# Patient Record
Sex: FEMALE | Race: WHITE | Employment: UNEMPLOYED | URBAN - METROPOLITAN AREA
[De-identification: names, ages, dates, MRNs, and addresses within clinical notes are randomized per-mention and may not be internally consistent; named-entity substitution may affect disease eponyms.]

---

## 2019-05-08 ENCOUNTER — ANESTHESIA (OUTPATIENT)
Dept: OPERATING ROOM | Age: 49
End: 2019-05-08
Payer: COMMERCIAL

## 2019-05-08 ENCOUNTER — HOSPITAL ENCOUNTER (OUTPATIENT)
Age: 49
Setting detail: OUTPATIENT SURGERY
Discharge: HOME | End: 2019-05-08
Attending: ORTHOPAEDIC SURGERY | Admitting: ORTHOPAEDIC SURGERY
Payer: COMMERCIAL

## 2019-05-08 ENCOUNTER — HOSPITAL ENCOUNTER (OUTPATIENT)
Dept: GENERAL RADIOLOGY | Age: 49
Setting detail: OUTPATIENT SURGERY
Discharge: HOME | End: 2019-05-10
Attending: ORTHOPAEDIC SURGERY
Payer: COMMERCIAL

## 2019-05-08 ENCOUNTER — ANESTHESIA EVENT (OUTPATIENT)
Dept: OPERATING ROOM | Age: 49
End: 2019-05-08
Payer: COMMERCIAL

## 2019-05-08 VITALS
TEMPERATURE: 96.4 F | SYSTOLIC BLOOD PRESSURE: 130 MMHG | OXYGEN SATURATION: 100 % | DIASTOLIC BLOOD PRESSURE: 65 MMHG | RESPIRATION RATE: 12 BRPM

## 2019-05-08 VITALS
HEART RATE: 78 BPM | RESPIRATION RATE: 20 BRPM | OXYGEN SATURATION: 98 % | TEMPERATURE: 97.3 F | HEIGHT: 62 IN | WEIGHT: 144 LBS | SYSTOLIC BLOOD PRESSURE: 122 MMHG | DIASTOLIC BLOOD PRESSURE: 66 MMHG | BODY MASS INDEX: 26.5 KG/M2

## 2019-05-08 DIAGNOSIS — R52 PAIN: ICD-10-CM

## 2019-05-08 LAB
ANION GAP SERPL CALCULATED.3IONS-SCNC: 13 MEQ/L (ref 9–15)
BASOPHILS ABSOLUTE: 0 K/UL (ref 0–0.2)
BASOPHILS RELATIVE PERCENT: 0.6 %
BUN BLDV-MCNC: 32 MG/DL (ref 6–20)
CALCIUM SERPL-MCNC: 9 MG/DL (ref 8.5–9.9)
CHLORIDE BLD-SCNC: 105 MEQ/L (ref 95–107)
CO2: 23 MEQ/L (ref 20–31)
CREAT SERPL-MCNC: 0.85 MG/DL (ref 0.5–0.9)
EKG ATRIAL RATE: 83 BPM
EKG P AXIS: 27 DEGREES
EKG P-R INTERVAL: 170 MS
EKG Q-T INTERVAL: 392 MS
EKG QRS DURATION: 78 MS
EKG QTC CALCULATION (BAZETT): 460 MS
EKG R AXIS: 27 DEGREES
EKG T AXIS: 51 DEGREES
EKG VENTRICULAR RATE: 83 BPM
EOSINOPHILS ABSOLUTE: 0.2 K/UL (ref 0–0.7)
EOSINOPHILS RELATIVE PERCENT: 3.8 %
GFR AFRICAN AMERICAN: >60
GFR NON-AFRICAN AMERICAN: >60
GLUCOSE BLD-MCNC: 235 MG/DL (ref 70–99)
GLUCOSE BLD-MCNC: 240 MG/DL (ref 60–115)
GLUCOSE BLD-MCNC: 246 MG/DL (ref 60–115)
HCT VFR BLD CALC: 34.5 % (ref 37–47)
HEMOGLOBIN: 11.4 G/DL (ref 12–16)
LYMPHOCYTES ABSOLUTE: 1.7 K/UL (ref 1–4.8)
LYMPHOCYTES RELATIVE PERCENT: 37.8 %
MCH RBC QN AUTO: 31.3 PG (ref 27–31.3)
MCHC RBC AUTO-ENTMCNC: 33.1 % (ref 33–37)
MCV RBC AUTO: 94.5 FL (ref 82–100)
MONOCYTES ABSOLUTE: 0.2 K/UL (ref 0.2–0.8)
MONOCYTES RELATIVE PERCENT: 5.1 %
NEUTROPHILS ABSOLUTE: 2.4 K/UL (ref 1.4–6.5)
NEUTROPHILS RELATIVE PERCENT: 52.7 %
PDW BLD-RTO: 12.9 % (ref 11.5–14.5)
PERFORMED ON: ABNORMAL
PERFORMED ON: ABNORMAL
PLATELET # BLD: 240 K/UL (ref 130–400)
POTASSIUM SERPL-SCNC: 4.6 MEQ/L (ref 3.4–4.9)
RBC # BLD: 3.65 M/UL (ref 4.2–5.4)
SODIUM BLD-SCNC: 141 MEQ/L (ref 135–144)
WBC # BLD: 4.5 K/UL (ref 4.8–10.8)

## 2019-05-08 PROCEDURE — 3700000001 HC ADD 15 MINUTES (ANESTHESIA): Performed by: ORTHOPAEDIC SURGERY

## 2019-05-08 PROCEDURE — 64415 NJX AA&/STRD BRCH PLXS IMG: CPT | Performed by: ANESTHESIOLOGY

## 2019-05-08 PROCEDURE — 6360000002 HC RX W HCPCS: Performed by: ANESTHESIOLOGY

## 2019-05-08 PROCEDURE — 2580000003 HC RX 258: Performed by: ORTHOPAEDIC SURGERY

## 2019-05-08 PROCEDURE — 2709999900 HC NON-CHARGEABLE SUPPLY: Performed by: ORTHOPAEDIC SURGERY

## 2019-05-08 PROCEDURE — 7100000001 HC PACU RECOVERY - ADDTL 15 MIN: Performed by: ORTHOPAEDIC SURGERY

## 2019-05-08 PROCEDURE — 93005 ELECTROCARDIOGRAM TRACING: CPT

## 2019-05-08 PROCEDURE — 3600000013 HC SURGERY LEVEL 3 ADDTL 15MIN: Performed by: ORTHOPAEDIC SURGERY

## 2019-05-08 PROCEDURE — 3209999900 FLUORO FOR SURGICAL PROCEDURES

## 2019-05-08 PROCEDURE — C1713 ANCHOR/SCREW BN/BN,TIS/BN: HCPCS | Performed by: ORTHOPAEDIC SURGERY

## 2019-05-08 PROCEDURE — 80048 BASIC METABOLIC PNL TOTAL CA: CPT

## 2019-05-08 PROCEDURE — 85025 COMPLETE CBC W/AUTO DIFF WBC: CPT

## 2019-05-08 PROCEDURE — 3700000000 HC ANESTHESIA ATTENDED CARE: Performed by: ORTHOPAEDIC SURGERY

## 2019-05-08 PROCEDURE — 2720000010 HC SURG SUPPLY STERILE: Performed by: ORTHOPAEDIC SURGERY

## 2019-05-08 PROCEDURE — 6360000002 HC RX W HCPCS: Performed by: ORTHOPAEDIC SURGERY

## 2019-05-08 PROCEDURE — 6360000002 HC RX W HCPCS: Performed by: NURSE ANESTHETIST, CERTIFIED REGISTERED

## 2019-05-08 PROCEDURE — 3600000003 HC SURGERY LEVEL 3 BASE: Performed by: ORTHOPAEDIC SURGERY

## 2019-05-08 PROCEDURE — 7100000010 HC PHASE II RECOVERY - FIRST 15 MIN: Performed by: ORTHOPAEDIC SURGERY

## 2019-05-08 PROCEDURE — 7100000000 HC PACU RECOVERY - FIRST 15 MIN: Performed by: ORTHOPAEDIC SURGERY

## 2019-05-08 PROCEDURE — 2500000003 HC RX 250 WO HCPCS: Performed by: NURSE ANESTHETIST, CERTIFIED REGISTERED

## 2019-05-08 PROCEDURE — 7100000011 HC PHASE II RECOVERY - ADDTL 15 MIN: Performed by: ORTHOPAEDIC SURGERY

## 2019-05-08 DEVICE — PLATE BNE L45MM 6X2 H L VOLAR DST RAD S STL VAR ANG LOK: Type: IMPLANTABLE DEVICE | Site: WRIST | Status: FUNCTIONAL

## 2019-05-08 DEVICE — SCREW BNE L12MM DIA24MM CORT S STL ST T8 STARDRV RECESS: Type: IMPLANTABLE DEVICE | Site: WRIST | Status: FUNCTIONAL

## 2019-05-08 DEVICE — SCREW BNE L18MM DIA2.4MM S STL ST LOK FULL THRD T8 STARDRV: Type: IMPLANTABLE DEVICE | Site: WRIST | Status: FUNCTIONAL

## 2019-05-08 DEVICE — SCREW BNE L16MM DIA2.4MM S STL ST LOK FULL THRD T8 STARDRV: Type: IMPLANTABLE DEVICE | Site: WRIST | Status: FUNCTIONAL

## 2019-05-08 RX ORDER — PREDNISOLONE ACETATE 10 MG/ML
2 SUSPENSION/ DROPS OPHTHALMIC
COMMUNITY
Start: 2019-01-15

## 2019-05-08 RX ORDER — METOCLOPRAMIDE HYDROCHLORIDE 5 MG/ML
10 INJECTION INTRAMUSCULAR; INTRAVENOUS
Status: DISCONTINUED | OUTPATIENT
Start: 2019-05-08 | End: 2019-05-08 | Stop reason: HOSPADM

## 2019-05-08 RX ORDER — FENTANYL CITRATE 50 UG/ML
INJECTION, SOLUTION INTRAMUSCULAR; INTRAVENOUS PRN
Status: DISCONTINUED | OUTPATIENT
Start: 2019-05-08 | End: 2019-05-08 | Stop reason: SDUPTHER

## 2019-05-08 RX ORDER — LOPERAMIDE HYDROCHLORIDE 2 MG/1
2 CAPSULE ORAL
COMMUNITY
Start: 2018-05-07

## 2019-05-08 RX ORDER — LIDOCAINE HYDROCHLORIDE 10 MG/ML
1 INJECTION, SOLUTION EPIDURAL; INFILTRATION; INTRACAUDAL; PERINEURAL
Status: DISCONTINUED | OUTPATIENT
Start: 2019-05-08 | End: 2019-05-08 | Stop reason: HOSPADM

## 2019-05-08 RX ORDER — METOCLOPRAMIDE 10 MG/1
10 TABLET ORAL
COMMUNITY

## 2019-05-08 RX ORDER — LEVETIRACETAM 100 MG/ML
1000 SOLUTION ORAL
COMMUNITY
Start: 2018-10-29

## 2019-05-08 RX ORDER — MAGNESIUM HYDROXIDE 1200 MG/15ML
LIQUID ORAL CONTINUOUS PRN
Status: COMPLETED | OUTPATIENT
Start: 2019-05-08 | End: 2019-05-08

## 2019-05-08 RX ORDER — ROPIVACAINE HYDROCHLORIDE 5 MG/ML
INJECTION, SOLUTION EPIDURAL; INFILTRATION; PERINEURAL PRN
Status: DISCONTINUED | OUTPATIENT
Start: 2019-05-08 | End: 2019-05-08 | Stop reason: SDUPTHER

## 2019-05-08 RX ORDER — PROPOFOL 10 MG/ML
INJECTION, EMULSION INTRAVENOUS PRN
Status: DISCONTINUED | OUTPATIENT
Start: 2019-05-08 | End: 2019-05-08 | Stop reason: SDUPTHER

## 2019-05-08 RX ORDER — INSULIN GLARGINE 100 [IU]/ML
30 INJECTION, SOLUTION SUBCUTANEOUS
COMMUNITY
Start: 2018-06-23

## 2019-05-08 RX ORDER — SODIUM CHLORIDE, SODIUM LACTATE, POTASSIUM CHLORIDE, CALCIUM CHLORIDE 600; 310; 30; 20 MG/100ML; MG/100ML; MG/100ML; MG/100ML
INJECTION, SOLUTION INTRAVENOUS CONTINUOUS
Status: DISCONTINUED | OUTPATIENT
Start: 2019-05-08 | End: 2019-05-08 | Stop reason: HOSPADM

## 2019-05-08 RX ORDER — DIPHENHYDRAMINE HYDROCHLORIDE 50 MG/ML
12.5 INJECTION INTRAMUSCULAR; INTRAVENOUS
Status: DISCONTINUED | OUTPATIENT
Start: 2019-05-08 | End: 2019-05-08 | Stop reason: HOSPADM

## 2019-05-08 RX ORDER — ONDANSETRON 2 MG/ML
4 INJECTION INTRAMUSCULAR; INTRAVENOUS
Status: DISCONTINUED | OUTPATIENT
Start: 2019-05-08 | End: 2019-05-08 | Stop reason: HOSPADM

## 2019-05-08 RX ORDER — POLYETHYLENE GLYCOL 3350 17 G/17G
17 POWDER, FOR SOLUTION ORAL
COMMUNITY
Start: 2018-03-28

## 2019-05-08 RX ORDER — FENTANYL CITRATE 50 UG/ML
50 INJECTION, SOLUTION INTRAMUSCULAR; INTRAVENOUS EVERY 10 MIN PRN
Status: DISCONTINUED | OUTPATIENT
Start: 2019-05-08 | End: 2019-05-08 | Stop reason: HOSPADM

## 2019-05-08 RX ORDER — SODIUM CHLORIDE 0.9 % (FLUSH) 0.9 %
10 SYRINGE (ML) INJECTION PRN
Status: DISCONTINUED | OUTPATIENT
Start: 2019-05-08 | End: 2019-05-08 | Stop reason: HOSPADM

## 2019-05-08 RX ORDER — ZONISAMIDE 100 MG/1
CAPSULE ORAL
COMMUNITY
Start: 2018-10-29

## 2019-05-08 RX ORDER — HYDROCODONE BITARTRATE AND ACETAMINOPHEN 5; 325 MG/1; MG/1
2 TABLET ORAL PRN
Status: DISCONTINUED | OUTPATIENT
Start: 2019-05-08 | End: 2019-05-08 | Stop reason: HOSPADM

## 2019-05-08 RX ORDER — MIDAZOLAM HYDROCHLORIDE 1 MG/ML
INJECTION INTRAMUSCULAR; INTRAVENOUS PRN
Status: DISCONTINUED | OUTPATIENT
Start: 2019-05-08 | End: 2019-05-08 | Stop reason: SDUPTHER

## 2019-05-08 RX ORDER — FAMOTIDINE 20 MG/1
20 TABLET, FILM COATED ORAL
COMMUNITY
Start: 2019-01-04

## 2019-05-08 RX ORDER — LIDOCAINE HYDROCHLORIDE 20 MG/ML
INJECTION, SOLUTION INFILTRATION; PERINEURAL PRN
Status: DISCONTINUED | OUTPATIENT
Start: 2019-05-08 | End: 2019-05-08 | Stop reason: SDUPTHER

## 2019-05-08 RX ORDER — ONDANSETRON 2 MG/ML
INJECTION INTRAMUSCULAR; INTRAVENOUS PRN
Status: DISCONTINUED | OUTPATIENT
Start: 2019-05-08 | End: 2019-05-08 | Stop reason: SDUPTHER

## 2019-05-08 RX ORDER — HYDROCODONE BITARTRATE AND ACETAMINOPHEN 5; 325 MG/1; MG/1
1 TABLET ORAL PRN
Status: DISCONTINUED | OUTPATIENT
Start: 2019-05-08 | End: 2019-05-08 | Stop reason: HOSPADM

## 2019-05-08 RX ORDER — CLONAZEPAM 0.5 MG/1
TABLET ORAL
COMMUNITY
Start: 2019-04-08 | End: 2019-09-16

## 2019-05-08 RX ORDER — LACOSAMIDE 150 MG/1
300 TABLET ORAL
COMMUNITY
Start: 2018-10-29

## 2019-05-08 RX ORDER — ONDANSETRON HYDROCHLORIDE 4 MG/5ML
4 SOLUTION ORAL
COMMUNITY
Start: 2019-02-06

## 2019-05-08 RX ORDER — MEPERIDINE HYDROCHLORIDE 25 MG/ML
12.5 INJECTION INTRAMUSCULAR; INTRAVENOUS; SUBCUTANEOUS EVERY 5 MIN PRN
Status: DISCONTINUED | OUTPATIENT
Start: 2019-05-08 | End: 2019-05-08 | Stop reason: HOSPADM

## 2019-05-08 RX ORDER — SODIUM CHLORIDE 0.9 % (FLUSH) 0.9 %
10 SYRINGE (ML) INJECTION EVERY 12 HOURS SCHEDULED
Status: DISCONTINUED | OUTPATIENT
Start: 2019-05-08 | End: 2019-05-08 | Stop reason: HOSPADM

## 2019-05-08 RX ORDER — FERROUS SULFATE 325(65) MG
330 TABLET ORAL
COMMUNITY

## 2019-05-08 RX ORDER — OMEPRAZOLE 20 MG/1
20 CAPSULE, DELAYED RELEASE ORAL
COMMUNITY

## 2019-05-08 RX ADMIN — PROPOFOL 150 MG: 10 INJECTION, EMULSION INTRAVENOUS at 11:57

## 2019-05-08 RX ADMIN — PHENYLEPHRINE HYDROCHLORIDE 100 MCG: 10 INJECTION INTRAVENOUS at 12:07

## 2019-05-08 RX ADMIN — ROPIVACAINE HYDROCHLORIDE 40 ML: 5 INJECTION, SOLUTION EPIDURAL; INFILTRATION; PERINEURAL at 11:14

## 2019-05-08 RX ADMIN — PHENYLEPHRINE HYDROCHLORIDE 50 MCG: 10 INJECTION INTRAVENOUS at 12:32

## 2019-05-08 RX ADMIN — PHENYLEPHRINE HYDROCHLORIDE 50 MCG: 10 INJECTION INTRAVENOUS at 12:26

## 2019-05-08 RX ADMIN — PHENYLEPHRINE HYDROCHLORIDE 100 MCG: 10 INJECTION INTRAVENOUS at 12:10

## 2019-05-08 RX ADMIN — LIDOCAINE HYDROCHLORIDE 40 MG: 20 INJECTION, SOLUTION INFILTRATION; PERINEURAL at 11:57

## 2019-05-08 RX ADMIN — FENTANYL CITRATE 100 MCG: 50 INJECTION, SOLUTION INTRAMUSCULAR; INTRAVENOUS at 11:10

## 2019-05-08 RX ADMIN — MIDAZOLAM HYDROCHLORIDE 2 MG: 1 INJECTION, SOLUTION INTRAMUSCULAR; INTRAVENOUS at 11:10

## 2019-05-08 RX ADMIN — Medication 2 G: at 11:57

## 2019-05-08 RX ADMIN — ONDANSETRON 4 MG: 2 INJECTION INTRAMUSCULAR; INTRAVENOUS at 12:48

## 2019-05-08 RX ADMIN — PHENYLEPHRINE HYDROCHLORIDE 50 MCG: 10 INJECTION INTRAVENOUS at 12:37

## 2019-05-08 SDOH — HEALTH STABILITY: MENTAL HEALTH: HOW OFTEN DO YOU HAVE A DRINK CONTAINING ALCOHOL?: NEVER

## 2019-05-08 ASSESSMENT — PULMONARY FUNCTION TESTS
PIF_VALUE: 3
PIF_VALUE: 3
PIF_VALUE: 6
PIF_VALUE: 5
PIF_VALUE: 5
PIF_VALUE: 0
PIF_VALUE: 21
PIF_VALUE: 10
PIF_VALUE: 5
PIF_VALUE: 10
PIF_VALUE: 3
PIF_VALUE: 10
PIF_VALUE: 4
PIF_VALUE: 10
PIF_VALUE: 1
PIF_VALUE: 6
PIF_VALUE: 5
PIF_VALUE: 5
PIF_VALUE: 10
PIF_VALUE: 1
PIF_VALUE: 1
PIF_VALUE: 3
PIF_VALUE: 12
PIF_VALUE: 12
PIF_VALUE: 1
PIF_VALUE: 3
PIF_VALUE: 6
PIF_VALUE: 5
PIF_VALUE: 10
PIF_VALUE: 10
PIF_VALUE: 6
PIF_VALUE: 3
PIF_VALUE: 5
PIF_VALUE: 8
PIF_VALUE: 6
PIF_VALUE: 1
PIF_VALUE: 1
PIF_VALUE: 5
PIF_VALUE: 3
PIF_VALUE: 5
PIF_VALUE: 5
PIF_VALUE: 6
PIF_VALUE: 12
PIF_VALUE: 5
PIF_VALUE: 10
PIF_VALUE: 2
PIF_VALUE: 5
PIF_VALUE: 5
PIF_VALUE: 10
PIF_VALUE: 3
PIF_VALUE: 10
PIF_VALUE: 5
PIF_VALUE: 3
PIF_VALUE: 10
PIF_VALUE: 12
PIF_VALUE: 5
PIF_VALUE: 12
PIF_VALUE: 19
PIF_VALUE: 12
PIF_VALUE: 5
PIF_VALUE: 6
PIF_VALUE: 3
PIF_VALUE: 6
PIF_VALUE: 3
PIF_VALUE: 6
PIF_VALUE: 10
PIF_VALUE: 3

## 2019-05-08 NOTE — ANESTHESIA PRE PROCEDURE
List:  There is no problem list on file for this patient. Past Medical History:        Diagnosis Date    Cerebral artery occlusion with cerebral infarction ([facility]     Diabetes mellitus ([facility]     Epilepsy ([facility]        Past Surgical History:        Procedure Laterality Date    APPENDECTOMY      CHOLECYSTECTOMY      EYE SURGERY      recent detach retina    GASTROSTOMY TUBE PLACEMENT      JOINT REPLACEMENT Right     infant       Social History:    Social History     Tobacco Use    Smoking status: Never Smoker    Smokeless tobacco: Never Used   Substance Use Topics    Alcohol use: Never     Frequency: Never                                Counseling given: Not Answered      Vital Signs (Current):   Vitals:    05/08/19 0916   BP: 118/72   Pulse: 83   Resp: 16   Temp: 97.2 °F (36.2 °C)   TempSrc: Temporal   SpO2: 100%   Weight: 144 lb (65.3 kg)   Height: 5' 2\" (1.575 m)                                              BP Readings from Last 3 Encounters:   05/08/19 118/72       NPO Status: Time of last liquid consumption: 1800                        Time of last solid consumption: 1800                        Date of last liquid consumption: 05/07/19                        Date of last solid food consumption: 05/07/19    BMI:   Wt Readings from Last 3 Encounters:   05/08/19 144 lb (65.3 kg)     Body mass index is 26.34 kg/m². CBC: No results found for: WBC, RBC, HGB, HCT, MCV, RDW, PLT    CMP: No results found for: NA, K, CL, CO2, BUN, CREATININE, GFRAA, AGRATIO, LABGLOM, GLUCOSE, PROT, CALCIUM, BILITOT, ALKPHOS, AST, ALT    POC Tests: No results for input(s): POCGLU, POCNA, POCK, POCCL, POCBUN, POCHEMO, POCHCT in the last 72 hours.     Coags: No results found for: PROTIME, INR, APTT    HCG (If Applicable): No results found for: PREGTESTUR, PREGSERUM, HCG, HCGQUANT     ABGs: No results found for: PHART, PO2ART, QBN5LQV, DQJ9EJC, BEART, J6IKCKWR     Type & Screen (If Applicable):  No results found for: LABABO, [address]    Anesthesia Evaluation  Patient summary reviewed and Nursing notes reviewed no history of anesthetic complications:   Airway: Mallampati: II  TM distance: >3 FB   Neck ROM: full  Mouth opening: > = 3 FB Dental: normal exam         Pulmonary:Negative Pulmonary ROS and normal exam                               Cardiovascular:Negative CV ROS          ECG reviewed                        Neuro/Psych:   (+) seizures:, CVA:,             GI/Hepatic/Renal: Neg GI/Hepatic/Renal ROS            Endo/Other:    (+) Diabetes, . Pt had PAT visit. Abdominal:           Vascular:   + PVD, aortic or cerebral, . Anesthesia Plan      general     ASA 4       Induction: intravenous. MIPS: Prophylactic antiemetics administered. Anesthetic plan and risks discussed with patient. Plan discussed with CRNA.     Attending anesthesiologist reviewed and agrees with Pre Eval content              Maykel Godfrey MD   5/8/2019

## 2019-05-08 NOTE — BRIEF OP NOTE
Brief Postoperative Note  ______________________________________________________________    Patient: Marisol Box  YOB: 1970  MRN: [id number]  Date of Procedure: 5/8/2019    Pre-Op Diagnosis: LEFT DISTAL RADIUS FRACTURE    Post-Op Diagnosis: Same       Procedure(s):  LEFT OPEN REDUCTION INTERNAL FIXATION LEFT DISTAL RADIUS    Anesthesia: General    Surgeon(s):  Annika Allen DO    Assistant: BABAK Weinberg  The physician assistant was present through the entire case. Given the nature of the disease process and the procedure to be performed a skilled surgical assistant was necessary during the case. The assistant was necessary in order to hold retractors and directly assist in the operation. A certified scrub tech was at the back table managing instruments and supplies for the surgical case. Estimated Blood Loss (mL): less than 50     Complications: None    Specimens:   * No specimens in log *    Implants:  Implant Name Type Inv.  Item Serial No.  Lot No. LRB No. Used   PLATE DISTL RAD LIVIER ANGL LT 2.4X45MM Screw/Plate/Nail/Jose PLATE DISTL RAD LIVIER ANGL LT 2.4X45MM  SYNTHES [id number] Left 1   SCREW CORTX SLFTP W/ T8 2.4X12MM Screw/Plate/Nail/Jose SCREW CORTX SLFTP W/ T8 2.4X12MM  SYNTHES  Left 1   SCREW LK SLFTP W/ T8 2.4X16MM Screw/Plate/Nail/Jose SCREW LK SLFTP W/ T8 2.4X16MM  SYNTHES  Left 1   SCREW LK SLFTP W/ T8 2.4X18MM Screw/Plate/Nail/Jose SCREW LK SLFTP W/ T8 2.4X18MM  SYNTHES  Left 3         Drains: * No LDAs found *    Findings: Intra-articular fracture of the left distal radius    FLAQUITA MULLER DO  Date: 5/8/2019  Time: 1:53 PM

## 2019-05-08 NOTE — OP NOTE
Preoperative diagnosis: Left distal radius fracture    Postoperative diagnosis: Intra-articular fracture of the left distal radius     Procedure planned: Open reduction with internal fixation left distal radius fracture    Procedure performed: Open reduction with internal fixation to 3 part intra-articular fracture left distal radius    Surgeon: Jalen Nguyen D.O. Assistant: BABAK Xie  The physician assistant was present through the entire case. Given the nature of the disease process and the procedure to be performed a skilled surgical assistant was necessary during the case. The assistant was necessary in order to hold retractors and directly assist in the operation. A certified scrub tech was at the back table managing instruments and supplies for the surgical case. Anesthesia: Gen. Estimated blood loss: Less than 20 mL    Drains: None    Tourniquet: Approximately 25 minutes at 250 mmHg to the well-padded left upper arm    Specimens: None    Implants: Synthes    Indications: The patient is a pleasant 57-year-old female status post injury to the left wrist.  X-rays showed evidence for displaced unstable pattern of left distal radius fracture. Treatment options were discussed. Recommendations made for operative stabilization. Patient verbalized understanding and agreement plan for care. Informed consent was signed and placed in the chart. She elected to proceed forth with surgery. Complications: None noted at the time of surgery     Description of operation: The patient was taken to the operative suite and placed in the supine position on the operating table. A timeout was performed and the left wrist was confirmed to be the operative site. She was carefully positioned on the table in such a fashion as to pad all bony prominences and peripheral nerves. She was administered appropriate IV antibiotics and general anesthesia. She was prepped and draped in normal sterile fashion.   The tourniquet was raised. A longitudinal incision was made over the distal course of the FCR tendon dissecting down through the skin and subcutaneous plane dividing the tendon sheath both palmarly and dorsally. A finger sweep maneuver was used to clear the FPL muscle belly and fat plane. The pronator quadratus was sharply elevated off of its volar attachment to the distal radius and reflected ulnarly. Direct techniques were used to anatomically reduce the fracture. The temporizing K wires were placed. The appropriate plate was chosen from the Synthes set and coupled to bone in the shaft segment. Targeted locking screws were then used to capture the intra-articular fragments. While manipulating and examining the patient under anesthesia and by way of fluoroscopy it was noted that she had a split within the articular surface giving us a discrete fragment of radial styloid discrete fragment of lunate facet comprising free articular fragments. In the distal segment of the plate to locking screws were placed in the radial styloid segment and 2 were placed in the lunate facet segment. This gave us stable reduction and nice restoration of articular congruence radial height radial inclination and volar tilt. An additional cortical screw was placed in the shaft segment. The wounds were copiously irrigated the tourniquet was relieved and the wounds were closed in a layered fashion. A bulky soft dressing was placed along with short arm plaster splint. The patient was allowed to arise from anesthesia and taken recovery in stable condition. Overall she tolerated the procedure well.        Disposition: Stable to PACU

## 2019-05-08 NOTE — ANESTHESIA POSTPROCEDURE EVALUATION
Department of Anesthesiology  Postprocedure Note    Patient: Miladis Carrasco  MRN: [id number]  [place]: 1970  Date of evaluation: 5/8/2019  Time:  1:18 PM     Procedure Summary     Date:  05/08/19 Room / Location:  [facility] OR  / [facility] OR    Anesthesia Start:  1152 Anesthesia Stop:  4588    Procedure:  LEFT OPEN REDUCTION INTERNAL FIXATION LEFT DISTAL RADIUS (Left Wrist) Diagnosis:  (LEFT DISTAL RADIUS FRACTURE)    Surgeon:  Alka Masters DO Responsible Provider:  Ronda Billings MD    Anesthesia Type:  general ASA Status:  4          Anesthesia Type: general    Osmel Phase I: Osmel Score: 8    Osmel Phase II:      Last vitals: Reviewed and per EMR flowsheets.        Anesthesia Post Evaluation    Patient location during evaluation: bedside  Patient participation: complete - patient participated  Level of consciousness: awake and awake and alert  Pain score: 0  Airway patency: patent  Nausea & Vomiting: no nausea and no vomiting  Complications: no  Cardiovascular status: blood pressure returned to baseline and hemodynamically stable  Respiratory status: acceptable  Hydration status: euvolemic

## 2019-05-09 PROCEDURE — 93010 ELECTROCARDIOGRAM REPORT: CPT | Performed by: INTERNAL MEDICINE

## 2023-02-17 PROBLEM — M79.2 NEUROPATHIC PAIN: Status: ACTIVE | Noted: 2023-02-17

## 2023-02-17 PROBLEM — M79.606 LEG PAIN: Status: ACTIVE | Noted: 2023-02-17

## 2023-02-17 PROBLEM — R32 URINARY INCONTINENCE: Status: ACTIVE | Noted: 2023-02-17

## 2023-02-17 PROBLEM — E11.43 DIABETIC GASTROPARESIS (MULTI): Status: ACTIVE | Noted: 2023-02-17

## 2023-02-17 PROBLEM — L25.9 CONTACT DERMATITIS: Status: ACTIVE | Noted: 2023-02-17

## 2023-02-17 PROBLEM — M25.512 PAIN, JOINT, SHOULDER REGION, LEFT: Status: ACTIVE | Noted: 2023-02-17

## 2023-02-17 PROBLEM — R56.9 SEIZURE (MULTI): Status: ACTIVE | Noted: 2023-02-17

## 2023-02-17 PROBLEM — B37.81 CANDIDA ESOPHAGITIS (MULTI): Status: ACTIVE | Noted: 2023-02-17

## 2023-02-17 PROBLEM — K31.84 DIABETIC GASTROPARESIS (MULTI): Status: ACTIVE | Noted: 2023-02-17

## 2023-02-17 PROBLEM — R06.09 DYSPNEA ON MINIMAL EXERTION: Status: ACTIVE | Noted: 2023-02-17

## 2023-02-17 PROBLEM — E88.A: Status: ACTIVE | Noted: 2023-02-17

## 2023-02-17 PROBLEM — M79.603 ARM PAIN: Status: ACTIVE | Noted: 2023-02-17

## 2023-02-17 PROBLEM — R11.0 DAILY NAUSEA: Status: ACTIVE | Noted: 2023-02-17

## 2023-02-17 PROBLEM — G62.9 PERIPHERAL NEUROPATHY: Status: ACTIVE | Noted: 2023-02-17

## 2023-02-17 PROBLEM — R19.7 DIABETIC DIARRHEA (MULTI): Status: ACTIVE | Noted: 2023-02-17

## 2023-02-17 PROBLEM — E10.9 TYPE 1 DIABETES MELLITUS (MULTI): Status: ACTIVE | Noted: 2023-02-17

## 2023-02-17 PROBLEM — F44.9 CONVERSION DISORDER: Status: ACTIVE | Noted: 2023-02-17

## 2023-02-17 PROBLEM — I69.398 GAIT DISTURBANCE, POST-STROKE: Status: ACTIVE | Noted: 2023-02-17

## 2023-02-17 PROBLEM — E11.69 DIABETIC DIARRHEA (MULTI): Status: ACTIVE | Noted: 2023-02-17

## 2023-02-17 PROBLEM — E78.5 HYPERLIPIDEMIA LDL GOAL <100: Status: ACTIVE | Noted: 2023-02-17

## 2023-02-17 PROBLEM — K22.70 BARRETT'S ESOPHAGUS WITHOUT DYSPLASIA: Status: ACTIVE | Noted: 2023-02-17

## 2023-02-17 PROBLEM — E11.10 DIABETIC KETOACIDOSIS (MULTI): Status: ACTIVE | Noted: 2023-02-17

## 2023-02-17 PROBLEM — J39.8 TRACHEAL STENOSIS: Status: ACTIVE | Noted: 2023-02-17

## 2023-02-17 PROBLEM — M62.81 MUSCLE WEAKNESS: Status: ACTIVE | Noted: 2023-02-17

## 2023-02-17 PROBLEM — R60.9 DEPENDENT EDEMA: Status: ACTIVE | Noted: 2023-02-17

## 2023-02-17 PROBLEM — E11.40: Status: ACTIVE | Noted: 2023-02-17

## 2023-02-17 PROBLEM — M25.519 SHOULDER PAIN: Status: ACTIVE | Noted: 2023-02-17

## 2023-02-17 PROBLEM — E06.3 HASHIMOTO'S THYROIDITIS: Status: ACTIVE | Noted: 2023-02-17

## 2023-02-17 PROBLEM — R26.9 GAIT DISTURBANCE, POST-STROKE: Status: ACTIVE | Noted: 2023-02-17

## 2023-02-17 PROBLEM — R13.14 PHARYNGOESOPHAGEAL DYSPHAGIA: Status: ACTIVE | Noted: 2023-02-17

## 2023-02-17 PROBLEM — I63.9 STROKE (MULTI): Status: ACTIVE | Noted: 2023-02-17

## 2023-02-17 RX ORDER — ATORVASTATIN CALCIUM 40 MG/1
40 TABLET, FILM COATED ORAL
COMMUNITY
Start: 2021-05-13

## 2023-02-17 RX ORDER — LACOSAMIDE 100 MG/1
100 TABLET ORAL 2 TIMES DAILY
COMMUNITY
Start: 2019-08-29 | End: 2024-05-10 | Stop reason: DRUGHIGH

## 2023-02-17 RX ORDER — ZONISAMIDE 100 MG/1
CAPSULE ORAL
COMMUNITY
Start: 2018-05-25

## 2023-02-17 RX ORDER — LOSARTAN POTASSIUM 25 MG/1
1 TABLET ORAL DAILY
COMMUNITY
Start: 2022-08-24 | End: 2023-08-21 | Stop reason: SDUPTHER

## 2023-02-17 RX ORDER — ALBUTEROL SULFATE 90 UG/1
2 AEROSOL, METERED RESPIRATORY (INHALATION)
COMMUNITY
Start: 2017-03-26 | End: 2024-02-28 | Stop reason: WASHOUT

## 2023-02-17 RX ORDER — OMEPRAZOLE 40 MG/1
1 CAPSULE, DELAYED RELEASE ORAL DAILY
COMMUNITY
End: 2023-12-27

## 2023-02-17 RX ORDER — LOPERAMIDE HYDROCHLORIDE 2 MG/1
1 CAPSULE ORAL 2 TIMES DAILY PRN
COMMUNITY
End: 2024-02-28 | Stop reason: WASHOUT

## 2023-02-17 RX ORDER — INSULIN LISPRO 100 [IU]/ML
INJECTION, SOLUTION INTRAVENOUS; SUBCUTANEOUS
COMMUNITY
Start: 2021-11-09 | End: 2024-02-06 | Stop reason: WASHOUT

## 2023-02-17 RX ORDER — ONDANSETRON HYDROCHLORIDE 8 MG/1
8 TABLET, FILM COATED ORAL
COMMUNITY
Start: 2019-05-04 | End: 2024-02-07 | Stop reason: ALTCHOICE

## 2023-02-17 RX ORDER — TRIAMCINOLONE ACETONIDE 1 MG/G
CREAM TOPICAL 2 TIMES DAILY PRN
COMMUNITY
Start: 2022-07-14 | End: 2024-02-28 | Stop reason: WASHOUT

## 2023-02-17 RX ORDER — ASPIRIN 81 MG/1
1 TABLET ORAL DAILY
COMMUNITY
End: 2024-02-28 | Stop reason: WASHOUT

## 2023-02-17 RX ORDER — MONTELUKAST SODIUM 10 MG/1
1 TABLET ORAL NIGHTLY
COMMUNITY
Start: 2021-06-23 | End: 2024-02-28 | Stop reason: WASHOUT

## 2023-02-17 RX ORDER — BLOOD-GLUCOSE METER
EACH MISCELLANEOUS 4 TIMES DAILY
COMMUNITY
End: 2024-02-28 | Stop reason: WASHOUT

## 2023-02-17 RX ORDER — METOCLOPRAMIDE 5 MG/1
TABLET ORAL
COMMUNITY
Start: 2018-05-25 | End: 2023-10-30 | Stop reason: ALTCHOICE

## 2023-02-17 RX ORDER — INSULIN GLARGINE 100 [IU]/ML
INJECTION, SOLUTION SUBCUTANEOUS
COMMUNITY
Start: 2013-04-17 | End: 2023-10-30 | Stop reason: ALTCHOICE

## 2023-02-17 RX ORDER — MOMETASONE FUROATE AND FORMOTEROL FUMARATE DIHYDRATE 200; 5 UG/1; UG/1
AEROSOL RESPIRATORY (INHALATION)
COMMUNITY
Start: 2021-06-23 | End: 2024-02-28 | Stop reason: WASHOUT

## 2023-04-27 ENCOUNTER — OFFICE VISIT (OUTPATIENT)
Dept: PRIMARY CARE | Facility: CLINIC | Age: 53
End: 2023-04-27
Payer: MEDICAID

## 2023-04-27 VITALS
HEART RATE: 90 BPM | OXYGEN SATURATION: 100 % | WEIGHT: 148 LBS | HEIGHT: 62 IN | RESPIRATION RATE: 14 BRPM | SYSTOLIC BLOOD PRESSURE: 110 MMHG | BODY MASS INDEX: 27.23 KG/M2 | DIASTOLIC BLOOD PRESSURE: 70 MMHG

## 2023-04-27 DIAGNOSIS — J39.8 TRACHEAL STENOSIS: Primary | ICD-10-CM

## 2023-04-27 DIAGNOSIS — Z12.31 ENCOUNTER FOR SCREENING MAMMOGRAM FOR BREAST CANCER: ICD-10-CM

## 2023-04-27 DIAGNOSIS — K31.84 GASTROPARESIS: ICD-10-CM

## 2023-04-27 DIAGNOSIS — E10.9 TYPE 1 DIABETES MELLITUS WITHOUT COMPLICATION (MULTI): ICD-10-CM

## 2023-04-27 LAB — POC HEMOGLOBIN A1C: 8.3 % (ref 4.2–6.5)

## 2023-04-27 PROCEDURE — 1036F TOBACCO NON-USER: CPT | Performed by: INTERNAL MEDICINE

## 2023-04-27 PROCEDURE — 3074F SYST BP LT 130 MM HG: CPT | Performed by: INTERNAL MEDICINE

## 2023-04-27 PROCEDURE — 3078F DIAST BP <80 MM HG: CPT | Performed by: INTERNAL MEDICINE

## 2023-04-27 PROCEDURE — 4010F ACE/ARB THERAPY RXD/TAKEN: CPT | Performed by: INTERNAL MEDICINE

## 2023-04-27 PROCEDURE — 83036 HEMOGLOBIN GLYCOSYLATED A1C: CPT | Performed by: INTERNAL MEDICINE

## 2023-04-27 PROCEDURE — 99214 OFFICE O/P EST MOD 30 MIN: CPT | Performed by: INTERNAL MEDICINE

## 2023-04-27 ASSESSMENT — PATIENT HEALTH QUESTIONNAIRE - PHQ9
SUM OF ALL RESPONSES TO PHQ9 QUESTIONS 1 AND 2: 0
2. FEELING DOWN, DEPRESSED OR HOPELESS: NOT AT ALL
1. LITTLE INTEREST OR PLEASURE IN DOING THINGS: NOT AT ALL

## 2023-04-27 NOTE — PATIENT INSTRUCTIONS
SINCE THE VOMITING ISSUE SEEMS TO HAVE RESOLVED FOR THE TIME BEING, OBSERVE,AND IF THE PROBLEM COMES BACK THEN I'D GET TO BE SEEN BY THE GI DOCTORS THAT SAW YOU AND DID THE PROCEDURE ON THE STOMACH    2.  LABS ORDERED TO MONITOR MEDS    3. MAMMOGRAM ORDERED FOR YOU    4.  FOLLOW UP WITH THE ENT SURGEON AS PLANNED FOR REPEAT PULMONARY FUNCTION ASSESSMENT AFTER POST OPERATIVE INFLAMMATION HAS COMPLETELY RESOLVED, THIS WILL AID THE SURGEON IN DETERMINING IF THE OTHER SIDE NEEDS TO BE DONE TO MORE COMPLETELY CORRECT THE OBSTRUCTION.  FROM WHAT I CAN HEAR AND TELL, THERE HAS BEEN A SIGNIFICANT RELIEF OF THE SUBGLOTTIC STENOSIS WITH THE SURGERY YOU HAD    5. FOLLOW UP WITH LAM AUGUST AS PLANNED    6.  FOLLOW UP WITH ME IN 6 MONTHS OR AS NEEDED

## 2023-04-27 NOTE — PROGRESS NOTES
"Subjective   Mattie Wolfe is a 52 y.o. female who presents for follow up   Pt says she has had bouts of vomiting bile ,had a episode of bright red blood in stool     Hx of gastric paresis had procedure 2019 that resolved issue .  Had surgery on throat wont follow up for 6 months told wont have much more improvement still gets SOB   Endo appt not till August     HPI     Had a surgical procedure on her throat UNILATERAL CHORDOTOMY , did improve her breathing but not as much as she was hoping  Follow up ent in 6 months to see if could improve more    Still get sob a lot when walking and even standing, but not wheezing as much, but very glad had surgery    FURTHER SURGICAL INTERVENTION POSSIBILITIES TO BE DETERMINED AFTER 6 MONTHS.  DID HAVE PFT'S POST OP, UNSURE IF ACCURATE OR NOT    AWAIT INFLAMMATION RECEDE AND THEN CONSIDER REPEAT PFT'S AFTER THE FACT.    WHAT BEEN BOTHERING HERE LATELY IS GASTROPARESIS.  IN 2019 HAD PROCEDURE CALLED POP PROCEDURE MADE A HOLE IN STOMACH TO MAKE FOOD MOVE THROUGH FASTER. HAVEN'T HAD AN ISSUE SINCE THEN BUT IN LAST FEW MONTHS HAVE HAD SEVERAL BOUTS OF VOMITING BILE.    THREW UP A LOT OF BILE.  2 WEEKS AGO THROWING UP ALL EVENING, ALMOST WENT TO ER.  DID HAVE A PAIN LEFT UPPER QUADRANT AREA TRANSIENTLY.    ALSO PASSED BRBPR A FEW WEEKS AGO.  NO H/ HEMORRHOIDS, BUT FELT THAT'S WHAT IT WAS.    BILE VOMITING HAS HAPPENED 3 TIMES    SEE NEUROLOGIST REGULARLY NEXT SEPT, NEXT CARD NOV, JUST WENT TO [facility]          Review of Systems   Constitutional:  Negative for chills, diaphoresis and fever.   HENT:          PER HPI   Respiratory:  Negative for cough and shortness of breath.    Cardiovascular:  Negative for chest pain and leg swelling.   Gastrointestinal:  Positive for vomiting. Negative for constipation, diarrhea and nausea.   Musculoskeletal:  Negative for joint swelling and myalgias.       Objective   /70   Pulse 90   Resp 14   Ht 1.575 m (5' 2\")   Wt 67.1 kg (148 lb)   " SpO2 100%   BMI 27.07 kg/m²     Physical Exam  Vitals reviewed.   Constitutional:       General: She is not in acute distress.     Appearance: She is not ill-appearing.   Cardiovascular:      Rate and Rhythm: Normal rate and regular rhythm.      Pulses: Normal pulses.      Heart sounds:      No gallop.   Pulmonary:      Breath sounds: Normal breath sounds. No stridor. No wheezing, rhonchi or rales.      Comments: STRIDOR HAS FOR THE MOST PART RESOLVED AFTER SURGICAL INTERVENTION  Abdominal:      General: Abdomen is flat. Bowel sounds are normal.      Palpations: Abdomen is soft.      Tenderness: There is no guarding or rebound.   Musculoskeletal:      Right lower leg: No edema.      Left lower leg: No edema.         Assessment/Plan   Problem List Items Addressed This Visit          Respiratory    Tracheal stenosis - Primary    Relevant Orders    Follow Up In [facility] - PCP       Endocrine/Metabolic    Type 1 diabetes mellitus (CMS/HCC)    Relevant Orders    POCT glycosylated hemoglobin (Hb A1C) manually resulted    CBC    Comprehensive Metabolic Panel     Other Visit Diagnoses       Encounter for screening mammogram for breast cancer        Relevant Orders    BI mammo bilateral screening tomosynthesis          Patient Instructions    SINCE THE VOMITING ISSUE SEEMS TO HAVE RESOLVED FOR THE TIME BEING, OBSERVE,AND IF THE PROBLEM COMES BACK THEN I'D GET TO BE SEEN BY THE GI DOCTORS THAT SAW YOU AND DID THE PROCEDURE ON THE STOMACH    2.  LABS ORDERED TO MONITOR MEDS    3. MAMMOGRAM ORDERED FOR YOU    4.  FOLLOW UP WITH THE ENT SURGEON AS PLANNED FOR REPEAT PULMONARY FUNCTION ASSESSMENT AFTER POST OPERATIVE INFLAMMATION HAS COMPLETELY RESOLVED, THIS WILL AID THE SURGEON IN DETERMINING IF THE OTHER SIDE NEEDS TO BE DONE TO MORE COMPLETELY CORRECT THE OBSTRUCTION.  FROM WHAT I CAN HEAR AND TELL, THERE HAS BEEN A SIGNIFICANT RELIEF OF THE SUBGLOTTIC STENOSIS WITH THE SURGERY YOU HAD    5. FOLLOW UP WITH ENDO  AUGUST AS PLANNED    6.  FOLLOW UP WITH ME IN 6 MONTHS OR AS NEEDED

## 2023-05-01 ASSESSMENT — ENCOUNTER SYMPTOMS
JOINT SWELLING: 0
COUGH: 0
FEVER: 0
DIAPHORESIS: 0
MYALGIAS: 0
VOMITING: 1
DIARRHEA: 0
CONSTIPATION: 0
NAUSEA: 0
SHORTNESS OF BREATH: 0
CHILLS: 0

## 2023-08-18 ENCOUNTER — TELEPHONE (OUTPATIENT)
Dept: PRIMARY CARE | Facility: CLINIC | Age: 53
End: 2023-08-18
Payer: MEDICAID

## 2023-08-18 NOTE — TELEPHONE ENCOUNTER
[facility] calling in refill request for patient 90 day supply    -losartan (Cozaar) 25 mg tablet 1xday

## 2023-08-21 DIAGNOSIS — I10 HTN (HYPERTENSION), BENIGN: ICD-10-CM

## 2023-08-21 RX ORDER — LOSARTAN POTASSIUM 25 MG/1
25 TABLET ORAL DAILY
Qty: 90 TABLET | Refills: 3 | Status: SHIPPED | OUTPATIENT
Start: 2023-08-21

## 2023-10-06 ENCOUNTER — LAB (OUTPATIENT)
Dept: LAB | Facility: LAB | Age: 53
End: 2023-10-06
Payer: MEDICAID

## 2023-10-06 DIAGNOSIS — E53.1 PYRIDOXINE DEFICIENCY: ICD-10-CM

## 2023-10-06 DIAGNOSIS — H47.293 OTHER OPTIC ATROPHY, BILATERAL: ICD-10-CM

## 2023-10-06 DIAGNOSIS — E53.8 DEFICIENCY OF OTHER SPECIFIED B GROUP VITAMINS: ICD-10-CM

## 2023-10-06 DIAGNOSIS — E53.0 RIBOFLAVIN DEFICIENCY: ICD-10-CM

## 2023-10-06 DIAGNOSIS — E10.9 TYPE 1 DIABETES MELLITUS WITHOUT COMPLICATION (MULTI): ICD-10-CM

## 2023-10-06 DIAGNOSIS — E10.9 TYPE 1 DIABETES MELLITUS WITHOUT COMPLICATIONS (MULTI): Primary | ICD-10-CM

## 2023-10-06 DIAGNOSIS — E51.8 OTHER MANIFESTATIONS OF THIAMINE DEFICIENCY: ICD-10-CM

## 2023-10-06 LAB
CREAT UR-MCNC: 79.6 MG/DL (ref 20–320)
MICROALBUMIN UR-MCNC: 289 MG/L
MICROALBUMIN/CREAT UR: 363.1 UG/MG CREAT

## 2023-10-06 PROCEDURE — 86140 C-REACTIVE PROTEIN: CPT

## 2023-10-06 PROCEDURE — 83036 HEMOGLOBIN GLYCOSYLATED A1C: CPT

## 2023-10-06 PROCEDURE — 85652 RBC SED RATE AUTOMATED: CPT

## 2023-10-06 PROCEDURE — 85027 COMPLETE CBC AUTOMATED: CPT

## 2023-10-06 PROCEDURE — 80053 COMPREHEN METABOLIC PANEL: CPT

## 2023-10-06 PROCEDURE — 80061 LIPID PANEL: CPT

## 2023-10-06 PROCEDURE — 36415 COLL VENOUS BLD VENIPUNCTURE: CPT

## 2023-10-06 PROCEDURE — 82043 UR ALBUMIN QUANTITATIVE: CPT

## 2023-10-06 PROCEDURE — 86780 TREPONEMA PALLIDUM: CPT

## 2023-10-06 PROCEDURE — 82570 ASSAY OF URINE CREATININE: CPT

## 2023-10-06 PROCEDURE — 82746 ASSAY OF FOLIC ACID SERUM: CPT

## 2023-10-07 LAB
ALBUMIN SERPL BCP-MCNC: 3.7 G/DL (ref 3.4–5)
ALP SERPL-CCNC: 390 U/L (ref 33–110)
ALT SERPL W P-5'-P-CCNC: 71 U/L (ref 7–45)
ANION GAP SERPL CALC-SCNC: 14 MMOL/L (ref 10–20)
AST SERPL W P-5'-P-CCNC: 40 U/L (ref 9–39)
BILIRUB SERPL-MCNC: 0.3 MG/DL (ref 0–1.2)
BUN SERPL-MCNC: 25 MG/DL (ref 6–23)
CALCIUM SERPL-MCNC: 9 MG/DL (ref 8.6–10.6)
CHLORIDE SERPL-SCNC: 109 MMOL/L (ref 98–107)
CHOLEST SERPL-MCNC: 145 MG/DL (ref 0–199)
CHOLESTEROL/HDL RATIO: 2.8
CO2 SERPL-SCNC: 21 MMOL/L (ref 21–32)
CREAT SERPL-MCNC: 1.1 MG/DL (ref 0.5–1.05)
CRP SERPL-MCNC: <0.1 MG/DL
ERYTHROCYTE [DISTWIDTH] IN BLOOD BY AUTOMATED COUNT: 13.2 % (ref 11.5–14.5)
ERYTHROCYTE [SEDIMENTATION RATE] IN BLOOD BY WESTERGREN METHOD: 10 MM/H (ref 0–30)
FOLATE SERPL-MCNC: 20.4 NG/ML
GFR SERPL CREATININE-BSD FRML MDRD: 59 ML/MIN/1.73M*2
GLUCOSE SERPL-MCNC: 139 MG/DL (ref 74–99)
HCT VFR BLD AUTO: 35.7 % (ref 36–46)
HDLC SERPL-MCNC: 52.3 MG/DL
HGB BLD-MCNC: 10.9 G/DL (ref 12–16)
LDLC SERPL CALC-MCNC: 77 MG/DL (ref 140–190)
MCH RBC QN AUTO: 30.6 PG (ref 26–34)
MCHC RBC AUTO-ENTMCNC: 30.5 G/DL (ref 32–36)
MCV RBC AUTO: 100 FL (ref 80–100)
NON HDL CHOLESTEROL: 93 MG/DL (ref 0–149)
NRBC BLD-RTO: 0 /100 WBCS (ref 0–0)
PLATELET # BLD AUTO: 181 X10*3/UL (ref 150–450)
PMV BLD AUTO: 11.2 FL (ref 7.5–11.5)
POTASSIUM SERPL-SCNC: 5.1 MMOL/L (ref 3.5–5.3)
PROT SERPL-MCNC: 6.5 G/DL (ref 6.4–8.2)
RBC # BLD AUTO: 3.56 X10*6/UL (ref 4–5.2)
SODIUM SERPL-SCNC: 139 MMOL/L (ref 136–145)
TRIGL SERPL-MCNC: 78 MG/DL (ref 0–149)
VLDL: 16 MG/DL (ref 0–40)
WBC # BLD AUTO: 3.4 X10*3/UL (ref 4.4–11.3)

## 2023-10-08 LAB — T PALLIDUM AB SER QL: NONREACTIVE

## 2023-10-10 ENCOUNTER — TELEPHONE (OUTPATIENT)
Dept: ENDOCRINOLOGY | Facility: CLINIC | Age: 53
End: 2023-10-10
Payer: MEDICAID

## 2023-10-10 LAB
EST. AVERAGE GLUCOSE BLD GHB EST-MCNC: 134 MG/DL
HBA1C MFR BLD: 6.3 %

## 2023-10-10 NOTE — TELEPHONE ENCOUNTER
Mattie left a voice mail asking if we could put in a new order for the A1C that was done on  Monday?  She wanted them to use the same blood.  Please call her to explain [phone]

## 2023-10-17 ENCOUNTER — OFFICE VISIT (OUTPATIENT)
Dept: OPHTHALMOLOGY | Facility: CLINIC | Age: 53
End: 2023-10-17
Payer: MEDICAID

## 2023-10-17 ENCOUNTER — LAB (OUTPATIENT)
Dept: LAB | Facility: LAB | Age: 53
End: 2023-10-17
Payer: MEDICAID

## 2023-10-17 DIAGNOSIS — H53.432 ARCUATE VISUAL FIELD DEFECT OF LEFT EYE: ICD-10-CM

## 2023-10-17 DIAGNOSIS — E53.0 RIBOFLAVIN DEFICIENCY: ICD-10-CM

## 2023-10-17 DIAGNOSIS — E53.1 PYRIDOXINE DEFICIENCY: ICD-10-CM

## 2023-10-17 DIAGNOSIS — E51.8 OTHER MANIFESTATIONS OF THIAMINE DEFICIENCY: Primary | ICD-10-CM

## 2023-10-17 DIAGNOSIS — H53.432 ARCUATE VISUAL FIELD DEFECT OF LEFT EYE: Primary | ICD-10-CM

## 2023-10-17 DIAGNOSIS — H47.20 OPTIC ATROPHY: ICD-10-CM

## 2023-10-17 DIAGNOSIS — H47.293 OTHER OPTIC ATROPHY, BILATERAL: ICD-10-CM

## 2023-10-17 DIAGNOSIS — E53.8 DEFICIENCY OF OTHER SPECIFIED B GROUP VITAMINS: ICD-10-CM

## 2023-10-17 LAB
CREAT SERPL-MCNC: 1.1 MG/DL (ref 0.5–1.05)
GFR SERPL CREATININE-BSD FRML MDRD: 59 ML/MIN/1.73M*2
VIT B12 SERPL-MCNC: 470 PG/ML (ref 211–911)

## 2023-10-17 PROCEDURE — 84590 ASSAY OF VITAMIN A: CPT

## 2023-10-17 PROCEDURE — 84425 ASSAY OF VITAMIN B-1: CPT

## 2023-10-17 PROCEDURE — 82565 ASSAY OF CREATININE: CPT

## 2023-10-17 PROCEDURE — 82164 ANGIOTENSIN I ENZYME TEST: CPT

## 2023-10-17 PROCEDURE — 36415 COLL VENOUS BLD VENIPUNCTURE: CPT

## 2023-10-17 PROCEDURE — 99205 OFFICE O/P NEW HI 60 MIN: CPT | Performed by: PSYCHIATRY & NEUROLOGY

## 2023-10-17 PROCEDURE — 92133 CPTRZD OPH DX IMG PST SGM ON: CPT | Performed by: PSYCHIATRY & NEUROLOGY

## 2023-10-17 PROCEDURE — 84252 ASSAY OF VITAMIN B-2: CPT

## 2023-10-17 PROCEDURE — 84207 ASSAY OF VITAMIN B-6: CPT

## 2023-10-17 PROCEDURE — 86481 TB AG RESPONSE T-CELL SUSP: CPT

## 2023-10-17 PROCEDURE — 82607 VITAMIN B-12: CPT

## 2023-10-17 RX ORDER — FLUTICASONE PROPIONATE 50 MCG
SPRAY, SUSPENSION (ML) NASAL
COMMUNITY
End: 2024-01-16 | Stop reason: SDUPTHER

## 2023-10-17 RX ORDER — KETOCONAZOLE 20 MG/G
1 CREAM TOPICAL 2 TIMES DAILY PRN
COMMUNITY
Start: 2023-09-26 | End: 2024-02-28 | Stop reason: WASHOUT

## 2023-10-17 ASSESSMENT — SLIT LAMP EXAM - LIDS
COMMENTS: NORMAL
COMMENTS: NORMAL

## 2023-10-17 ASSESSMENT — TONOMETRY
OD_IOP_MMHG: 18
OS_IOP_MMHG: 18
IOP_METHOD: GOLDMANN APPLANATION

## 2023-10-17 ASSESSMENT — ENCOUNTER SYMPTOMS
MUSCULOSKELETAL NEGATIVE: 0
ALLERGIC/IMMUNOLOGIC NEGATIVE: 0
GASTROINTESTINAL NEGATIVE: 0
PSYCHIATRIC NEGATIVE: 0
RESPIRATORY NEGATIVE: 0
HEMATOLOGIC/LYMPHATIC NEGATIVE: 0
EYES NEGATIVE: 1
CONSTITUTIONAL NEGATIVE: 0
NEUROLOGICAL NEGATIVE: 0
CARDIOVASCULAR NEGATIVE: 0
ENDOCRINE NEGATIVE: 0

## 2023-10-17 ASSESSMENT — EXTERNAL EXAM - RIGHT EYE: OD_EXAM: NORMAL

## 2023-10-17 ASSESSMENT — VISUAL ACUITY
OS_SC: 20/40
OS_SC+: +2
OD_SC: NLP
METHOD: SNELLEN - LINEAR

## 2023-10-17 ASSESSMENT — CUP TO DISC RATIO: OS_RATIO: 0.2

## 2023-10-17 ASSESSMENT — CONF VISUAL FIELD
OD_SUPERIOR_TEMPORAL_RESTRICTION: 1
OS_SUPERIOR_TEMPORAL_RESTRICTION: 3
OD_INFERIOR_TEMPORAL_RESTRICTION: 1
OD_INFERIOR_NASAL_RESTRICTION: 1
OS_INFERIOR_TEMPORAL_RESTRICTION: 3
OD_SUPERIOR_NASAL_RESTRICTION: 1

## 2023-10-17 ASSESSMENT — EXTERNAL EXAM - LEFT EYE: OS_EXAM: NORMAL

## 2023-10-17 NOTE — LETTER
October 17, 2023     Kamilah Corona MD  [id number] [facility] [id number]    Patient: Mattie Wolfe   YOB: 1970   Date of Visit: 10/17/2023     Dear Dr. Kamilah Corona MD:    I am writing to share my findings regarding our shared patient Mattie Wolfe from her visit with me on 10/17/2023.    HPI    Last A1c: 6.3% on 10/13/2023  Pt reports having a hard time with colors and gets injections from retina specialist  -- Attending history below --  This 53 year-old woman with a history of DM1 complicated by diabetic retinopathy with bilateral retinal detachments, ongoing treatment for left macular edema, stroke 2018, epilepsy, HLD, Hashimoto thyroiditis presents for evaluation of left eye vision loss.      She has seen ophthalmologist Dr. Kamilah Corona and retina specialist Dr. Anil Salamanca. She has a complex history as above including retinal detachment.    She is bothered by poor vision when she enters a bright room or goes outside. She also has trouble distinguishing colors. It has become worse in the past year and even worse in the past 6 months. She denies transient vision loss episodes recently.    Last edited by Flakito Faulkner MD PhD on 10/17/2023  9:35 AM.        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    Lab Results   Component Value Date/Time    SEDRATE 10 10/06/2023 1117    CRP <0.10 10/06/2023 1117      10/06/2023 folate wnl. Syphilis non-reactive (NR).    10/17/2023 OCT RNFL OD NLP & OS 58 with S & I & borderline T & N thinning.    06/18/2023 HVF 30-2 OD no light perception (NLP) & OS superior altitudinal & infeiror arcuate MD -11.81.  12/13/2022 HVF 30-2 OD unable & OS FP  17%, FN 19%, inferior & superior arcuate MD -8.20. (hard to read)  05/14/2023 HVF 30-2  OS unreliable MD -5.43.  Prior HVFs.    This 53 year-old woman with a history of DM1 complicated by diabetic retinopathy with bilateral retinal detachments, ongoing treatment for left macular edema, stroke 2018, epilepsy, HLD, Hashimoto thyroiditis presents for evaluation of left eye vision loss.    She has decreased vision of the left eye including mildly reduced central vision and color vision. Problems are somewhat suspicious for nutritional optic neuropathy in addition to known macular edema. The differential of optic atrophy is broad including compressive lesions, nutritional deficiencies, chronic/latent infections. I will test for these possibilties. I also will follow over time to try to establish chronicity.     Plan    Check vitamin A, B12, thiamine, T-SPOT & ACE.  Check MRI orbits with contrast.    Follow up in 1-2 months with HVF & OCT. (Dilated 10/17/2023)      Below you will find my full examination. I appreciate the opportunity to see Mattie Wolfe today and to share in her care with you. Please contact me if you have questions for me regarding this visit or if I can be of assistance to another of your patients with neuro-ophthalmological problems.    Sincerely,    Flakito Faulkner MD PhD    CC:   Anil Salamanca MD      Base Eye Exam       Visual Acuity (Snellen - Linear)         Right Left    Dist sc NLP 20/40 +2    Dist ph sc  NI              Tonometry (Goldmann Applanation, 8:39 AM)         Right Left    Pressure 18 18              Pupils         Dark Light Shape React APD    Right 3 3 Round none NATALEE    Left 3 3 Round none NATALEE              Visual Fields         Left Right    Restrictions Partial outer superior temporal, inferior temporal deficiencies Total superior temporal, inferior temporal, superior nasal, inferior nasal deficiencies   Pt kept looking at fingers- may have some constriction nasally OS              Extraocular Movement         Right Left     Full Full   Small angle exotropia              Neuro/Psych       Oriented x3: Yes    Mood/Affect: Normal              Dilation       Both eyes: 1% Mydriacyl & 2.5% Harinder  @ 9:41 AM                  Additional Tests       Color         Right Left    Ishihara NLP 4/11                  Slit Lamp and Fundus Exam       External Exam         Right Left    External Normal Normal              Slit Lamp Exam         Right Left    Lids/Lashes Normal Normal    Conjunctiva/Sclera White and quiet White and quiet    Cornea Clear Clear    Anterior Chamber Deep and quiet Deep and quiet    Iris Round and reactive Round and reactive    Lens Posterior chamber intraocular lens 1+ Nuclear sclerosis    Anterior Vitreous Normal Normal              Fundus Exam         Right Left    Disc obscured Pallor    C/D Ratio obscured by tractional membrane 0.2    Macula flat Retinal pigment epithelial mottling    Vessels attenuated Normal    Periphery extensive PRP extensive PRP scars

## 2023-10-19 LAB
NIL(NEG) CONTROL SPOT COUNT: NORMAL
PANEL A SPOT COUNT: 0
PANEL B SPOT COUNT: 0
POS CONTROL SPOT COUNT: NORMAL
T-SPOT. TB INTERPRETATION: NEGATIVE

## 2023-10-20 LAB
ACE SERPL-CCNC: 69 U/L (ref 16–85)
ANNOTATION COMMENT IMP: NORMAL
RETINYL PALMITATE SERPL-MCNC: <0.02 MG/L (ref 0–0.1)
VIT A SERPL-MCNC: 0.75 MG/L (ref 0.3–1.2)

## 2023-10-20 PROCEDURE — 87186 SC STD MICRODIL/AGAR DIL: CPT

## 2023-10-20 PROCEDURE — 87086 URINE CULTURE/COLONY COUNT: CPT

## 2023-10-21 ENCOUNTER — LAB REQUISITION (OUTPATIENT)
Dept: LAB | Facility: HOSPITAL | Age: 53
End: 2023-10-21
Payer: MEDICAID

## 2023-10-21 DIAGNOSIS — N39.0 URINARY TRACT INFECTION, SITE NOT SPECIFIED: ICD-10-CM

## 2023-10-21 LAB — PYRIDOXAL PHOS SERPL-SCNC: 11.8 NMOL/L (ref 20–125)

## 2023-10-22 LAB — VIT B1 PYROPHOSHATE BLD-SCNC: 135 NMOL/L (ref 70–180)

## 2023-10-23 LAB — BACTERIA UR CULT: ABNORMAL

## 2023-10-24 LAB — VIT B2 SERPL-SCNC: 12 NMOL/L (ref 5–50)

## 2023-10-30 ENCOUNTER — OFFICE VISIT (OUTPATIENT)
Dept: PRIMARY CARE | Facility: CLINIC | Age: 53
End: 2023-10-30
Payer: MEDICAID

## 2023-10-30 VITALS
SYSTOLIC BLOOD PRESSURE: 118 MMHG | RESPIRATION RATE: 16 BRPM | WEIGHT: 146 LBS | BODY MASS INDEX: 26.87 KG/M2 | HEIGHT: 62 IN | OXYGEN SATURATION: 100 % | DIASTOLIC BLOOD PRESSURE: 78 MMHG | HEART RATE: 85 BPM

## 2023-10-30 DIAGNOSIS — J39.8 TRACHEAL STENOSIS: ICD-10-CM

## 2023-10-30 DIAGNOSIS — Z00.00 PREVENTATIVE HEALTH CARE: ICD-10-CM

## 2023-10-30 DIAGNOSIS — Z23 NEED FOR INFLUENZA VACCINATION: ICD-10-CM

## 2023-10-30 DIAGNOSIS — H91.93 BILATERAL HEARING LOSS, UNSPECIFIED HEARING LOSS TYPE: ICD-10-CM

## 2023-10-30 DIAGNOSIS — H91.90 HEARING LOSS, UNSPECIFIED HEARING LOSS TYPE, UNSPECIFIED LATERALITY: Primary | ICD-10-CM

## 2023-10-30 PROCEDURE — 3048F LDL-C <100 MG/DL: CPT | Performed by: INTERNAL MEDICINE

## 2023-10-30 PROCEDURE — 90471 IMMUNIZATION ADMIN: CPT | Performed by: INTERNAL MEDICINE

## 2023-10-30 PROCEDURE — 3078F DIAST BP <80 MM HG: CPT | Performed by: INTERNAL MEDICINE

## 2023-10-30 PROCEDURE — 90686 IIV4 VACC NO PRSV 0.5 ML IM: CPT | Performed by: INTERNAL MEDICINE

## 2023-10-30 PROCEDURE — 4010F ACE/ARB THERAPY RXD/TAKEN: CPT | Performed by: INTERNAL MEDICINE

## 2023-10-30 PROCEDURE — 99214 OFFICE O/P EST MOD 30 MIN: CPT | Performed by: INTERNAL MEDICINE

## 2023-10-30 PROCEDURE — 3044F HG A1C LEVEL LT 7.0%: CPT | Performed by: INTERNAL MEDICINE

## 2023-10-30 PROCEDURE — 1036F TOBACCO NON-USER: CPT | Performed by: INTERNAL MEDICINE

## 2023-10-30 PROCEDURE — 3060F POS MICROALBUMINURIA REV: CPT | Performed by: INTERNAL MEDICINE

## 2023-10-30 PROCEDURE — 3074F SYST BP LT 130 MM HG: CPT | Performed by: INTERNAL MEDICINE

## 2023-10-30 NOTE — PATIENT INSTRUCTIONS
WILL HOLD ON REPEAT LABS FOR MINOR LIVER ENZYME ELEVATION GIVEN DIFFICULTY OF BLOOD DRAWS    2.  WILL REPEAT AT NEXT VISIT IN 6 MONTHS    3.  FLU SHOT TODAY    4.  PLEASE CALL IF REFILLS ARE NEEDED    5.  ONGOING FOLLOW UP WITH DR. SWARTZ    6.  FOLLOW UP 6 MONTHS    7.  REFERRAL DR. ALBERTO GYN FOR ROUTINE    8.  REFERRAL [facility]

## 2023-10-30 NOTE — PROGRESS NOTES
"Subjective   Mattie Wolfe is a 53 y.o. female who presents for FOLLOW UP   6.1 %A1C WITH     WOULD LIKE FLU SHOT TODAY       HPI   BROKE LEG ON 7/3, HAD REPAIRED, IN PT NOW    HAD A SCOPE DONE 10/13, HAD BLOODWORK DONE    HAD A DIFFICULT IV PLACEMENT, ALSO TROUBLE WITH DRAWING BLOOD    A1C 6.3% NOW ON INSULIN PUMP    SEE CARDIOLOGIST 12/1/23    SEEING AN OPTICAL NEUROLOGIST, MRI'S ARE PLANNED ALONG WITH FOLLOW UP    BREATHING HAS BEEN BETTER WITH UNILATERAL CHORD SCAR LASER REMOVAL, CONTEMOPLATING WHETHER SHE WANTS THE OTHER SIDE DONE    NEUROLOGIST RECOMMENDED SHE ALSO HAVE HEARING CHECKED, ADMITS TO HEARING LOSS    Review of Systems   Constitutional:  Negative for chills, diaphoresis and fever.   Respiratory:  Negative for cough and shortness of breath.    Cardiovascular:  Negative for chest pain and leg swelling.   Gastrointestinal:  Negative for constipation, diarrhea, nausea and vomiting.   Musculoskeletal:  Negative for joint swelling and myalgias.       Objective   /78   Pulse 85   Resp 16   Ht 1.575 m (5' 2\")   Wt 66.2 kg (146 lb)   SpO2 100%   BMI 26.70 kg/m²     Physical Exam  Vitals reviewed.   Constitutional:       General: She is not in acute distress.     Appearance: She is not ill-appearing.   HENT:      Right Ear: Tympanic membrane and external ear normal. There is no impacted cerumen.      Left Ear: Tympanic membrane and external ear normal. There is no impacted cerumen.   Cardiovascular:      Rate and Rhythm: Normal rate and regular rhythm.      Pulses: Normal pulses.      Heart sounds:      No gallop.   Pulmonary:      Breath sounds: Normal breath sounds. No wheezing, rhonchi or rales.      Comments: MARKEDLY REDUCED STRIDOROUS NECK SOUNDS THAN BEFORE  Abdominal:      General: Abdomen is flat. Bowel sounds are normal.      Palpations: Abdomen is soft.      Tenderness: There is no guarding or rebound.   Musculoskeletal:      Right lower leg: No edema.      Left lower leg: No " edema.         Assessment/Plan   Problem List Items Addressed This Visit       Tracheal stenosis    Bilateral hearing loss     Other Visit Diagnoses       Hearing loss, unspecified hearing loss type, unspecified laterality    -  Primary    Relevant Orders    Referral to Audiology    Preventative health care        Relevant Orders    Referral to Gynecology    Need for influenza vaccination        Relevant Orders    Flu vaccine (IIV4) age 6 months and greater, preservative free (Completed)          Patient Instructions    WILL HOLD ON REPEAT LABS FOR MINOR LIVER ENZYME ELEVATION GIVEN DIFFICULTY OF BLOOD DRAWS    2.  WILL REPEAT AT NEXT VISIT IN 6 MONTHS    3.  FLU SHOT TODAY    4.  PLEASE CALL IF REFILLS ARE NEEDED    5.  ONGOING FOLLOW UP WITH DR. SWARTZ    6.  FOLLOW UP 6 MONTHS    7.  REFERRAL DR. ALBERTO GYN FOR ROUTINE    8.  REFERRAL [facility]

## 2023-11-01 PROBLEM — H91.93 BILATERAL HEARING LOSS: Status: ACTIVE | Noted: 2023-11-01

## 2023-11-01 ASSESSMENT — ENCOUNTER SYMPTOMS
DIARRHEA: 0
FEVER: 0
COUGH: 0
NAUSEA: 0
VOMITING: 0
CHILLS: 0
CONSTIPATION: 0
MYALGIAS: 0
SHORTNESS OF BREATH: 0
DIAPHORESIS: 0
JOINT SWELLING: 0

## 2023-11-09 ENCOUNTER — OFFICE VISIT (OUTPATIENT)
Dept: ENDOCRINOLOGY | Facility: CLINIC | Age: 53
End: 2023-11-09
Payer: MEDICAID

## 2023-11-09 VITALS — DIASTOLIC BLOOD PRESSURE: 76 MMHG | WEIGHT: 144.4 LBS | BODY MASS INDEX: 26.41 KG/M2 | SYSTOLIC BLOOD PRESSURE: 124 MMHG

## 2023-11-09 DIAGNOSIS — Z97.8 USES SELF-APPLIED CONTINUOUS GLUCOSE MONITORING DEVICE: ICD-10-CM

## 2023-11-09 DIAGNOSIS — Z96.41 INSULIN PUMP IN PLACE: ICD-10-CM

## 2023-11-09 DIAGNOSIS — E10.9 TYPE 1 DIABETES MELLITUS WITHOUT COMPLICATION (MULTI): Primary | ICD-10-CM

## 2023-11-09 LAB — POC FINGERSTICK BLOOD GLUCOSE: 184 MG/DL (ref 70–100)

## 2023-11-09 PROCEDURE — 99214 OFFICE O/P EST MOD 30 MIN: CPT | Performed by: STUDENT IN AN ORGANIZED HEALTH CARE EDUCATION/TRAINING PROGRAM

## 2023-11-09 PROCEDURE — 1036F TOBACCO NON-USER: CPT | Performed by: STUDENT IN AN ORGANIZED HEALTH CARE EDUCATION/TRAINING PROGRAM

## 2023-11-09 PROCEDURE — 3044F HG A1C LEVEL LT 7.0%: CPT | Performed by: STUDENT IN AN ORGANIZED HEALTH CARE EDUCATION/TRAINING PROGRAM

## 2023-11-09 PROCEDURE — 3060F POS MICROALBUMINURIA REV: CPT | Performed by: STUDENT IN AN ORGANIZED HEALTH CARE EDUCATION/TRAINING PROGRAM

## 2023-11-09 PROCEDURE — 3048F LDL-C <100 MG/DL: CPT | Performed by: STUDENT IN AN ORGANIZED HEALTH CARE EDUCATION/TRAINING PROGRAM

## 2023-11-09 PROCEDURE — 4010F ACE/ARB THERAPY RXD/TAKEN: CPT | Performed by: STUDENT IN AN ORGANIZED HEALTH CARE EDUCATION/TRAINING PROGRAM

## 2023-11-09 PROCEDURE — 95251 CONT GLUC MNTR ANALYSIS I&R: CPT | Performed by: STUDENT IN AN ORGANIZED HEALTH CARE EDUCATION/TRAINING PROGRAM

## 2023-11-09 PROCEDURE — 3074F SYST BP LT 130 MM HG: CPT | Performed by: STUDENT IN AN ORGANIZED HEALTH CARE EDUCATION/TRAINING PROGRAM

## 2023-11-09 PROCEDURE — 82962 GLUCOSE BLOOD TEST: CPT | Performed by: STUDENT IN AN ORGANIZED HEALTH CARE EDUCATION/TRAINING PROGRAM

## 2023-11-09 PROCEDURE — 3078F DIAST BP <80 MM HG: CPT | Performed by: STUDENT IN AN ORGANIZED HEALTH CARE EDUCATION/TRAINING PROGRAM

## 2023-11-09 NOTE — PROGRESS NOTES
Subjective   Patient ID: Mattie Wolfe is a 53 y.o. female who presents for Diabetes (Patient here for 3 month recheck. Last A1c was 10/6/23 and was 6.3% Too soon for A1c today so I will just do the glucose finger stick. ).  HPI  The patient is a 51 yo female with Type 1 DM diagnosed 14 years ago , CVA 2018 , Rt eye blindness epilepsy present for follow up.       Currently on 780 G with CGM     Smart guard  98 %   Sesnor wear 96 %     she previously followed with Dr. welsh     Previous  meds :  Lantus 28 units daily   humalog 8 units , SS2     Review of Systems     Objective   Physical Exam  Pulmonary:      Effort: Pulmonary effort is normal.   Musculoskeletal:      Comments: Uses a walker    Neurological:      Mental Status: She is alert and oriented to person, place, and time.         Assessment/Plan        Well controlled Type 1 DM with Hba1c  6.3 on  Medtronic 780 G pump automode        DM retinopathy ,with retinal detachment , rt eye blindness follows with Retina specilasit   DM neuropathy , follows with podiatry   DM nephropathy , urine albumin/creat 363 , GFR 59 on ARB   HLD , LDL 77 on Atorvasatin 40 mg      Plan :  Continue current pump settings   Basal 0.9 unit/hr   Carb ratio 1:1 ( will likely change it to 1:2 on follow up to avoid hypoglycemia )   Sensitivity 40  -120  Active Inuslin 2    RTC in 3 months

## 2023-11-16 ENCOUNTER — HOSPITAL ENCOUNTER (OUTPATIENT)
Dept: RADIOLOGY | Facility: CLINIC | Age: 53
Discharge: HOME | End: 2023-11-16
Payer: MEDICAID

## 2023-11-16 DIAGNOSIS — H47.20 OPTIC ATROPHY: ICD-10-CM

## 2023-11-16 DIAGNOSIS — H53.432 ARCUATE VISUAL FIELD DEFECT OF LEFT EYE: ICD-10-CM

## 2023-11-16 PROCEDURE — 70540 MRI ORBIT/FACE/NECK W/O DYE: CPT | Performed by: RADIOLOGY

## 2023-11-16 PROCEDURE — 70540 MRI ORBIT/FACE/NECK W/O DYE: CPT

## 2023-11-16 PROCEDURE — 70551 MRI BRAIN STEM W/O DYE: CPT

## 2023-11-16 PROCEDURE — 70551 MRI BRAIN STEM W/O DYE: CPT | Performed by: RADIOLOGY

## 2023-11-21 ENCOUNTER — OFFICE VISIT (OUTPATIENT)
Dept: OPHTHALMOLOGY | Facility: CLINIC | Age: 53
End: 2023-11-21
Payer: MEDICAID

## 2023-11-21 DIAGNOSIS — H54.7 UNSPECIFIED VISUAL LOSS: Primary | ICD-10-CM

## 2023-11-21 DIAGNOSIS — H47.20 OPTIC ATROPHY: ICD-10-CM

## 2023-11-21 PROCEDURE — 1036F TOBACCO NON-USER: CPT | Performed by: PSYCHIATRY & NEUROLOGY

## 2023-11-21 PROCEDURE — 92133 CPTRZD OPH DX IMG PST SGM ON: CPT | Mod: LEFT SIDE | Performed by: PSYCHIATRY & NEUROLOGY

## 2023-11-21 PROCEDURE — 92083 EXTENDED VISUAL FIELD XM: CPT | Mod: LEFT SIDE | Performed by: PSYCHIATRY & NEUROLOGY

## 2023-11-21 PROCEDURE — 3044F HG A1C LEVEL LT 7.0%: CPT | Performed by: PSYCHIATRY & NEUROLOGY

## 2023-11-21 PROCEDURE — 4010F ACE/ARB THERAPY RXD/TAKEN: CPT | Performed by: PSYCHIATRY & NEUROLOGY

## 2023-11-21 PROCEDURE — 3060F POS MICROALBUMINURIA REV: CPT | Performed by: PSYCHIATRY & NEUROLOGY

## 2023-11-21 PROCEDURE — 3048F LDL-C <100 MG/DL: CPT | Performed by: PSYCHIATRY & NEUROLOGY

## 2023-11-21 PROCEDURE — 99214 OFFICE O/P EST MOD 30 MIN: CPT | Performed by: PSYCHIATRY & NEUROLOGY

## 2023-11-21 ASSESSMENT — VISUAL ACUITY
OD_SC: NLP
OS_SC: 20/40-2
METHOD: SNELLEN - LINEAR

## 2023-11-21 ASSESSMENT — ENCOUNTER SYMPTOMS
ALLERGIC/IMMUNOLOGIC NEGATIVE: 0
ENDOCRINE NEGATIVE: 0
HEMATOLOGIC/LYMPHATIC NEGATIVE: 0
RESPIRATORY NEGATIVE: 0
CARDIOVASCULAR NEGATIVE: 0
MUSCULOSKELETAL NEGATIVE: 0
CONSTITUTIONAL NEGATIVE: 0
NEUROLOGICAL NEGATIVE: 0
EYES NEGATIVE: 1
GASTROINTESTINAL NEGATIVE: 0
PSYCHIATRIC NEGATIVE: 0

## 2023-11-21 ASSESSMENT — CUP TO DISC RATIO: OS_RATIO: 0.2

## 2023-11-21 ASSESSMENT — CONF VISUAL FIELD
OD_INFERIOR_TEMPORAL_RESTRICTION: 1
OD_SUPERIOR_TEMPORAL_RESTRICTION: 1
OS_SUPERIOR_NASAL_RESTRICTION: 3
OS_INFERIOR_TEMPORAL_RESTRICTION: 3
OD_INFERIOR_NASAL_RESTRICTION: 1
METHOD: COUNTING FINGERS
OS_SUPERIOR_TEMPORAL_RESTRICTION: 3
OD_SUPERIOR_NASAL_RESTRICTION: 1
OS_INFERIOR_NASAL_RESTRICTION: 3

## 2023-11-21 ASSESSMENT — TONOMETRY
OS_IOP_MMHG: 12
IOP_METHOD: GOLDMANN APPLANATION
OD_IOP_MMHG: 22

## 2023-11-21 ASSESSMENT — EXTERNAL EXAM - RIGHT EYE: OD_EXAM: NORMAL

## 2023-11-21 ASSESSMENT — SLIT LAMP EXAM - LIDS
COMMENTS: NORMAL
COMMENTS: NORMAL

## 2023-11-21 ASSESSMENT — EXTERNAL EXAM - LEFT EYE: OS_EXAM: NORMAL

## 2023-11-21 NOTE — PROGRESS NOTES
Assessment and Plan    11/16/2023 MRI brain & orbits without contrast, which I personally reviewed, shows right parietotemporal FLAIR findings consistent with chronic stroke changes.    02/28/2020 MRI brain without contrast & MRA head & neck ,which I personally reviewed previously, show right occipital encephalomalacia consistent with prior stroke.    10/17/2023 B12 470. Vitamin A, thiamine, T-SPOT & ACE negative/wnl.    Lab Results   Component Value Date/Time    SEDRATE 10 10/06/2023 1117    CRP <0.10 10/06/2023 1117      10/06/2023 folate wnl. Syphilis non-reactive (NR).    11/21/2023 OCT RNFL OD unable secondary to NLP & OS 56. (Stable left eye (OS))  10/17/2023 OCT RNFL OD NLP & OS 58 with S & I & borderline T & N thinning.    11/21/2023 HVF 24-2 OD no light perception (NLP) & OS fovea 31, FL 10/18, FP 8%, FN 30%, unreliable MD -16.43.  06/18/2023 HVF 30-2 OD no light perception (NLP) & OS superior altitudinal & infeiror arcuate MD -11.81.  12/13/2022 HVF 30-2 OD unable & OS FP 17%, FN 19%, inferior & superior arcuate MD -8.20. (hard to read)  05/14/2023 HVF 30-2  OS unreliable MD -5.43.  Prior HVFs.    This 53 year-old woman with a history of DM1 complicated by diabetic retinopathy with bilateral retinal detachments, ongoing treatment for left macular edema, stroke 2018, epilepsy, HLD, Hashimoto thyroiditis presents for evaluation of left eye vision loss.    Vision remains poor, but similar with some worse color vision. Testing thus far revealed no cause, so I recommend further toxic & nutritional optic neuropathy testing such as heavy metals, copper and volatiles. Genetic testing for a hereditary cause contributing is an option, too, but not one expected to lead to a tretament.    Plan    Check other toxic / metabolic studies.    Follow up in 3-4 months with HVF & OCT. (Dilated 10/17/2023)

## 2023-12-12 ENCOUNTER — CLINICAL SUPPORT (OUTPATIENT)
Dept: AUDIOLOGY | Facility: CLINIC | Age: 53
End: 2023-12-12
Payer: MEDICAID

## 2023-12-12 DIAGNOSIS — H90.3 SENSORINEURAL HEARING LOSS (SNHL) OF BOTH EARS: Primary | ICD-10-CM

## 2023-12-12 PROCEDURE — 92550 TYMPANOMETRY & REFLEX THRESH: CPT

## 2023-12-12 PROCEDURE — 92557 COMPREHENSIVE HEARING TEST: CPT

## 2023-12-12 ASSESSMENT — PAIN SCALES - GENERAL: PAINLEVEL_OUTOF10: 0 - NO PAIN

## 2023-12-12 NOTE — PROGRESS NOTES
AUDIOLOGIC EVALUATION  Name: Mattie Wolfe  YOB: 1970  MRN: [id number]  Age: 53 y.o.    Date of Evaluation:  12/12/2023    History:  Mattie Wolfe, 53 y.o., was seen today for a hearing evaluation on order from Kobi Gold MD .  The patient reported a history of hearing loss for many years. She reported difficulty understanding others at times. She noted that she had a stroke in 2018. She denied otalgia, recent ear infection, dizziness, tinnitus and previous otologic surgery. Medical history is notable for type 1 diabetes and epilepsy.    Evaluation:    Otoscopy  Clear ear canals bilaterally.    Tympanometry  Right ear: Type A, normal ear canal volume and compliance.  Left ear: Type A, normal ear canal volume and compliance.    Acoustic Reflexes  Right ear: Ipsilateral acoustic reflexes present at 500 - 4000 Hz  Left ear: Ipsilateral acoustic reflexes present at 500 - 4000 Hz    Audiometric Evaluation  Right ear: essentially moderately-severe sensorineural hearing loss rising to within normal limits. Word recognition ability estimated to be poor (64%) at 80 dB HL based on an NU-6 recorded 25-word list.  Left ear: moderate rising to mild sensorineural hearing loss. Word recognition ability estimated to be good (88%) at 80 dB HL based on an NU-6 recorded 25-word list.    The test results were discussed with the patient.    Impressions  Today's evaluation revealed an essentially moderately-severe sensorineural hearing loss rising to within normal limits in the right ear and a moderate rising to mild sensorineural hearing loss in the left ear. Word recognition abilities were measured to be good in the left ear and poor in the right ear. Of note, word recognition scores are significantly asymmetric. It was recommended that the patient see an ENT provider regarding this asymmetry.    The patient was informed that they are a candidate for binaural amplification, pending medical clearance. She reported  that she has a hearing aid benefit through Medicaid. She was given the phone number for [facility] in order to pursue this hearing aid benefit.     Recommendations  - Continue medical follow-up with established providers   - Consider binaural amplification  - See an ENT regarding your word understanding asymmetry  -Re-test hearing annually    Time: 0199-0273    YAMILET Carvalho, CCC-A  Licensed Audiologist

## 2023-12-12 NOTE — LETTER
December 12, 2023     Kobi Gold MD  [address]  [address]  [place] [place] [address]    Patient: Mattie Wolfe   YOB: 1970   Date of Visit: 12/12/2023       Dear Dr. Kobi Gold MD:    Thank you for referring Mattie Wolfe to me for evaluation. Below are my notes for this consultation.  If you have questions, please do not hesitate to call me. I look forward to following your patient along with you.       Sincerely,     Kylie Hebert, YAMILET, CCC-A      CC: No Recipients  ______________________________________________________________________________________    AUDIOLOGIC EVALUATION  Name: Mattie Wolfe  YOB: 1970  MRN: [id number]  Age: 53 y.o.    Date of Evaluation:  12/12/2023    History:  Mattie Wolfe, 53 y.o., was seen today for a hearing evaluation on order from Kobi Gold MD .  The patient reported a history of hearing loss for many years. She reported difficulty understanding others at times. She noted that she had a stroke in 2018. She denied otalgia, recent ear infection, dizziness, tinnitus and previous otologic surgery. Medical history is notable for type 1 diabetes and epilepsy.    Evaluation:    Otoscopy  Clear ear canals bilaterally.    Tympanometry  Right ear: Type A, normal ear canal volume and compliance.  Left ear: Type A, normal ear canal volume and compliance.    Acoustic Reflexes  Right ear: Ipsilateral acoustic reflexes present at 500 - 4000 Hz  Left ear: Ipsilateral acoustic reflexes present at 500 - 4000 Hz    Audiometric Evaluation  Right ear: essentially moderately-severe sensorineural hearing loss rising to within normal limits. Word recognition ability estimated to be poor (64%) at 80 dB HL based on an NU-6 recorded 25-word list.  Left ear: moderate rising to mild sensorineural hearing loss. Word recognition ability estimated to be good (88%) at 80 dB HL based on an NU-6 recorded 25-word list.    The test results were discussed with the  patient.    Impressions  Today's evaluation revealed an essentially moderately-severe sensorineural hearing loss rising to within normal limits in the right ear and a moderate rising to mild sensorineural hearing loss in the left ear. Word recognition abilities were measured to be good in the left ear and poor in the right ear. Of note, word recognition scores are significantly asymmetric. It was recommended that the patient see an ENT provider regarding this asymmetry.    The patient was informed that they are a candidate for binaural amplification, pending medical clearance. She reported that she has a hearing aid benefit through Medicaid. She was given the phone number for [facility] in order to pursue this hearing aid benefit.     Recommendations  - Continue medical follow-up with established providers   - Consider binaural amplification  - See an ENT regarding your word understanding asymmetry  -Re-test hearing annually    Time: 7988-2053    YAMILET Carvalho, CCC-A  Licensed Audiologist

## 2023-12-20 ENCOUNTER — APPOINTMENT (OUTPATIENT)
Dept: AUDIOLOGY | Facility: CLINIC | Age: 53
End: 2023-12-20
Payer: MEDICAID

## 2023-12-26 DIAGNOSIS — K22.70 BARRETT'S ESOPHAGUS WITHOUT DYSPLASIA: Primary | ICD-10-CM

## 2023-12-27 RX ORDER — OMEPRAZOLE 40 MG/1
40 CAPSULE, DELAYED RELEASE ORAL DAILY
Qty: 90 CAPSULE | Refills: 3 | Status: SHIPPED | OUTPATIENT
Start: 2023-12-27

## 2024-01-08 ENCOUNTER — OFFICE VISIT (OUTPATIENT)
Dept: OTOLARYNGOLOGY | Facility: CLINIC | Age: 54
End: 2024-01-08
Payer: MEDICAID

## 2024-01-08 ENCOUNTER — APPOINTMENT (OUTPATIENT)
Dept: OTOLARYNGOLOGY | Facility: CLINIC | Age: 54
End: 2024-01-08
Payer: MEDICAID

## 2024-01-08 VITALS
BODY MASS INDEX: 26.5 KG/M2 | SYSTOLIC BLOOD PRESSURE: 105 MMHG | WEIGHT: 144 LBS | HEIGHT: 62 IN | TEMPERATURE: 97.3 F | DIASTOLIC BLOOD PRESSURE: 67 MMHG

## 2024-01-08 DIAGNOSIS — H90.3 BILATERAL SENSORINEURAL HEARING LOSS: Primary | ICD-10-CM

## 2024-01-08 PROCEDURE — 1036F TOBACCO NON-USER: CPT | Performed by: OTOLARYNGOLOGY

## 2024-01-08 PROCEDURE — 3078F DIAST BP <80 MM HG: CPT | Performed by: OTOLARYNGOLOGY

## 2024-01-08 PROCEDURE — 3074F SYST BP LT 130 MM HG: CPT | Performed by: OTOLARYNGOLOGY

## 2024-01-08 PROCEDURE — 99204 OFFICE O/P NEW MOD 45 MIN: CPT | Performed by: OTOLARYNGOLOGY

## 2024-01-08 PROCEDURE — 4010F ACE/ARB THERAPY RXD/TAKEN: CPT | Performed by: OTOLARYNGOLOGY

## 2024-01-08 NOTE — PROGRESS NOTES
Impression:  1. Bilateral sensorineural hearing loss              RECOMMENDATIONS/PLAN :  I reassured the patient that medically her ears are clear and more than likely she would benefit from hearing aids.  She is considering hearing aids in the near future.  We will certainly repeat an audiogram over the next year to make sure she does not have any fluctuation in hearing.  All of her concerns were addressed today.      **This electronic medical record note was created with the use of voice recognition software.  Despite proofreading, typographical or grammatical errors may be present that could affect meaning of content **    Subjective   Patient ID:     Mattie Wolfe is a 53 y.o. female who presents to the office today with a longstanding history of hearing loss.  She denies any recent vertigo or any neurologic complaints.  Back in 2018, the patient did suffer a stroke/CVA.  She feels like her hearing did decrease after this event.  She has not had repetitive middle ear infections or any drainage from her ears.  I did review her most recent audiogram from 12/12/2023.    ROS:  A detailed 12 system review of systems is noted on the intake form has been reviewed with the patient with details noted in the HPI and scanned into the patient's medical record.    Objective     Past Medical History:   Diagnosis Date    Diabetic retinopathy (CMS/HCC)     Gastroparesis     Gastroparesis    Other conditions influencing health status     Diabetes type 1, uncontrolled    Personal history of transient ischemic attack (TIA), and cerebral infarction without residual deficits     History of stroke        Past Surgical History:   Procedure Laterality Date    APPENDECTOMY  04/09/2014    Appendectomy    CATARACT EXTRACTION      HIP SURGERY  04/09/2014    Hip Surgery    MR HEAD ANGIO WO IV CONTRAST  02/28/2020    MR HEAD ANGIO WO IV CONTRAST [facility]    MR NECK ANGIO WO IV CONTRAST  02/28/2020    MR NECK ANGIO WO  IV CONTRAST 2/28/2020 [facility]    OTHER SURGICAL HISTORY  04/09/2014    Cholecystotomy    OTHER SURGICAL HISTORY  11/01/2019    Retinal detachment repair    OTHER SURGICAL HISTORY  06/04/2020    Wrist surgery    RETINAL DETACHMENT SURGERY          Allergies   Allergen Reactions    Aspirin Unknown    Azithromycin Hives          Current Outpatient Medications:     aspirin 81 mg EC tablet, Take 1 tablet (81 mg) by mouth once daily., Disp: , Rfl:     atorvastatin (Lipitor) 40 mg tablet, Take 1 tablet (40 mg) by mouth., Disp: , Rfl:     blood sugar diagnostic (Premier Test Strip) strip, 4 times a day., Disp: , Rfl:     fluticasone (Flonase) 50 mcg/actuation nasal spray, INSTILL ONE (1) SPRAY IN EACH NOSTRIL ONCE DAILY, Disp: , Rfl:     insulin lispro (HumaLOG) 100 unit/mL injection, Inject under the skin. Inject 8 units subcutaneous plus sliding scale max dose 50 units daily, Disp: , Rfl:     ketoconazole (NIZOral) 2 % cream, , Disp: , Rfl:     lacosamide (Vimpat) 100 mg tablet, Take 1 tablet (100 mg) by mouth., Disp: , Rfl:     loperamide (Imodium) 2 mg capsule, Take 1 capsule (2 mg) by mouth 2 times a day., Disp: , Rfl:     losartan (Cozaar) 25 mg tablet, Take 1 tablet (25 mg) by mouth once daily., Disp: 90 tablet, Rfl: 3    mometasone-formoterol (Dulera) 200-5 mcg/actuation inhaler, Inhale. 2 puffs Bid, Disp: , Rfl:     montelukast (Singulair) 10 mg tablet, Take 1 tablet (10 mg) by mouth once daily at bedtime., Disp: , Rfl:     omeprazole (PriLOSEC) 40 mg DR capsule, TAKE 1 CAPSULE BY MOUTH DAILY, Disp: 90 capsule, Rfl: 3    zonisamide (Zonegran) 100 mg capsule, Take by mouth. 4 capules qam and 4 capsules at bedtime, Disp: , Rfl:     albuterol 90 mcg/actuation inhaler, Inhale 2 puffs. 4-6 hrs prn, Disp: , Rfl:     ondansetron (Zofran) 8 mg tablet, Take 1 tablet (8 mg) by mouth., Disp: , Rfl:     triamcinolone (Kenalog) 0.1 % cream, twice a day., Disp: , Rfl:      Tobacco Use: Low Risk  (1/8/2024)     "Patient History     Smoking Tobacco Use: Never     Smokeless Tobacco Use: Never     Passive Exposure: Not on file        Alcohol Use: Not on file        Social History     Substance and Sexual Activity   Drug Use Not Currently        Physical Exam:  Visit Vitals  /67   Temp 36.3 °C (97.3 °F) (Temporal)   Ht 1.575 m (5' 2\")   Wt 65.3 kg (144 lb)   BMI 26.34 kg/m²   Smoking Status Never   BSA 1.69 m²      General: Patient is alert, oriented, cooperative in no apparent distress.  Head: Normocephalic, atraumatic.  Eyes: PERRL, EOMI, Conjunctiva is clear. No nystagmus.  Ears: Right Ear-- Pinna is normal.  External auditory canal is patent. Tympanic membrane is [intact, translucent and has good mobility with my pneumatic otoscope. No effusion].  Mastoid is nontender.  Left ear-- Pinna is normal.  External auditory canal is patent. Tympanic membrane is [intact, translucent and has good mobility with my pneumatic otoscope.  No effusion].  Mastoid is nontender.  Nose: Septum is relatively straight.  No septal perforation or lesions. No septal hematoma/ seroma.  No signs of bleeding.  Inferior turbinates are mildly swollen.   No evidence of intranasal polyps.  No infectious drainage.  Throat:  Floor of mouth is clear, no masses.  Tongue appears normal, no lesions or masses. Gums, gingiva, buccal mucosa appear pink and moist, no lesions. Teeth are in fair repair.  No obvious dental infections.  Peritonsillar regions appear symmetric without swelling.  Hard and soft palate appear normal, no obvious cleft. Uvula is midline.  Oropharynx: No lesions. Retropharyngeal wall is flat.  No active postnasal drip.  Neck: Supple,  no lymphadenopathy.  No masses.  Salivary Glands: Symmetric bilaterally.  No palpable masses.  No evidence of acute infection or salivary stones  Neurologic: Cranial Nerves 2-12 are grossly intact without focal deficits. Cerebellar function testing is normal.     Results:   I reviewed her recent audiogram " and she does have a moderate sensorineural loss in both ears.  This is symmetric.  Both tympanic membranes have good mobility on tympanometry.  Word recognition score was 64% in the right ear and 88% in the left ear.  Speech reception threshold is 40 dB bilaterally.    Procedure:   []    Franklin Green, DO

## 2024-01-09 ENCOUNTER — TELEPHONE (OUTPATIENT)
Dept: PRIMARY CARE | Facility: CLINIC | Age: 54
End: 2024-01-09
Payer: MEDICAID

## 2024-01-09 NOTE — TELEPHONE ENCOUNTER
Pt is requesting an order to have a port put in- she is unable to have her blood drawn and cannot get IV's started. Having a port placed helped in the past.

## 2024-01-16 ENCOUNTER — OFFICE VISIT (OUTPATIENT)
Dept: PRIMARY CARE | Facility: CLINIC | Age: 54
End: 2024-01-16
Payer: MEDICAID

## 2024-01-16 VITALS
OXYGEN SATURATION: 100 % | HEIGHT: 62 IN | HEART RATE: 83 BPM | RESPIRATION RATE: 14 BRPM | DIASTOLIC BLOOD PRESSURE: 70 MMHG | BODY MASS INDEX: 26.68 KG/M2 | WEIGHT: 145 LBS | SYSTOLIC BLOOD PRESSURE: 115 MMHG

## 2024-01-16 DIAGNOSIS — E10.69 TYPE 1 DIABETES MELLITUS WITH OTHER SPECIFIED COMPLICATION (MULTI): ICD-10-CM

## 2024-01-16 DIAGNOSIS — J39.8 TRACHEAL STENOSIS: ICD-10-CM

## 2024-01-16 DIAGNOSIS — Z78.9 DIFFICULT INTRAVENOUS ACCESS: Primary | ICD-10-CM

## 2024-01-16 DIAGNOSIS — K22.70 BARRETT'S ESOPHAGUS WITHOUT DYSPLASIA: ICD-10-CM

## 2024-01-16 DIAGNOSIS — I63.9 CEREBROVASCULAR ACCIDENT (CVA), UNSPECIFIED MECHANISM (MULTI): ICD-10-CM

## 2024-01-16 DIAGNOSIS — J30.9 ALLERGIC RHINITIS, UNSPECIFIED SEASONALITY, UNSPECIFIED TRIGGER: ICD-10-CM

## 2024-01-16 DIAGNOSIS — K59.09 OTHER CONSTIPATION: ICD-10-CM

## 2024-01-16 DIAGNOSIS — R13.14 PHARYNGOESOPHAGEAL DYSPHAGIA: ICD-10-CM

## 2024-01-16 DIAGNOSIS — R56.9 SEIZURE (MULTI): ICD-10-CM

## 2024-01-16 PROCEDURE — 99214 OFFICE O/P EST MOD 30 MIN: CPT | Performed by: INTERNAL MEDICINE

## 2024-01-16 PROCEDURE — 4010F ACE/ARB THERAPY RXD/TAKEN: CPT | Performed by: INTERNAL MEDICINE

## 2024-01-16 PROCEDURE — 3078F DIAST BP <80 MM HG: CPT | Performed by: INTERNAL MEDICINE

## 2024-01-16 PROCEDURE — 1036F TOBACCO NON-USER: CPT | Performed by: INTERNAL MEDICINE

## 2024-01-16 PROCEDURE — 3074F SYST BP LT 130 MM HG: CPT | Performed by: INTERNAL MEDICINE

## 2024-01-16 RX ORDER — FLUTICASONE PROPIONATE 50 MCG
2 SPRAY, SUSPENSION (ML) NASAL DAILY
Qty: 16 G | Refills: 11 | Status: SHIPPED | OUTPATIENT
Start: 2024-01-16 | End: 2025-01-15

## 2024-01-16 ASSESSMENT — ENCOUNTER SYMPTOMS
FEVER: 0
JOINT SWELLING: 0
DIARRHEA: 0
DIAPHORESIS: 0
SHORTNESS OF BREATH: 0
NAUSEA: 0
CHILLS: 0
CONSTIPATION: 0
COUGH: 0
VOMITING: 0
MYALGIAS: 0

## 2024-01-16 NOTE — PATIENT INSTRUCTIONS
RESUME COLACE 100 MG TWICE DAILY FOR CONSTIPATION, TAKE IT REGULARLY    2.  TRY DULCOLAX SUPPOSITORY TO MAKE YOU HAVE A BM, THESE TEND TO WORK MORE EFFECTIVELY THAN THE PILLS    3.  IF YOU CAN'T GET BOWEL TO MOVE WITH DULCOLAX SUPPOSITORY, THEN YOU CAN TRY OVER THE COUNTER MIRALAX TO GET THINGS MOVING    4.  I'LL START ARRANGEMENTS FOR A MEDIPOST PLACEMENT BY THE INTERVENTIONAL RADIOLOGY DOCTORS AT THE [facility]    5.  FOLLOW UP 6 MONTHS OR AS NEEDED.

## 2024-01-16 NOTE — PROGRESS NOTES
"Subjective   Mattie Wolfe is a 53 y.o. female who presents for FOLLOW UP   WOULD LIKE TO DISCUSS HAVING A PORT IN FOR BLOOD DRAWS   NEEDS ORDER RENEWED WITH [facility] FOR INCONTINENCE SUPPLIES   PULL UPS MEDIUM /CHUX / GLOVES LARGE  PAPER ORDER I CAN FAX     HPI   Small VEINS, HARD TO GET BLOOD DRAWN OR GET IV ACCESS    HAS HAD AN MRI ORDERED BY NEUROLOGIST, WANTED CONTRAST    UNFORTUNATELY COULDN'T GET AN IV STARTED    ALSO BLOOD DRAWERS HAVING TROUBLE GETTING ADEQUATE SAMPLING    CARDIOLOGY WANTED CHEMICAL STRESS TEST FOR THE HEART, COULDN'T GET IV STARTED AFTER 5 TIMES TRYING.    HAVE HAD A PORT YEARS AGO BUT TAKEN OUT DUE TO INFECTION.  HAD HAD IT FOR 2 YEARS AT THE TIME, AND GLUCOSE WAS UNCONTROLLED WHEN IT GOT INFECTED    HAD TO USE A FOOT IV FOR HER THROAT SURGERY?  HAD TROUBLE AT EGD AS WELL DESPITE A VEIN FINDER, HAD 8 PEOPLE TRY    HAVING PROBLEMS WITH CONSTIPATION.  TOOK LAXATIVE, AND THEN GOING TO GET SOME  Review of Systems   Constitutional:  Negative for chills, diaphoresis and fever.   Respiratory:  Negative for cough and shortness of breath.    Cardiovascular:  Negative for chest pain and leg swelling.   Gastrointestinal:  Negative for constipation, diarrhea, nausea and vomiting.   Musculoskeletal:  Negative for joint swelling and myalgias.       Objective   /70   Pulse 83   Resp 14   Ht 1.575 m (5' 2\")   Wt 65.8 kg (145 lb)   SpO2 100%   BMI 26.52 kg/m²     Physical Exam  Vitals reviewed.   Constitutional:       General: She is not in acute distress.     Appearance: She is not ill-appearing.   HENT:      Right Ear: Tympanic membrane and external ear normal. There is no impacted cerumen.      Left Ear: Tympanic membrane and external ear normal. There is no impacted cerumen.   Cardiovascular:      Rate and Rhythm: Normal rate and regular rhythm.      Pulses: Normal pulses.      Heart sounds:      No gallop.   Pulmonary:      Breath sounds: Normal breath sounds. No wheezing, rhonchi " or rales.      Comments: MARKEDLY REDUCED STRIDOROUS NECK SOUNDS THAN BEFORE  Abdominal:      General: Abdomen is flat. Bowel sounds are normal.      Palpations: Abdomen is soft.      Tenderness: There is no guarding or rebound.   Musculoskeletal:      Right lower leg: No edema.      Left lower leg: No edema.      Comments: THE PATIENT HAS EXTREMELY SMALL PERIPHERAL VEINS         Assessment/Plan   Problem List Items Addressed This Visit       Ortega's esophagus without dysplasia    Type 1 diabetes mellitus (CMS/HCC)    Pharyngoesophageal dysphagia    Seizure (CMS/[facility], located within [facility])    Stroke (CMS/[facility], located within [facility])    Tracheal stenosis    Difficult intravenous access - Primary     Other Visit Diagnoses       Allergic rhinitis, unspecified seasonality, unspecified trigger        Relevant Medications    fluticasone (Flonase) 50 mcg/actuation nasal spray          Patient Instructions    RESUME COLACE 100 MG TWICE DAILY FOR CONSTIPATION, TAKE IT REGULARLY    2.  TRY DULCOLAX SUPPOSITORY TO MAKE YOU HAVE A BM, THESE TEND TO WORK MORE EFFECTIVELY THAN THE PILLS    3.  IF YOU CAN'T GET BOWEL TO MOVE WITH DULCOLAX SUPPOSITORY, THEN YOU CAN TRY OVER THE COUNTER MIRALAX TO GET THINGS MOVING    4.  I'LL START ARRANGEMENTS FOR A MEDIPOST PLACEMENT BY THE INTERVENTIONAL RADIOLOGY DOCTORS AT THE [facility]    5.  FOLLOW UP 6 MONTHS OR AS NEEDED.

## 2024-01-26 ENCOUNTER — TELEPHONE (OUTPATIENT)
Dept: PRIMARY CARE | Facility: CLINIC | Age: 54
End: 2024-01-26
Payer: MEDICAID

## 2024-01-26 NOTE — TELEPHONE ENCOUNTER
Pt was reading her summary from last week and noticed it says to  her medications from [facility] pharmacy.  Pt cannot drive and is supposed to have them delivered. Asking to verify these are in fact for delivery not .

## 2024-01-31 RX ORDER — SODIUM CHLORIDE 9 MG/ML
50 INJECTION, SOLUTION INTRAVENOUS CONTINUOUS
Status: CANCELLED | OUTPATIENT
Start: 2024-01-31

## 2024-02-02 ENCOUNTER — APPOINTMENT (OUTPATIENT)
Dept: RADIOLOGY | Facility: HOSPITAL | Age: 54
End: 2024-02-02
Payer: MEDICAID

## 2024-02-06 DIAGNOSIS — E10.9 TYPE 1 DIABETES MELLITUS WITHOUT COMPLICATION (MULTI): ICD-10-CM

## 2024-02-06 RX ORDER — INSULIN LISPRO 100 [IU]/ML
INJECTION, SOLUTION INTRAVENOUS; SUBCUTANEOUS
Qty: 70 ML | Refills: 0 | Status: SHIPPED | OUTPATIENT
Start: 2024-02-06 | End: 2024-05-28

## 2024-02-06 RX ORDER — INSULIN LISPRO 100 [IU]/ML
INJECTION, SOLUTION INTRAVENOUS; SUBCUTANEOUS
COMMUNITY
Start: 2024-01-15 | End: 2024-02-06 | Stop reason: SDUPTHER

## 2024-02-07 ENCOUNTER — HOSPITAL ENCOUNTER (OUTPATIENT)
Dept: RADIOLOGY | Facility: HOSPITAL | Age: 54
Discharge: HOME | End: 2024-02-07
Payer: MEDICAID

## 2024-02-07 VITALS
OXYGEN SATURATION: 100 % | HEART RATE: 81 BPM | BODY MASS INDEX: 25.4 KG/M2 | SYSTOLIC BLOOD PRESSURE: 107 MMHG | HEIGHT: 62 IN | TEMPERATURE: 98.3 F | DIASTOLIC BLOOD PRESSURE: 59 MMHG | WEIGHT: 138 LBS | RESPIRATION RATE: 16 BRPM

## 2024-02-07 DIAGNOSIS — Z78.9 DIFFICULT INTRAVENOUS ACCESS: ICD-10-CM

## 2024-02-07 LAB
ANION GAP SERPL CALC-SCNC: 9 MMOL/L (ref 10–20)
BUN SERPL-MCNC: 26 MG/DL (ref 6–23)
CALCIUM SERPL-MCNC: 9.1 MG/DL (ref 8.6–10.3)
CHLORIDE SERPL-SCNC: 110 MMOL/L (ref 98–107)
CO2 SERPL-SCNC: 26 MMOL/L (ref 21–32)
CREAT SERPL-MCNC: 1.28 MG/DL (ref 0.5–1.05)
EGFRCR SERPLBLD CKD-EPI 2021: 50 ML/MIN/1.73M*2
ERYTHROCYTE [DISTWIDTH] IN BLOOD BY AUTOMATED COUNT: 12.4 % (ref 11.5–14.5)
GLUCOSE BLD MANUAL STRIP-MCNC: 183 MG/DL (ref 74–99)
GLUCOSE BLD MANUAL STRIP-MCNC: 56 MG/DL (ref 74–99)
GLUCOSE SERPL-MCNC: 65 MG/DL (ref 74–99)
HCT VFR BLD AUTO: 36.7 % (ref 36–46)
HGB BLD-MCNC: 11.7 G/DL (ref 12–16)
INR PPP: 1 (ref 0.9–1.1)
MCH RBC QN AUTO: 30.8 PG (ref 26–34)
MCHC RBC AUTO-ENTMCNC: 31.9 G/DL (ref 32–36)
MCV RBC AUTO: 97 FL (ref 80–100)
NRBC BLD-RTO: 0 /100 WBCS (ref 0–0)
PLATELET # BLD AUTO: 214 X10*3/UL (ref 150–450)
POTASSIUM SERPL-SCNC: 4.2 MMOL/L (ref 3.5–5.3)
PROTHROMBIN TIME: 11.5 SECONDS (ref 9.8–12.8)
RBC # BLD AUTO: 3.8 X10*6/UL (ref 4–5.2)
SODIUM SERPL-SCNC: 141 MMOL/L (ref 136–145)
WBC # BLD AUTO: 5.1 X10*3/UL (ref 4.4–11.3)

## 2024-02-07 PROCEDURE — 85610 PROTHROMBIN TIME: CPT | Performed by: RADIOLOGY

## 2024-02-07 PROCEDURE — 76937 US GUIDE VASCULAR ACCESS: CPT | Mod: RT

## 2024-02-07 PROCEDURE — 7100000001 HC RECOVERY ROOM TIME - INITIAL BASE CHARGE

## 2024-02-07 PROCEDURE — 36561 INSERT TUNNELED CV CATH: CPT | Mod: RT

## 2024-02-07 PROCEDURE — 80048 BASIC METABOLIC PNL TOTAL CA: CPT | Performed by: RADIOLOGY

## 2024-02-07 PROCEDURE — 99153 MOD SED SAME PHYS/QHP EA: CPT

## 2024-02-07 PROCEDURE — 76937 US GUIDE VASCULAR ACCESS: CPT | Performed by: RADIOLOGY

## 2024-02-07 PROCEDURE — 99152 MOD SED SAME PHYS/QHP 5/>YRS: CPT

## 2024-02-07 PROCEDURE — 36010 PLACE CATHETER IN VEIN: CPT | Mod: RT

## 2024-02-07 PROCEDURE — 2500000005 HC RX 250 GENERAL PHARMACY W/O HCPCS

## 2024-02-07 PROCEDURE — 2780000003 HC OR 278 NO HCPCS

## 2024-02-07 PROCEDURE — 77001 FLUOROGUIDE FOR VEIN DEVICE: CPT | Mod: RT

## 2024-02-07 PROCEDURE — 99152 MOD SED SAME PHYS/QHP 5/>YRS: CPT | Performed by: RADIOLOGY

## 2024-02-07 PROCEDURE — 82947 ASSAY GLUCOSE BLOOD QUANT: CPT

## 2024-02-07 PROCEDURE — 85027 COMPLETE CBC AUTOMATED: CPT | Performed by: RADIOLOGY

## 2024-02-07 PROCEDURE — 99153 MOD SED SAME PHYS/QHP EA: CPT | Performed by: RADIOLOGY

## 2024-02-07 PROCEDURE — 36415 COLL VENOUS BLD VENIPUNCTURE: CPT | Performed by: RADIOLOGY

## 2024-02-07 PROCEDURE — 2500000004 HC RX 250 GENERAL PHARMACY W/ HCPCS (ALT 636 FOR OP/ED): Performed by: RADIOLOGY

## 2024-02-07 PROCEDURE — 7100000002 HC RECOVERY ROOM TIME - EACH INCREMENTAL 1 MINUTE

## 2024-02-07 PROCEDURE — 36561 INSERT TUNNELED CV CATH: CPT | Performed by: RADIOLOGY

## 2024-02-07 PROCEDURE — 2720000007 HC OR 272 NO HCPCS

## 2024-02-07 PROCEDURE — 77001 FLUOROGUIDE FOR VEIN DEVICE: CPT | Performed by: RADIOLOGY

## 2024-02-07 PROCEDURE — C1788 PORT, INDWELLING, IMP: HCPCS

## 2024-02-07 RX ORDER — CEFAZOLIN SODIUM 1 G/50ML
SOLUTION INTRAVENOUS CONTINUOUS PRN
Status: COMPLETED | OUTPATIENT
Start: 2024-02-07 | End: 2024-02-07

## 2024-02-07 RX ORDER — FENTANYL CITRATE 50 UG/ML
INJECTION, SOLUTION INTRAMUSCULAR; INTRAVENOUS
Status: COMPLETED | OUTPATIENT
Start: 2024-02-07 | End: 2024-02-07

## 2024-02-07 RX ORDER — MIDAZOLAM HYDROCHLORIDE 1 MG/ML
INJECTION, SOLUTION INTRAMUSCULAR; INTRAVENOUS
Status: COMPLETED | OUTPATIENT
Start: 2024-02-07 | End: 2024-02-07

## 2024-02-07 RX ORDER — DEXTROSE MONOHYDRATE 25 G/50ML
INJECTION, SOLUTION INTRAVENOUS
Status: COMPLETED | OUTPATIENT
Start: 2024-02-07 | End: 2024-02-07

## 2024-02-07 RX ORDER — SODIUM CHLORIDE 9 MG/ML
50 INJECTION, SOLUTION INTRAVENOUS CONTINUOUS
Status: DISCONTINUED | OUTPATIENT
Start: 2024-02-07 | End: 2024-02-08 | Stop reason: HOSPADM

## 2024-02-07 RX ADMIN — CEFAZOLIN SODIUM 2 G: 1 INJECTION, SOLUTION INTRAVENOUS at 11:42

## 2024-02-07 RX ADMIN — MIDAZOLAM 1 MG: 1 INJECTION INTRAMUSCULAR; INTRAVENOUS at 11:42

## 2024-02-07 RX ADMIN — FENTANYL CITRATE 50 MCG: 50 INJECTION, SOLUTION INTRAMUSCULAR; INTRAVENOUS at 11:43

## 2024-02-07 RX ADMIN — SODIUM CHLORIDE 50 ML/HR: 9 INJECTION, SOLUTION INTRAVENOUS at 09:00

## 2024-02-07 RX ADMIN — DEXTROSE MONOHYDRATE 25 G: 25 INJECTION, SOLUTION INTRAVENOUS at 11:16

## 2024-02-07 ASSESSMENT — PAIN - FUNCTIONAL ASSESSMENT
PAIN_FUNCTIONAL_ASSESSMENT: 0-10

## 2024-02-07 ASSESSMENT — PAIN SCALES - GENERAL

## 2024-02-07 NOTE — NURSING NOTE
Ambulated to BR and back with 2 standby assist. Gait unsteady but is baseline for patient. Mediport implant incision right chest remains open to air and well-approximated with skin glue intact and no bleeding or drainage noted. Right subclavian Mediport venotomy incision with drsg dry/intact and no bleeding or drainage noted.

## 2024-02-07 NOTE — NURSING NOTE
Patient wearing an insulin pump which gives continuous blood glucose readings. Current blood glucose reading is 95.

## 2024-02-07 NOTE — PRE-PROCEDURE NOTE
Interventional Radiology Preprocedure Note    Indication for procedure: The encounter diagnosis was Difficult intravenous access.    Relevant review of systems: NA    Relevant Labs:   Lab Results   Component Value Date    CREATININE 1.28 (H) 02/07/2024    EGFR 50 (L) 02/07/2024    INR 1.0 02/07/2024    PROTIME 11.5 02/07/2024       Planned Sedation/Anesthesia: Moderate    Airway assessment: normal    Directed physical examination:    RRR, CTA-Bilat    Mallampati: III (soft and hard palate and base of uvula visible)    ASA Score: ASA 2 - Patient with mild systemic disease with no functional limitations    Benefits, risks and alternatives of procedure and planned sedation have been discussed with the patient and/or their representative. All questions answered and they agree to proceed.

## 2024-02-07 NOTE — POST-PROCEDURE NOTE
Interventional Radiology Brief Postprocedure Note    Attending: Denys Plaza MD    Assistant:   Staff Role   Denys Plaza MD Radiologist   Miller Elkins, RN Radiology Nurse   Jose Angel Noel Radiology Technologist   Ronak Calderon, RT Radiology Technologist   Jennifer Encinas Radiology Technologist   Jolynn Shultz, RN Radiology Nurse       Diagnosis:   1. Difficult intravenous access  IR CVC port placement    IR CVC port placement          Description of procedure: right chest port placement    Timeout:  Yes    Procedure Area: Procedure Area     Anesthesia:   Conscious Sedation    Complications: None    Estimated Blood Loss: minimal    Medications (Filter: Administrations occurring from 1111 to 1222 on 02/07/24) As of 02/07/24 1222      dextrose 50 % solution (g) Total dose:  25 g      Date/Time Rate/Dose/Volume Action       02/07/24  1116 25 g Given               midazolam (Versed) injection (mg) Total dose:  1 mg      Date/Time Rate/Dose/Volume Action       02/07/24  1142 1 mg Given               fentaNYL PF (Sublimaze) injection (mcg) Total dose:  50 mcg      Date/Time Rate/Dose/Volume Action       02/07/24  1143 50 mcg Given               ceFAZolin in dextrose (iso-os) (Ancef) IVPB (g) Total dose:  2 g      Date/Time Rate/Dose/Volume Action       02/07/24  1142 2 g New Bag      1212  (over 30 min) Stopped                   No specimens collected      See detailed result report with images in PACS.    The patient tolerated the procedure well without incident or complication and is in stable condition.

## 2024-02-07 NOTE — NURSING NOTE
Discharge instructions given and reviewed with patient and patient's sister Hamida. Mediport pamphlet w/ID card given and reviewed with patient. Discharged to private vehicle via wheelchair, escorted by cath lab RN.

## 2024-02-07 NOTE — Clinical Note
Right chest mediport inserted. Incision internally sutured then skin approximated with dermabond. Jugular venotomy site approximated with steri strips and dressed with 2x2 and tegaderm. Pt tolerated procedure well.

## 2024-02-13 ENCOUNTER — TELEPHONE (OUTPATIENT)
Dept: PRIMARY CARE | Facility: CLINIC | Age: 54
End: 2024-02-13

## 2024-02-14 ENCOUNTER — TELEMEDICINE (OUTPATIENT)
Dept: ENDOCRINOLOGY | Facility: CLINIC | Age: 54
End: 2024-02-14
Payer: MEDICAID

## 2024-02-14 VITALS — BODY MASS INDEX: 24.84 KG/M2 | WEIGHT: 135 LBS | HEIGHT: 62 IN

## 2024-02-14 DIAGNOSIS — Z97.8 USES SELF-APPLIED CONTINUOUS GLUCOSE MONITORING DEVICE: ICD-10-CM

## 2024-02-14 DIAGNOSIS — E10.69 TYPE 1 DIABETES MELLITUS WITH OTHER SPECIFIED COMPLICATION (MULTI): Primary | ICD-10-CM

## 2024-02-14 DIAGNOSIS — U07.1 COVID-19 VIRUS INFECTION: Primary | ICD-10-CM

## 2024-02-14 DIAGNOSIS — Z96.41 INSULIN PUMP IN PLACE: ICD-10-CM

## 2024-02-14 PROCEDURE — 4010F ACE/ARB THERAPY RXD/TAKEN: CPT | Performed by: STUDENT IN AN ORGANIZED HEALTH CARE EDUCATION/TRAINING PROGRAM

## 2024-02-14 PROCEDURE — 1036F TOBACCO NON-USER: CPT | Performed by: STUDENT IN AN ORGANIZED HEALTH CARE EDUCATION/TRAINING PROGRAM

## 2024-02-14 PROCEDURE — 99214 OFFICE O/P EST MOD 30 MIN: CPT | Performed by: STUDENT IN AN ORGANIZED HEALTH CARE EDUCATION/TRAINING PROGRAM

## 2024-02-14 NOTE — PROGRESS NOTES
"Subjective   Mattie Wolfe is a 53 y.o. female with Type 1 DM diagnosed 14 years ago , CVA 2018 , Rt eye blindness epilepsy present for follow up.       Currently on 780 G with CGM , not linked ( unable to get downloads on this virtual )       She is COVID +ve since yesterady      BG is 120 on CGM       she previously followed with Dr. welsh   Previous  meds :  Lantus 28 units daily   humalog 8 units , SS2        Review of Systems URI     Objective   Ht 1.575 m (5' 2\")   Wt 61.2 kg (135 lb)   BMI 24.69 kg/m²       Assessment/Plan      Well controlled Type 1 DM with Hba1c  6.3 on  Medtronic 780 G pump automode.Due for recheck.       DM retinopathy ,with retinal detachment , rt eye blindness follows with Retina specilasit   DM neuropathy , follows with podiatry   DM nephropathy , urine albumin/creat 363 , GFR 59 on ARB   HLD , LDL 77 on Atorvasatin 40 mg   COVID + ve , urinary symptoms as well. She will call pcp today      Plan :  Continue current pump settings   Basal 0.9 unit/hr   Carb ratio 1:1 ( she enters the required insulin dose for coverage)  Sensitivity 40  -120  Active Inuslin 2  We discussed temp target on smart guard to be used during procedures     RTC in 3 months     "

## 2024-02-14 NOTE — TELEPHONE ENCOUNTER
Pt states she had a home COVID test sx started Monday night   Fever ( resolved now) runny nose body aches tightness in chest   Advised BY Dr. Ohara to call for paxlovid because she is diabetic   I reviewed protocol with pt.

## 2024-02-19 ENCOUNTER — APPOINTMENT (OUTPATIENT)
Dept: OBSTETRICS AND GYNECOLOGY | Facility: CLINIC | Age: 54
End: 2024-02-19
Payer: MEDICAID

## 2024-02-28 ENCOUNTER — OFFICE VISIT (OUTPATIENT)
Dept: OBSTETRICS AND GYNECOLOGY | Facility: CLINIC | Age: 54
End: 2024-02-28
Payer: MEDICAID

## 2024-02-28 VITALS
DIASTOLIC BLOOD PRESSURE: 66 MMHG | BODY MASS INDEX: 25.79 KG/M2 | WEIGHT: 140.13 LBS | SYSTOLIC BLOOD PRESSURE: 108 MMHG | HEIGHT: 62 IN

## 2024-02-28 DIAGNOSIS — Z91.89 AT RISK FOR OSTEOPOROSIS: ICD-10-CM

## 2024-02-28 DIAGNOSIS — Z12.31 SCREENING MAMMOGRAM FOR BREAST CANCER: ICD-10-CM

## 2024-02-28 DIAGNOSIS — Z01.419 WELL WOMAN EXAM WITH ROUTINE GYNECOLOGICAL EXAM: Primary | ICD-10-CM

## 2024-02-28 DIAGNOSIS — R39.9 UTI SYMPTOMS: ICD-10-CM

## 2024-02-28 DIAGNOSIS — R19.00 PELVIC MASS: ICD-10-CM

## 2024-02-28 LAB
POC APPEARANCE, URINE: ABNORMAL
POC BILIRUBIN, URINE: NEGATIVE
POC BLOOD, URINE: ABNORMAL
POC COLOR, URINE: YELLOW
POC GLUCOSE, URINE: NEGATIVE MG/DL
POC KETONES, URINE: NEGATIVE MG/DL
POC LEUKOCYTES, URINE: ABNORMAL
POC NITRITE,URINE: NEGATIVE
POC PH, URINE: 6 PH
POC PROTEIN, URINE: ABNORMAL MG/DL
POC SPECIFIC GRAVITY, URINE: 1.02
POC UROBILINOGEN, URINE: 1 EU/DL

## 2024-02-28 PROCEDURE — 99386 PREV VISIT NEW AGE 40-64: CPT | Performed by: ADVANCED PRACTICE MIDWIFE

## 2024-02-28 PROCEDURE — 87186 SC STD MICRODIL/AGAR DIL: CPT

## 2024-02-28 PROCEDURE — 4010F ACE/ARB THERAPY RXD/TAKEN: CPT | Performed by: ADVANCED PRACTICE MIDWIFE

## 2024-02-28 PROCEDURE — 3074F SYST BP LT 130 MM HG: CPT | Performed by: ADVANCED PRACTICE MIDWIFE

## 2024-02-28 PROCEDURE — 87624 HPV HI-RISK TYP POOLED RSLT: CPT

## 2024-02-28 PROCEDURE — 87086 URINE CULTURE/COLONY COUNT: CPT

## 2024-02-28 PROCEDURE — 81003 URINALYSIS AUTO W/O SCOPE: CPT | Performed by: ADVANCED PRACTICE MIDWIFE

## 2024-02-28 PROCEDURE — 3078F DIAST BP <80 MM HG: CPT | Performed by: ADVANCED PRACTICE MIDWIFE

## 2024-02-28 PROCEDURE — 1036F TOBACCO NON-USER: CPT | Performed by: ADVANCED PRACTICE MIDWIFE

## 2024-02-28 PROCEDURE — 88175 CYTOPATH C/V AUTO FLUID REDO: CPT

## 2024-02-28 ASSESSMENT — ENCOUNTER SYMPTOMS
ALLERGIC/IMMUNOLOGIC NEGATIVE: 0
EYES NEGATIVE: 1
GASTROINTESTINAL NEGATIVE: 0
CONSTITUTIONAL NEGATIVE: 0
RESPIRATORY NEGATIVE: 0
PSYCHIATRIC NEGATIVE: 0
NEUROLOGICAL NEGATIVE: 0
CARDIOVASCULAR NEGATIVE: 0
ENDOCRINE NEGATIVE: 0
MUSCULOSKELETAL NEGATIVE: 0
HEMATOLOGIC/LYMPHATIC NEGATIVE: 0

## 2024-02-28 NOTE — PROGRESS NOTES
Subjective   Mattie Wolfe is a 53 y.o. female who is here for an annual gyn exam.     Concerns for this visit:  Possible UTI , dysuria  Hx urinary incontinence    Current contraception: none  PAP: 19 NML HPV -  History of abnormal Pap smear: no  Family history of uterine or ovarian cancer: yes - Aunt  ovarian cancern-mom side  Mammogram: 23 NML  History of abnormal mammogram: no  Family history of breast cancer: yes - Grandmother  father's side    Comorbidities include DM Type 1 and hx stroke. Multiple bone fractures in past 3 years.    Review of Systems   Eyes:  Positive for visual disturbance.   Gastrointestinal:  Positive for constipation.   All other systems reviewed and are negative.      Objective     Physical Exam  Constitutional:       General: She is not in acute distress.  Cardiovascular:      Rate and Rhythm: Normal rate and regular rhythm.      Heart sounds: Normal heart sounds.   Pulmonary:      Effort: Pulmonary effort is normal.      Breath sounds: Normal breath sounds.   Chest:      Chest wall: No deformity, swelling or tenderness.   Breasts:     Right: Normal.      Left: Normal.   Abdominal:      Palpations: Abdomen is soft. There is no mass.      Tenderness: There is no abdominal tenderness.   Genitourinary:     Vagina: No vaginal discharge, erythema, tenderness, bleeding or lesions.      Cervix: No cervical motion tenderness, discharge, friability, lesion or cervical bleeding.      Uterus: Normal. Not enlarged, not fixed and not tender.       Adnexa: Right adnexa normal.        Right: No mass, tenderness or fullness.          Left: Mass (constipation vs adnexal), tenderness and fullness present.       Rectum: Normal. No anal fissure or external hemorrhoid.      Comments: Significant atrophy of vulva without fusion/discoloration or depigmentation  Vaginal paleness of mucosa and absence or ruggae  Lymphadenopathy:      Upper Body:      Right upper body: No supraclavicular  or axillary adenopathy.      Left upper body: No supraclavicular or axillary adenopathy.   Skin:     Findings: Lesion (right groin and legs, dry patchy) present.   Neurological:      Mental Status: She is alert.          Assessment/Plan: 53 y.o. yo woman for annual GYN exam    1) Health maintenance:   Pap guidelines discussed (ASCCP). PAP cotesting  Self breast exam encouraged - mammogram discussed and ordered  Diet and exercise reviewed.  Routine follow up with PCP for health maintenance examination encouraged including TSH, cholesterol and vitamin D evaluation.    2) Postmenopausal  Call for vaginal bleeding    3) At risk osteoporosis  15yrs postmenopausal + DM Type 1 + hx thyroid disease + multiple bone fractures = indication for DEXA   Will reach out to pt PCP for coordination of care    4) Left pelvic mass  Constipation vs gyn, obtain pelvic ultrasound by abdominal means due to significant vaginal atrophy    5) Urinary incontinence  Urogyn referral  UA leuks and blood  Urine culture, follow per results    6) Vaginal atrophy  Urogyn referral    Mattie was seen today for well women visit.  Diagnoses and all orders for this visit:  Well woman exam with routine gynecological exam  -     THINPREP PAP  Screening mammogram for breast cancer  -     BI mammo bilateral screening tomosynthesis; Future  UTI symptoms  -     POCT UA Automated manually resulted  -     Urine culture  -     Referral to Urogynecology; Future  Pelvic mass  -     US pelvis; Future  At risk for osteoporosis     F/U 1 year or as needed

## 2024-02-29 DIAGNOSIS — Z78.0 MENOPAUSE PRESENT: Primary | ICD-10-CM

## 2024-02-29 ASSESSMENT — ENCOUNTER SYMPTOMS: CONSTIPATION: 1

## 2024-03-01 ENCOUNTER — HOSPITAL ENCOUNTER (OUTPATIENT)
Dept: RADIOLOGY | Facility: HOSPITAL | Age: 54
Discharge: HOME | End: 2024-03-01
Payer: MEDICAID

## 2024-03-01 DIAGNOSIS — R06.02 SOB (SHORTNESS OF BREATH): ICD-10-CM

## 2024-03-01 DIAGNOSIS — R19.00 PELVIC MASS: ICD-10-CM

## 2024-03-01 DIAGNOSIS — N30.01 ACUTE CYSTITIS WITH HEMATURIA: Primary | ICD-10-CM

## 2024-03-01 DIAGNOSIS — Z78.0 MENOPAUSE PRESENT: ICD-10-CM

## 2024-03-01 LAB — BACTERIA UR CULT: ABNORMAL

## 2024-03-01 PROCEDURE — 77080 DXA BONE DENSITY AXIAL: CPT

## 2024-03-01 PROCEDURE — 71046 X-RAY EXAM CHEST 2 VIEWS: CPT | Performed by: RADIOLOGY

## 2024-03-01 PROCEDURE — 71046 X-RAY EXAM CHEST 2 VIEWS: CPT

## 2024-03-01 PROCEDURE — 76856 US EXAM PELVIC COMPLETE: CPT | Performed by: RADIOLOGY

## 2024-03-01 PROCEDURE — 77080 DXA BONE DENSITY AXIAL: CPT | Performed by: STUDENT IN AN ORGANIZED HEALTH CARE EDUCATION/TRAINING PROGRAM

## 2024-03-01 PROCEDURE — 76856 US EXAM PELVIC COMPLETE: CPT

## 2024-03-01 PROCEDURE — 76830 TRANSVAGINAL US NON-OB: CPT | Performed by: RADIOLOGY

## 2024-03-01 RX ORDER — SULFAMETHOXAZOLE AND TRIMETHOPRIM 800; 160 MG/1; MG/1
1 TABLET ORAL 2 TIMES DAILY
Qty: 6 TABLET | Refills: 0 | Status: SHIPPED | OUTPATIENT
Start: 2024-03-01 | End: 2024-03-04

## 2024-03-04 ENCOUNTER — TELEPHONE (OUTPATIENT)
Dept: PRIMARY CARE | Facility: CLINIC | Age: 54
End: 2024-03-04
Payer: MEDICAID

## 2024-03-04 NOTE — TELEPHONE ENCOUNTER
Patient called regarding the port you referred her to get. She would like to know where/how to get it flushed. She states she is supposed to get it done monthly.     Thanks

## 2024-03-05 ENCOUNTER — OFFICE VISIT (OUTPATIENT)
Dept: OPHTHALMOLOGY | Facility: CLINIC | Age: 54
End: 2024-03-05
Payer: MEDICAID

## 2024-03-05 DIAGNOSIS — H54.7 UNSPECIFIED VISUAL LOSS: Primary | ICD-10-CM

## 2024-03-05 DIAGNOSIS — H47.20 OPTIC ATROPHY: ICD-10-CM

## 2024-03-05 DIAGNOSIS — H53.432 ARCUATE VISUAL FIELD DEFECT OF LEFT EYE: ICD-10-CM

## 2024-03-05 PROCEDURE — 92083 EXTENDED VISUAL FIELD XM: CPT | Performed by: PSYCHIATRY & NEUROLOGY

## 2024-03-05 PROCEDURE — 99214 OFFICE O/P EST MOD 30 MIN: CPT | Performed by: PSYCHIATRY & NEUROLOGY

## 2024-03-05 PROCEDURE — 92133 CPTRZD OPH DX IMG PST SGM ON: CPT | Performed by: PSYCHIATRY & NEUROLOGY

## 2024-03-05 ASSESSMENT — EXTERNAL EXAM - RIGHT EYE: OD_EXAM: NORMAL

## 2024-03-05 ASSESSMENT — CONF VISUAL FIELD
OS_INFERIOR_TEMPORAL_RESTRICTION: 3
OS_INFERIOR_NASAL_RESTRICTION: 3
OD_INFERIOR_TEMPORAL_RESTRICTION: 1
OD_INFERIOR_NASAL_RESTRICTION: 1
OD_SUPERIOR_NASAL_RESTRICTION: 1
OD_SUPERIOR_TEMPORAL_RESTRICTION: 1
OS_SUPERIOR_TEMPORAL_RESTRICTION: 3
OS_SUPERIOR_NASAL_RESTRICTION: 3

## 2024-03-05 ASSESSMENT — SLIT LAMP EXAM - LIDS
COMMENTS: NORMAL
COMMENTS: NORMAL

## 2024-03-05 ASSESSMENT — VISUAL ACUITY
OD_SC: NPL
OS_SC: 20/40
METHOD: SNELLEN - LINEAR
OS_SC+: -1

## 2024-03-05 ASSESSMENT — ENCOUNTER SYMPTOMS
RESPIRATORY NEGATIVE: 0
CARDIOVASCULAR NEGATIVE: 0
ALLERGIC/IMMUNOLOGIC NEGATIVE: 0
CONSTITUTIONAL NEGATIVE: 0
NEUROLOGICAL NEGATIVE: 0
EYES NEGATIVE: 1
PSYCHIATRIC NEGATIVE: 0
HEMATOLOGIC/LYMPHATIC NEGATIVE: 0
GASTROINTESTINAL NEGATIVE: 0
MUSCULOSKELETAL NEGATIVE: 0
ENDOCRINE NEGATIVE: 0

## 2024-03-05 ASSESSMENT — EXTERNAL EXAM - LEFT EYE: OS_EXAM: NORMAL

## 2024-03-05 ASSESSMENT — TONOMETRY
OD_IOP_MMHG: 18
IOP_METHOD: GOLDMANN APPLANATION
OS_IOP_MMHG: 10

## 2024-03-05 ASSESSMENT — CUP TO DISC RATIO: OS_RATIO: 0.2

## 2024-03-05 NOTE — LETTER
March 5, 2024     Anil Salamanca MD  [address]  [address]  [facility] [address]    Patient: Mattie Wolfe   YOB: 1970   Date of Visit: 3/5/2024     Dear Dr. Anil Salamanca MD:    I am writing to share my findings regarding our shared patient Mattie Wolfe from her visit with me on 3/5/2024.    HPI    This 53 year-old woman with a history of DM1 complicated by diabetic retinopathy with bilateral retinal detachments, ongoing treatment for left macular edema, stroke 2018, epilepsy, HLD, Hashimoto thyroiditis presents in follow up for evaluation of left eye vision loss. No changes since she was last here. A1c was 6.3 in 10/2023  -- Attending history below --  She went to the lab for blood draw, but it was not successful. She since had a port placed.    She notes some similar vision. She continues care with a retina specialist, but recently, the decision was against injection.  Last edited by Flakito Faulkner MD PhD on 3/5/2024  4:47 PM.        Diagnoses    Mattie was seen today for follow-up.  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  02/28/2020 MRI brain without contrast & MRA head & neck ,which I personally reviewed previously, show right occipital encephalomalacia consistent with prior stroke.    10/17/2023 B12 470. Vitamin A, thiamine, T-SPOT & ACE negative/wnl.    Lab Results   Component Value Date/Time    SEDRATE 10 10/06/2023 1117    CRP <0.10  10/06/2023 1117      10/06/2023 folate wnl. Syphilis non-reactive (NR).    03/05/2024 OCT RNFL OD unable secondary to NLP & OS 56. (Stable OS)  11/21/2023 OCT RNFL OD unable secondary to NLP & OS 56. (Stable left eye (OS))  10/17/2023 OCT RNFL OD NLP & OS 58 with S & I & borderline T & N thinning.    03/05/2024 HVF 24-2 OD no light perception (NLP) & OS fove 33, L 6/18, FP 7%, FN 13%, generalized depression MD -12.22.  11/21/2023 HVF 24-2 OD no light perception (NLP) & OS fovea 31, FL 10/18, FP 8%, FN 30%, unreliable MD -16.43.  06/18/2023 HVF 30-2 OD no light perception (NLP) & OS superior altitudinal & infeiror arcuate MD -11.81.  12/13/2022 HVF 30-2 OD unable & OS FP 17%, FN 19%, inferior & superior arcuate MD -8.20. (hard to read)  05/14/2023 HVF 30-2  OS unreliable MD -5.43.  Prior HVFs.    This 53 year-old woman with a history of DM1 complicated by diabetic retinopathy with bilateral retinal detachments, ongoing treatment for left macular edema, stroke 2018, epilepsy, HLD, Hashimoto thyroiditis presents in follow up for evaluation of left eye vision loss.    My examination today is similar to the last one. I again recommend further toxic & nutritional optic neuropathy testing such as heavy metals, copper and volatiles. Genetic testing for a hereditary cause contributing is an option, too, but not one expected to lead to a treatment.    Plan    Check other toxic / metabolic studies.    Follow up in 3-4 months with HVF & OCT. (Dilated 10/17/2023)      Below you will find my full examination. I appreciate the opportunity to see Mattie Wolfe today and to share in her care with you. Please contact me if you have questions for me regarding this visit or if I can be of assistance to another of your patients with neuro-ophthalmological problems.    Sincerely,    Flakito Faulkner MD PhD    CC:   No Recipients      Base Eye Exam       Visual Acuity (Snellen - Linear)         Right Left    Dist sc NPL 20/40 -1               Tonometry (Goldmann Applanation, 3:23 PM)         Right Left    Pressure 18 10              Pupils         Dark Light Shape React APD    Right 3 3 Round None NATALEE    Left 3 3 Round None NATALEE              Visual Fields         Left Right    Restrictions Partial outer superior temporal, inferior temporal, superior nasal, inferior nasal deficiencies Total superior temporal, inferior temporal, superior nasal, inferior nasal deficiencies              Extraocular Movement         Right Left     Full, Ortho Full, Ortho              Neuro/Psych       Oriented x3: Yes    Mood/Affect: Normal                  Additional Tests       Color         Right Left    Ishihara NLP 2/11                  Slit Lamp and Fundus Exam       External Exam         Right Left    External Normal Normal              Slit Lamp Exam         Right Left    Lids/Lashes Normal Normal    Conjunctiva/Sclera White and quiet White and quiet    Cornea Clear Clear    Anterior Chamber Deep and quiet Deep and quiet    Iris Round and reactive Round and reactive    Lens Posterior chamber intraocular lens 1+ Nuclear sclerosis    Anterior Vitreous Normal Normal              Fundus Exam         Right Left    Disc obscured Pallor    C/D Ratio obscured by tractional membrane 0.2    Macula flat Retinal pigment epithelial mottling    Vessels attenuated Normal    Periphery extensive PRP extensive PRP scars

## 2024-03-05 NOTE — PROGRESS NOTES
Assessment and Plan    11/16/2023 MRI brain & orbits without contrast, which I personally reviewed previously, shows right parietotemporal FLAIR findings consistent with chronic stroke changes.  02/28/2020 MRI brain without contrast & MRA head & neck ,which I personally reviewed previously, show right occipital encephalomalacia consistent with prior stroke.    10/17/2023 B12 470. Vitamin A, thiamine, T-SPOT & ACE negative/wnl.    Lab Results   Component Value Date/Time    SEDRATE 10 10/06/2023 1117    CRP <0.10 10/06/2023 1117      10/06/2023 folate wnl. Syphilis non-reactive (NR).    03/05/2024 OCT RNFL OD unable secondary to NLP & OS 56. (Stable OS)  11/21/2023 OCT RNFL OD unable secondary to NLP & OS 56. (Stable left eye (OS))  10/17/2023 OCT RNFL OD NLP & OS 58 with S & I & borderline T & N thinning.    03/05/2024 HVF 24-2 OD no light perception (NLP) & OS fove 33, L 6/18, FP 7%, FN 13%, generalized depression MD -12.22.  11/21/2023 HVF 24-2 OD no light perception (NLP) & OS fovea 31, FL 10/18, FP 8%, FN 30%, unreliable MD -16.43.  06/18/2023 HVF 30-2 OD no light perception (NLP) & OS superior altitudinal & infeiror arcuate MD -11.81.  12/13/2022 HVF 30-2 OD unable & OS FP 17%, FN 19%, inferior & superior arcuate MD -8.20. (hard to read)  05/14/2023 HVF 30-2  OS unreliable MD -5.43.  Prior HVFs.    This 53 year-old woman with a history of DM1 complicated by diabetic retinopathy with bilateral retinal detachments, ongoing treatment for left macular edema, stroke 2018, epilepsy, HLD, Hashimoto thyroiditis presents in follow up for evaluation of left eye vision loss.    My examination today is similar to the last one. I again recommend further toxic & nutritional optic neuropathy testing such as heavy metals, copper and volatiles. Genetic testing for a hereditary cause contributing is an option, too, but not one expected to lead to a treatment.    Plan    Check other toxic / metabolic studies.    Follow up in 3-4  months with HVF & OCT. (Dilated 10/17/2023)

## 2024-03-06 ENCOUNTER — TELEPHONE (OUTPATIENT)
Dept: INFUSION THERAPY | Facility: CLINIC | Age: 54
End: 2024-03-06
Payer: MEDICAID

## 2024-03-06 PROBLEM — Z95.828: Status: ACTIVE | Noted: 2024-03-06

## 2024-03-06 RX ORDER — HEPARIN 100 UNIT/ML
500 SYRINGE INTRAVENOUS AS NEEDED
Status: CANCELLED | OUTPATIENT
Start: 2024-03-11

## 2024-03-06 RX ORDER — HEPARIN SODIUM,PORCINE/PF 10 UNIT/ML
50 SYRINGE (ML) INTRAVENOUS AS NEEDED
Status: CANCELLED | OUTPATIENT
Start: 2024-03-11

## 2024-03-12 LAB
CYTOLOGY CMNT CVX/VAG CYTO-IMP: NORMAL
HPV HR 12 DNA GENITAL QL NAA+PROBE: NEGATIVE
HPV HR GENOTYPES PNL CVX NAA+PROBE: NEGATIVE
HPV16 DNA SPEC QL NAA+PROBE: NEGATIVE
HPV18 DNA SPEC QL NAA+PROBE: NEGATIVE
LAB AP HPV GENOTYPE QUESTION: YES
LAB AP HPV HR: NORMAL
LABORATORY COMMENT REPORT: NORMAL
PATH REPORT.TOTAL CANCER: NORMAL

## 2024-03-13 ENCOUNTER — TELEPHONE (OUTPATIENT)
Dept: PRIMARY CARE | Facility: CLINIC | Age: 54
End: 2024-03-13

## 2024-03-13 NOTE — TELEPHONE ENCOUNTER
Spoke with infusion center they do not see a referral for pts port flush please put in again   [phone]

## 2024-03-21 ENCOUNTER — INFUSION (OUTPATIENT)
Dept: INFUSION THERAPY | Facility: CLINIC | Age: 54
End: 2024-03-21
Payer: MEDICAID

## 2024-03-21 DIAGNOSIS — Z95.828 PRESENCE OF PERMANENT CENTRAL VENOUS CATHETER: ICD-10-CM

## 2024-03-21 PROCEDURE — 96523 IRRIG DRUG DELIVERY DEVICE: CPT | Performed by: REGISTERED NURSE

## 2024-03-21 RX ORDER — HEPARIN SODIUM,PORCINE/PF 10 UNIT/ML
50 SYRINGE (ML) INTRAVENOUS AS NEEDED
Status: CANCELLED | OUTPATIENT
Start: 2024-03-21

## 2024-03-21 RX ORDER — HEPARIN SODIUM,PORCINE/PF 10 UNIT/ML
50 SYRINGE (ML) INTRAVENOUS AS NEEDED
Status: DISCONTINUED | OUTPATIENT
Start: 2024-03-21 | End: 2024-03-21 | Stop reason: HOSPADM

## 2024-03-21 RX ORDER — HEPARIN 100 UNIT/ML
500 SYRINGE INTRAVENOUS AS NEEDED
Status: DISCONTINUED | OUTPATIENT
Start: 2024-03-21 | End: 2024-03-21 | Stop reason: HOSPADM

## 2024-03-21 RX ORDER — HEPARIN 100 UNIT/ML
500 SYRINGE INTRAVENOUS AS NEEDED
Status: CANCELLED | OUTPATIENT
Start: 2024-03-21

## 2024-03-21 RX ADMIN — HEPARIN 500 UNITS: 100 SYRINGE at 13:17

## 2024-03-21 NOTE — PATIENT INSTRUCTIONS
Today :We administered heparin flush.     For:   1. Presence of permanent central venous catheter         Your next appointment is due in:  30 days        Please read the  Medication Guide that was given to you and reviewed during todays visit.     (Tell all doctors including dentists that you are taking this medication)     Go to the emergency room or call 911 if:  -You have signs of allergic reaction:   -Rash, hives, itching.   -Swollen, blistered, peeling skin.   -Swelling of face, lips, mouth, tongue or throat.   -Tightness of chest, trouble breathing, swallowing or talking     Call your doctor:  - If IV / injection site gets red, warm, swollen, itchy or leaks fluid or pus.     (Leave dressing on your IV site for at least 2 hours and keep area clean and dry  - If you get sick or have symptoms of infection or are not feeling well for any reason.    (Wash your hands often, stay away from people who are sick)  - If you have side effects from your medication that do not go away or are bothersome.     (Refer to the teaching your nurse gave you for side effects to call your doctor about)    - Common side effects may include:  stuffy nose, headache, feeling tired, muscle aches, upset stomach  - Before receiving any vaccines     - Call the [facility] at  [phone]

## 2024-03-21 NOTE — PROGRESS NOTES
[facility]   Infusion Clinic Note   Date: 2024   Name: Mattie Wolfe  : 1970   MRN: [id number]         Reason for Visit: OP Infusion (Port Flush)         Visit Type: INFUSION             Accompanied by:Self      Diagnosis: Presence of permanent central venous catheter       Allergies:   Allergies as of 2024 - Reviewed 2024   Allergen Reaction Noted    Aspirin Unknown 2023    Azithromycin Hives 2023    Other Unknown 2024         Current Medications has a current medication list which includes the following prescription(s): atorvastatin, fluticasone, insulin lispro, lacosamide, losartan, omeprazole, and zonisamide, and the following Facility-Administered Medications: alteplase, heparin flush, and heparin flush.       Vitals:   There were no vitals filed for this visit.          Infusion Pre-procedure Checklist:   - Allergies reviewed: yes   - Medications reviewed: yes       - Previous reaction to current treatment: no      Assess patient for the concerns below. Document provider notification as appropriate.  - Active or recent infection with/without current antibiotic use: no  - Recent or planned invasive dental work: no  - Recent or planned surgeries: no  - Recently received or plans to receive vaccinations: no  - Has treatment related toxicities: no  - Is pregnant:  no      Pain: 0   - Is the pain different from normal: no   - Is the pain tolerable: n/a   - Is your Doctor aware:  n/a      Labs: N/A Pt had order for A1c but had just eaten Peanut Butter crackers         Fall Risk Screening:         Review Of Systems:  Review of Systems - Oncology      Infusion Readiness:   - Assessment Concerns Related to Infusion: No  - Provider notified: n/a      Document Below Only If Indicated:   New Patient Education:    N/A (returning patient for continuation of therapy. Ongoing education provided as needed.)        Treatment Conditions & Drug Specific  Questions:    NOT APPLICABLE        Weight Based Drug Calculations:    WEIGHT BASED DRUGS: NOT APPLICABLE / FLAT DOSE          Initiated By: Kate Mann RN   Time: 1:18 PM     We administered heparin flush.

## 2024-03-27 RX ORDER — HEPARIN SODIUM 1000 [USP'U]/ML
2000 INJECTION, SOLUTION INTRAVENOUS; SUBCUTANEOUS AS NEEDED
Status: CANCELLED | OUTPATIENT
Start: 2024-03-27

## 2024-03-27 RX ORDER — HEPARIN SODIUM,PORCINE/PF 10 UNIT/ML
50 SYRINGE (ML) INTRAVENOUS AS NEEDED
Status: CANCELLED | OUTPATIENT
Start: 2024-03-27

## 2024-03-28 ENCOUNTER — TELEPHONE (OUTPATIENT)
Dept: HEMATOLOGY/ONCOLOGY | Facility: CLINIC | Age: 54
End: 2024-03-28
Payer: MEDICAID

## 2024-03-28 DIAGNOSIS — Z95.828 PRESENCE OF PERMANENT CENTRAL VENOUS CATHETER: ICD-10-CM

## 2024-03-28 NOTE — TELEPHONE ENCOUNTER
Call to pt to set up scheduling for port flushes. Per pt, she gets port flushed monthly and had her port flushed yesterday. I confirmed with pt that her next flush should be the week of 4/27/24. Pt given verbal directions on how to find [facility]. I made pt aware that a scheduled would be calling to set up port flush appointment. Pt denied further questions and was appreciative of call.

## 2024-04-01 ENCOUNTER — LAB (OUTPATIENT)
Dept: LAB | Facility: LAB | Age: 54
End: 2024-04-01
Payer: MEDICAID

## 2024-04-01 DIAGNOSIS — E78.49 OTHER HYPERLIPIDEMIA: Primary | ICD-10-CM

## 2024-04-01 DIAGNOSIS — E10.69 TYPE 1 DIABETES MELLITUS WITH OTHER SPECIFIED COMPLICATION (MULTI): ICD-10-CM

## 2024-04-01 LAB
CHOLEST SERPL-MCNC: 146 MG/DL (ref 0–199)
CHOLESTEROL/HDL RATIO: 2.6
EST. AVERAGE GLUCOSE BLD GHB EST-MCNC: 120 MG/DL
HBA1C MFR BLD: 5.8 %
HDLC SERPL-MCNC: 55.5 MG/DL
LDLC SERPL CALC-MCNC: 76 MG/DL
NON HDL CHOLESTEROL: 91 MG/DL (ref 0–149)
TRIGL SERPL-MCNC: 74 MG/DL (ref 0–149)
VLDL: 15 MG/DL (ref 0–40)

## 2024-04-01 PROCEDURE — 83036 HEMOGLOBIN GLYCOSYLATED A1C: CPT

## 2024-04-01 PROCEDURE — 80061 LIPID PANEL: CPT

## 2024-04-01 PROCEDURE — 36415 COLL VENOUS BLD VENIPUNCTURE: CPT

## 2024-04-18 ENCOUNTER — OFFICE VISIT (OUTPATIENT)
Dept: UROLOGY | Facility: CLINIC | Age: 54
End: 2024-04-18
Payer: MEDICAID

## 2024-04-18 ENCOUNTER — APPOINTMENT (OUTPATIENT)
Dept: UROLOGY | Facility: CLINIC | Age: 54
End: 2024-04-18
Payer: MEDICAID

## 2024-04-18 VITALS — SYSTOLIC BLOOD PRESSURE: 104 MMHG | DIASTOLIC BLOOD PRESSURE: 68 MMHG | TEMPERATURE: 98 F | HEART RATE: 98 BPM

## 2024-04-18 DIAGNOSIS — R39.9 UTI SYMPTOMS: ICD-10-CM

## 2024-04-18 DIAGNOSIS — R30.0 DYSURIA: ICD-10-CM

## 2024-04-18 LAB
POC APPEARANCE, URINE: CLEAR
POC BILIRUBIN, URINE: ABNORMAL
POC BLOOD, URINE: ABNORMAL
POC COLOR, URINE: YELLOW
POC GLUCOSE, URINE: NEGATIVE MG/DL
POC KETONES, URINE: ABNORMAL MG/DL
POC LEUKOCYTES, URINE: ABNORMAL
POC NITRITE,URINE: NEGATIVE
POC PH, URINE: 5.5 PH
POC PROTEIN, URINE: ABNORMAL MG/DL
POC SPECIFIC GRAVITY, URINE: >=1.03
POC UROBILINOGEN, URINE: 0.2 EU/DL

## 2024-04-18 PROCEDURE — 87086 URINE CULTURE/COLONY COUNT: CPT

## 2024-04-18 PROCEDURE — 3074F SYST BP LT 130 MM HG: CPT | Performed by: UROLOGY

## 2024-04-18 PROCEDURE — 3048F LDL-C <100 MG/DL: CPT | Performed by: UROLOGY

## 2024-04-18 PROCEDURE — 4010F ACE/ARB THERAPY RXD/TAKEN: CPT | Performed by: UROLOGY

## 2024-04-18 PROCEDURE — 81003 URINALYSIS AUTO W/O SCOPE: CPT | Performed by: UROLOGY

## 2024-04-18 PROCEDURE — 3078F DIAST BP <80 MM HG: CPT | Performed by: UROLOGY

## 2024-04-18 PROCEDURE — 99214 OFFICE O/P EST MOD 30 MIN: CPT | Performed by: UROLOGY

## 2024-04-18 PROCEDURE — 87186 SC STD MICRODIL/AGAR DIL: CPT

## 2024-04-18 PROCEDURE — 3044F HG A1C LEVEL LT 7.0%: CPT | Performed by: UROLOGY

## 2024-04-18 ASSESSMENT — ENCOUNTER SYMPTOMS
NEUROLOGICAL NEGATIVE: 1
SHORTNESS OF BREATH: 1
PSYCHIATRIC NEGATIVE: 1
EYES NEGATIVE: 1
ALLERGIC/IMMUNOLOGIC NEGATIVE: 1
ROS GI COMMENTS: HEARTBURN
ENDOCRINE NEGATIVE: 1
CONSTITUTIONAL NEGATIVE: 1
CARDIOVASCULAR NEGATIVE: 1
MUSCULOSKELETAL NEGATIVE: 1
HEMATOLOGIC/LYMPHATIC NEGATIVE: 1

## 2024-04-18 NOTE — PROGRESS NOTES
Referred by: Mai Perez, BOUCHRA-CNM     PCP  Kobi Gold MD         CHIEF COMPLAINT:    UTIs         HISTORY OF PRESENT ILLNESS:  1.WINSTON   2. Frequent UTIs with signifincant dysuria, burning on urination, fever, gross hematuria   3.Sensation of pneumauria   4.WINSTON  5.T1DM  6. Shx hip surgery as an infant, gallbladder surgery, appendectomy, cordotomy    Patient's history was reviewed. This is a 53 y.o. female who presents for evaluation of UTIs. She 3 UTIs since halloween with signifincant dysuria, burning on urination, fever, gross hematuria (during 2 out of 3 of her recent UTIs). She has had UTIs before but not as frequent. She wears incontinence supplies. She has been told that she has air in her bladder and she heard the passage of air form her bladder this morning. Additionally, she endorses frequency, occasional nocturia, and incontinence with cough, laugh, sneeze, urge, and when she moves in a hurry. Her incontinence is also worse when it is hot out. She wears period underwear and pad today, but wears pull-ups at home. She changes incontinence products up to TID. She has skin irritation with utilization of incontinence products when it is hot out. She has T1DM and is concerned about her kidney function d/t T1DM. She has been on a pump for her T1DM since December and her blood sugar has recently improved. She has not been diagnosed with diverticulosis. She is not sexually active, she has not had a hysterectomy, and she denies symptoms of prolapse. She had a hip surgery as an infant, an eye surgery for retina, wrist surgery for broken bone, gallbladder surgery, appendectomy, cordotomy, and has had a tracheostomy. She has a 12 y/o daughter.       OB History    No obstetric history on file.         Past Medical History:   Diagnosis Date    Asthma (HHS-HCC)     Diabetes (Multi)     Diabetic retinopathy (Multi)     Gastrointestinal disorder     Gastroparesis     Gastroparesis    H/O bladder infections      History of stroke     Osteoporosis     Other conditions influencing health status     Diabetes type 1, uncontrolled    Personal history of transient ischemic attack (TIA), and cerebral infarction without residual deficits     History of stroke       Past Surgical History:   Procedure Laterality Date    APPENDECTOMY  04/09/2014    Appendectomy    CATARACT EXTRACTION      HIP SURGERY  04/09/2014    Hip Surgery    MR HEAD ANGIO WO IV CONTRAST  02/28/2020    MR HEAD ANGIO WO IV CONTRAST 2/28/2020 [facility]    MR NECK ANGIO WO IV CONTRAST  02/28/2020    MR NECK ANGIO WO IV CONTRAST 2/28/2020 [facility]    OTHER SURGICAL HISTORY  04/09/2014    Cholecystotomy    OTHER SURGICAL HISTORY  11/01/2019    Retinal detachment repair    OTHER SURGICAL HISTORY  06/04/2020    Wrist surgery    RETINAL DETACHMENT SURGERY         Family History   Problem Relation Name Age of Onset    Hypertension Mother      Stroke Mother      Hypertension Father      Other (SUBSTANCE ABUSE) Father      Stroke Other Sibling     Breast cancer Other grandparent        Review of Systems   Constitutional: Negative.    HENT: Negative.     Eyes: Negative.    Respiratory:  Positive for shortness of breath.    Cardiovascular: Negative.    Gastrointestinal:         Heartburn   Endocrine: Negative.    Genitourinary:         REFER TO HPI   Musculoskeletal: Negative.    Skin: Negative.    Allergic/Immunologic: Negative.    Neurological: Negative.    Hematological: Negative.    Psychiatric/Behavioral: Negative.                 PHYSICAL EXAMINATION:  No LMP recorded. Patient is postmenopausal.  There is no height or weight on file to calculate BMI.  Visit Vitals  /68   Pulse 98   Temp 36.7 °C (98 °F) (Temporal)   OB Status Postmenopausal   Smoking Status Never     NP: Constitutional: alert and in no acute distress, well developed, well nourished.   Head and Face: head and face: unremarkable.   Neck: no neck asymmetry, supple.   Pulmonary:  no respiratory distress, unremarkable respiratory effort.   Skin: normal skin color and pigmentation, normal skin turgor, and no rash.   Neurologic: cranial nerves: non-focal, grossly intact.   Psychiatric: alert and oriented x 3.     PVR (by Ultrasound): 0   Urine dip:   Recent Results (from the past 6 hour(s))   POCT UA Automated manually resulted    Collection Time: 04/18/24 10:25 AM   Result Value Ref Range    POC Color, Urine Yellow Straw, Yellow, Light-Yellow    POC Appearance, Urine Clear Clear    POC Glucose, Urine NEGATIVE NEGATIVE mg/dl    POC Bilirubin, Urine SMALL (1+) (A) NEGATIVE    POC Ketones, Urine TRACE (A) NEGATIVE mg/dl    POC Specific Gravity, Urine >=1.030 1.005 - 1.035    POC Blood, Urine TRACE-Intact (A) NEGATIVE    POC PH, Urine 5.5 No Reference Range Established PH    POC Protein, Urine 100 (2+) (A) NEGATIVE, 30 (1+) mg/dl    POC Urobilinogen, Urine 0.2 0.2, 1.0 EU/DL    Poc Nitrite, Urine NEGATIVE NEGATIVE    POC Leukocytes, Urine TRACE (A) NEGATIVE       I personally reviewed recent Hemoglobin A1c findings with the patient today in clinic. They demonstrated   Component  Ref Range & Units 2 wk ago (4/1/24) 6 mo ago 1 yr ago   Hemoglobin A1C  see below % 5.8 High  6.3 High  8.3 High  R, CM   Estimated Average Glucose  Not Established mg/dL 120 134 192 R, CM       I personally reviewed the US pelvis dated 3/02/2024 with the patient today in clinic. It demonstrated     FINDINGS:  UTERUS:  The uterus measures  3.9 x 2.1 x 5.9.  ENDOMETRIUM:  The endometrium measures a thickness of 0.4 cm, which is normal.  Right ovary not seen  LEFT ADNEXA:  The left ovary measures 1.9 x 1.2 x 2 and demonstrates normal flow.  No gross left adnexal masses are seen, no hydrosalpinx.  Urinary bladder is distended and probably contains air. It has a  thick wall. This may be cystitis. CT is advised.  IMPRESSION:  1. Thickened urinary bladder wall. Cystitis is seen. Equivocal air  seen which does raise the  possibility of underlying urinary tract  infection. Emphysematous cystitis is felt to be unlikely. Consider CT.  2. Right ovary is not seen      I personally reviewed the urine culture dated 2/28/24 with the patient today in clinic. It demonstrated   Urine Culture >100,000 Escherichia coli Abnormal            Resulting Agency: [facility]     Susceptibility     Escherichia coli     MICROSCAN    $$ Ampicillin Susceptible    $ Cefazolin Susceptible     Cefazolin (uncomplicated UTIs only) Susceptible    $ Ciprofloxacin Resistant    $ Gentamicin Susceptible    $ Nitrofurantoin Susceptible    $$ Piperacillin/Tazobactam Susceptible    $ Trimethoprim/Sulfamethoxazole Susceptible                 I personally reviewed the recent BMPs with the patient today in clinic. They demonstrated             Component  Ref Range & Units 2 mo ago  (2/7/24) 6 mo ago  (10/17/23) 6 mo ago  (10/6/23) 1 yr ago  (8/11/22) 2 yr ago  (6/21/21) 2 yr ago  (6/21/21) 4 yr ago  (2/29/20)   Glucose  74 - 99 mg/dL 65 Low   139 High  176 High  286 High  286 High  93   Sodium  136 - 145 mmol/L 141  139 139 138 138 141   Potassium  3.5 - 5.3 mmol/L 4.2  5.1 4.5 4.3 4.2 4.1   Chloride  98 - 107 mmol/L 110 High   109 High  107 108 High  107 110 High    Bicarbonate  21 - 32 mmol/L 26  21 24 22 23 23   Anion Gap  10 - 20 mmol/L 9 Low   14 13 12 12 12   Urea Nitrogen  6 - 23 mg/dL 26 High   25 High  28 High  26 High  26 High  34 High    Creatinine  0.50 - 1.05 mg/dL 1.28 High  1.10 High  1.10 High  1.14 High  1.06 High  1.04 1.16 High    eGFR  >60 mL/min/1.73m*2 50 Low  59 Low  CM 59 Low  CM       Comment: Calculations of estimated GFR are performed using the 2021 CKD-EPI Study Refit equation without the race variable for the IDMS-Traceable creatinine methods.  https://jasn.asnjournals.org/content/early/2021/09/22/ASN.5075521709   Calcium  8.6 - 10.3 mg/dL 9.1  9.0 R 9.3 9.0 9.0 8.9            IMPRESSION AND PLAN:  1.WINSTON   2. Frequent UTIs with signifincant dysuria,  burning on urination, fever, gross hematuria   3.Sensation of pneumauria   4.WINSTON  5.T1DM  6. Shx hip surgery as an infant, gallbladder surgery, appendectomy, cordotomy    Mattie Wolfe is a 53 y.o. who presents with UTIs     Problem List Items Addressed This Visit    None  Visit Diagnoses       Dysuria        Relevant Orders    POCT UA Automated manually resulted (Completed)    Post-Void Residual (Completed)    UTI symptoms               We discussed treatment options for UTI including topical estrogen cream or low dose prophylactic antibiotics. Patient feels comfortable sending urine for culture and treating with culture-specific antibiotics. I described that air in the bladder could be secondary to UTIs, but I asked her to follow up for cystoscopy  when she is not infected to rule out fistula. Additionally, I recommended that she visit a nephrologist in regard to her T1DM.     All questions and concerns were answered and addressed.  The patient expressed understanding and agrees with the plan.       SIGNATURES  Scribe Attestation  By signing my name below, ISusie Scribe   attest that this documentation has been prepared under the direction and in the presence of Iván Abdul MD.

## 2024-04-18 NOTE — LETTER
April 20, 2024     GEORGIE Paniagua  [address] [place] [address]    Patient: Mattie Wolfe   YOB: 1970   Date of Visit: 4/18/2024       Dear GEORGIE Medeiros:    Thank you for referring Mattie Wolfe to me for evaluation. Below are my notes for this consultation.  If you have questions, please do not hesitate to call me. I look forward to following your patient along with you.       Sincerely,     Iván Abdul MD      CC: Kobi Gold MD  ______________________________________________________________________________________    Referred by: GEORGIE Paniagua     PCP  Kobi Gold MD         CHIEF COMPLAINT:    UTIs         HISTORY OF PRESENT ILLNESS:  1.WINSTON   2. Frequent UTIs with signifincant dysuria, burning on urination, fever, gross hematuria   3.Sensation of pneumauria   4.WINSTON  5.T1DM  6. Shx hip surgery as an infant, gallbladder surgery, appendectomy, cordotomy    Patient's history was reviewed. This is a 53 y.o. female who presents for evaluation of UTIs. She 3 UTIs since halloween with signifincant dysuria, burning on urination, fever, gross hematuria (during 2 out of 3 of her recent UTIs). She has had UTIs before but not as frequent. She wears incontinence supplies. She has been told that she has air in her bladder and she heard the passage of air form her bladder this morning. Additionally, she endorses frequency, occasional nocturia, and incontinence with cough, laugh, sneeze, urge, and when she moves in a hurry. Her incontinence is also worse when it is hot out. She wears period underwear and pad today, but wears pull-ups at home. She changes incontinence products up to TID. She has skin irritation with utilization of incontinence products when it is hot out. She has T1DM and is concerned about her kidney function d/t T1DM. She has been on a pump for her T1DM since December and her blood sugar has recently improved. She  has not been diagnosed with diverticulosis. She is not sexually active, she has not had a hysterectomy, and she denies symptoms of prolapse. She had a hip surgery as an infant, an eye surgery for retina, wrist surgery for broken bone, gallbladder surgery, appendectomy, cordotomy, and has had a tracheostomy. She has a 12 y/o daughter.       OB History    No obstetric history on file.         Past Medical History:   Diagnosis Date   • Asthma (HHS-HCC)    • Diabetes (Multi)    • Diabetic retinopathy (Multi)    • Gastrointestinal disorder    • Gastroparesis     Gastroparesis   • H/O bladder infections    • History of stroke    • Osteoporosis    • Other conditions influencing health status     Diabetes type 1, uncontrolled   • Personal history of transient ischemic attack (TIA), and cerebral infarction without residual deficits     History of stroke       Past Surgical History:   Procedure Laterality Date   • APPENDECTOMY  04/09/2014    Appendectomy   • CATARACT EXTRACTION     • HIP SURGERY  04/09/2014    Hip Surgery   • MR HEAD ANGIO WO IV CONTRAST  02/28/2020    MR HEAD ANGIO WO IV CONTRAST 2/28/2020 [facility]   • MR NECK ANGIO WO IV CONTRAST  02/28/2020    MR NECK ANGIO WO IV CONTRAST 2/28/2020 [facility]   • OTHER SURGICAL HISTORY  04/09/2014    Cholecystotomy   • OTHER SURGICAL HISTORY  11/01/2019    Retinal detachment repair   • OTHER SURGICAL HISTORY  06/04/2020    Wrist surgery   • RETINAL DETACHMENT SURGERY         Family History   Problem Relation Name Age of Onset   • Hypertension Mother     • Stroke Mother     • Hypertension Father     • Other (SUBSTANCE ABUSE) Father     • Stroke Other Sibling    • Breast cancer Other grandparent        Review of Systems   Constitutional: Negative.    HENT: Negative.     Eyes: Negative.    Respiratory:  Positive for shortness of breath.    Cardiovascular: Negative.    Gastrointestinal:         Heartburn   Endocrine: Negative.    Genitourinary:          REFER TO HPI   Musculoskeletal: Negative.    Skin: Negative.    Allergic/Immunologic: Negative.    Neurological: Negative.    Hematological: Negative.    Psychiatric/Behavioral: Negative.                 PHYSICAL EXAMINATION:  No LMP recorded. Patient is postmenopausal.  There is no height or weight on file to calculate BMI.  Visit Vitals  /68   Pulse 98   Temp 36.7 °C (98 °F) (Temporal)   OB Status Postmenopausal   Smoking Status Never     NP: Constitutional: alert and in no acute distress, well developed, well nourished.   Head and Face: head and face: unremarkable.   Neck: no neck asymmetry, supple.   Pulmonary: no respiratory distress, unremarkable respiratory effort.   Skin: normal skin color and pigmentation, normal skin turgor, and no rash.   Neurologic: cranial nerves: non-focal, grossly intact.   Psychiatric: alert and oriented x 3.     PVR (by Ultrasound): 0   Urine dip:   Recent Results (from the past 6 hour(s))   POCT UA Automated manually resulted    Collection Time: 04/18/24 10:25 AM   Result Value Ref Range    POC Color, Urine Yellow Straw, Yellow, Light-Yellow    POC Appearance, Urine Clear Clear    POC Glucose, Urine NEGATIVE NEGATIVE mg/dl    POC Bilirubin, Urine SMALL (1+) (A) NEGATIVE    POC Ketones, Urine TRACE (A) NEGATIVE mg/dl    POC Specific Gravity, Urine >=1.030 1.005 - 1.035    POC Blood, Urine TRACE-Intact (A) NEGATIVE    POC PH, Urine 5.5 No Reference Range Established PH    POC Protein, Urine 100 (2+) (A) NEGATIVE, 30 (1+) mg/dl    POC Urobilinogen, Urine 0.2 0.2, 1.0 EU/DL    Poc Nitrite, Urine NEGATIVE NEGATIVE    POC Leukocytes, Urine TRACE (A) NEGATIVE       I personally reviewed recent Hemoglobin A1c findings with the patient today in clinic. They demonstrated   Component  Ref Range & Units 2 wk ago (4/1/24) 6 mo ago 1 yr ago   Hemoglobin A1C  see below % 5.8 High  6.3 High  8.3 High  R, CM   Estimated Average Glucose  Not Established mg/dL 120 134 192 R, CM       I  personally reviewed the US pelvis dated 3/02/2024 with the patient today in clinic. It demonstrated     FINDINGS:  UTERUS:  The uterus measures  3.9 x 2.1 x 5.9.  ENDOMETRIUM:  The endometrium measures a thickness of 0.4 cm, which is normal.  Right ovary not seen  LEFT ADNEXA:  The left ovary measures 1.9 x 1.2 x 2 and demonstrates normal flow.  No gross left adnexal masses are seen, no hydrosalpinx.  Urinary bladder is distended and probably contains air. It has a  thick wall. This may be cystitis. CT is advised.  IMPRESSION:  1. Thickened urinary bladder wall. Cystitis is seen. Equivocal air  seen which does raise the possibility of underlying urinary tract  infection. Emphysematous cystitis is felt to be unlikely. Consider CT.  2. Right ovary is not seen      I personally reviewed the urine culture dated 2/28/24 with the patient today in clinic. It demonstrated   Urine Culture >100,000 Escherichia coli Abnormal            Resulting Agency: [facility]     Susceptibility     Escherichia coli     MICROSCAN    $$ Ampicillin Susceptible    $ Cefazolin Susceptible     Cefazolin (uncomplicated UTIs only) Susceptible    $ Ciprofloxacin Resistant    $ Gentamicin Susceptible    $ Nitrofurantoin Susceptible    $$ Piperacillin/Tazobactam Susceptible    $ Trimethoprim/Sulfamethoxazole Susceptible                 I personally reviewed the recent BMPs with the patient today in clinic. They demonstrated             Component  Ref Range & Units 2 mo ago  (2/7/24) 6 mo ago  (10/17/23) 6 mo ago  (10/6/23) 1 yr ago  (8/11/22) 2 yr ago  (6/21/21) 2 yr ago  (6/21/21) 4 yr ago  (2/29/20)   Glucose  74 - 99 mg/dL 65 Low   139 High  176 High  286 High  286 High  93   Sodium  136 - 145 mmol/L 141  139 139 138 138 141   Potassium  3.5 - 5.3 mmol/L 4.2  5.1 4.5 4.3 4.2 4.1   Chloride  98 - 107 mmol/L 110 High   109 High  107 108 High  107 110 High    Bicarbonate  21 - 32 mmol/L 26 21 24 22 23 23   Anion Gap  10 - 20 mmol/L 9 Low   14 13 12 12  12   Urea Nitrogen  6 - 23 mg/dL 26 High   25 High  28 High  26 High  26 High  34 High    Creatinine  0.50 - 1.05 mg/dL 1.28 High  1.10 High  1.10 High  1.14 High  1.06 High  1.04 1.16 High    eGFR  >60 mL/min/1.73m*2 50 Low  59 Low  CM 59 Low  CM       Comment: Calculations of estimated GFR are performed using the 2021 CKD-EPI Study Refit equation without the race variable for the IDMS-Traceable creatinine methods.  https://jasn.asnjournals.org/content/early/2021/09/22/ASN.6777680569   Calcium  8.6 - 10.3 mg/dL 9.1  9.0 R 9.3 9.0 9.0 8.9            IMPRESSION AND PLAN:  1.WINSTON   2. Frequent UTIs with signifincant dysuria, burning on urination, fever, gross hematuria   3.Sensation of pneumauria   4.WINSTON  5.T1DM  6. Shx hip surgery as an infant, gallbladder surgery, appendectomy, cordotomy    Mattie Wolfe is a 53 y.o. who presents with UTIs     Problem List Items Addressed This Visit    None  Visit Diagnoses       Dysuria        Relevant Orders    POCT UA Automated manually resulted (Completed)    Post-Void Residual (Completed)    UTI symptoms               We discussed treatment options for UTI including topical estrogen cream or low dose prophylactic antibiotics. Patient feels comfortable sending urine for culture and treating with culture-specific antibiotics. I described that air in the bladder could be secondary to UTIs, but I asked her to follow up for cystoscopy  when she is not infected to rule out fistula. Additionally, I recommended that she visit a nephrologist in regard to her T1DM.     All questions and concerns were answered and addressed.  The patient expressed understanding and agrees with the plan.       SIGNATURES  Scribe Attestation  By signing my name below, I, Taniya Garcia   attest that this documentation has been prepared under the direction and in the presence of Iván Abdul MD.

## 2024-04-21 LAB — BACTERIA UR CULT: ABNORMAL

## 2024-04-22 DIAGNOSIS — N39.0 URINARY TRACT INFECTION WITHOUT HEMATURIA, SITE UNSPECIFIED: Primary | ICD-10-CM

## 2024-04-22 RX ORDER — CEPHALEXIN 500 MG/1
500 CAPSULE ORAL 3 TIMES DAILY
Qty: 15 CAPSULE | Refills: 0 | Status: SHIPPED | OUTPATIENT
Start: 2024-04-22 | End: 2024-04-27

## 2024-04-22 RX ORDER — SULFAMETHOXAZOLE AND TRIMETHOPRIM 800; 160 MG/1; MG/1
1 TABLET ORAL 2 TIMES DAILY
Qty: 10 TABLET | Refills: 0 | Status: CANCELLED | OUTPATIENT
Start: 2024-04-22 | End: 2024-04-27

## 2024-04-26 ENCOUNTER — INFUSION (OUTPATIENT)
Dept: HEMATOLOGY/ONCOLOGY | Facility: CLINIC | Age: 54
End: 2024-04-26
Payer: MEDICAID

## 2024-04-26 DIAGNOSIS — H54.7 UNSPECIFIED VISUAL LOSS: ICD-10-CM

## 2024-04-26 DIAGNOSIS — H47.20 OPTIC ATROPHY: ICD-10-CM

## 2024-04-26 DIAGNOSIS — Z95.828 PRESENCE OF PERMANENT CENTRAL VENOUS CATHETER: ICD-10-CM

## 2024-04-26 LAB — HOLD SPECIMEN: NORMAL

## 2024-04-26 PROCEDURE — 83825 ASSAY OF MERCURY: CPT

## 2024-04-26 PROCEDURE — 82525 ASSAY OF COPPER: CPT

## 2024-04-26 PROCEDURE — 96523 IRRIG DRUG DELIVERY DEVICE: CPT

## 2024-04-26 PROCEDURE — 80320 DRUG SCREEN QUANTALCOHOLS: CPT

## 2024-04-26 PROCEDURE — 36591 DRAW BLOOD OFF VENOUS DEVICE: CPT

## 2024-04-26 RX ORDER — HEPARIN 100 UNIT/ML
500 SYRINGE INTRAVENOUS AS NEEDED
Status: DISCONTINUED | OUTPATIENT
Start: 2024-04-26 | End: 2024-04-26 | Stop reason: HOSPADM

## 2024-04-26 RX ORDER — HEPARIN SODIUM 1000 [USP'U]/ML
2000 INJECTION, SOLUTION INTRAVENOUS; SUBCUTANEOUS AS NEEDED
Status: CANCELLED | OUTPATIENT
Start: 2024-04-26

## 2024-04-26 RX ORDER — HEPARIN 100 UNIT/ML
500 SYRINGE INTRAVENOUS AS NEEDED
Status: CANCELLED | OUTPATIENT
Start: 2024-04-26

## 2024-04-26 RX ORDER — HEPARIN SODIUM,PORCINE/PF 10 UNIT/ML
50 SYRINGE (ML) INTRAVENOUS AS NEEDED
Status: CANCELLED | OUTPATIENT
Start: 2024-04-26

## 2024-04-29 ENCOUNTER — OFFICE VISIT (OUTPATIENT)
Dept: PRIMARY CARE | Facility: CLINIC | Age: 54
End: 2024-04-29
Payer: MEDICAID

## 2024-04-29 ENCOUNTER — TELEPHONE (OUTPATIENT)
Dept: PRIMARY CARE | Facility: CLINIC | Age: 54
End: 2024-04-29

## 2024-04-29 VITALS
SYSTOLIC BLOOD PRESSURE: 132 MMHG | RESPIRATION RATE: 14 BRPM | DIASTOLIC BLOOD PRESSURE: 78 MMHG | OXYGEN SATURATION: 100 % | WEIGHT: 144 LBS | BODY MASS INDEX: 26.5 KG/M2 | HEIGHT: 62 IN | HEART RATE: 75 BPM

## 2024-04-29 DIAGNOSIS — E10.69 TYPE 1 DIABETES MELLITUS WITH OTHER SPECIFIED COMPLICATION (MULTI): ICD-10-CM

## 2024-04-29 DIAGNOSIS — R06.09 DYSPNEA ON MINIMAL EXERTION: Primary | ICD-10-CM

## 2024-04-29 DIAGNOSIS — Q65.00: ICD-10-CM

## 2024-04-29 DIAGNOSIS — Z95.828 PRESENCE OF PERMANENT CENTRAL VENOUS CATHETER: ICD-10-CM

## 2024-04-29 DIAGNOSIS — J39.8 TRACHEAL STENOSIS: ICD-10-CM

## 2024-04-29 DIAGNOSIS — I42.8 CARDIOMYOPATHY, NONISCHEMIC (MULTI): ICD-10-CM

## 2024-04-29 PROBLEM — E11.10 DIABETIC KETOACIDOSIS (MULTI): Status: RESOLVED | Noted: 2023-02-17 | Resolved: 2024-04-29

## 2024-04-29 PROCEDURE — 3075F SYST BP GE 130 - 139MM HG: CPT | Performed by: INTERNAL MEDICINE

## 2024-04-29 PROCEDURE — 3078F DIAST BP <80 MM HG: CPT | Performed by: INTERNAL MEDICINE

## 2024-04-29 PROCEDURE — 1036F TOBACCO NON-USER: CPT | Performed by: INTERNAL MEDICINE

## 2024-04-29 PROCEDURE — 99214 OFFICE O/P EST MOD 30 MIN: CPT | Performed by: INTERNAL MEDICINE

## 2024-04-29 PROCEDURE — 3044F HG A1C LEVEL LT 7.0%: CPT | Performed by: INTERNAL MEDICINE

## 2024-04-29 PROCEDURE — 3048F LDL-C <100 MG/DL: CPT | Performed by: INTERNAL MEDICINE

## 2024-04-29 PROCEDURE — 4010F ACE/ARB THERAPY RXD/TAKEN: CPT | Performed by: INTERNAL MEDICINE

## 2024-04-29 ASSESSMENT — ENCOUNTER SYMPTOMS
NAUSEA: 0
WHEEZING: 0
FEVER: 0
DIAPHORESIS: 0
SHORTNESS OF BREATH: 1
CHILLS: 0
CHOKING: 0
CONSTIPATION: 0
JOINT SWELLING: 0
COUGH: 0
MYALGIAS: 0
VOMITING: 0
DIARRHEA: 0
STRIDOR: 1

## 2024-04-29 NOTE — TELEPHONE ENCOUNTER
Walking oximetry done today started at 99 pt dropped to 94 had to stop after 3rd lap to catch breath deferred oxygen.

## 2024-04-29 NOTE — PATIENT INSTRUCTIONS
CONTINUE PRESENT MEDS    2.  AWAIT RESULTS OF THE CYSTOSCOPY BY DR. DELONG    3.  YOUR SHORT OF BREATH SEEMS TO BE FROM LUNG SCARRING FIBROSIS - WHICH IS ALSO A CHRONIC LUNG DISORDER.  WILL CHECK IF YOU COULD USE OXYGEN FOR EXERCISE    4.  PLEASE CALL IF REFILLS ARE NEEDED    5.  WILL ASK RN CASE  MANAGER TO FIND OUT IF PULMONARY REHAB IS TRULY NOT POSSIBLE FOR YOU.

## 2024-04-29 NOTE — PROGRESS NOTES
"Subjective   Mattie Wolfe is a 53 y.o. female who presents for FOLLOW UP    CURRENTLY ON KEFLEX FOR UTI WILL BE HAVING A SCOPE OF BLADDER    Walking oximetry done today started at 99 pt dropped to 94 had to stop after 3rd lap to catch breath deferred oxygen      HPI   Being treated for uti and planned cystoscopy     GYN SENT TO UROLOGY    GOT A PORT PUT IN, JONATHAN ROBB FOR FLUSHES MONTHLY    GET INSULIN THROUGH PUMP, LAST A1C 5.8%.  EVERY ONCE IN AWHILE CGM ALARMS LOW BUT THAT IS RARE    HEART AND LUNGS ARE HEALTHY, BUT HAVE TROUBLE STANDING OR CLIMBING UP STAIRS.    HAS HAD PT FOR THE FOOT ALREADY AND NO PULMONARY THERAPY BECAUSE LUNG FUNCTION IS APPARENTLY OK.  SEES HEART AND PULMONOLOGISTS    HAS BEEN TOLD DAMAGE IN LUNGS WAS DONE AS A CONSEQUENCE OF BEING ON VENTILATOR --- FIBROSIS WITH LOW DLCO    Review of Systems   Constitutional:  Negative for chills, diaphoresis and fever.   Respiratory:  Positive for shortness of breath (WITH EXERTION) and stridor. Negative for cough, choking and wheezing.    Cardiovascular:  Negative for chest pain and leg swelling.   Gastrointestinal:  Negative for constipation, diarrhea, nausea and vomiting.   Musculoskeletal:  Negative for joint swelling and myalgias.       Objective   /78   Pulse 75   Resp 14   Ht 1.575 m (5' 2\")   Wt 65.3 kg (144 lb)   SpO2 100%   BMI 26.34 kg/m²     Physical Exam  Vitals reviewed.   Constitutional:       General: She is not in acute distress.     Appearance: She is not ill-appearing.   HENT:      Right Ear: Tympanic membrane and external ear normal. There is no impacted cerumen.      Left Ear: Tympanic membrane and external ear normal. There is no impacted cerumen.   Cardiovascular:      Rate and Rhythm: Normal rate and regular rhythm.      Pulses: Normal pulses.      Heart sounds:      No gallop.   Pulmonary:      Breath sounds: Normal breath sounds. No wheezing, rhonchi or rales.      Comments: MARKEDLY REDUCED STRIDOROUS NECK SOUNDS " THAN BEFORE  Abdominal:      General: Abdomen is flat. Bowel sounds are normal.      Palpations: Abdomen is soft.      Tenderness: There is no guarding or rebound.   Musculoskeletal:      Right lower leg: No edema.      Left lower leg: No edema.      Comments: THE PATIENT HAS EXTREMELY SMALL PERIPHERAL VEINS         Assessment/Plan   Problem List Items Addressed This Visit       Type 1 diabetes mellitus (Multi)    Dyspnea on minimal exertion - Primary    Relevant Orders    Follow Up In [facility] - Care Manager    Tracheal stenosis    Presence of permanent central venous catheter    Cardiomyopathy, nonischemic (Multi)     COMPENSATED WITH CURRENT MED REGIMEN         Congenital dislocation of hip, unilateral (HHS-HCC)     CHRONIC HIP PAIN IS NOTED          Patient Instructions    CONTINUE PRESENT MEDS    2.  AWAIT RESULTS OF THE CYSTOSCOPY BY DR. DELONG    3.  YOUR SHORT OF BREATH SEEMS TO BE FROM LUNG SCARRING FIBROSIS - WHICH IS ALSO A CHRONIC LUNG DISORDER.  WILL CHECK IF YOU COULD USE OXYGEN FOR EXERCISE    4.  PLEASE CALL IF REFILLS ARE NEEDED    5.  WILL ASK RN CASE  MANAGER TO FIND OUT IF PULMONARY REHAB IS TRULY NOT POSSIBLE FOR YOU.

## 2024-04-30 LAB
ACETONE SERPL-MCNC: <5 MG/DL
ARSENIC BLD-MCNC: <10 UG/L
COPPER SERPL-MCNC: 88.6 UG/DL (ref 80–155)
ETHANOL SERPL-MCNC: <5 MG/DL
ISOPROPANOL SERPL-MCNC: <5 MG/DL
LEAD BLDV-MCNC: <2 UG/DL
MERCURY BLD-MCNC: <2.5 UG/L
METHANOL SERPL-MCNC: <5 MG/DL
SCAN RESULT: NORMAL

## 2024-05-03 ENCOUNTER — HOSPITAL ENCOUNTER (OUTPATIENT)
Dept: RADIOLOGY | Facility: HOSPITAL | Age: 54
Discharge: HOME | End: 2024-05-03
Payer: MEDICAID

## 2024-05-03 VITALS — WEIGHT: 135 LBS | BODY MASS INDEX: 24.84 KG/M2 | HEIGHT: 62 IN

## 2024-05-03 DIAGNOSIS — Z12.31 SCREENING MAMMOGRAM FOR BREAST CANCER: ICD-10-CM

## 2024-05-03 PROCEDURE — 77063 BREAST TOMOSYNTHESIS BI: CPT | Performed by: RADIOLOGY

## 2024-05-03 PROCEDURE — 77067 SCR MAMMO BI INCL CAD: CPT

## 2024-05-03 PROCEDURE — 77067 SCR MAMMO BI INCL CAD: CPT | Performed by: RADIOLOGY

## 2024-05-09 NOTE — TELEPHONE ENCOUNTER
Be Esquivel,     Pt called asking about the case manger getting a referral to get her Oxygen??  I did not see any notes on it.  Are you working on anything for her?

## 2024-05-10 ENCOUNTER — PATIENT OUTREACH (OUTPATIENT)
Dept: PRIMARY CARE | Facility: CLINIC | Age: 54
End: 2024-05-10
Payer: MEDICAID

## 2024-05-10 DIAGNOSIS — J39.8 TRACHEAL STENOSIS: ICD-10-CM

## 2024-05-10 DIAGNOSIS — I42.8 CARDIOMYOPATHY, NONISCHEMIC (MULTI): ICD-10-CM

## 2024-05-10 DIAGNOSIS — R26.9 GAIT DISTURBANCE, POST-STROKE: ICD-10-CM

## 2024-05-10 DIAGNOSIS — R06.09 DYSPNEA ON MINIMAL EXERTION: ICD-10-CM

## 2024-05-10 DIAGNOSIS — E10.69 TYPE 1 DIABETES MELLITUS WITH OTHER SPECIFIED COMPLICATION (MULTI): ICD-10-CM

## 2024-05-10 DIAGNOSIS — R56.9 SEIZURE (MULTI): ICD-10-CM

## 2024-05-10 DIAGNOSIS — K31.84 GASTROPARESIS: ICD-10-CM

## 2024-05-10 DIAGNOSIS — E06.3 HASHIMOTO'S THYROIDITIS: ICD-10-CM

## 2024-05-10 DIAGNOSIS — I69.398 GAIT DISTURBANCE, POST-STROKE: ICD-10-CM

## 2024-05-10 PROCEDURE — 99490 CHRNC CARE MGMT STAFF 1ST 20: CPT | Performed by: INTERNAL MEDICINE

## 2024-05-10 RX ORDER — MULTIVITAMIN
1 TABLET ORAL DAILY
COMMUNITY

## 2024-05-10 RX ORDER — MOMETASONE FUROATE AND FORMOTEROL FUMARATE DIHYDRATE 200; 5 UG/1; UG/1
2 AEROSOL RESPIRATORY (INHALATION) 2 TIMES DAILY
COMMUNITY
Start: 2021-06-23

## 2024-05-10 RX ORDER — KETOCONAZOLE 20 MG/G
CREAM TOPICAL DAILY
COMMUNITY
Start: 2024-03-25

## 2024-05-10 RX ORDER — MONTELUKAST SODIUM 10 MG/1
10 TABLET ORAL NIGHTLY
COMMUNITY
Start: 2024-04-23

## 2024-05-10 RX ORDER — LACOSAMIDE 150 MG/1
150 TABLET ORAL 2 TIMES DAILY
COMMUNITY
Start: 2024-04-23

## 2024-05-10 NOTE — PROGRESS NOTES
This RN CM reached out and spoke with Mattie today. Introduced myself and the chronic care management services. Explained to Mattie that I received a referral from Dr. Gold regarding her oxygen and pulmonary rehab. Explained that Dr. Gold suggested that Mattie may need some extra resources.     Explained that if her insurance were to change then there may be a copay requirement but at this time there is no copay requirement for Medicaid insurance. Mattie is agreeable to services. Explained that if at any time she wishes to opt out to let me know and the monthly calls will stop.     Mattie reports that she has a lot of problems with breathing especially when exerting energy. She was short of breath during our call as well. Provided education of the requirements that need to be met to have qualified for home oxygen. Mattie voiced understanding but at the same time voiced frustration because she is unable to do any kind of activities without being short of breath.     Performed a chart review and noted that there was an office visit from Dr. Juarez, the pulmonologist on 4/12 and pulmonary rehab was ordered along with a nocturnal pulse ox testing. Researched more and found the scanned document from [facility] on 4/22-4/23/24 that shows that during the night time her pulse ox dropped to 75% and that she would qualify for nocturnal oxygen. I also found her pulmonary function testing results from 9/2022 and 3/1/24. These are outside facility results. Mattie reports that she was told her lungs are fine. Explained that I would talk to Dr. Gold on Monday due to him being out of the office today and obtain his recommendations and attempt to order her nocturnal oxygen.     The other option that I discussed is that we would maybe try to obtain an ABG result and obtain oxygen based on those results.     Mattie reports that she attempted to schedule pulmonary rehab at [facility]. Will submit the recent PFTs results after  talking to Dr. Gold on Monday.     Spoke with Mattie about her home life. She reports that she receives assistance via a voucher program with Medicaid for ADL assistance 5x/week from 9am-1pm. Her sister and friends also help. We discussed her ability to make meals. She receives weekly deliveries from [employer].     She does utilize a shower chair as well as cane, rollator and wheelchair for long distances.     Mattie reports that she is to start physical therapy on Monday for her new leg braces but is very anxious due to her breathing.     Mattie lives alone with her 13 year old daughter. She receives transportation via her family, [employer] or [employer].     Reviewed her medications, updated her medication list. She receives her medications via [facility] pharmacy.     We discussed her work history. She was a  until her stroke.     Explained that I will send via the post office today my contact information, informational packet regarding the chronic care management services. Reassured her if any questions arise to please reach out to me.

## 2024-05-14 ENCOUNTER — DOCUMENTATION (OUTPATIENT)
Dept: PRIMARY CARE | Facility: CLINIC | Age: 54
End: 2024-05-14
Payer: MEDICAID

## 2024-05-14 DIAGNOSIS — R09.02 HYPOXEMIA: Primary | ICD-10-CM

## 2024-05-14 DIAGNOSIS — I42.8 CARDIOMYOPATHY, NONISCHEMIC (MULTI): ICD-10-CM

## 2024-05-14 DIAGNOSIS — R06.09 DYSPNEA ON MINIMAL EXERTION: ICD-10-CM

## 2024-05-14 DIAGNOSIS — E06.3 HASHIMOTO'S THYROIDITIS: ICD-10-CM

## 2024-05-14 DIAGNOSIS — K31.84 GASTROPARESIS: ICD-10-CM

## 2024-05-14 DIAGNOSIS — R26.9 GAIT DISTURBANCE, POST-STROKE: ICD-10-CM

## 2024-05-14 DIAGNOSIS — E10.69 TYPE 1 DIABETES MELLITUS WITH OTHER SPECIFIED COMPLICATION (MULTI): ICD-10-CM

## 2024-05-14 DIAGNOSIS — I69.398 GAIT DISTURBANCE, POST-STROKE: ICD-10-CM

## 2024-05-14 DIAGNOSIS — J39.8 TRACHEAL STENOSIS: ICD-10-CM

## 2024-05-14 DIAGNOSIS — R56.9 SEIZURE (MULTI): ICD-10-CM

## 2024-05-14 NOTE — PROGRESS NOTES
This RN CM spoke with Dr. Gold today. Explained that I found an overnight pulse oximetry testing results and that she qualifies for nocturnal oxygen. Also explained that there were updated PFT results from 3/2024. Asked Dr. Gold if he would be agreeable to ordering home oxygen and a referral to pulmonary rehab. Explained I would submit these results with the referrals.     Dr. Gold was agreeable. New orders placed for his approval.

## 2024-05-16 ENCOUNTER — PROCEDURE VISIT (OUTPATIENT)
Dept: UROLOGY | Facility: CLINIC | Age: 54
End: 2024-05-16
Payer: MEDICAID

## 2024-05-16 VITALS
WEIGHT: 135 LBS | SYSTOLIC BLOOD PRESSURE: 117 MMHG | TEMPERATURE: 97.3 F | HEART RATE: 89 BPM | BODY MASS INDEX: 24.69 KG/M2 | DIASTOLIC BLOOD PRESSURE: 75 MMHG

## 2024-05-16 DIAGNOSIS — R39.9 UTI SYMPTOMS: ICD-10-CM

## 2024-05-16 DIAGNOSIS — Z79.2 PROPHYLACTIC ANTIBIOTIC: ICD-10-CM

## 2024-05-16 LAB
POC APPEARANCE, URINE: CLEAR
POC BILIRUBIN, URINE: NEGATIVE
POC BLOOD, URINE: ABNORMAL
POC COLOR, URINE: YELLOW
POC GLUCOSE, URINE: ABNORMAL MG/DL
POC KETONES, URINE: NEGATIVE MG/DL
POC LEUKOCYTES, URINE: NEGATIVE
POC NITRITE,URINE: NEGATIVE
POC PH, URINE: 6.5 PH
POC PROTEIN, URINE: ABNORMAL MG/DL
POC SPECIFIC GRAVITY, URINE: 1.02
POC UROBILINOGEN, URINE: 0.2 EU/DL

## 2024-05-16 PROCEDURE — 81003 URINALYSIS AUTO W/O SCOPE: CPT | Performed by: UROLOGY

## 2024-05-16 PROCEDURE — 52000 CYSTOURETHROSCOPY: CPT | Performed by: UROLOGY

## 2024-05-16 PROCEDURE — 99213 OFFICE O/P EST LOW 20 MIN: CPT | Performed by: UROLOGY

## 2024-05-16 RX ORDER — ALBUTEROL SULFATE 90 UG/1
AEROSOL, METERED RESPIRATORY (INHALATION)
COMMUNITY
Start: 2024-04-23

## 2024-05-16 RX ORDER — SODIUM CHLORIDE 9 MG/ML
50 INJECTION, SOLUTION INTRAVENOUS
COMMUNITY
Start: 2024-02-07

## 2024-05-16 RX ORDER — SULFAMETHOXAZOLE AND TRIMETHOPRIM 400; 80 MG/1; MG/1
1 TABLET ORAL DAILY
Qty: 90 TABLET | Refills: 0 | Status: CANCELLED | OUTPATIENT
Start: 2024-05-16 | End: 2024-08-14

## 2024-05-16 RX ORDER — CALCIUM CARBONATE 200(500)MG
TABLET,CHEWABLE ORAL DAILY PRN
COMMUNITY

## 2024-05-17 RX ORDER — CEPHALEXIN 250 MG/1
250 CAPSULE ORAL DAILY
Qty: 90 CAPSULE | Refills: 0 | Status: SHIPPED | OUTPATIENT
Start: 2024-05-17 | End: 2024-08-15

## 2024-05-19 ASSESSMENT — ENCOUNTER SYMPTOMS
HEMATOLOGIC/LYMPHATIC NEGATIVE: 1
PSYCHIATRIC NEGATIVE: 1
CONSTITUTIONAL NEGATIVE: 1
CARDIOVASCULAR NEGATIVE: 1
ALLERGIC/IMMUNOLOGIC NEGATIVE: 1
RESPIRATORY NEGATIVE: 1
GASTROINTESTINAL NEGATIVE: 1
EYES NEGATIVE: 1
MUSCULOSKELETAL NEGATIVE: 1
NEUROLOGICAL NEGATIVE: 1

## 2024-05-21 ENCOUNTER — HOSPITAL ENCOUNTER (OUTPATIENT)
Dept: RADIOLOGY | Facility: EXTERNAL LOCATION | Age: 54
Discharge: HOME | End: 2024-05-21
Payer: MEDICAID

## 2024-05-21 DIAGNOSIS — M62.81 MUSCLE WEAKNESS: ICD-10-CM

## 2024-05-23 ENCOUNTER — INFUSION (OUTPATIENT)
Dept: HEMATOLOGY/ONCOLOGY | Facility: CLINIC | Age: 54
End: 2024-05-23
Payer: MEDICAID

## 2024-05-23 DIAGNOSIS — Z95.828 PRESENCE OF PERMANENT CENTRAL VENOUS CATHETER: ICD-10-CM

## 2024-05-23 PROCEDURE — 96523 IRRIG DRUG DELIVERY DEVICE: CPT

## 2024-05-23 PROCEDURE — 2500000004 HC RX 250 GENERAL PHARMACY W/ HCPCS (ALT 636 FOR OP/ED): Performed by: INTERNAL MEDICINE

## 2024-05-23 RX ORDER — HEPARIN SODIUM,PORCINE/PF 10 UNIT/ML
50 SYRINGE (ML) INTRAVENOUS AS NEEDED
OUTPATIENT
Start: 2024-05-23

## 2024-05-23 RX ORDER — HEPARIN 100 UNIT/ML
500 SYRINGE INTRAVENOUS AS NEEDED
Status: DISCONTINUED | OUTPATIENT
Start: 2024-05-23 | End: 2024-05-23 | Stop reason: HOSPADM

## 2024-05-23 RX ORDER — HEPARIN 100 UNIT/ML
500 SYRINGE INTRAVENOUS AS NEEDED
OUTPATIENT
Start: 2024-05-23

## 2024-05-23 RX ORDER — HEPARIN SODIUM 1000 [USP'U]/ML
2000 INJECTION, SOLUTION INTRAVENOUS; SUBCUTANEOUS AS NEEDED
OUTPATIENT
Start: 2024-05-23

## 2024-05-23 RX ADMIN — HEPARIN 500 UNITS: 100 SYRINGE at 09:39

## 2024-05-24 DIAGNOSIS — E10.9 TYPE 1 DIABETES MELLITUS WITHOUT COMPLICATION (MULTI): ICD-10-CM

## 2024-05-28 RX ORDER — INSULIN LISPRO 100 [IU]/ML
INJECTION, SOLUTION INTRAVENOUS; SUBCUTANEOUS
Qty: 20 ML | Refills: 10 | Status: SHIPPED | OUTPATIENT
Start: 2024-05-28

## 2024-06-12 ENCOUNTER — PATIENT OUTREACH (OUTPATIENT)
Dept: PRIMARY CARE | Facility: CLINIC | Age: 54
End: 2024-06-12
Payer: MEDICAID

## 2024-06-12 DIAGNOSIS — I42.8 CARDIOMYOPATHY, NONISCHEMIC (MULTI): ICD-10-CM

## 2024-06-12 DIAGNOSIS — R26.9 GAIT DISTURBANCE, POST-STROKE: ICD-10-CM

## 2024-06-12 DIAGNOSIS — R56.9 SEIZURE (MULTI): ICD-10-CM

## 2024-06-12 DIAGNOSIS — I69.398 GAIT DISTURBANCE, POST-STROKE: ICD-10-CM

## 2024-06-12 DIAGNOSIS — R06.09 DYSPNEA ON MINIMAL EXERTION: ICD-10-CM

## 2024-06-12 DIAGNOSIS — R09.02 HYPOXEMIA: ICD-10-CM

## 2024-06-12 DIAGNOSIS — E06.3 HASHIMOTO'S THYROIDITIS: ICD-10-CM

## 2024-06-12 DIAGNOSIS — E10.69 TYPE 1 DIABETES MELLITUS WITH OTHER SPECIFIED COMPLICATION (MULTI): ICD-10-CM

## 2024-06-12 DIAGNOSIS — K31.84 GASTROPARESIS: ICD-10-CM

## 2024-06-12 DIAGNOSIS — J39.8 TRACHEAL STENOSIS: ICD-10-CM

## 2024-06-12 NOTE — PROGRESS NOTES
This RN CM received a voicemail from Mattie this morning. She was inquiring about obtaining a portable oxygen concentrator.     This RN CM returned her call. Explained that I am still trying to figure out what testing can be done to show she requires portable oxygen/oxygen during the daytime. Provided education that insurances require a pulse ox reading of 88% or below during exercise to qualify for portable oxygen. She was unable to complete the walking laps due to shortness of breath. Also updated her that pulmonary rehab reviewed our referral and reports based on her PFT results she does not qualify per insurance criteria for pulmonary rehab.     We discussed maybe trying to obtain outpatient ABG lab to monitor her arterial blood oxygen level and see if that qualifies for oxygen. Suggested since she is scheduled to see her pulmonary doctor today asking for that test and that it can be done at the hospital pretty quickly. Mattie reports that her appointment was moved to Friday 6/21/24.     Mattie does report that her oxygen that we ordered for nighttime was delivered and that has helped her with sleep. Mattie reports that she was unable to even walk to the baseball field to watch her 13 year old play softball and that several dads offered to carry her due to how short of breath Mattie was and reports that it was very embarrassing for herself and her daughter. Provided reassurance that I would talk with Dr. Gold and see if this is something he can order.     Reviewed Mattie's chart. Reviewed recent office visits, X-rays and upcoming appointments. Reviewed preventative exams and vaccines.     Ensured she has my contact information and availability for any concerns in between our calls. Ensured all her questions and concerns were addressed prior to the end of our call.

## 2024-06-24 ENCOUNTER — HOSPITAL ENCOUNTER (OUTPATIENT)
Dept: RESPIRATORY THERAPY | Facility: HOSPITAL | Age: 54
Discharge: HOME | End: 2024-06-24
Payer: MEDICAID

## 2024-06-24 DIAGNOSIS — R09.02 HYPOXEMIA: ICD-10-CM

## 2024-06-24 DIAGNOSIS — J39.8 TRACHEAL STENOSIS: ICD-10-CM

## 2024-06-24 DIAGNOSIS — R06.09 DYSPNEA ON MINIMAL EXERTION: ICD-10-CM

## 2024-06-24 LAB
ARTERIAL PATENCY WRIST A: ABNORMAL
BASE EXCESS BLDA CALC-SCNC: -1.3 MMOL/L (ref -2–3)
BODY TEMPERATURE: ABNORMAL
HCO3 BLDA-SCNC: 22.7 MMOL/L (ref 22–26)
OXYHGB MFR BLDA: 97.6 % (ref 94–98)
PCO2 BLDA: 35 MM HG (ref 38–42)
PH BLDA: 7.42 PH (ref 7.38–7.42)
PO2 BLDA: 107 MM HG (ref 85–95)
SAO2 % BLDA: 100 % (ref 94–100)

## 2024-06-24 PROCEDURE — 36600 WITHDRAWAL OF ARTERIAL BLOOD: CPT

## 2024-06-24 PROCEDURE — 82805 BLOOD GASES W/O2 SATURATION: CPT

## 2024-07-08 ENCOUNTER — APPOINTMENT (OUTPATIENT)
Dept: ENDOCRINOLOGY | Facility: CLINIC | Age: 54
End: 2024-07-08
Payer: MEDICAID

## 2024-07-08 VITALS
BODY MASS INDEX: 27.23 KG/M2 | DIASTOLIC BLOOD PRESSURE: 62 MMHG | SYSTOLIC BLOOD PRESSURE: 106 MMHG | WEIGHT: 148 LBS | HEIGHT: 62 IN

## 2024-07-08 DIAGNOSIS — Z96.41 INSULIN PUMP IN PLACE: Primary | ICD-10-CM

## 2024-07-08 DIAGNOSIS — E10.69 TYPE 1 DIABETES MELLITUS WITH OTHER SPECIFIED COMPLICATION (MULTI): ICD-10-CM

## 2024-07-08 DIAGNOSIS — Z97.8 USES SELF-APPLIED CONTINUOUS GLUCOSE MONITORING DEVICE: ICD-10-CM

## 2024-07-08 LAB
POC FINGERSTICK BLOOD GLUCOSE: 154 MG/DL (ref 70–100)
POC HEMOGLOBIN A1C: 6.2 % (ref 4.2–6.5)

## 2024-07-08 PROCEDURE — 3074F SYST BP LT 130 MM HG: CPT | Performed by: STUDENT IN AN ORGANIZED HEALTH CARE EDUCATION/TRAINING PROGRAM

## 2024-07-08 PROCEDURE — 3044F HG A1C LEVEL LT 7.0%: CPT | Performed by: STUDENT IN AN ORGANIZED HEALTH CARE EDUCATION/TRAINING PROGRAM

## 2024-07-08 PROCEDURE — 3048F LDL-C <100 MG/DL: CPT | Performed by: STUDENT IN AN ORGANIZED HEALTH CARE EDUCATION/TRAINING PROGRAM

## 2024-07-08 PROCEDURE — 83036 HEMOGLOBIN GLYCOSYLATED A1C: CPT | Performed by: STUDENT IN AN ORGANIZED HEALTH CARE EDUCATION/TRAINING PROGRAM

## 2024-07-08 PROCEDURE — 4010F ACE/ARB THERAPY RXD/TAKEN: CPT | Performed by: STUDENT IN AN ORGANIZED HEALTH CARE EDUCATION/TRAINING PROGRAM

## 2024-07-08 PROCEDURE — 82962 GLUCOSE BLOOD TEST: CPT | Performed by: STUDENT IN AN ORGANIZED HEALTH CARE EDUCATION/TRAINING PROGRAM

## 2024-07-08 PROCEDURE — 99214 OFFICE O/P EST MOD 30 MIN: CPT | Performed by: STUDENT IN AN ORGANIZED HEALTH CARE EDUCATION/TRAINING PROGRAM

## 2024-07-08 PROCEDURE — 3078F DIAST BP <80 MM HG: CPT | Performed by: STUDENT IN AN ORGANIZED HEALTH CARE EDUCATION/TRAINING PROGRAM

## 2024-07-08 PROCEDURE — 95251 CONT GLUC MNTR ANALYSIS I&R: CPT | Performed by: STUDENT IN AN ORGANIZED HEALTH CARE EDUCATION/TRAINING PROGRAM

## 2024-07-08 NOTE — PROGRESS NOTES
"Subjective   Patient ID: Mattie Wolfe is a 53 y.o. female who presents for Diabetes (Diabetes Type 1 / PCP= Dr EDIL Gold ? Sees Podiatry and an eye doctor/no family history/Metronic Pump and CGM Pt testing glucose 4 x/day with metronic  due to fluctuating  blood glucose and hypoglycemia .  Compliant with diabetic regime.  Pti s adherent and benefiting from CGM treatment plab)   Lab Results   Component Value Date    HGBA1C 6.2 07/08/2024      HPI  Mattie Wolfe is a 53 y.o. female with Type 1 DM diagnosed 14 years ago , CVA 2018 , Rt eye blindness epilepsy present for follow up.       Currently on 780 G with CGM  Pump download reviewed [place] where 95% average blood glucose is 162  Total insulin dose 35 units  Bolus is 49 % uto basal 51%  In range 72% high 21 very high 7%     she previously followed with Dr. welsh   Previous  meds :  Lantus 28 units daily   humalog 8 units , SS2     Review of Systems    Objective   Physical Exam  Constitutional:       Appearance: Normal appearance.   Cardiovascular:      Rate and Rhythm: Normal rate and regular rhythm.   Pulmonary:      Effort: Pulmonary effort is normal.      Breath sounds: Normal breath sounds.   Musculoskeletal:      Comments: Uses a walker    Neurological:      Mental Status: She is alert.      Visit Vitals  /62   Ht 1.575 m (5' 2\")   Wt 67.1 kg (148 lb)   BMI 27.07 kg/m²   OB Status Postmenopausal   Smoking Status Never   BSA 1.71 m²        Assessment/Plan        Well controlled Type 1 DM with Hba1c  6.2 , was 5.8 on  Medtronic 780 G pump automode.       DM retinopathy ,with retinal detachment , rt eye blindness follows with Retina specilasit   DM neuropathy , follows with podiatry   DM nephropathy , urine albumin 30 mg/dlon ARB   HLD , LDL 76 on Atorvasatin 40 mg        Plan :  Continue current pump settings   Basal 0.9 unit/hr   Carb ratio 1:1 ( she enters the required insulin dose for coverage advise to use 7 units per meal for 1 week 15 minutes " before meal if high spikes not eliminated advised to increase to 8 units before meals next week)  Sensitivity 40  -120  Active Inuslin 2       RTC in 3 months

## 2024-07-09 ENCOUNTER — APPOINTMENT (OUTPATIENT)
Dept: PRIMARY CARE | Facility: CLINIC | Age: 54
End: 2024-07-09
Payer: MEDICAID

## 2024-07-09 VITALS
RESPIRATION RATE: 16 BRPM | DIASTOLIC BLOOD PRESSURE: 78 MMHG | HEART RATE: 86 BPM | BODY MASS INDEX: 27.42 KG/M2 | WEIGHT: 149 LBS | SYSTOLIC BLOOD PRESSURE: 126 MMHG | HEIGHT: 62 IN | OXYGEN SATURATION: 99 %

## 2024-07-09 DIAGNOSIS — J39.8 TRACHEAL STENOSIS: Primary | ICD-10-CM

## 2024-07-09 DIAGNOSIS — E10.69 TYPE 1 DIABETES MELLITUS WITH OTHER SPECIFIED COMPLICATION (MULTI): ICD-10-CM

## 2024-07-09 DIAGNOSIS — Z95.828 PRESENCE OF PERMANENT CENTRAL VENOUS CATHETER: ICD-10-CM

## 2024-07-09 DIAGNOSIS — R06.09 DYSPNEA ON EXERTION: ICD-10-CM

## 2024-07-09 DIAGNOSIS — Z78.9 DIFFICULT INTRAVENOUS ACCESS: ICD-10-CM

## 2024-07-09 PROCEDURE — 3048F LDL-C <100 MG/DL: CPT | Performed by: INTERNAL MEDICINE

## 2024-07-09 PROCEDURE — 3074F SYST BP LT 130 MM HG: CPT | Performed by: INTERNAL MEDICINE

## 2024-07-09 PROCEDURE — 99214 OFFICE O/P EST MOD 30 MIN: CPT | Performed by: INTERNAL MEDICINE

## 2024-07-09 PROCEDURE — 4010F ACE/ARB THERAPY RXD/TAKEN: CPT | Performed by: INTERNAL MEDICINE

## 2024-07-09 PROCEDURE — 3044F HG A1C LEVEL LT 7.0%: CPT | Performed by: INTERNAL MEDICINE

## 2024-07-09 PROCEDURE — 3078F DIAST BP <80 MM HG: CPT | Performed by: INTERNAL MEDICINE

## 2024-07-09 NOTE — PATIENT INSTRUCTIONS
PLEASE GET THE RESULTS OF CT SCAN CHEST JUST DONE THROUGH [facility] IN [place], PLEASE GET RESULTS OF CHEMICAL STRESS TEST AND ECHOCARDIOGRAM RESULTS FROM DR. HAM AT [facility].  ALSO GET RESULT OF PFT 'S DONE BY DR. MARES AT [facility] PULMONARY    2.  THE ABG ESSENTIALLY SHOWS MILD HYPERVENTILATION, BUT ABSOLUTELY NO PROBLEMS WITH GAS EXCHANGE.    3.  PENDING CT SCAN REPORT, HOPEFULLY WE CAN COME TO SOME CONCLUSION ABOUT WHY YOU HAVE SO MUCH TROUBLE BREATHING WITH EVEN SMALL AMOUNTS OF PHYSICAL ACTIVITY.  I MAINTAIN THAT I THINK THE PROBLEM IS FIXED VENTILATORY OBSTRUCTION FROM TRACHEAL STENOSIS CAUSING THIS SYMPTOM.  THIS WAS LIKELY INCOMPLETELY TREATED WITH THE LASER SURGICAL PROCEDURE YOU HAVE HAD IN THE PAST.  A REPEAT PULMONARY FUNCTION TEST WOULD BE THE TEST TO CONFIRM THIS.    4.  IF DR. MARES DOES NOT AGREE THAT THIS ISSUE IS FROM TRACHEAL STENOSIS, AND IS NOT RELATED TO LUNG FUNCTION, THEN I WILL CHECK AN ECHOCARDIOGRAM TO EXCLUDE THE UNLIKELY POSSIBILITY OF CONGESTIVE HEART FAILURE CAUSING YOUR SHORTNESS OF BREATH WITH MINIMAL EXERTION.    5.  PLEASE CALL IF REFILLS ARE NEEDED.    6.  FOLLOW UP 4-6 MONTHS OR AS NEEDED.

## 2024-07-09 NOTE — PROGRESS NOTES
"Subjective   Mattie Wolfe is a 53 y.o. female who presents for FOLLOW UP     HPI   BREATHING STILL CAUSES HER TROUBLES    HAD BLOOD GAS AND CT SCAN CHEST    ABG DONE TO SEE IF SHE CAN QUALIFY FOR OXYGEN TO BE USED DURING THE DAY    CT SCAN WAS DONE JUST A FEW DAYS AGO    WAS TOLD THAT SHE HAS FIBROTIC LUNG ISSUES RELATED TO DISTANT CHRONIC VENT AND GAS EXCHANGE ISSUES?      Review of Systems   Constitutional:  Negative for chills, diaphoresis and fever.   Respiratory:  Positive for shortness of breath (WITH EXERTION) and stridor. Negative for cough, choking and wheezing.    Cardiovascular:  Negative for chest pain and leg swelling.   Gastrointestinal:  Negative for constipation, diarrhea, nausea and vomiting.   Musculoskeletal:  Negative for joint swelling and myalgias.       Objective   /78   Pulse 86   Resp 16   Ht 1.575 m (5' 2\")   Wt 67.6 kg (149 lb)   SpO2 99%   BMI 27.25 kg/m²     Physical Exam  Vitals reviewed.   Constitutional:       General: She is not in acute distress.     Appearance: She is not ill-appearing.   HENT:      Right Ear: Tympanic membrane and external ear normal. There is no impacted cerumen.      Left Ear: Tympanic membrane and external ear normal. There is no impacted cerumen.   Cardiovascular:      Rate and Rhythm: Normal rate and regular rhythm.      Pulses: Normal pulses.      Heart sounds:      No gallop.   Pulmonary:      Breath sounds: Normal breath sounds. No wheezing, rhonchi or rales.      Comments: MARKEDLY REDUCED STRIDOROUS NECK SOUNDS THAN BEFORE  Abdominal:      General: Abdomen is flat. Bowel sounds are normal.      Palpations: Abdomen is soft.      Tenderness: There is no guarding or rebound.   Musculoskeletal:      Right lower leg: No edema.      Left lower leg: No edema.      Comments: THE PATIENT HAS EXTREMELY SMALL PERIPHERAL VEINS         Assessment/Plan   Problem List Items Addressed This Visit       Type 1 diabetes mellitus (Multi)    Tracheal stenosis - " Primary    Difficult intravenous access    Presence of permanent central venous catheter    Dyspnea on exertion     Patient Instructions    PLEASE GET THE RESULTS OF CT SCAN CHEST JUST DONE THROUGH [facility] IN [place], PLEASE GET RESULTS OF CHEMICAL STRESS TEST AND ECHOCARDIOGRAM RESULTS FROM DR. HAM AT [facility].  ALSO GET RESULT OF PFT 'S DONE BY DR. MARES AT [facility] PULMONARY    2.  THE ABG ESSENTIALLY SHOWS MILD HYPERVENTILATION, BUT ABSOLUTELY NO PROBLEMS WITH GAS EXCHANGE.    3.  PENDING CT SCAN REPORT, HOPEFULLY WE CAN COME TO SOME CONCLUSION ABOUT WHY YOU HAVE SO MUCH TROUBLE BREATHING WITH EVEN SMALL AMOUNTS OF PHYSICAL ACTIVITY.  I MAINTAIN THAT I THINK THE PROBLEM IS FIXED VENTILATORY OBSTRUCTION FROM TRACHEAL STENOSIS CAUSING THIS SYMPTOM.  THIS WAS LIKELY INCOMPLETELY TREATED WITH THE LASER SURGICAL PROCEDURE YOU HAVE HAD IN THE PAST.  A REPEAT PULMONARY FUNCTION TEST WOULD BE THE TEST TO CONFIRM THIS.    4.  IF DR. MARES DOES NOT AGREE THAT THIS ISSUE IS FROM TRACHEAL STENOSIS, AND IS NOT RELATED TO LUNG FUNCTION, THEN I WILL CHECK AN ECHOCARDIOGRAM TO EXCLUDE THE UNLIKELY POSSIBILITY OF CONGESTIVE HEART FAILURE CAUSING YOUR SHORTNESS OF BREATH WITH MINIMAL EXERTION.    5.  PLEASE CALL IF REFILLS ARE NEEDED.    6.  FOLLOW UP 4-6 MONTHS OR AS NEEDED.

## 2024-07-15 ENCOUNTER — TELEPHONE (OUTPATIENT)
Dept: PRIMARY CARE | Facility: CLINIC | Age: 54
End: 2024-07-15
Payer: MEDICAID

## 2024-07-15 PROBLEM — R06.09 DYSPNEA ON EXERTION: Status: ACTIVE | Noted: 2024-07-15

## 2024-07-15 ASSESSMENT — ENCOUNTER SYMPTOMS
MYALGIAS: 0
JOINT SWELLING: 0
CONSTIPATION: 0
DIAPHORESIS: 0
CHOKING: 0
STRIDOR: 1
NAUSEA: 0
VOMITING: 0
DIARRHEA: 0
SHORTNESS OF BREATH: 1
FEVER: 0
WHEEZING: 0
COUGH: 0
CHILLS: 0

## 2024-07-15 NOTE — TELEPHONE ENCOUNTER
DAREN KHALIL, REGARDING MS. HAMPTON OFFICE VISIT, I WAS ABLE TO FIND AND REVIEW THE NUCLEAR STRESS TEST RESULTS, AND THE CT CHEST RESULTS, BUT DR. MARES'S IN-OFFICE LUNG SPIROMETRY RESULTS ARE NOT ENTERED INTO HER OFFICE NOTES, SO I AM HOPING YOU CAN GET THE RESULTS OF HE MOST RECENT SPIROMETRY NUMBERS FROM DR. MARES OFFICE, [facility]

## 2024-07-16 ENCOUNTER — APPOINTMENT (OUTPATIENT)
Dept: OPHTHALMOLOGY | Facility: CLINIC | Age: 54
End: 2024-07-16
Payer: MEDICAID

## 2024-07-17 ENCOUNTER — TELEPHONE (OUTPATIENT)
Dept: PRIMARY CARE | Facility: CLINIC | Age: 54
End: 2024-07-17
Payer: MEDICAID

## 2024-07-17 NOTE — TELEPHONE ENCOUNTER
PLEASE LET MS. JEAN KNOW THAT I WAS ABLE TO GET THE PULMONARY FUNCTION TESTING DONE IN MARCH 2024.  THE FIXED AIRFLOW OBSTRUCTION THAT SHE HAD PRIOR TO HER TRACHEA LASER SURGERY APPEARS TO HAVE IMPROVED GREATLY, AT LEAST THAT IS WHAT I SEE ON THE FLOW/VOLUME LOOPS.  THERE IS MENTION OF A LOWER DLCO NUMBER (THIS IS GAS EXCHANGE NUMBER) BUT I THINK THERE MAY HAVE BEEN A TECHNICAL PROBLEM WITH THAT PART OF THE TEST.  WITH HER SUPRA-NORMAL OXYGEN AND NORMAL CARBON DIOXIDE ON ROOM AIR WITH RECENT ABG, I THINK IT  UNLIKELY THAT SHE HAS A GAS EXCHANGE PROBLEM.  THE CT SCAN SHOWED BRONCHIECTSAIS MILD AND DIFFUSE, BUT NOT THE SCARRING FIBROSIS THAT TYPICALLY WOULD RESULT IN GAS EXCHANGE PROBLEM.  SO AT THIS POINT, I THINK WE ARE LEFT WITH HER DYSPNEA ON EXERTION BEING DUE TO EITHER A DISTANT VENTILATOR ASSOCIATED LUNG INJURY, OR DECONDITIONING.  THX

## 2024-07-23 DIAGNOSIS — I10 HTN (HYPERTENSION), BENIGN: ICD-10-CM

## 2024-07-24 RX ORDER — LOSARTAN POTASSIUM 25 MG/1
25 TABLET ORAL DAILY
Qty: 90 TABLET | Refills: 3 | Status: SHIPPED | OUTPATIENT
Start: 2024-07-24

## 2024-07-29 ENCOUNTER — INFUSION (OUTPATIENT)
Dept: HEMATOLOGY/ONCOLOGY | Facility: CLINIC | Age: 54
End: 2024-07-29
Payer: MEDICAID

## 2024-07-29 DIAGNOSIS — Z95.828 PRESENCE OF PERMANENT CENTRAL VENOUS CATHETER: ICD-10-CM

## 2024-07-29 PROCEDURE — 96523 IRRIG DRUG DELIVERY DEVICE: CPT

## 2024-07-29 PROCEDURE — 2500000004 HC RX 250 GENERAL PHARMACY W/ HCPCS (ALT 636 FOR OP/ED): Performed by: INTERNAL MEDICINE

## 2024-07-29 RX ORDER — HEPARIN SODIUM 1000 [USP'U]/ML
2000 INJECTION, SOLUTION INTRAVENOUS; SUBCUTANEOUS AS NEEDED
Status: DISCONTINUED | OUTPATIENT
Start: 2024-07-29 | End: 2024-07-29 | Stop reason: HOSPADM

## 2024-07-29 RX ORDER — HEPARIN SODIUM,PORCINE/PF 10 UNIT/ML
50 SYRINGE (ML) INTRAVENOUS AS NEEDED
OUTPATIENT
Start: 2024-07-29

## 2024-07-29 RX ORDER — HEPARIN SODIUM 1000 [USP'U]/ML
2000 INJECTION, SOLUTION INTRAVENOUS; SUBCUTANEOUS AS NEEDED
OUTPATIENT
Start: 2024-07-29

## 2024-07-29 RX ORDER — HEPARIN 100 UNIT/ML
500 SYRINGE INTRAVENOUS AS NEEDED
OUTPATIENT
Start: 2024-07-29

## 2024-07-29 RX ORDER — HEPARIN SODIUM,PORCINE/PF 10 UNIT/ML
50 SYRINGE (ML) INTRAVENOUS AS NEEDED
Status: DISCONTINUED | OUTPATIENT
Start: 2024-07-29 | End: 2024-07-29 | Stop reason: HOSPADM

## 2024-07-29 RX ORDER — HEPARIN 100 UNIT/ML
500 SYRINGE INTRAVENOUS AS NEEDED
Status: DISCONTINUED | OUTPATIENT
Start: 2024-07-29 | End: 2024-07-29 | Stop reason: HOSPADM

## 2024-08-15 ENCOUNTER — APPOINTMENT (OUTPATIENT)
Dept: UROLOGY | Facility: CLINIC | Age: 54
End: 2024-08-15
Payer: MEDICAID

## 2024-08-26 ENCOUNTER — APPOINTMENT (OUTPATIENT)
Dept: HEMATOLOGY/ONCOLOGY | Facility: CLINIC | Age: 54
End: 2024-08-26
Payer: MEDICAID

## 2024-08-27 ENCOUNTER — INFUSION (OUTPATIENT)
Dept: HEMATOLOGY/ONCOLOGY | Facility: CLINIC | Age: 54
End: 2024-08-27
Payer: MEDICAID

## 2024-08-27 ENCOUNTER — DOCUMENTATION (OUTPATIENT)
Dept: PRIMARY CARE | Facility: CLINIC | Age: 54
End: 2024-08-27

## 2024-08-27 DIAGNOSIS — I69.398 GAIT DISTURBANCE, POST-STROKE: ICD-10-CM

## 2024-08-27 DIAGNOSIS — R06.09 DYSPNEA ON EXERTION: ICD-10-CM

## 2024-08-27 DIAGNOSIS — E10.69 TYPE 1 DIABETES MELLITUS WITH OTHER SPECIFIED COMPLICATION (MULTI): ICD-10-CM

## 2024-08-27 DIAGNOSIS — Z95.828 PRESENCE OF PERMANENT CENTRAL VENOUS CATHETER: ICD-10-CM

## 2024-08-27 DIAGNOSIS — E06.3 HASHIMOTO'S THYROIDITIS: ICD-10-CM

## 2024-08-27 DIAGNOSIS — R56.9 SEIZURE (MULTI): ICD-10-CM

## 2024-08-27 DIAGNOSIS — K31.84 GASTROPARESIS: ICD-10-CM

## 2024-08-27 DIAGNOSIS — R26.9 GAIT DISTURBANCE, POST-STROKE: ICD-10-CM

## 2024-08-27 PROCEDURE — 2500000004 HC RX 250 GENERAL PHARMACY W/ HCPCS (ALT 636 FOR OP/ED): Performed by: INTERNAL MEDICINE

## 2024-08-27 PROCEDURE — 96523 IRRIG DRUG DELIVERY DEVICE: CPT

## 2024-08-27 RX ORDER — HEPARIN 100 UNIT/ML
500 SYRINGE INTRAVENOUS AS NEEDED
OUTPATIENT
Start: 2024-08-27

## 2024-08-27 RX ORDER — HEPARIN SODIUM 1000 [USP'U]/ML
2000 INJECTION, SOLUTION INTRAVENOUS; SUBCUTANEOUS AS NEEDED
Status: DISCONTINUED | OUTPATIENT
Start: 2024-08-27 | End: 2024-08-27 | Stop reason: HOSPADM

## 2024-08-27 RX ORDER — HEPARIN SODIUM,PORCINE/PF 10 UNIT/ML
50 SYRINGE (ML) INTRAVENOUS AS NEEDED
OUTPATIENT
Start: 2024-08-27

## 2024-08-27 RX ORDER — HEPARIN 100 UNIT/ML
500 SYRINGE INTRAVENOUS AS NEEDED
Status: DISCONTINUED | OUTPATIENT
Start: 2024-08-27 | End: 2024-08-27 | Stop reason: HOSPADM

## 2024-08-27 RX ORDER — HEPARIN SODIUM 1000 [USP'U]/ML
2000 INJECTION, SOLUTION INTRAVENOUS; SUBCUTANEOUS AS NEEDED
OUTPATIENT
Start: 2024-08-27

## 2024-08-27 RX ORDER — HEPARIN SODIUM,PORCINE/PF 10 UNIT/ML
50 SYRINGE (ML) INTRAVENOUS AS NEEDED
Status: DISCONTINUED | OUTPATIENT
Start: 2024-08-27 | End: 2024-08-27 | Stop reason: HOSPADM

## 2024-08-27 NOTE — PROGRESS NOTES
This RN CM attempted to reach out and see how Mattie is doing. There was no answer, Left a voicemail with my contact information and availability. Asked patient to call RN back.      Chart review completed. Reviewed recent office visit notes, upcoming appointments, lab results and preventative exams.     Will attempt to reach out at a later date if no return call received.

## 2024-09-11 ENCOUNTER — PATIENT OUTREACH (OUTPATIENT)
Dept: PRIMARY CARE | Facility: CLINIC | Age: 54
End: 2024-09-11
Payer: MEDICAID

## 2024-09-11 DIAGNOSIS — E10.69 TYPE 1 DIABETES MELLITUS WITH OTHER SPECIFIED COMPLICATION (MULTI): ICD-10-CM

## 2024-09-11 DIAGNOSIS — E06.3 HASHIMOTO'S THYROIDITIS: ICD-10-CM

## 2024-09-11 DIAGNOSIS — R26.9 GAIT DISTURBANCE, POST-STROKE: ICD-10-CM

## 2024-09-11 DIAGNOSIS — I69.398 GAIT DISTURBANCE, POST-STROKE: ICD-10-CM

## 2024-09-11 DIAGNOSIS — R06.09 DYSPNEA ON EXERTION: ICD-10-CM

## 2024-09-11 DIAGNOSIS — R56.9 SEIZURE (MULTI): ICD-10-CM

## 2024-09-11 NOTE — PROGRESS NOTES
"This RN CM reached out and spoke with Mattie. She reports that she is attending pulmonary rehab 2 times a week for 8 weeks. She reports that it at times \"kicks her butt\"     Reviewed her recent office visits with specialist. Mattie reports that she was told that maybe her shortness of breath is a auto-neurological issue instead of a pulmonary issue. Mattie reports that her pulmonologist is the person who suggested this.     Reviewed her chart and found that Dr. Gold things that it may be damage to the lungs from past intubations and surgeries. Read to Mattie, the message from Dr. Gold regarding this suggestion.     We talked about having speech therapy and seeing if someone like a vocal /specialist could work with her. Mattie is going to discuss this with her pulmonologist during her next visit but is concerned that Medicaid may not cover this type of visit/specialty.     Mattie reports that she received bills now from her endocrine specialist. This RN CM was able to review the bill and see where she received a bill for $15.00. Provided Mattie the contact number for billing to dispute this charge.     Reviewed her medications. She reports that no changes have been made. She continues to use the oxygen at night and sometimes after her pulmonary rehab appointments.     Discussed her upcoming appointments and preventative exams.    Ensured that Mattie has my contact information and availability for any concerns that may arise in between our calls.   "

## 2024-09-23 ENCOUNTER — INFUSION (OUTPATIENT)
Dept: HEMATOLOGY/ONCOLOGY | Facility: CLINIC | Age: 54
End: 2024-09-23
Payer: MEDICAID

## 2024-09-23 DIAGNOSIS — Z95.828 PRESENCE OF PERMANENT CENTRAL VENOUS CATHETER: ICD-10-CM

## 2024-09-23 PROCEDURE — 96523 IRRIG DRUG DELIVERY DEVICE: CPT

## 2024-09-23 RX ORDER — HEPARIN 100 UNIT/ML
500 SYRINGE INTRAVENOUS AS NEEDED
OUTPATIENT
Start: 2024-09-23

## 2024-09-23 RX ORDER — HEPARIN SODIUM,PORCINE/PF 10 UNIT/ML
50 SYRINGE (ML) INTRAVENOUS AS NEEDED
Status: DISCONTINUED | OUTPATIENT
Start: 2024-09-23 | End: 2024-09-23 | Stop reason: HOSPADM

## 2024-09-23 RX ORDER — HEPARIN SODIUM 1000 [USP'U]/ML
2000 INJECTION, SOLUTION INTRAVENOUS; SUBCUTANEOUS AS NEEDED
Status: DISCONTINUED | OUTPATIENT
Start: 2024-09-23 | End: 2024-09-23 | Stop reason: HOSPADM

## 2024-09-23 RX ORDER — HEPARIN SODIUM 1000 [USP'U]/ML
2000 INJECTION, SOLUTION INTRAVENOUS; SUBCUTANEOUS AS NEEDED
OUTPATIENT
Start: 2024-09-23

## 2024-09-23 RX ORDER — HEPARIN 100 UNIT/ML
500 SYRINGE INTRAVENOUS AS NEEDED
Status: DISCONTINUED | OUTPATIENT
Start: 2024-09-23 | End: 2024-09-23 | Stop reason: HOSPADM

## 2024-09-23 RX ORDER — HEPARIN SODIUM,PORCINE/PF 10 UNIT/ML
50 SYRINGE (ML) INTRAVENOUS AS NEEDED
OUTPATIENT
Start: 2024-09-23

## 2024-10-09 ENCOUNTER — APPOINTMENT (OUTPATIENT)
Dept: OPHTHALMOLOGY | Facility: CLINIC | Age: 54
End: 2024-10-09
Payer: MEDICAID

## 2024-10-21 ENCOUNTER — APPOINTMENT (OUTPATIENT)
Dept: HEMATOLOGY/ONCOLOGY | Facility: CLINIC | Age: 54
End: 2024-10-21
Payer: MEDICAID

## 2024-10-21 NOTE — PROGRESS NOTES
Pt did not show up today for scheduled port flush. When contacted, pt states that she was not aware of appt today. Port flush re-scheduled to another day this week, Pt notified of new date and time, 10/24 at 2pm.

## 2024-10-24 ENCOUNTER — INFUSION (OUTPATIENT)
Dept: HEMATOLOGY/ONCOLOGY | Facility: CLINIC | Age: 54
End: 2024-10-24
Payer: MEDICAID

## 2024-10-24 DIAGNOSIS — Z95.828 PRESENCE OF PERMANENT CENTRAL VENOUS CATHETER: ICD-10-CM

## 2024-10-24 PROCEDURE — 2500000004 HC RX 250 GENERAL PHARMACY W/ HCPCS (ALT 636 FOR OP/ED): Performed by: INTERNAL MEDICINE

## 2024-10-24 PROCEDURE — 96523 IRRIG DRUG DELIVERY DEVICE: CPT

## 2024-10-24 RX ORDER — HEPARIN SODIUM,PORCINE/PF 10 UNIT/ML
50 SYRINGE (ML) INTRAVENOUS AS NEEDED
Status: DISCONTINUED | OUTPATIENT
Start: 2024-10-24 | End: 2024-10-24 | Stop reason: HOSPADM

## 2024-10-24 RX ORDER — HEPARIN SODIUM 1000 [USP'U]/ML
2000 INJECTION, SOLUTION INTRAVENOUS; SUBCUTANEOUS AS NEEDED
OUTPATIENT
Start: 2024-10-24

## 2024-10-24 RX ORDER — HEPARIN 100 UNIT/ML
500 SYRINGE INTRAVENOUS AS NEEDED
OUTPATIENT
Start: 2024-10-24

## 2024-10-24 RX ORDER — HEPARIN SODIUM 1000 [USP'U]/ML
2000 INJECTION, SOLUTION INTRAVENOUS; SUBCUTANEOUS AS NEEDED
Status: DISCONTINUED | OUTPATIENT
Start: 2024-10-24 | End: 2024-10-24 | Stop reason: HOSPADM

## 2024-10-24 RX ORDER — HEPARIN 100 UNIT/ML
500 SYRINGE INTRAVENOUS AS NEEDED
Status: DISCONTINUED | OUTPATIENT
Start: 2024-10-24 | End: 2024-10-24 | Stop reason: HOSPADM

## 2024-10-24 RX ORDER — HEPARIN SODIUM,PORCINE/PF 10 UNIT/ML
50 SYRINGE (ML) INTRAVENOUS AS NEEDED
OUTPATIENT
Start: 2024-10-24

## 2024-10-30 ENCOUNTER — DOCUMENTATION (OUTPATIENT)
Dept: PRIMARY CARE | Facility: CLINIC | Age: 54
End: 2024-10-30
Payer: MEDICAID

## 2024-10-30 DIAGNOSIS — I69.398 GAIT DISTURBANCE, POST-STROKE: ICD-10-CM

## 2024-10-30 DIAGNOSIS — E06.3 HASHIMOTO'S THYROIDITIS: ICD-10-CM

## 2024-10-30 DIAGNOSIS — R06.09 DYSPNEA ON EXERTION: ICD-10-CM

## 2024-10-30 DIAGNOSIS — R26.9 GAIT DISTURBANCE, POST-STROKE: ICD-10-CM

## 2024-10-30 DIAGNOSIS — J39.8 TRACHEAL STENOSIS: ICD-10-CM

## 2024-10-30 DIAGNOSIS — E10.69 TYPE 1 DIABETES MELLITUS WITH OTHER SPECIFIED COMPLICATION: ICD-10-CM

## 2024-11-12 ENCOUNTER — APPOINTMENT (OUTPATIENT)
Dept: ENDOCRINOLOGY | Facility: CLINIC | Age: 54
End: 2024-11-12
Payer: MEDICAID

## 2024-11-12 VITALS
WEIGHT: 151 LBS | HEIGHT: 62 IN | BODY MASS INDEX: 27.79 KG/M2 | DIASTOLIC BLOOD PRESSURE: 60 MMHG | SYSTOLIC BLOOD PRESSURE: 108 MMHG

## 2024-11-12 DIAGNOSIS — Z97.8 USES SELF-APPLIED CONTINUOUS GLUCOSE MONITORING DEVICE: ICD-10-CM

## 2024-11-12 DIAGNOSIS — Z96.41 INSULIN PUMP IN PLACE: ICD-10-CM

## 2024-11-12 DIAGNOSIS — E10.69 TYPE 1 DIABETES MELLITUS WITH OTHER SPECIFIED COMPLICATION: Primary | ICD-10-CM

## 2024-11-12 LAB
POC FINGERSTICK BLOOD GLUCOSE: 114 MG/DL (ref 70–100)
POC HEMOGLOBIN A1C: 6 % (ref 4.2–6.5)

## 2024-11-12 PROCEDURE — 3008F BODY MASS INDEX DOCD: CPT | Performed by: STUDENT IN AN ORGANIZED HEALTH CARE EDUCATION/TRAINING PROGRAM

## 2024-11-12 PROCEDURE — 99214 OFFICE O/P EST MOD 30 MIN: CPT | Performed by: STUDENT IN AN ORGANIZED HEALTH CARE EDUCATION/TRAINING PROGRAM

## 2024-11-12 PROCEDURE — 3078F DIAST BP <80 MM HG: CPT | Performed by: STUDENT IN AN ORGANIZED HEALTH CARE EDUCATION/TRAINING PROGRAM

## 2024-11-12 PROCEDURE — 3044F HG A1C LEVEL LT 7.0%: CPT | Performed by: STUDENT IN AN ORGANIZED HEALTH CARE EDUCATION/TRAINING PROGRAM

## 2024-11-12 PROCEDURE — 4010F ACE/ARB THERAPY RXD/TAKEN: CPT | Performed by: STUDENT IN AN ORGANIZED HEALTH CARE EDUCATION/TRAINING PROGRAM

## 2024-11-12 PROCEDURE — 83036 HEMOGLOBIN GLYCOSYLATED A1C: CPT | Performed by: STUDENT IN AN ORGANIZED HEALTH CARE EDUCATION/TRAINING PROGRAM

## 2024-11-12 PROCEDURE — 3074F SYST BP LT 130 MM HG: CPT | Performed by: STUDENT IN AN ORGANIZED HEALTH CARE EDUCATION/TRAINING PROGRAM

## 2024-11-12 PROCEDURE — 95251 CONT GLUC MNTR ANALYSIS I&R: CPT | Performed by: STUDENT IN AN ORGANIZED HEALTH CARE EDUCATION/TRAINING PROGRAM

## 2024-11-12 PROCEDURE — 3048F LDL-C <100 MG/DL: CPT | Performed by: STUDENT IN AN ORGANIZED HEALTH CARE EDUCATION/TRAINING PROGRAM

## 2024-11-12 PROCEDURE — 82962 GLUCOSE BLOOD TEST: CPT | Performed by: STUDENT IN AN ORGANIZED HEALTH CARE EDUCATION/TRAINING PROGRAM

## 2024-11-12 NOTE — PROGRESS NOTES
"Subjective   Patient ID: Matite Wolfe is a 54 y.o. female who presents for Diabetes ( Mattie Wolfe is a 53 y.o. female who presents for Diabetes (Diabetes Type 1 / PCP= Dr EDIL Gold / Sees Podiatry and an eye doctor/no family history/Metronic Pump and CGM Pt testing glucose 4 x/day with metronic  due to fluctuating  blood glucose and hypoglycemia .  Compliant with diabetic regime.  Pti s adherent and benefiting from CGM treatment plan /)  Lab Results   Component Value Date    HGBA1C 6.0 11/12/2024      HPI    Mattie Wolfe is a 54 y.o. female with Type 1 DM diagnosed 14 years ago , CVA 2018 , Rt eye blindness epilepsy present for follow up.    She is doing well overall , however she states that she feels light headed and tired after walking for a short distance ( uses a walker )         Currently on 780 G with CGM  Pump download reviewed   Smart guard 97 %   average blood glucose is 160  Total insulin dose 37 units  Bolus is 53 % auto basal 51%  In range 73% high 20 very high 7%     she previously followed with Dr. welsh   Previous  meds :  Lantus 28 units daily   humalog 8 units , SS2     Review of Systems    Objective   Physical Exam  Constitutional:       Appearance: Normal appearance.   Cardiovascular:      Rate and Rhythm: Normal rate and regular rhythm.   Pulmonary:      Effort: Pulmonary effort is normal.      Breath sounds: Normal breath sounds.   Musculoskeletal:      Comments: Uses a walker    Neurological:      Mental Status: She is alert.      Visit Vitals  /60   Ht 1.575 m (5' 2\")   Wt 68.5 kg (151 lb)   BMI 27.62 kg/m²   OB Status Postmenopausal   Smoking Status Never   BSA 1.73 m²        Assessment/Plan        Well controlled Type 1 DM with Hba1c 6 was 6.2 ,5.8 on  Medtronic 780 G pump automode.       DM retinopathy ,with retinal detachment , rt eye blindness follows with Retina specilasit   DM neuropathy , follows with podiatry   DM nephropathy , urine albumin 30 mg/dlon ARB   HLD , LDL " 76 on Atorvasatin 40 mg         Plan :  Continue current pump settings   Basal 0.9 unit/hr   Carb ratio 1:1 ( she enters the required insulin dose for coverage advise to use 8 units per meal)  Sensitivity 40  -120  Active Inuslin 2    Advised to follow up with Dr. Rodriguez

## 2024-11-21 ENCOUNTER — APPOINTMENT (OUTPATIENT)
Dept: HEMATOLOGY/ONCOLOGY | Facility: CLINIC | Age: 54
End: 2024-11-21
Payer: MEDICAID

## 2024-11-22 DIAGNOSIS — G40.219 LOCALIZATION-RELATED (FOCAL) (PARTIAL) SYMPTOMATIC EPILEPSY AND EPILEPTIC SYNDROMES WITH COMPLEX PARTIAL SEIZURES, INTRACTABLE, WITHOUT STATUS EPILEPTICUS: Primary | ICD-10-CM

## 2024-11-26 ENCOUNTER — PATIENT OUTREACH (OUTPATIENT)
Dept: PRIMARY CARE | Facility: CLINIC | Age: 54
End: 2024-11-26
Payer: MEDICAID

## 2024-11-26 DIAGNOSIS — J39.8 TRACHEAL STENOSIS: ICD-10-CM

## 2024-11-26 DIAGNOSIS — Z95.828 PRESENCE OF PERMANENT CENTRAL VENOUS CATHETER: ICD-10-CM

## 2024-11-26 DIAGNOSIS — I69.398 GAIT DISTURBANCE, POST-STROKE: ICD-10-CM

## 2024-11-26 DIAGNOSIS — R26.9 GAIT DISTURBANCE, POST-STROKE: ICD-10-CM

## 2024-11-26 DIAGNOSIS — K31.84 GASTROPARESIS: ICD-10-CM

## 2024-11-26 DIAGNOSIS — R06.09 DYSPNEA ON EXERTION: ICD-10-CM

## 2024-11-26 DIAGNOSIS — E06.3 HASHIMOTO'S THYROIDITIS: ICD-10-CM

## 2024-11-26 DIAGNOSIS — E10.69 TYPE 1 DIABETES MELLITUS WITH OTHER SPECIFIED COMPLICATION: ICD-10-CM

## 2024-11-26 PROCEDURE — 99490 CHRNC CARE MGMT STAFF 1ST 20: CPT | Performed by: INTERNAL MEDICINE

## 2024-11-26 RX ORDER — TERBINAFINE HYDROCHLORIDE 250 MG/1
250 TABLET ORAL DAILY
COMMUNITY
Start: 2024-11-21

## 2024-11-26 RX ORDER — PREDNISONE 20 MG/1
20 TABLET ORAL AS NEEDED
COMMUNITY

## 2024-11-26 NOTE — PROGRESS NOTES
"This RN CM reached out and spoke with Mattie this afternoon. She reports that she recently went to her endocrinologist appointment and her A1C is 6.0%.    Mattie voiced frustration regarding her activity intolerance. We discussed how she rests while completing household tasks such as emptying the .     Mattie reports that she was encouraged to be tested for potentially orthostatic hypotension because she was told that she \"is young and that her stroke wasn't a significant stroke\"     This RN CM inquired about her checking her blood pressure at home, Mattie reports that she doesn't frequently. Mattie also reports that she has an aide who comes to the house several times a week to help her. Asked Mattie if she would be willing to  check her blood pressure at home with her aides' assistance. Also provided education on how to check her blood pressure as well as how to perform orthostatic blood pressure checks at home. Instructed Mattie to keep a log of her blood pressures to be reviewed at her next appointment as well.     Reviewed her medications. Mattie denied any refill needs at this time.     Reviewed her upcoming appointments and preventative exams.     Ensured that Mattie has my contact information and availability for any concerns that arise in between our calls.   "

## 2024-11-27 ENCOUNTER — INFUSION (OUTPATIENT)
Dept: HEMATOLOGY/ONCOLOGY | Facility: CLINIC | Age: 54
End: 2024-11-27
Payer: MEDICAID

## 2024-11-27 DIAGNOSIS — Z95.828 PRESENCE OF PERMANENT CENTRAL VENOUS CATHETER: ICD-10-CM

## 2024-11-27 PROCEDURE — 96523 IRRIG DRUG DELIVERY DEVICE: CPT

## 2024-11-27 PROCEDURE — 2500000004 HC RX 250 GENERAL PHARMACY W/ HCPCS (ALT 636 FOR OP/ED): Mod: SE | Performed by: INTERNAL MEDICINE

## 2024-11-27 RX ORDER — HEPARIN SODIUM 1000 [USP'U]/ML
2000 INJECTION, SOLUTION INTRAVENOUS; SUBCUTANEOUS AS NEEDED
Status: DISCONTINUED | OUTPATIENT
Start: 2024-11-27 | End: 2024-11-29 | Stop reason: HOSPADM

## 2024-11-27 RX ORDER — HEPARIN SODIUM 1000 [USP'U]/ML
2000 INJECTION, SOLUTION INTRAVENOUS; SUBCUTANEOUS AS NEEDED
OUTPATIENT
Start: 2024-11-27

## 2024-11-27 RX ORDER — HEPARIN 100 UNIT/ML
500 SYRINGE INTRAVENOUS AS NEEDED
OUTPATIENT
Start: 2024-11-27

## 2024-11-27 RX ORDER — HEPARIN 100 UNIT/ML
500 SYRINGE INTRAVENOUS AS NEEDED
Status: DISCONTINUED | OUTPATIENT
Start: 2024-11-27 | End: 2024-11-29 | Stop reason: HOSPADM

## 2024-11-27 RX ORDER — HEPARIN SODIUM,PORCINE/PF 10 UNIT/ML
50 SYRINGE (ML) INTRAVENOUS AS NEEDED
OUTPATIENT
Start: 2024-11-27

## 2024-11-27 RX ORDER — HEPARIN SODIUM,PORCINE/PF 10 UNIT/ML
50 SYRINGE (ML) INTRAVENOUS AS NEEDED
Status: DISCONTINUED | OUTPATIENT
Start: 2024-11-27 | End: 2024-11-29 | Stop reason: HOSPADM

## 2024-12-02 ENCOUNTER — APPOINTMENT (OUTPATIENT)
Dept: OPHTHALMOLOGY | Facility: CLINIC | Age: 54
End: 2024-12-02
Payer: MEDICAID

## 2024-12-19 ENCOUNTER — INFUSION (OUTPATIENT)
Dept: HEMATOLOGY/ONCOLOGY | Facility: CLINIC | Age: 54
End: 2024-12-19
Payer: MEDICAID

## 2024-12-19 ENCOUNTER — APPOINTMENT (OUTPATIENT)
Dept: PRIMARY CARE | Facility: CLINIC | Age: 54
End: 2024-12-19
Payer: MEDICAID

## 2024-12-19 VITALS
BODY MASS INDEX: 28.34 KG/M2 | SYSTOLIC BLOOD PRESSURE: 90 MMHG | WEIGHT: 154 LBS | HEIGHT: 62 IN | HEART RATE: 90 BPM | RESPIRATION RATE: 18 BRPM | OXYGEN SATURATION: 100 % | DIASTOLIC BLOOD PRESSURE: 62 MMHG

## 2024-12-19 DIAGNOSIS — Z95.828 PRESENCE OF PERMANENT CENTRAL VENOUS CATHETER: ICD-10-CM

## 2024-12-19 DIAGNOSIS — I95.1 ORTHOSTATIC HYPOTENSION: Primary | ICD-10-CM

## 2024-12-19 DIAGNOSIS — E55.9 MILD VITAMIN D DEFICIENCY: ICD-10-CM

## 2024-12-19 DIAGNOSIS — E10.69 TYPE 1 DIABETES MELLITUS WITH OTHER SPECIFIED COMPLICATION: ICD-10-CM

## 2024-12-19 DIAGNOSIS — E78.5 HYPERLIPIDEMIA LDL GOAL <100: ICD-10-CM

## 2024-12-19 DIAGNOSIS — K22.70 BARRETT'S ESOPHAGUS WITHOUT DYSPLASIA: ICD-10-CM

## 2024-12-19 LAB
ALBUMIN SERPL BCP-MCNC: 4 G/DL (ref 3.4–5)
ALP SERPL-CCNC: 166 U/L (ref 33–110)
ALT SERPL W P-5'-P-CCNC: 17 U/L (ref 7–45)
ANION GAP SERPL CALC-SCNC: 10 MMOL/L (ref 10–20)
AST SERPL W P-5'-P-CCNC: 17 U/L (ref 9–39)
BILIRUB SERPL-MCNC: 0.3 MG/DL (ref 0–1.2)
BUN SERPL-MCNC: 27 MG/DL (ref 6–23)
CALCIUM SERPL-MCNC: 9.1 MG/DL (ref 8.6–10.3)
CHLORIDE SERPL-SCNC: 111 MMOL/L (ref 98–107)
CO2 SERPL-SCNC: 24 MMOL/L (ref 21–32)
CREAT SERPL-MCNC: 1.28 MG/DL (ref 0.5–1.05)
CREAT UR-MCNC: 156.4 MG/DL (ref 20–320)
EGFRCR SERPLBLD CKD-EPI 2021: 49 ML/MIN/1.73M*2
ERYTHROCYTE [DISTWIDTH] IN BLOOD BY AUTOMATED COUNT: 12 % (ref 11.5–14.5)
GLUCOSE SERPL-MCNC: 131 MG/DL (ref 74–99)
HCT VFR BLD AUTO: 34.7 % (ref 36–46)
HGB BLD-MCNC: 11.4 G/DL (ref 12–16)
MCH RBC QN AUTO: 31.7 PG (ref 26–34)
MCHC RBC AUTO-ENTMCNC: 32.9 G/DL (ref 32–36)
MCV RBC AUTO: 96 FL (ref 80–100)
PLATELET # BLD AUTO: 211 X10*3/UL (ref 150–450)
POTASSIUM SERPL-SCNC: 4.1 MMOL/L (ref 3.5–5.3)
PROT SERPL-MCNC: 6.6 G/DL (ref 6.4–8.2)
PROT UR-ACNC: 54 MG/DL (ref 5–24)
PROT/CREAT UR: 0.35 MG/MG CREAT (ref 0–0.17)
RBC # BLD AUTO: 3.6 X10*6/UL (ref 4–5.2)
SODIUM SERPL-SCNC: 141 MMOL/L (ref 136–145)
TSH SERPL-ACNC: 2.1 MIU/L (ref 0.44–3.98)
WBC # BLD AUTO: 4.2 X10*3/UL (ref 4.4–11.3)

## 2024-12-19 PROCEDURE — 3048F LDL-C <100 MG/DL: CPT | Performed by: INTERNAL MEDICINE

## 2024-12-19 PROCEDURE — 99214 OFFICE O/P EST MOD 30 MIN: CPT | Performed by: INTERNAL MEDICINE

## 2024-12-19 PROCEDURE — 3078F DIAST BP <80 MM HG: CPT | Performed by: INTERNAL MEDICINE

## 2024-12-19 PROCEDURE — 82607 VITAMIN B-12: CPT

## 2024-12-19 PROCEDURE — 82306 VITAMIN D 25 HYDROXY: CPT

## 2024-12-19 PROCEDURE — 84443 ASSAY THYROID STIM HORMONE: CPT

## 2024-12-19 PROCEDURE — 3060F POS MICROALBUMINURIA REV: CPT | Performed by: INTERNAL MEDICINE

## 2024-12-19 PROCEDURE — 85027 COMPLETE CBC AUTOMATED: CPT

## 2024-12-19 PROCEDURE — 2500000004 HC RX 250 GENERAL PHARMACY W/ HCPCS (ALT 636 FOR OP/ED): Mod: SE | Performed by: INTERNAL MEDICINE

## 2024-12-19 PROCEDURE — 82570 ASSAY OF URINE CREATININE: CPT

## 2024-12-19 PROCEDURE — 4010F ACE/ARB THERAPY RXD/TAKEN: CPT | Performed by: INTERNAL MEDICINE

## 2024-12-19 PROCEDURE — 3044F HG A1C LEVEL LT 7.0%: CPT | Performed by: INTERNAL MEDICINE

## 2024-12-19 PROCEDURE — 3074F SYST BP LT 130 MM HG: CPT | Performed by: INTERNAL MEDICINE

## 2024-12-19 PROCEDURE — 84075 ASSAY ALKALINE PHOSPHATASE: CPT

## 2024-12-19 PROCEDURE — 3008F BODY MASS INDEX DOCD: CPT | Performed by: INTERNAL MEDICINE

## 2024-12-19 PROCEDURE — 36591 DRAW BLOOD OFF VENOUS DEVICE: CPT

## 2024-12-19 RX ORDER — HEPARIN 100 UNIT/ML
500 SYRINGE INTRAVENOUS AS NEEDED
OUTPATIENT
Start: 2024-12-19

## 2024-12-19 RX ORDER — HEPARIN SODIUM 1000 [USP'U]/ML
2000 INJECTION, SOLUTION INTRAVENOUS; SUBCUTANEOUS AS NEEDED
OUTPATIENT
Start: 2024-12-19

## 2024-12-19 RX ORDER — HEPARIN SODIUM,PORCINE/PF 10 UNIT/ML
50 SYRINGE (ML) INTRAVENOUS AS NEEDED
Status: DISCONTINUED | OUTPATIENT
Start: 2024-12-19 | End: 2024-12-19 | Stop reason: HOSPADM

## 2024-12-19 RX ORDER — HEPARIN 100 UNIT/ML
500 SYRINGE INTRAVENOUS AS NEEDED
Status: DISCONTINUED | OUTPATIENT
Start: 2024-12-19 | End: 2024-12-19 | Stop reason: HOSPADM

## 2024-12-19 RX ORDER — HEPARIN SODIUM,PORCINE/PF 10 UNIT/ML
50 SYRINGE (ML) INTRAVENOUS AS NEEDED
OUTPATIENT
Start: 2024-12-19

## 2024-12-19 NOTE — PROGRESS NOTES
"Subjective   Mattie Wolfe is a 54 y.o. female who presents for FOLLOW UP   PT SAYS ENDOCRINOLOGIST AND PULMONOLOGIST THEY ARE CONCERNED THAT SHE IS ORTHOSTATIC HYPOTENSIVE  ORTHOSTATICS TODAY     SAYS SHE GETS DIZZY BP FLUCTUATES WITH POSITIONS   SHE SITS ON FLOOR TO EMPTY    STILL OUT OF BREATH  WALKING STANDING WORSE     HPI   FEEL REAL WEAK WHEN STANDING.    BEEN HAPPENING FOR AWHILE, PERHAPS OVER A YEAR, HAS BEEN ATTRIBUTED TO SIDE EFFECTS FROM THE STROKE.    HAS BEEN GETTING PROGRESSIVELY MORE DEBILITATED SINCE CAME HOME FOR [facility] A YEAR AGO.  BREATHING BETTER BUT UNABLE TO EXERCISE MORE    DR. ABREU PODIATRIST HAD HER ON TERBINAFINE FOR 30 DAYS FOR ATHLETE'S FOOT, WHICH FINISHED 2 WEEKS AGO    TAKE PREDNISONE TWICE MONTHLY ON AVERAGE FOR BREATHING?    ORTHOSTATICS HERE:  SUPINE 102/72 P=88  SITTING 102/70 P=97  UPRIGHT 90/64 & DIZZY P=108    Review of Systems   Constitutional:  Negative for chills, diaphoresis and fever.   Respiratory:  Negative for cough, choking, shortness of breath, wheezing and stridor.    Cardiovascular:  Negative for chest pain and leg swelling.   Gastrointestinal:  Negative for constipation, diarrhea, nausea and vomiting.   Musculoskeletal:  Negative for joint swelling and myalgias.       Objective   BP 90/62   Pulse 90   Resp 18   Ht 1.575 m (5' 2\")   Wt 69.9 kg (154 lb)   SpO2 100%   BMI 28.17 kg/m²     Physical Exam  Vitals reviewed.   Constitutional:       General: She is not in acute distress.     Appearance: She is not ill-appearing.   HENT:      Right Ear: Tympanic membrane and external ear normal. There is no impacted cerumen.      Left Ear: Tympanic membrane and external ear normal. There is no impacted cerumen.   Cardiovascular:      Rate and Rhythm: Normal rate and regular rhythm.      Pulses: Normal pulses.      Heart sounds:      No gallop.   Pulmonary:      Breath sounds: Normal breath sounds. No wheezing, rhonchi or rales.      Comments: MARKEDLY REDUCED " STRIDOROUS NECK SOUNDS THAN BEFORE  Abdominal:      General: Abdomen is flat. Bowel sounds are normal.      Palpations: Abdomen is soft.      Tenderness: There is no guarding or rebound.   Musculoskeletal:      Right lower leg: No edema.      Left lower leg: No edema.         Assessment/Plan   Problem List Items Addressed This Visit       Type 1 diabetes mellitus    Relevant Orders    Protein, Urine Random (Completed)    Hyperlipidemia LDL goal <100    Relevant Orders    Vitamin B12 (Completed)    TSH with reflex to Free T4 if abnormal (Completed)    Comprehensive Metabolic Panel (Completed)    CBC (Completed)    Orthostatic hypotension - Primary     Other Visit Diagnoses       Mild vitamin D deficiency        Relevant Orders    Vitamin D 25-Hydroxy,Total (for eval of Vitamin D levels) (Completed)          Patient Instructions    I SUSPECT THAT THE LOW BLOOD PRESSURE AND BLOOD PRESSURE DROPPING WITH UPRIGHT POSITION CAN BE RELATED TO THE LOSARTAN BP MEDICATION THAT YOU ARE TAKING    2.  SINCE YOUR HEART CHECKED OUT WELL EARLIER THIS YEAR BY DR. HAM, THEN I DON'T THINK IT IS ABSOLUTELY NECESSARY TO TAKE LOSARTAN FROM A CARDIAC STANDPOINT.    3.  LOSARTAN IS ALSO USED FOR PREVENTION OF A SECOND STROKE, BUT ONLY IF THE PERSON CAN TOLERATE THE BLOOD PRESSURE LOWERING EFFECTS OF LOSARTAN.    4.  SO IN YOUR CASE, I RECOMMEND STOPPING THE LOSARTAN MEDICATION NOW AND OBSERVE WHAT HAPPENS.  WILL HAVE YOU COME BACK IN 2 WEEKS FOR RECHECK.  IF YOUR PROBLEMS RESOLVE, THEN WE WILL SAY IT WAS THE LOSARTAN.  IF THE BP ISSUES PERSIST, THEN WE MAY NEED TO QUESTION NEUROLOGY WHETHER THE COMBINATION OF ZONEGRAN/VIMPAT MIGHT HAVE SOMETHING TO DO WITH THIS    5.  FOLLOW UP 2 WEEKS OR AS NEEDED.      6.  LABS ARE ORDERED FOR TODAY TO CHECK SYSTEMS ASSOCIATED WITH BLOOD PRESSURE, NON-FASTING

## 2024-12-19 NOTE — PATIENT INSTRUCTIONS
I SUSPECT THAT THE LOW BLOOD PRESSURE AND BLOOD PRESSURE DROPPING WITH UPRIGHT POSITION CAN BE RELATED TO THE LOSARTAN BP MEDICATION THAT YOU ARE TAKING    2.  SINCE YOUR HEART CHECKED OUT WELL EARLIER THIS YEAR BY DR. HAM, THEN I DON'T THINK IT IS ABSOLUTELY NECESSARY TO TAKE LOSARTAN FROM A CARDIAC STANDPOINT.    3.  LOSARTAN IS ALSO USED FOR PREVENTION OF A SECOND STROKE, BUT ONLY IF THE PERSON CAN TOLERATE THE BLOOD PRESSURE LOWERING EFFECTS OF LOSARTAN.    4.  SO IN YOUR CASE, I RECOMMEND STOPPING THE LOSARTAN MEDICATION NOW AND OBSERVE WHAT HAPPENS.  WILL HAVE YOU COME BACK IN 2 WEEKS FOR RECHECK.  IF YOUR PROBLEMS RESOLVE, THEN WE WILL SAY IT WAS THE LOSARTAN.  IF THE BP ISSUES PERSIST, THEN WE MAY NEED TO QUESTION NEUROLOGY WHETHER THE COMBINATION OF ZONEGRAN/VIMPAT MIGHT HAVE SOMETHING TO DO WITH THIS    5.  FOLLOW UP 2 WEEKS OR AS NEEDED.      6.  LABS ARE ORDERED FOR TODAY TO CHECK SYSTEMS ASSOCIATED WITH BLOOD PRESSURE, NON-FASTING

## 2024-12-20 LAB
25(OH)D3 SERPL-MCNC: 9 NG/ML (ref 30–100)
VIT B12 SERPL-MCNC: 364 PG/ML (ref 211–911)

## 2024-12-20 RX ORDER — OMEPRAZOLE 40 MG/1
40 CAPSULE, DELAYED RELEASE ORAL DAILY
Qty: 30 CAPSULE | Refills: 10 | Status: SHIPPED | OUTPATIENT
Start: 2024-12-20

## 2024-12-27 ENCOUNTER — PATIENT OUTREACH (OUTPATIENT)
Dept: PRIMARY CARE | Facility: CLINIC | Age: 54
End: 2024-12-27
Payer: MEDICAID

## 2024-12-27 DIAGNOSIS — K31.84 GASTROPARESIS: ICD-10-CM

## 2024-12-27 DIAGNOSIS — R26.9 GAIT DISTURBANCE, POST-STROKE: ICD-10-CM

## 2024-12-27 DIAGNOSIS — E55.9 MILD VITAMIN D DEFICIENCY: ICD-10-CM

## 2024-12-27 DIAGNOSIS — I69.398 GAIT DISTURBANCE, POST-STROKE: ICD-10-CM

## 2024-12-27 DIAGNOSIS — E10.69 TYPE 1 DIABETES MELLITUS WITH OTHER SPECIFIED COMPLICATION: ICD-10-CM

## 2024-12-27 RX ORDER — CHOLECALCIFEROL (VITAMIN D3) 50 MCG
50 TABLET ORAL DAILY
COMMUNITY
Start: 2025-03-17

## 2024-12-27 RX ORDER — CHOLECALCIFEROL (VITAMIN D3) 1250 MCG
50000 TABLET ORAL WEEKLY
COMMUNITY
End: 2025-03-16

## 2024-12-27 NOTE — PROGRESS NOTES
This RN CM reached out and spoke with Mattie this afternoon. She reports that she is no longer taking the Losartan but is still experiencing dizziness but not as bad. She also states that her blood pressure was low the other day when checking.     Ensured that she is drinking fluids. Stressed to change positions slowly.     Also education regarding preventing falls or fractures reviewed. Ensuring to use her walker, no throw rugs on the floor.     Reviewed also with Mattie her recent lab results. Reviewed the instructions from Dr. Gold regarding her low Vitamin D level with instructions to start Vitamin D3 50.000 units weekly for 12 weeks. I was able to determine if she starts this dose tomorrow or Sunday, she will have to take the weekly dose until 3/16/25. Then on 3/17/25 she will start Vitamin D3 2,000 units daily indefinitely. She voiced understanding.     Reviewed her other lab results as well. We discussed foods naturally high in iron for red blood cell levels. Such as eggs, lamb, pork, green leafy vegetables.     Reviewed her medications, Mattie is concerned about her kidney function and she states that she takes a medication for kidney function but cannot recall and thinks it is a blue tablet. She is going to bring in the medication to review and try to identify. She does not see a nephrologist at this time.     Ensured that Mattie has my contact information and availability for any concerns that arise in between our calls.

## 2024-12-30 PROBLEM — I95.1 ORTHOSTATIC HYPOTENSION: Status: ACTIVE | Noted: 2024-12-30

## 2024-12-30 ASSESSMENT — ENCOUNTER SYMPTOMS
STRIDOR: 0
JOINT SWELLING: 0
CHOKING: 0
NAUSEA: 0
WHEEZING: 0
CONSTIPATION: 0
COUGH: 0
CHILLS: 0
DIARRHEA: 0
SHORTNESS OF BREATH: 0
MYALGIAS: 0
VOMITING: 0
DIAPHORESIS: 0
FEVER: 0

## 2025-01-06 ENCOUNTER — APPOINTMENT (OUTPATIENT)
Dept: PRIMARY CARE | Facility: CLINIC | Age: 55
End: 2025-01-06
Payer: MEDICAID

## 2025-01-06 VITALS
OXYGEN SATURATION: 100 % | HEIGHT: 62 IN | SYSTOLIC BLOOD PRESSURE: 92 MMHG | RESPIRATION RATE: 16 BRPM | BODY MASS INDEX: 28.52 KG/M2 | HEART RATE: 62 BPM | DIASTOLIC BLOOD PRESSURE: 70 MMHG | WEIGHT: 155 LBS

## 2025-01-06 DIAGNOSIS — G62.89 OTHER POLYNEUROPATHY: ICD-10-CM

## 2025-01-06 DIAGNOSIS — K31.84 GASTROPARESIS: ICD-10-CM

## 2025-01-06 DIAGNOSIS — Z51.81 MEDICATION MONITORING ENCOUNTER: ICD-10-CM

## 2025-01-06 DIAGNOSIS — K22.70 BARRETT'S ESOPHAGUS WITHOUT DYSPLASIA: ICD-10-CM

## 2025-01-06 DIAGNOSIS — I95.1 ORTHOSTATIC HYPOTENSION: Primary | ICD-10-CM

## 2025-01-06 PROCEDURE — 3074F SYST BP LT 130 MM HG: CPT | Performed by: INTERNAL MEDICINE

## 2025-01-06 PROCEDURE — 3078F DIAST BP <80 MM HG: CPT | Performed by: INTERNAL MEDICINE

## 2025-01-06 PROCEDURE — 99215 OFFICE O/P EST HI 40 MIN: CPT | Performed by: INTERNAL MEDICINE

## 2025-01-06 PROCEDURE — 3008F BODY MASS INDEX DOCD: CPT | Performed by: INTERNAL MEDICINE

## 2025-01-06 RX ORDER — POTASSIUM CHLORIDE 600 MG/1
8 TABLET, FILM COATED, EXTENDED RELEASE ORAL DAILY
Qty: 30 TABLET | Refills: 2 | Status: SHIPPED | OUTPATIENT
Start: 2025-01-06

## 2025-01-06 ASSESSMENT — ENCOUNTER SYMPTOMS
DIARRHEA: 0
CONSTIPATION: 0
COUGH: 0
MYALGIAS: 0
STRIDOR: 0
CHOKING: 0
WHEEZING: 0
FEVER: 0
SHORTNESS OF BREATH: 0
CHILLS: 0
NAUSEA: 0
JOINT SWELLING: 0
DIAPHORESIS: 0
VOMITING: 0

## 2025-01-06 NOTE — PATIENT INSTRUCTIONS
AT THIS TIME, SINCE BP CONTINUES TO BE LOW OFF OF ANTIHYPERTENSIVE TREATMENT, THEN RECOMMEND STARTING FLUDROCORTISONE 0.1 MG DAILY ALONG WITH A POTASSIUM SUPPLEMENT.  THIS SHOULD AUGMENT BLOOD [PRESSURE AND HOPEFULLY ALLOW YOU TO WALK AND MOVE ABOUT WITHOUT LIGHTHEADEDNESS.  FLORINEF SCRIPT CALLED INTO PHARMACY AS [employer] CURRENTLY DOES NOT ALLOW..  NORMAL EF ON CARDIAC TESTING MARCH 2024    2.  CONTINUE WEARING YOUR COMPRESSION SOCKS.    3.  LABS WILL NEED TO BE DRAWN 1 WEEK AFTER STARTING THE MEDICATION    4.  I ALSO WOULD ASK YOU TO SEE DR. HAM URGENTLY FOR BP MEASUREMENT WHEN ON THE FLUDROCORTISONE AND CONSIDERATION FOR TILT TABLE TESTING TO LOOK FOR REASONS WHY YOUR BP SYSTEM IS SO COMPROMISED.    5.  FOLLOW UP AFTER SEEING DR. HAM    6.  REFER DR. TRUONG LOCALLY [facility] FOR ONGOING FOLLOWUP    7.  PLEASE FIND A NEW PLACE FOR BLOOD DRAWS THROUGH THE [facility].    8.  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

## 2025-01-06 NOTE — PROGRESS NOTES
"Subjective   Mattie Wolfe is a 54 y.o. female who presents for  follow up after stopping losartan     HPI   STOPPED LOSARTAN, MADE A DIFFERENCE, BUT NOT AS MUCH OF A DIFFERENCE AS EXPECTED.    SEE NEUROLOGIST FOR SEIZURES, THEY DON'T THINK LOW BLOOD PRESSURE ITS A NEURO ISSUE    PULMONOLOGIST DON'T THINK ITS THE MEDS FOR THE LUNGS EITHER    BP STILL FALLING WHEN STAND UP.    GETS LIGHTHEADED AND CAN'T WALK FAR.      Review of Systems   Constitutional:  Negative for chills, diaphoresis and fever.   Respiratory:  Negative for cough, choking, shortness of breath, wheezing and stridor.    Cardiovascular:  Negative for chest pain and leg swelling.   Gastrointestinal:  Negative for constipation, diarrhea, nausea and vomiting.   Musculoskeletal:  Negative for joint swelling and myalgias.       Objective   BP 92/70   Pulse 62   Resp 16   Ht 1.575 m (5' 2\")   Wt 70.3 kg (155 lb)   SpO2 100%   BMI 28.35 kg/m²     Physical Exam  Vitals reviewed.   Constitutional:       General: She is not in acute distress.     Appearance: She is not ill-appearing.   HENT:      Right Ear: Tympanic membrane and external ear normal. There is no impacted cerumen.      Left Ear: Tympanic membrane and external ear normal. There is no impacted cerumen.   Cardiovascular:      Rate and Rhythm: Normal rate and regular rhythm.      Pulses: Normal pulses.      Heart sounds:      No gallop.   Pulmonary:      Breath sounds: Normal breath sounds. No wheezing, rhonchi or rales.      Comments: MARKEDLY REDUCED STRIDOROUS NECK SOUNDS THAN BEFORE  Abdominal:      General: Abdomen is flat. Bowel sounds are normal.      Palpations: Abdomen is soft.      Tenderness: There is no guarding or rebound.   Musculoskeletal:      Right lower leg: No edema.      Left lower leg: No edema.         Assessment/Plan   Problem List Items Addressed This Visit       Ortega's esophagus without dysplasia    Relevant Orders    Referral to Gastroenterology    Gastroparesis    " Relevant Orders    Referral to Gastroenterology    Peripheral neuropathy    Orthostatic hypotension - Primary    Relevant Medications    potassium chloride CR (Klor-Con) 8 mEq ER tablet    Other Relevant Orders    Referral to Cardiology     Other Visit Diagnoses       Medication monitoring encounter        Relevant Orders    Basic Metabolic Panel          Patient Instructions    AT THIS TIME, SINCE BP CONTINUES TO BE LOW OFF OF ANTIHYPERTENSIVE TREATMENT, THEN RECOMMEND STARTING FLUDROCORTISONE 0.1 MG DAILY ALONG WITH A POTASSIUM SUPPLEMENT.  THIS SHOULD AUGMENT BLOOD [PRESSURE AND HOPEFULLY ALLOW YOU TO WALK AND MOVE ABOUT WITHOUT LIGHTHEADEDNESS.  FLORINEF SCRIPT CALLED INTO PHARMACY AS [employer] CURRENTLY DOES NOT ALLOW..  NORMAL EF ON CARDIAC TESTING MARCH 2024    2.  CONTINUE WEARING YOUR COMPRESSION SOCKS.    3.  LABS WILL NEED TO BE DRAWN 1 WEEK AFTER STARTING THE MEDICATION    4.  I ALSO WOULD ASK YOU TO SEE DR. HAM URGENTLY FOR BP MEASUREMENT WHEN ON THE FLUDROCORTISONE AND CONSIDERATION FOR TILT TABLE TESTING TO LOOK FOR REASONS WHY YOUR BP SYSTEM IS SO COMPROMISED.    5.  FOLLOW UP AFTER SEEING DR. HAM    6.  REFER DR. TRUONG LOCALLY  [facility] FOR ONGOING FOLLOWUP    7.  PLEASE FIND A NEW PLACE FOR BLOOD DRAWS THROUGH THE [facility].    8.  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

## 2025-01-07 ENCOUNTER — TELEPHONE (OUTPATIENT)
Dept: PRIMARY CARE | Facility: CLINIC | Age: 55
End: 2025-01-07
Payer: MEDICAID

## 2025-01-07 NOTE — TELEPHONE ENCOUNTER
Pt has lawrence.  Can you find out where she can go to have labs drawn?  She is due for a lab draw next week.      PS--[facility] told her they don't do it there anymore.

## 2025-01-16 ENCOUNTER — INFUSION (OUTPATIENT)
Dept: HEMATOLOGY/ONCOLOGY | Facility: CLINIC | Age: 55
End: 2025-01-16
Payer: MEDICAID

## 2025-01-16 DIAGNOSIS — Z95.828 PRESENCE OF PERMANENT CENTRAL VENOUS CATHETER: ICD-10-CM

## 2025-01-16 DIAGNOSIS — Z51.81 MEDICATION MONITORING ENCOUNTER: ICD-10-CM

## 2025-01-16 LAB
ANION GAP SERPL CALC-SCNC: 10 MMOL/L (ref 10–20)
BUN SERPL-MCNC: 23 MG/DL (ref 6–23)
CALCIUM SERPL-MCNC: 8.8 MG/DL (ref 8.6–10.3)
CHLORIDE SERPL-SCNC: 108 MMOL/L (ref 98–107)
CO2 SERPL-SCNC: 25 MMOL/L (ref 21–32)
CREAT SERPL-MCNC: 1.11 MG/DL (ref 0.5–1.05)
EGFRCR SERPLBLD CKD-EPI 2021: 58 ML/MIN/1.73M*2
GLUCOSE SERPL-MCNC: 299 MG/DL (ref 74–99)
POTASSIUM SERPL-SCNC: 4.1 MMOL/L (ref 3.5–5.3)
SODIUM SERPL-SCNC: 139 MMOL/L (ref 136–145)

## 2025-01-16 PROCEDURE — 82374 ASSAY BLOOD CARBON DIOXIDE: CPT

## 2025-01-16 PROCEDURE — 2500000004 HC RX 250 GENERAL PHARMACY W/ HCPCS (ALT 636 FOR OP/ED): Mod: SE | Performed by: INTERNAL MEDICINE

## 2025-01-16 PROCEDURE — 36591 DRAW BLOOD OFF VENOUS DEVICE: CPT

## 2025-01-16 RX ORDER — HEPARIN SODIUM,PORCINE/PF 10 UNIT/ML
50 SYRINGE (ML) INTRAVENOUS AS NEEDED
OUTPATIENT
Start: 2025-01-16

## 2025-01-16 RX ORDER — HEPARIN 100 UNIT/ML
500 SYRINGE INTRAVENOUS AS NEEDED
OUTPATIENT
Start: 2025-01-16

## 2025-01-16 RX ORDER — HEPARIN 100 UNIT/ML
500 SYRINGE INTRAVENOUS AS NEEDED
Status: DISCONTINUED | OUTPATIENT
Start: 2025-01-16 | End: 2025-01-16 | Stop reason: HOSPADM

## 2025-01-16 RX ORDER — HEPARIN SODIUM 1000 [USP'U]/ML
2000 INJECTION, SOLUTION INTRAVENOUS; SUBCUTANEOUS AS NEEDED
OUTPATIENT
Start: 2025-01-16

## 2025-01-16 RX ADMIN — HEPARIN 500 UNITS: 100 SYRINGE at 11:33

## 2025-01-27 ENCOUNTER — PATIENT OUTREACH (OUTPATIENT)
Dept: PRIMARY CARE | Facility: CLINIC | Age: 55
End: 2025-01-27
Payer: MEDICAID

## 2025-01-27 DIAGNOSIS — I69.398 GAIT DISTURBANCE, POST-STROKE: ICD-10-CM

## 2025-01-27 DIAGNOSIS — K22.70 BARRETT'S ESOPHAGUS WITHOUT DYSPLASIA: ICD-10-CM

## 2025-01-27 DIAGNOSIS — I95.1 ORTHOSTATIC HYPOTENSION: ICD-10-CM

## 2025-01-27 DIAGNOSIS — E10.69 TYPE 1 DIABETES MELLITUS WITH OTHER SPECIFIED COMPLICATION: ICD-10-CM

## 2025-01-27 DIAGNOSIS — I63.9 CEREBROVASCULAR ACCIDENT (CVA), UNSPECIFIED MECHANISM (MULTI): ICD-10-CM

## 2025-01-27 DIAGNOSIS — K31.84 GASTROPARESIS: ICD-10-CM

## 2025-01-27 DIAGNOSIS — R26.9 GAIT DISTURBANCE, POST-STROKE: ICD-10-CM

## 2025-01-27 PROCEDURE — 99490 CHRNC CARE MGMT STAFF 1ST 20: CPT | Performed by: INTERNAL MEDICINE

## 2025-01-27 RX ORDER — FLUDROCORTISONE ACETATE 0.1 MG/1
1 TABLET ORAL
COMMUNITY
Start: 2025-01-06

## 2025-01-27 NOTE — PROGRESS NOTES
This RN CM reached out and spoke with Mattie this afternoon. She reports that she is still having dizziness and has to sit down quite frequently during activities.     We reviewed her lab results. Updated her on Dr. Gold's instructions to continue her current medications and labs looked stabled.     Mattie was concerned her blood sugar was elevated but provided reassurance and understanding. She reports that she had a banana and peanut butter sandwich right before hand.     We reviewed her medications. Denies any refill needs.    Reviewed upcoming appointments.     Mattie reports that she has not noticed much of a difference since stopping the Losartan and adding the Florinef to her medication regiment to her low blood pressures. She reports that she becomes dizzy and sits down. Very low activity level tolerance.     We discussed therapy sessions. She just recently completed Cardiac Rehab. Mattie is going to reach out to her cardiologist to see about being seen sooner.     We also discussed PT/OT. Mattie is agreeable to a referral to [facility]. Will ask Dr. Gold for approval. Explained that I am unsure if they work with only medicaid but it doesn't hurt to see especially since she is very limited in her activities.     Referral request sent to Dr. Gold for approval.

## 2025-02-04 ENCOUNTER — EVALUATION (OUTPATIENT)
Dept: PHYSICAL THERAPY | Facility: CLINIC | Age: 55
End: 2025-02-04
Payer: MEDICAID

## 2025-02-04 DIAGNOSIS — Z91.81 AT RISK FOR INJURY RELATED TO FALL: ICD-10-CM

## 2025-02-04 DIAGNOSIS — R53.1 GENERALIZED WEAKNESS: ICD-10-CM

## 2025-02-04 DIAGNOSIS — R26.9 GAIT ABNORMALITY: Primary | ICD-10-CM

## 2025-02-04 PROCEDURE — 97163 PT EVAL HIGH COMPLEX 45 MIN: CPT | Mod: GP

## 2025-02-04 ASSESSMENT — ENCOUNTER SYMPTOMS
LOSS OF SENSATION IN FEET: 1
OCCASIONAL FEELINGS OF UNSTEADINESS: 1
DEPRESSION: 0

## 2025-02-04 NOTE — PROGRESS NOTES
Physical Therapy Initial Evaluation:  Vestibular and Balance      Patient Name:  Mattie Wolfe   MRN:  [id number]   Date of Evaluation:  02/04/25   Time Calculation  Start Time: 1105  Stop Time: 1159  Time Calculation (min): 54 min  Visit Number:  1  Insurance Information:  2025 [place] [employer] AUTH REQ AFTER 30 VS SHIRA YR     1. Gait abnormality        2. Generalized weakness        3. At risk for injury related to fall            Assessment: Mattie Wolfe is a 54 year old F referred to outpatient PT for difficulty w/mobility. Pt presents w/severely decreased LE strength/endurance and significant SOB w/any functional mobility although pulse-ox remaining >95%. She additionally presents w/unstable BP d/t hx of symptomatic orthostatic hypotension, however, no episodes this session. 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 Standardized 5xSTS, TUG, and inability to complete 6MWT indicates pt is at increased risk of falling and severely deconditioned. Based on patient's examination, concurrent co-morbidities, and presentation, patient's level of complexity is High.  As a result, recommending continued PT services to facilitate improvement in CLOF.    Problems include:  Activity limitations, Aerobic capacity / endurance, Balance, Decreased functional level, Decreased knowledge of HEP, Fall risk, Flexibility, Gait / locomotion, Integumentary, Pain, Participation restrictions, Posture, ROM, Sensory, Strength, and Transfers    Goals:  By Discharge patient will demo:  [place] in HEP  No falls during POC  Improvement in the following outcome measures to decrease risk of falls:  5xSTS </= 15sec  TUG </= 15sec  ABC >/= 70%  Pt will be able to complete 6MWT with </= 2 standing rest break.  Gait speed >/= .8m/s      Potential to achieve rehab goals is poor, d/t multitude of co-morbidities but pt is highly motivated.     Plan:  3 times every week for a  "total of 10 visits.  Re-assessment after that time.    Activities that may be performed include but are not limited to:  balance, gait, transfers, modalities (as appropriate), e-stim (as appropriate), canalith repositioning maneuvers, therapeutic exercise, therapeutic activities.    Mattie Wolfe in agreement with and understanding of all discussed this date.    ----------------------------------  ----------------------------------    Subjective:    Onset Date:  Years    HPI:  Mattie Wolfe is a 54 year old F referred to outpatient PT for gait. Pt reports she is legally blind, ambulates w/RW and presents bilateral AFO's donned. 2 different braces bc new one caused R foot ulcers. Pt reports in 2018 she had a stroke which resulted in being vented and a coma. She had gait training and PT etc but ended up in a nursing home for 2 years. She is now epileptic, diabetic, osteoporotic, and cont to be generally weak. She has had wrist and leg fx's d/t osteoporosis following falls. She now has difficulty using a cane and instead uses her RW. Pt can't stand or walk for more than a minute. She wants to build up some endurance for ADLs.       Home Setup: Ground floor apt, uses patio door for in/out otherwise would have to use steps. Has a home aid that helps daily for a few hours, has emergency call button.   ^Typically uses SPC for mobility, but unable to as of late, using FWW.       Patient Goal:  \"I want to gain more independence.\"     (note:  \"y\" = yes; \"n\" = no)    Hx of:  - Falls: not within last 6 months   - CVA: y, R occipital.   - Heart conditions (afib, HTN, [place]): orthostatic Hypotension.   - DM: y  - Major Surgeries: n  - Paresthesia: y bilaterally   - Unexplained Weight Loss/Gain: n  - Other significant PMH: Detached retinas, \"stroke in eye\" completely blind in R eye, (caput)      Imaging:  - CT n  - MRI n      Precautions: n    Steadi Fall Risk  One or more falls in the last year? Yes  How many Times? 1  Was the " patient injured in the fall? No  Has trouble stepping onto curb? Yes  Advised to use a cane or walker to get around safely? Yes  Often has to rush to toilet? Yes  Feels unsteady when walking? Yes  Has lost some feeling in feet? Yes  Often feels sad or depressed? No  Steadies self on furniture while walking at home? Yes  Takes medicine that makes them feel lightheaded or more tired than usual? Yes  Worried about Falling? Yes  Takes medicine to sleep or improve mood? No  Needs to push with hands when rising from a chair? Yes      ----------------------------------  ----------------------------------    OBJECTIVE    Blood Pressure:  Sitting, automatic, LUE: 100/55mmHg, 95bpm    Pulsox: Monitored frequently throughout d/t significant SOB w/all activities.     -LE Myotomes: (L/R)  L2 Hip Flexion: WFL bilaterally  L3 Knee Extension: WFL bilaterally  L4 Dorsiflexion: uses bilateral AFOs for foot drop.   S2 Knee Flexion: WFL bilaterally      Coordination:   EC Finger to Nose: No gross abnormalities noted w/functional mobility.  Finger to Finger: No gross abnormalities noted w/functional mobility.  Heel to Shin: No gross abnormalities noted w/functional mobility.  Dysdiadochokinesia: No gross abnormalities noted w/functional mobility.      Functional Mobility Assessment:  - Gait: RW, vc for path d/t vision impairments, decreased LLE stance time and decreased RLE step length, bilateral AFOs for foot drop (L>R subjectively)   - Transfers:  Able to perform w/out UE assist w/significant difficulty.   - Bed Mobility:  NT      Outcomes:  SF BARRIOS: NT d/t time constraints.   TUsec  5xSTS: 19sec w/out UE support, CGA for safety- significant SOB, pulsox = 99%, 101bpm  6MWT: 395ft,120m   ^Gait Speed: .57m/s  ABC = 10%    Treatments:  Access Code: [email or web address]  URL: [email or web address]  Date: 2025  Prepared by: David Castano    Exercises  - Sit to Stand Without Arm Support  - 1 x daily - 7 x weekly - 3  sets - 10 reps  - Standing March with Counter Support  - 1 x daily - 7 x weekly - 3 sets - 10 reps  - Walking  - 1 x daily - 7 x weekly - 1 sets - 3-5 reps - 2min hold

## 2025-02-06 ENCOUNTER — APPOINTMENT (OUTPATIENT)
Dept: OPHTHALMOLOGY | Facility: CLINIC | Age: 55
End: 2025-02-06
Payer: MEDICAID

## 2025-02-06 ENCOUNTER — EVALUATION (OUTPATIENT)
Dept: OCCUPATIONAL THERAPY | Facility: CLINIC | Age: 55
End: 2025-02-06
Payer: MEDICAID

## 2025-02-06 DIAGNOSIS — I95.1 ORTHOSTATIC HYPOTENSION: ICD-10-CM

## 2025-02-06 DIAGNOSIS — I69.398 GAIT DISTURBANCE, POST-STROKE: ICD-10-CM

## 2025-02-06 DIAGNOSIS — R06.09 DYSPNEA ON MINIMAL EXERTION: ICD-10-CM

## 2025-02-06 DIAGNOSIS — R41.842 VISUAL-SPATIAL IMPAIRMENT: ICD-10-CM

## 2025-02-06 DIAGNOSIS — R53.1 GENERALIZED WEAKNESS: Primary | ICD-10-CM

## 2025-02-06 DIAGNOSIS — R26.9 GAIT DISTURBANCE, POST-STROKE: ICD-10-CM

## 2025-02-06 DIAGNOSIS — H54.7 UNSPECIFIED VISUAL LOSS: Primary | ICD-10-CM

## 2025-02-06 DIAGNOSIS — I63.9 CEREBROVASCULAR ACCIDENT (CVA), UNSPECIFIED MECHANISM (MULTI): ICD-10-CM

## 2025-02-06 PROCEDURE — 97535 SELF CARE MNGMENT TRAINING: CPT | Mod: GO

## 2025-02-06 PROCEDURE — 97166 OT EVAL MOD COMPLEX 45 MIN: CPT | Mod: GO

## 2025-02-06 ASSESSMENT — CONF VISUAL FIELD
OS_NORMAL: 1
OD_INFERIOR_NASAL_RESTRICTION: 0
OS_INFERIOR_NASAL_RESTRICTION: 0
OD_SUPERIOR_NASAL_RESTRICTION: 0
OD_SUPERIOR_TEMPORAL_RESTRICTION: 0
OS_INFERIOR_TEMPORAL_RESTRICTION: 0
OS_SUPERIOR_TEMPORAL_RESTRICTION: 0
OD_INFERIOR_TEMPORAL_RESTRICTION: 0
OS_SUPERIOR_NASAL_RESTRICTION: 0

## 2025-02-06 ASSESSMENT — VISUAL ACUITY
METHOD: SNELLEN - LINEAR
OD_SC: NPL
OS_SC: 20/60

## 2025-02-06 ASSESSMENT — EXTERNAL EXAM - RIGHT EYE: OD_EXAM: NORMAL

## 2025-02-06 ASSESSMENT — SLIT LAMP EXAM - LIDS
COMMENTS: NORMAL
COMMENTS: NORMAL

## 2025-02-06 ASSESSMENT — PAIN SCALES - GENERAL: PAINLEVEL_OUTOF10: 0 - NO PAIN

## 2025-02-06 ASSESSMENT — ENCOUNTER SYMPTOMS
EYES NEGATIVE: 1
LOSS OF SENSATION IN FEET: 1
DEPRESSION: 0
OCCASIONAL FEELINGS OF UNSTEADINESS: 1

## 2025-02-06 ASSESSMENT — TONOMETRY
IOP_METHOD: TONOPEN
OS_IOP_MMHG: 15

## 2025-02-06 ASSESSMENT — EXTERNAL EXAM - LEFT EYE: OS_EXAM: NORMAL

## 2025-02-06 ASSESSMENT — CUP TO DISC RATIO: OS_RATIO: 0.2

## 2025-02-06 ASSESSMENT — ACTIVITIES OF DAILY LIVING (ADL): HOME_MANAGEMENT_TIME_ENTRY: 10

## 2025-02-06 ASSESSMENT — PAIN - FUNCTIONAL ASSESSMENT: PAIN_FUNCTIONAL_ASSESSMENT: 0-10

## 2025-02-06 NOTE — PROGRESS NOTES
"Occupational Therapy    Occupational Therapy Evaluation    Name: Mattie Wolfe  MRN: [id number]  : 1970  Date: 25    Time Entry:  Time Calculation  Start Time: 1543  Stop Time:   Time Calculation (min): 60 min  OT Evaluation Time Entry  OT Evaluation (Moderate) Time Entry: 50     OT Therapeutic Procedures Time Entry  Self Care/Home Management (ADLs) Time Entry: 10    Visit #:     Insurance:  MEDICAID  Authorization required: After 30 visits  # of visits approved:  30 (1 completed)  : [place] [employer] AUTH REQ AFTER 30 VS SHIRA YR     General:  OT Received On: 25  General  Reason for Referral: OT Evaluation and Treat  Referred By: Kobi Gold MD    Assessment:  Patient is a 53 yo F who presents to outpatient Occupational Therapy with reports of generalized weakness, low endurance, dec independence with IADLs, and visual spatial processing difficulties. Pt also c/o feeling \"heaviness\" and swelling distal BLEs (L>R). Pt exhibits BUE proximal weakness (R>L), and visual discrimination and color perception impairments. Visual impairments require further assessment. These impairments are limiting pt's safety and independence performing IADLs, leisure, and community mobility. Standardized assessments administered today reveal that the patient has multiple impairments in body structures and functions, activity limitations, and participation restrictions. The patient also has personal factors and comorbidities that may serve as barriers affecting the plan of care, including lives alone, uses paratransit for community mobility, and length of time since onset of impairment. Pt also has several supportive factors that support good rehab potential including high motivation, significant functional improvement since onset of CVA, good insight into deficits, and supportive HHA. The impairments and functional limitations outlined above warrant skilled OT services to facilitate measurable change in the " "above outcome measures and improve patient's safe participation in valued daily activities. Pt verbalized understanding and is in agreement with goals and plan of care.     Patient's clinical presentation includes evolving characteristics as noted during today's evaluation; therefore is of moderate complexity.      Plan:  Outpatient Plan  OT Plan: Skilled OT  Frequency: 1-2x/week  Duration: 6 weeks  Onset Date: 02/06/25  Number of Treatments Authorized: 30  Rehab Potential: Good  Plan of Care Agreement: Patient    Next Visit:  - QuickDash  - Look at BUEs distal c/f swelling and \"heaviness\"   - MVPT (motor-free visual perception test)  - Establish HEP    Current Problem:  1. Generalized weakness        2. Orthostatic hypotension  Referral to Occupational Therapy      3. Gait disturbance, post-stroke  Referral to Occupational Therapy      4. Cerebrovascular accident (CVA), unspecified mechanism (Multi)  Referral to Occupational Therapy      5. Dyspnea on minimal exertion        6. Visual-spatial impairment          Subjective:  Patient presents to the clinic ambulatory and independent with use of rollator, and w/ h/o CVA on 1/26/2018. Pt reports she was in a coma for over a month after the CVA, on a ventilator, had trouble weaning from ventilator, and ultimately got a trach. Pt reports she spent ~2 years in a SNF post-hospital discharge and re-learned to walk. Pt uses oxygen overnight (3.5 L), completed oxygen therapy in November 2024. \"My biggest difficulty is that I feel like I can't breathe.\"     Pt also reports \"heaviness\" in legs when exerting self; states she has occasional swelling in hilda feet (L>R; L is fx LLE), \"toes get really puffy\"    Per referring provider's most recent note (Kobi Gold MD, 1/6/25):  AT THIS TIME, SINCE BP CONTINUES TO BE LOW OFF OF ANTIHYPERTENSIVE TREATMENT, THEN RECOMMEND STARTING FLUDROCORTISONE 0.1 MG DAILY ALONG WITH A POTASSIUM SUPPLEMENT.  THIS SHOULD AUGMENT BLOOD " PRESSURE AND HOPEFULLY ALLOW YOU TO WALK AND MOVE ABOUT WITHOUT LIGHTHEADEDNESS.  FLORINEF SCRIPT CALLED INTO PHARMACY AS [employer] CURRENTLY DOES NOT ALLOW..  NORMAL EF ON CARDIAC TESTING MARCH 2024    CONTINUE WEARING YOUR COMPRESSION SOCKS.    LABS WILL NEED TO BE DRAWN 1 WEEK AFTER STARTING THE MEDICATION    I ALSO WOULD ASK YOU TO SEE DR. HAM URGENTLY FOR BP MEASUREMENT WHEN ON THE FLUDROCORTISONE AND CONSIDERATION FOR TILT TABLE TESTING TO LOOK FOR REASONS WHY YOUR BP SYSTEM IS SO COMPROMISED.  FOLLOW UP AFTER SEEING DR. HAM  REFER DR. TRUONG LOCALLY  GI FOR ONGOING FOLLOWUP  PLEASE FIND A NEW PLACE FOR BLOOD DRAWS THROUGH THE MEDIPORT.  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     Per most recent H&P (10/4/2025):  ASSESSMENT:  History of stroke 2018 after seizure - had subsequent respiratory failure with prolonged mechanical ventilation (tracheostomy).  States has some gait weakness since, but is ambulatory at home.  Epilepsy - on Vimpat and Zonegran.  Last seizure 2020.  Stable  Asthma - uses Dulera bid.  Hasn't needed albuterol much lately (once a week).  States breathing is currently stable.  Admits to chronic ACOSTA from tracheal stenosis.  Has improved following procedure 2/2023, but not completely resolved.  No new symptoms  Non-ischemic cardiomyopathy - follows with Dr. Ham.  Last EF 60% 2021.   He notes this was most-likely stress induced.  Patient denies new symptoms or concerns  History of tracheostomy - closed  Tracheal stenosis, history of tracheostomy. Had laryngoscopy and procedure for this 2/2023.  Denies new symptoms.    HTN - on losartan.  BP reading yesterday was 128/78.  STable  HLD - on Lipitor  Gastroparesis - s/p POP procedure.  Scheduled for above procedure to evaluate episodes of vomiting bile.  Will be NPO after midnight  GERD - on omeprazole.  Controlled  "per patient  Hashimoto's thyroiditis  Diabetes - type 1.  Has insulin pump.   Reports BS are running good at home and \"in range\" ~120 or less.   Is getting Hgb A1c checked tomorrow.   Addendum: leukopenia - noted on labs.  Reviewed with surgeon who is comfortable proceeding with EGD    Current Functional Limitations: poor endurance for ADL/IADL completion; pt requires inc time and occasional physical assistance for ADLs, and consistent physical assistance for IADLs    Patient-Stated Goals: \"I'm trying to gain back as much independence as possible,\" \"I want to be able to reach things out of my cupboards without assistance. I want to be able to make my own bed,\" increase standing tolerance/endurance    Relevant PMH: Type I Diabetes (dx 2009)t, Epilepsy (on vimpat and zonegran; last seizure 2020), CVA on 1/26/2018 affecting optic nerve and causing R eye blindness, L wrist fx (2019), orthostatic hypotension, HTN, HLD, gastroparesis, Hashimoto's thyroiditis, Leukopenia, non-ischemic cadiomyopathy, asthma    Precautions:  Precautions  STEADI Fall Risk Score (The score of 4 or more indicates an increased risk of falling): 5  Precautions Comment: Moderate fall risk, R eye blind  Steadi Fall Risk  One or more falls in the last year? No  How many Times?    Was the patient injured in the fall?    Has trouble stepping onto curb? Yes  Advised to use a cane or walker to get around safely? Yes  Often has to rush to toilet? No  Feels unsteady when walking? Yes  Has lost some feeling in feet? Yes  Comment:neuropathy all limbs  Often feels sad or depressed? No  Steadies self on furniture while walking at home? No  Takes medicine that makes them feel lightheaded or more tired than usual? Yes  Worried about Falling? No  Takes medicine to sleep or improve mood? No  Needs to push with hands when rising from a chair? No    Pain Assessment:  Pain Assessment  Pain Assessment: 0-10  0-10 (Numeric) Pain Score: 0 - No pain (Pt reports she does " "deal with pain in legs when exerting self/walking. \"It's not so much a pain as it is a heaviness.\")    Home Living:  Home Living  Home Living Comments: Live alone in an apartment, has HHA 5 days/week (28 hours/week), no elevator access, lives on first floor no ALLEN. Pt reports she is going to be moving to a more accessible apartment. No pets. Pt has 15 yo daughter and pt sees her every weekend.    Prior Function Per Pt/Caregiver Report:  Prior Function Per Pt/Caregiver Report  Prior Function Comments: Ind ADLs/IADLs +drives. 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 Pt reports in the year leading up to the stroke had progressively impaired endurance and SOB with activity.    ADL/IADL currently:  ADL Comments: HHA 28 hrs/week assists with IADLs. Pt mod I with ADLs and light meal prep, gets meals delivered. Wears depends, but reports mostly continent. Currently on disability d/t unable to work. -drives (d/t vision impairments s/p CVA that affected optic nerve). Walks with rollator (was walking with hurry-cane a few years ago). Pt reports functional decline over this past year in regards to her mobility s/p breaking LLE. Pt wears bilateral AFOs.    Objective   Vision:  R eye blindness, L eye cataract (saw ophthalmology today for cataract surgery consult. Surgery tentative for 3/26/25). Wears readers.    Hearing:  Bilateral hearing aids (pt not currently wearing them because she reports they mess with her phone).     Cognition:  Cognition  Overall Cognitive Status: Within Functional Limits (Pt reports short-term memory impairment since the stroke.)    Functional Movements/Balance:   -Bed Mobility: Ind  -Sit <> stand: mod Ind  -Transfers: mod Ind  -Functional Mobility: Mod Ind (uses rollator in the community, walk with cane/ind at home)  -Sitting Balance: Normal  -Standing Balance: G-/F+    Range of Motion:   Upper Extremity   BUEs " "WNL    Strength:  Upper Extremity  Generalized weakness  R-hand dominant  R shoulder 3+/5, R elbow 4/5, RUE otherwise WFL 4+/5  L shoulder 4/5, LUE otherwise WFL 4+/5    R  44.5#  L  36.5#    R pinch 10# (2-pt), 19# (3-pt), 14# (lateral)  L pinch 8# (2-pt), 14# (3-pt), 12# (lateral)    Hand Function & Coordination:   - 9 Hole Peg Test: 26 sec (R), 35 sec (L)    Motor Control:  WFL    Sensation:   Pt reports neuropathy in all limbs.    Perception:  Impaired visual perceptual skills (color perception and visual discrimination). Requires further assessment.  Pt participated in Stroop effect worksheet, demo intact ability read words, but impaired ability to identify font color of written words.     Outcome Measures:  OT Adult Other Outcome Measures  9 Hole Peg Test: 26 sec (R), 35 sec (L)    Treatment:   -Visual Perceptual assessment and training using Stroop effect worksheet \"name that color\"  -Education, see section below.    HEP:  To be established next tx session.    Outpatient Education:   Discussed rationale behind pathophysiology, OT diagnosis/prognosis, and OT POC. Discussed importance of physical activity and life modifications in the context of CVA prevention.     Goals:  By discharge,     Pt will increase standing tolerance during ADL/IADL performance to 5+ min.  Pt will demo independence with HEP completion.  Pt will demo and verbalize use of energy conservation/joint protection techniques to increase activity tolerance while completing ADL/IADL tasks.   Pt will increase bilateral shoulder strength to 4+/5 to increase activity tolerance for reaching and functional UE use.   Pt will participate in QuickDash to establish BUE functional baseline.   Pt will participate in further visual perceptual assessment to determine how visual perceptual skills play a role in ADL/IADL impairment.   Pt will demo ability to independently check orthostatic blood pressures during functional activity and positional " changes to prevent falls.   Pt will participate in assessment of BLE swelling to determine further OT needs.

## 2025-02-06 NOTE — PROGRESS NOTES
Assessment/Plan   Diagnoses and all orders for this visit:  Unspecified visual loss  -     Corneal Topography - OS - Left Eye  -     OCT, Retina - OS - Left Eye  -     IOL Biometry - OU - Both Eyes    Age-related cataract, left eye   Pseudophakic, right eye   - Visually significant cataract, left eye   - HX of complications due to diabetic retinopathy with bilateral retinal detachments, ongoing treatment for left macular edema, epilepsy, HLD, Hashimoto thyroiditis. Right Eye - Blind due to Stroke in 2018  - [facility] Left eye injections every 8 weeks per Dr. Salamanca, Next injection February 13th  - Type 1 Diabetic   - Extremely Light Sensitive   - Cataract surgery in left eye may improve vision somewhat; however, there will likely still be vision limitations given prior retinal detachment and ongoing DME   - Would not recommend multifocal/premium lens for this eye given other ocular pathology in this eye   - Discussed r/b/a of cataract surgery. Patient would like to proceed with surgery.         Plan:   Will proceed with cataract surgery with monofocal lens left eye with MAC. Target distance vision.     Patient is monocular and would surgery to be an attending only case. [place] 3/26 for surgery     Monocular precautions, including full time

## 2025-02-07 PROBLEM — R41.842 VISUAL-SPATIAL IMPAIRMENT: Status: ACTIVE | Noted: 2025-02-07

## 2025-02-13 ENCOUNTER — CLINICAL SUPPORT (OUTPATIENT)
Dept: OCCUPATIONAL THERAPY | Facility: CLINIC | Age: 55
End: 2025-02-13
Payer: MEDICAID

## 2025-02-13 ENCOUNTER — INFUSION (OUTPATIENT)
Dept: HEMATOLOGY/ONCOLOGY | Facility: CLINIC | Age: 55
End: 2025-02-13
Payer: MEDICAID

## 2025-02-13 ENCOUNTER — TREATMENT (OUTPATIENT)
Dept: PHYSICAL THERAPY | Facility: CLINIC | Age: 55
End: 2025-02-13
Payer: MEDICAID

## 2025-02-13 DIAGNOSIS — R06.09 DYSPNEA ON MINIMAL EXERTION: ICD-10-CM

## 2025-02-13 DIAGNOSIS — R41.842 VISUAL-SPATIAL IMPAIRMENT: ICD-10-CM

## 2025-02-13 DIAGNOSIS — Z95.828 PRESENCE OF PERMANENT CENTRAL VENOUS CATHETER: ICD-10-CM

## 2025-02-13 DIAGNOSIS — E10.69 TYPE 1 DIABETES MELLITUS WITH OTHER SPECIFIED COMPLICATION: ICD-10-CM

## 2025-02-13 DIAGNOSIS — R53.1 GENERALIZED WEAKNESS: ICD-10-CM

## 2025-02-13 DIAGNOSIS — R26.9 GAIT ABNORMALITY: Primary | ICD-10-CM

## 2025-02-13 DIAGNOSIS — I89.0 LYMPHEDEMA OF LEFT LOWER EXTREMITY: ICD-10-CM

## 2025-02-13 DIAGNOSIS — R53.1 GENERALIZED WEAKNESS: Primary | ICD-10-CM

## 2025-02-13 DIAGNOSIS — Z91.81 AT RISK FOR INJURY RELATED TO FALL: ICD-10-CM

## 2025-02-13 DIAGNOSIS — E55.9 MILD VITAMIN D DEFICIENCY: ICD-10-CM

## 2025-02-13 PROCEDURE — 2500000004 HC RX 250 GENERAL PHARMACY W/ HCPCS (ALT 636 FOR OP/ED): Mod: SE | Performed by: INTERNAL MEDICINE

## 2025-02-13 PROCEDURE — 97535 SELF CARE MNGMENT TRAINING: CPT | Mod: GO

## 2025-02-13 PROCEDURE — 96523 IRRIG DRUG DELIVERY DEVICE: CPT

## 2025-02-13 PROCEDURE — 97112 NEUROMUSCULAR REEDUCATION: CPT | Mod: GP

## 2025-02-13 PROCEDURE — 97530 THERAPEUTIC ACTIVITIES: CPT | Mod: GP

## 2025-02-13 RX ORDER — HEPARIN SODIUM,PORCINE/PF 10 UNIT/ML
50 SYRINGE (ML) INTRAVENOUS AS NEEDED
OUTPATIENT
Start: 2025-02-13

## 2025-02-13 RX ORDER — HEPARIN SODIUM 1000 [USP'U]/ML
2000 INJECTION, SOLUTION INTRAVENOUS; SUBCUTANEOUS AS NEEDED
OUTPATIENT
Start: 2025-02-13

## 2025-02-13 RX ORDER — HEPARIN 100 UNIT/ML
500 SYRINGE INTRAVENOUS AS NEEDED
OUTPATIENT
Start: 2025-02-13

## 2025-02-13 RX ORDER — HEPARIN 100 UNIT/ML
500 SYRINGE INTRAVENOUS AS NEEDED
Status: DISCONTINUED | OUTPATIENT
Start: 2025-02-13 | End: 2025-02-13 | Stop reason: HOSPADM

## 2025-02-13 RX ORDER — HEPARIN SODIUM,PORCINE/PF 10 UNIT/ML
50 SYRINGE (ML) INTRAVENOUS AS NEEDED
Status: DISCONTINUED | OUTPATIENT
Start: 2025-02-13 | End: 2025-02-13 | Stop reason: HOSPADM

## 2025-02-13 RX ORDER — HEPARIN SODIUM 1000 [USP'U]/ML
2000 INJECTION, SOLUTION INTRAVENOUS; SUBCUTANEOUS AS NEEDED
Status: DISCONTINUED | OUTPATIENT
Start: 2025-02-13 | End: 2025-02-13 | Stop reason: HOSPADM

## 2025-02-13 RX ADMIN — HEPARIN 500 UNITS: 100 SYRINGE at 10:00

## 2025-02-13 ASSESSMENT — ACTIVITIES OF DAILY LIVING (ADL): HOME_MANAGEMENT_TIME_ENTRY: 43

## 2025-02-13 NOTE — PROGRESS NOTES
Occupational Therapy  Occupational Therapy Treatment Note    Patient Name Mattie Wolfe   MRN: [id number]  : 1970  Today's Date: 2025  Time Calculation  Start Time: 1450  Stop Time: 1533  Time Calculation (min): 43 min    Visit #:     Insurance:  MEDICAID  Authorization required:  After 30 visits  # of visits approved: 2025: [place] [employer] AUTH REQ AFTER 30 VS SHIRA YR    Therapy Diagnoses:   1. Generalized weakness        2. Dyspnea on minimal exertion        3. Visual-spatial impairment        4. Lymphedema of left lower extremity          Assessment:  Patient tolerated treatment well this date. OT assessed skin and took circumferential measurements of LLE after +Stemmer sign to L second toe. Pt has hx of L ankle fracture 2022 which exacerbated LLE swelling and is likely disruption to lymphatic system in LLE leading to +lymphedema. Pt has no hx using compression garments. Pt is progressing with goals AEB completing lymphedema evaluation this date. While patient has multiple functional impairments/needs that OT can address, her most debilitating symptom currently is her LLE leg heaviness and related dec activity tolerance/endurance. Pt will benefit from further skilled OT for Complete Decongestive Therapy.     Plan:      Continue to progress per POC: Continue OT 1-2x/week for 6 weeks.  Interventions: Complete Decongestive Therapy, Manual Lymphatic Drainage, Neuromuscular Reeducation, Self-care/ADL training, Therapeutic Activity, Therapeutic Exercise, Manual Therapy    Next Visit:  - Initiate compression and lymphapress order processes  - Measure RLE for baseline  - Initiate short stretch bandages to distal LLE  - MVPT (motor-free visual perceptual test)  - Establish HEP for BUE strength    Goals:  By discharge,      Pt will increase standing tolerance during ADL/IADL performance to 5+ min.  Pt will demo independence with HEP completion.  Pt will demo and verbalize use of energy  conservation/joint protection techniques to increase activity tolerance while completing ADL/IADL tasks.   Pt will increase bilateral shoulder strength to 4+/5 to increase activity tolerance for reaching and functional UE use.   Pt will participate in QuickDash to establish BUE functional baseline.   Pt will participate in further visual perceptual assessment to determine how visual perceptual skills play a role in ADL/IADL impairment.   Pt will demo ability to independently check orthostatic blood pressures during functional activity and positional changes to prevent falls.   Pt will participate in assessment of BLE swelling to determine further OT needs (Met 2/13/25)    Lymphedema Goals Added 2/13/25:  Demonstrate decreased swelling and softened tissue texture in LLE upon visual inspection and palpation to increase safe functional mobility, ADLS. IADLS, work and leisure activities.  Decrease circumferential measurements in LLE to within 3-5 cm of RLE circumferential measurements.  Demonstrate increased knowledge with lymphedema skin care precautions to reduce the risk of infection and exacerbation.  Demonstrate independence with the self manual lymph drainage (MLD) to enhance lymphatic flow and decongestion of trunk and lower extremities.  Demonstrate independence with self bandaging/compression techniques and independent understanding of the principles and theory of lymphatic compression.  Be fit with and demonstrate good tolerance to LLE compression garment when the patient is stable, at maximal decongestion.  Demonstrate independence with donning/doffing garment and adherence to wear schedule, adaptive techniques as needed.  Decrease pain, tightness LLE.  Improve LLE functional ROM and strength as needed for safe functional mobility.  Demonstrate independence with home exercise program and compliance with lymphedema exercise precautions.     Subjective:   Patient reports she got her port flushed earlier today  with heparin. Pt reports she will bring her care plan with her from medicaid next tx session. Pt reports L ankle fracture July 2022.    Significant PMHx and rehab hx: H/o CVA on 1/26/2018 affecting optic nerve and causing R eye blindness. Pt reports she was in a coma for over a month after the CVA, on a ventilator, had trouble weaning from ventilator, and ultimately got a trach. Pt reports she spent ~2 years in a SNF post-hospital discharge and re-learned to walk. Pt uses oxygen overnight (3.5 L), completed oxygen therapy in November 2024.   Other hx: H/o L ankle fx July 2022; L eye cataract with tentative cataract sx scheduled for 3/26/25; hilda hearing aides; Type I Diabetes (dx 2009); Epilepsy (on vimpat and zonegran; last seizure 2020); L wrist fx (2019); orthostatic hypotension; HTN; HLD; gastroparesis; Hashimoto's thyroiditis; Leukopenia; non-ischemic cadiomyopathy; asthma.    Have you fallen since last visit: No    Precautions: Moderate fall risk, R eye blind     Pain:  5/10  Location/Type of pain: LLE heaviness    HEP compliance/understanding: N/A; establish next tx session    Objective:    LLE appearance:    Fullness knee and lower leg   - soft tissue texture    Veins visible    Minimal dryness    - applies moisturizer after shower   Fullness at ankle lobule      LE Skin Appearance/Condition and Girth:  L Superior border of patella (SBP) 42.8  L 10cm below SBP 32.9  L 20cm below SBP 32.0  L 30cm below SBP 22.7  L 35cm below SBP 20.9  L Ankle lobule 26.5  L Ankle 21.3  L Forefoot 22.0    Vision: pt's hilda eyes irritated this date with inc sensitivity    Treatment:     Self Care Home Management: 43 minutes  OT educated pt on anatomy / physiology of the lymphatic system.  OT educated pt on complete decongestive therapy (CDT) for lymphedema treatment. OT developed goals and plan of care with patient.   OT took circumferential measurements LLE to establish baseline.      Education:  Pt educated on the importance of  elevation throughout the day to reduce swelling in BLEs.

## 2025-02-13 NOTE — PROGRESS NOTES
Physical Therapy Treatment      Patient Name: Mattie Wolfe  MRN: [id number]  Today's Date: 2/13/2025  Visit #: 2  Insurance: 2025 [place] [employer] AUTH REQ AFTER 30 VS SHIRA YR   Time Calculation  Start Time: 1534  Stop Time: 1618  Time Calculation (min): 44 min    1. Gait abnormality        2. Generalized weakness        3. At risk for injury related to fall            ASSESSMENT:  Session focusing on ambulation endurance and breathing strategies to help manage her SOB likely d/t medical hx of obstructive breathing disorders. She is limited to approx 3min bouts of activity before requiring rest. Requires vc to perform pursed lip breathing to improve recovery btwn sets. She was challenged by paced breathing while ambulating and will need to practice this dual task to see significant benefit. Pt highly challenged by BARRIOS balance activities this date demoing decreased static balance and reliance on UE support. Pt to cont to benefit from skilled PT to improve CLOF.       GOALS:  By Discharge patient will demo:  [place] in HEP  No falls during POC  Improvement in the following outcome measures to decrease risk of falls:  5xSTS </= 15sec  TUG </= 15sec  ABC >/= 70%  Pt will be able to complete 6MWT with </= 2 standing rest break.  Gait speed >/= .8m/s    PLAN:  Cont to progress LE strength/endurance for functional mobility as tolerated.       Tx Considerations:  -Schedule Aquatic therapy  -Ambulation/dynamic gait w/out AD or w/SPC  -Toe taps, step ups etc.        SUBJECTIVE:  Pt reports she has been doing her exercises, they are not easy but going ok.       OBJECTIVE:  Treatments:  Therapeutic Exercise (55311):   minutes  Not performed    Manual Therapy (88048):   minutes  Not performed    Neuromuscular Re-education (23998): 10 minutes  BARRIOS Balance 33/56 - for static balance assessment.       Therapeutic Activitity (63082):  34 minutes  Walking w/RW - for dynamic warmup and to improve ambulation endurance/activity tolerance -  x2 sets ea to fatigue   ^Best: ~3min ea before requiring seated rest.   Discussed/reviewed of Pursed Lip Breathing and discussing purpose of strategy for Obstructive type disorders.   Heavy vc to perform during seated rest breaks to improve SOB after ambulation bouts.  Discussed and attempted paced breathing during ambulation to improve endurance while ambulating - pt challenged with coordinating at this time, and d/t dual tasking nature of activity ultimately resulted in decreased ambulation tolerance.   Ambulation w/SPC - to decrease reliance on assistive device - x4 sets ea to fatigue  ^Best: 160'   Fwd/Bkwd walking w/out SPC - to improve ambulation and steadiness w/LRAD for carryover to functional mobility - 3x20' bouts          HEP:  Access Code: [email or web address]  URL: [email or web address]  Date: 02/04/2025  Prepared by: David Castano     Exercises  - Sit to Stand Without Arm Support  - 1 x daily - 7 x weekly - 3 sets - 10 reps  - Standing March with Counter Support  - 1 x daily - 7 x weekly - 3 sets - 10 reps  - Walking  - 1 x daily - 7 x weekly - 1 sets - 3-5 reps - 2min hold

## 2025-02-18 ENCOUNTER — TREATMENT (OUTPATIENT)
Dept: PHYSICAL THERAPY | Facility: CLINIC | Age: 55
End: 2025-02-18
Payer: MEDICAID

## 2025-02-18 ENCOUNTER — CLINICAL SUPPORT (OUTPATIENT)
Dept: OCCUPATIONAL THERAPY | Facility: CLINIC | Age: 55
End: 2025-02-18
Payer: MEDICAID

## 2025-02-18 DIAGNOSIS — R53.1 GENERALIZED WEAKNESS: ICD-10-CM

## 2025-02-18 DIAGNOSIS — Z91.81 AT RISK FOR INJURY RELATED TO FALL: ICD-10-CM

## 2025-02-18 DIAGNOSIS — R06.09 DYSPNEA ON MINIMAL EXERTION: ICD-10-CM

## 2025-02-18 DIAGNOSIS — R41.842 VISUAL-SPATIAL IMPAIRMENT: ICD-10-CM

## 2025-02-18 DIAGNOSIS — R53.1 GENERALIZED WEAKNESS: Primary | ICD-10-CM

## 2025-02-18 DIAGNOSIS — R26.9 GAIT ABNORMALITY: Primary | ICD-10-CM

## 2025-02-18 DIAGNOSIS — I89.0 LYMPHEDEMA OF LEFT LOWER EXTREMITY: ICD-10-CM

## 2025-02-18 PROCEDURE — 97535 SELF CARE MNGMENT TRAINING: CPT | Mod: GO

## 2025-02-18 PROCEDURE — 97530 THERAPEUTIC ACTIVITIES: CPT | Mod: GP

## 2025-02-18 ASSESSMENT — ACTIVITIES OF DAILY LIVING (ADL): HOME_MANAGEMENT_TIME_ENTRY: 43

## 2025-02-18 NOTE — PROGRESS NOTES
Occupational Therapy  Occupational Therapy Treatment Note    Patient Name Mattie Wolfe   MRN: [id number]  : 1970  Today's Date: 2025  Time Calculation  Start Time: 1553  Stop Time: 1636  Time Calculation (min): 43 min    Visit #: 3/30    Insurance:  MEDICAID  Authorization required:  After 30 visits  # of visits approved: 2025: [place] MCAID AUTH REQ AFTER 30 VS SHIRA YR    Therapy Diagnoses:   1. Generalized weakness        2. Dyspnea on minimal exertion        3. Visual-spatial impairment        4. Lymphedema of left lower extremity          Assessment:  Patient tolerated treatment well this date. OT assessed BLE skin and took circumferential measurements of RLE to provide baseline to compare LLE (+ lymphedema). OT initiated use of short stretch bandage to pt's LLE from foot to knee to reduce swelling, with comfortable fit achieved by patient. Pt is progressing with goals AEB further knowledge regarding lymphedema and CDT. Pt will benefit from further skilled OT for Complete Decongestive Therapy and address her neurological impairments resulting from prior stroke.     Plan:      Continue to progress per POC: Continue OT 1-2x/week for 6 weeks.  Interventions: Complete Decongestive Therapy, Manual Lymphatic Drainage, Neuromuscular Reeducation, Self-care/ADL training, Therapeutic Activity, Therapeutic Exercise, Manual Therapy    Next Visit:  - Check-in regarding effect of short-stretch bandage on LLE swelling  - Teach MLD  - MVPT (motor-free visual perceptual test)  - Establish HEP for BUE strength    Goals:  By discharge,      Pt will increase standing tolerance during ADL/IADL performance to 5+ min.  Pt will demo independence with HEP completion.  Pt will demo and verbalize use of energy conservation/joint protection techniques to increase activity tolerance while completing ADL/IADL tasks.   Pt will increase bilateral shoulder strength to 4+/5 to increase activity tolerance for reaching and  functional UE use.   Pt will participate in [employer] to establish BUE functional baseline.   Pt will participate in further visual perceptual assessment to determine how visual perceptual skills play a role in ADL/IADL impairment.   Pt will demo ability to independently check orthostatic blood pressures during functional activity and positional changes to prevent falls.   Pt will participate in assessment of BLE swelling to determine further OT needs (Met 2/13/25)  Pt will improve handwriting AEB improved legibility and speed using compensatory strategies or inc fine motor strength.    Lymphedema Goals Added 2/13/25:  Demonstrate decreased swelling and softened tissue texture in LLE upon visual inspection and palpation to increase safe functional mobility, ADLS. IADLS, work and leisure activities.  Decrease circumferential measurements in LLE to within 3-5 cm of RLE circumferential measurements.  Demonstrate increased knowledge with lymphedema skin care precautions to reduce the risk of infection and exacerbation.  Demonstrate independence with the self manual lymph drainage (MLD) to enhance lymphatic flow and decongestion of trunk and lower extremities.  Demonstrate independence with self bandaging/compression techniques and independent understanding of the principles and theory of lymphatic compression.  Be fit with and demonstrate good tolerance to LLE compression garment when the patient is stable, at maximal decongestion.  Demonstrate independence with donning/doffing garment and adherence to wear schedule, adaptive techniques as needed.  Decrease pain, tightness LLE.  Improve LLE functional ROM and strength as needed for safe functional mobility.  Demonstrate independence with home exercise program and compliance with lymphedema exercise precautions.     Subjective:   Patient reports no hx of using medical grade compression, but she does wear OTC compression socks most days. Pt reports goal of improving her  handwriting (OT added to POC).     Significant PMHx and rehab hx: H/o CVA on 1/26/2018 affecting R occipital lobe, and other stroke affecting R optic nerve and causing R eye blindness. Pt reports she was in a coma for over a month after the CVA, on a ventilator, had trouble weaning from ventilator, and ultimately got a trach. Pt reports she spent ~2 years in a SNF post-hospital discharge and re-learned to walk. Pt uses oxygen overnight (3.5 L), completed oxygen therapy in November 2024.   Other hx: H/o L ankle fx July 2022; L eye cataract with tentative cataract sx scheduled for 3/26/25; hilda hearing aides; Type I Diabetes (dx 2009); Epilepsy (on vimpat and zonegran; last seizure 2020); L wrist fx (2019); orthostatic hypotension; HTN; HLD; gastroparesis; Hashimoto's thyroiditis; Leukopenia; non-ischemic cadiomyopathy; asthma.    Have you fallen since last visit: No    Precautions: Moderate fall risk, R eye blind     Pain:  5/10  Location/Type of pain: LLE heaviness    HEP compliance/understanding: N/A; establish next tx session    Objective:   LLE appearance:    Fullness knee and lower leg   - soft tissue texture    Veins mildly visible    Minimal dryness    - applies moisturizer after shower   Fullness at ankle lobule      RLE Skin Appearance/Condition and Girth cm (2/18/25):  R Superior border of patella (SBP) 39.1   R 10cm below SBP 32.7   R 20cm below SBP 31.3   R 30cm below SBP 23.3   R 35cm below SBP 18.9   R Ankle lobule 24.7   R Ankle 18.5   R Forefoot 20.9     LLE Skin Appearance/Condition and Girth cm (2/13/25):  L Superior border of patella (SBP) 42.8  L 10cm below SBP 32.9  L 20cm below SBP 32.0  L 30cm below SBP 22.7  L 35cm below SBP 20.9  L Ankle lobule 26.5  L Ankle 21.3  L Forefoot 22.0    Vision: pt's hilda eyes irritated this date with inc sensitivity    Treatment:     Self Care Home Management: 43 minutes  OT reviewed the 5 components of Complete Decongestive Therapy (CDT) to treat lymphedema, and  explained the purpose of applying short-stretch bandages.   OT took circumferential measurements RLE to establish baseline to compare LLE to (minimal swelling -Stemmer sign in RLE).  OT applied short stretch bandages to LLE: 4” stockinette base layer,1-8 cm short stretch bandage and 1-10 cm short stretch bandage - Comfortable fit achieved.  OT reviewed post bandaging wear instructions and precautions. Pt expressed teach-back of education.    Education:  OT educated pt on Complete Decongestive Therapy (CDT) treatment methods starting with short stretch bandaging and MLD with end goal to get her fitted for medical grade prescription compression garment.

## 2025-02-18 NOTE — PROGRESS NOTES
"Physical Therapy Treatment      Patient Name: Mattie Wolfe  MRN: [id number]  Today's Date: 2/18/2025  Visit #: 3  Insurance: 2025 [place] MCAID AUTH REQ AFTER 30 VS SHIRA YR   Time Calculation  Start Time: 1500  Stop Time: 1544  Time Calculation (min): 44 min    1. Gait abnormality        2. Generalized weakness        3. At risk for injury related to fall            ASSESSMENT:  Pt cont to be highly challenged d/t SOB w/exertion, and decreased LE strength bilaterally, but R > L. Strength difference is very apparent during step ups. She cont to require vc to practice pursed lip breathing btwn ambulation bouts to improve recovery. Pt to cont to benefit from skilled PT to improve functional mobility and decrease risk of falling.       GOALS:  By Discharge patient will demo:  [place] in HEP  No falls during POC  Improvement in the following outcome measures to decrease risk of falls:  5xSTS </= 15sec  TUG </= 15sec  ABC >/= 70%  Pt will be able to complete 6MWT with </= 2 standing rest break.  Gait speed >/= .8m/s    PLAN:  Cont to progress LE strength/endurance for functional mobility as tolerated.       Tx Considerations:  -Schedule Aquatic therapy  -Ambulation/dynamic gait w/out AD or w/SPC  -Toe taps, step ups etc.        SUBJECTIVE:  Pt reports both of her hips have been bothering her as of late, she thinks it may be due to the increase in exercise.         OBJECTIVE:  Treatments:  Therapeutic Exercise (09363):   minutes  Not performed    Manual Therapy (01903):   minutes  Not performed    Neuromuscular Re-education (74562):   minutes  -Not performed    Therapeutic Activitity (91656):  44 minutes  Cont discussion and purpose of pursed lip breathing.   Ambulation w/SPC - to decrease reliance on assistive device - x2 sets ea to fatigue  ^Best: 300'   Cybex Leg Press - 3x10, 50lbs  Step Ups - uni UE support - 3x10, 4\" - challenged R>L  Walking w/RW - for dynamic warmup and to improve ambulation endurance/activity " tolerance - x1 set to fatigue ~3min  ^Best: ~3min ea before requiring seated rest.           HEP:  Access Code: [email or web address]  URL: [email or web address]  Date: 02/04/2025  Prepared by: David Castano     Exercises  - Sit to Stand Without Arm Support  - 1 x daily - 7 x weekly - 3 sets - 10 reps  - Standing March with Counter Support  - 1 x daily - 7 x weekly - 3 sets - 10 reps  - Walking  - 1 x daily - 7 x weekly - 1 sets - 3-5 reps - 2min hold

## 2025-02-20 ENCOUNTER — TREATMENT (OUTPATIENT)
Dept: PHYSICAL THERAPY | Facility: CLINIC | Age: 55
End: 2025-02-20
Payer: MEDICAID

## 2025-02-20 DIAGNOSIS — Z91.81 AT RISK FOR INJURY RELATED TO FALL: ICD-10-CM

## 2025-02-20 DIAGNOSIS — R53.1 GENERALIZED WEAKNESS: ICD-10-CM

## 2025-02-20 DIAGNOSIS — R26.9 GAIT ABNORMALITY: Primary | ICD-10-CM

## 2025-02-20 PROCEDURE — 97113 AQUATIC THERAPY/EXERCISES: CPT | Mod: GP

## 2025-02-20 NOTE — PROGRESS NOTES
Physical Therapy Treatment      Patient Name: Mattie Wolfe  MRN: [id number]  Today's Date: 2/20/2025  Visit #4       Insurance:  2025 [place] MCA[employer] AUTH REQ AFTER 30 VS SHIRA YR     Assessment:  Trial of aquatic therapy focus of improving ambulatory tolerance, strengthening, and balance.  PT in the water for additional safety due to balance deficits, CGA occasionally with water walking.  Pt tolerated all interventions well with great tolerance, pt reported no pain with walking or standing exercises.  Planning on progressing balance with min UE support with standing exercises.  Pt is making progress towards goals and would continue to benefit from skilled PT at this time.   Aquatic PT is required due to, buoyancy of water reduced weight bearing and decreased LE and spinal loading, allowing for more freedom of movement and promotes improved ability to perform functional tasks.  The viscosity of water which is more than 700x that of air, allowed increased reaction time, in turn allowing advanced balance activities to be safely performed and allow patient to be challenged beyond limited of stability without fear of falling; provides the opportunity to work on balance in a safe environment.  The hydrostatic pressure of water facilitates the reduction of edema in the lower extremities, allowing for greater ease of movement.  Aquatics d/t cardio vascular benefits including: improved cardiovascular efficiency including increased stroke volume, decreased HR, and decreased blood pressure with increased cardiac output allowing patient to achieve cardiovascular training effect with less work load.  Aquatics to support upright posture during postural retraining and strengthening.   Aquatics allow patient to work on gait with less assistance or without assistive device improving posture and ease of gait training/conditioning.     Patient's response to session: Decreased pain, Increased ROM/joint mobility, Increase motor control, Improved  "gait, and Increased knowledge and understanding    Plan:  Cont to progress LE strength/endurance for functional mobility as tolerated.      Tx Considerations:  -Schedule Aquatic therapy  -Ambulation/dynamic gait w/out AD or w/SPC  -Toe taps, step ups etc.    Current Problem:   1. Gait abnormality        2. Generalized weakness        3. At risk for injury related to fall            Subjective     Pt reports, \"I am okay, today no pain really\"     Pain: /10       Objective       Treatments:  Therapeutic Exercise (63572):   minutes      Manual Therapy (92629):   minutes      Neuromuscular Re-education (10050):   minutes      Aquatic Therapy (04897):   27 Min 2 units  Forward walkin laps (CGA)  Mini Squats: 2 x 10:  Hip circles:    1 x 10 (B) (CW/CCW)  HS curls  2 x 10 (B)  Hip ABD  2 x 10 (B)  Hip FLX  2 x 10  Hip EXT  2 x 10 (B)  Standing  (B UE support)  Standing Bicycle kicks:  2 x 10 (B)  Heel Raise:  20     "

## 2025-02-24 ENCOUNTER — TREATMENT (OUTPATIENT)
Dept: PHYSICAL THERAPY | Facility: CLINIC | Age: 55
End: 2025-02-24
Payer: MEDICAID

## 2025-02-24 ENCOUNTER — APPOINTMENT (OUTPATIENT)
Dept: OCCUPATIONAL THERAPY | Facility: CLINIC | Age: 55
End: 2025-02-24
Payer: MEDICAID

## 2025-02-24 DIAGNOSIS — R53.1 GENERALIZED WEAKNESS: Primary | ICD-10-CM

## 2025-02-24 DIAGNOSIS — Z91.81 AT RISK FOR INJURY RELATED TO FALL: ICD-10-CM

## 2025-02-24 DIAGNOSIS — I89.0 LYMPHEDEMA OF LEFT LOWER EXTREMITY: ICD-10-CM

## 2025-02-24 DIAGNOSIS — R53.1 GENERALIZED WEAKNESS: ICD-10-CM

## 2025-02-24 DIAGNOSIS — R06.09 DYSPNEA ON MINIMAL EXERTION: ICD-10-CM

## 2025-02-24 DIAGNOSIS — R41.842 VISUAL-SPATIAL IMPAIRMENT: ICD-10-CM

## 2025-02-24 DIAGNOSIS — R26.9 GAIT ABNORMALITY: Primary | ICD-10-CM

## 2025-02-24 PROBLEM — H25.812 COMBINED FORM OF AGE-RELATED CATARACT, LEFT EYE: Status: ACTIVE | Noted: 2025-02-06

## 2025-02-24 PROCEDURE — 97530 THERAPEUTIC ACTIVITIES: CPT | Mod: GP

## 2025-02-24 PROCEDURE — 97110 THERAPEUTIC EXERCISES: CPT | Mod: GP

## 2025-02-24 NOTE — PROGRESS NOTES
Physical Therapy Treatment      Patient Name: Mattie Wolfe  MRN: [id number]  Today's Date: 2/24/2025  Visit #: 5  Insurance: 2025 [place] MCA[place] AUTH REQ AFTER 30 VS SHIRA YR   Time Calculation  Start Time: 1445  Stop Time: 1530  Time Calculation (min): 45 min    1. Gait abnormality        2. Generalized weakness        3. At risk for injury related to fall            ASSESSMENT:  Pt tolerated session well, she had decreased SOB w/exertional activities this date likely d/t subjective use of inhaler and prednisone yesterday. She tolerates ambulation w/out AFO's well having decreased hip pain w/out AFO, but demo's bilateral foot slap and decreased ambulation endurance d/t ankle fatigue. Pt to cont to benefit from skilled PT to improve functional mobility.       GOALS:  By Discharge patient will demo:  [place] in HEP  No falls during POC  Improvement in the following outcome measures to decrease risk of falls:  5xSTS </= 15sec  TUG </= 15sec  ABC >/= 70%  Pt will be able to complete 6MWT with </= 2 standing rest break.  Gait speed >/= .8m/s    PLAN:  Cont to progress LE strength/endurance for functional mobility as tolerated.       Tx Considerations:  -Schedule Aquatic therapy  -Ambulation/dynamic gait w/out AD or w/SPC  -Toe taps, step ups etc.        SUBJECTIVE:  Pt reports she loved the aquatic therapy.       OBJECTIVE:  Treatments:  Therapeutic Exercise (74883): 15 minutes  Supine df MMT (L,R) = 3+/5, 3-/5  ^decreased df ROM d/t decreased gastrocsoleus length of RLE.   Slant Bd Gastroc Stretch - [id number], UE support for balance  Supine df AROM - 2x10 ea LE - discussed performing this at home to improve df strength for ambulation    Manual Therapy (02366):   minutes  Not performed    Neuromuscular Re-education (67295):   minutes  -Not performed    Therapeutic Activitity (07133):  30 minutes  Walking w/RW - for dynamic warmup and to improve ambulation endurance/activity tolerance - x1 set to fatigue ~3min  ^Best: ~3min ea  "before requiring seated rest.   Walking w/RW w/out B AFO - for LE strength and balance to carryover to ambulation - x3 sets, ea to fatigue ~160' ea set.   Pt demo's B foot slap d/t decreased df strength B, and decreased propulsion step length d/t decreased pf strength.   Step Ups - uni UE support - 2x10, 4\" - challenged R>L  Ambulation w/SPC - to decrease reliance on assistive device - x1 set to fatigue  ^Best: 300'             HEP:  Access Code: [email or web address]  URL: [email or web address]  Date: 02/04/2025  Prepared by: David Castano     Exercises  - Sit to Stand Without Arm Support  - 1 x daily - 7 x weekly - 3 sets - 10 reps  - Standing March with Counter Support  - 1 x daily - 7 x weekly - 3 sets - 10 reps  - Walking  - 1 x daily - 7 x weekly - 1 sets - 3-5 reps - 2min hold    "

## 2025-02-25 ENCOUNTER — TREATMENT (OUTPATIENT)
Dept: PHYSICAL THERAPY | Facility: CLINIC | Age: 55
End: 2025-02-25
Payer: MEDICAID

## 2025-02-25 DIAGNOSIS — R53.1 GENERALIZED WEAKNESS: ICD-10-CM

## 2025-02-25 DIAGNOSIS — Z91.81 AT RISK FOR INJURY RELATED TO FALL: ICD-10-CM

## 2025-02-25 DIAGNOSIS — R26.9 GAIT ABNORMALITY: Primary | ICD-10-CM

## 2025-02-25 PROCEDURE — 97113 AQUATIC THERAPY/EXERCISES: CPT | Mod: GP

## 2025-02-25 NOTE — PROGRESS NOTES
Physical Therapy Treatment      Patient Name: Mattie Wolfe  MRN: [id number]  Today's Date: 2/25/2025  Visit #6  Time Calculation  Start Time: 1100  Stop Time: 1130  Time Calculation (min): 30 min    Insurance:  2025 [place] MCAEL [place] REQ AFTER 30 VS SHIRA YR     Assessment:  Trial of aquatic therapy focus of improving ambulatory tolerance, strengthening, and balance.  Pt tolerated all interventions well, SUP with PT in water for additional safety.  Planning on starting next session with 10 minutes of continuous ambulation to continue to work on ambulatory tolerance and endurance.  Pt demoed good balance throughout, as well as righting reactions while walking in water moving out of the way for other individuals walking in the water. Pt is making progress towards goals and would continue to benefit from skilled PT at this time.   Planning on progressing balance with min UE support with standing exercises.  Pt is making progress towards goals and would continue to benefit from skilled PT at this time.   Aquatic PT is required due to, buoyancy of water reduced weight bearing and decreased LE and spinal loading, allowing for more freedom of movement and promotes improved ability to perform functional tasks.  The viscosity of water which is more than 700x that of air, allowed increased reaction time, in turn allowing advanced balance activities to be safely performed and allow patient to be challenged beyond limited of stability without fear of falling; provides the opportunity to work on balance in a safe environment.  The hydrostatic pressure of water facilitates the reduction of edema in the lower extremities, allowing for greater ease of movement.  Aquatics d/t cardio vascular benefits including: improved cardiovascular efficiency including increased stroke volume, decreased HR, and decreased blood pressure with increased cardiac output allowing patient to achieve cardiovascular training effect with less work load.  Aquatics  "to support upright posture during postural retraining and strengthening.   Aquatics allow patient to work on gait with less assistance or without assistive device improving posture and ease of gait training/conditioning.     Patient's response to session: Decreased pain, Increased ROM/joint mobility, Increase motor control, Improved gait, and Increased knowledge and understanding    Plan:  Cont to progress LE strength/endurance for functional mobility as tolerated.      Tx Considerations:  -Schedule Aquatic therapy: Trial of 10 min forward walking in the beginning of the session (to build ambulatory tolerance).   -Ambulation/dynamic gait w/out AD or w/SPC  -Toe taps, step ups etc.    Current Problem:   1. Gait abnormality        2. Generalized weakness        3. At risk for injury related to fall            Subjective     Pt reports, \"nothing new, my ankle is a little more sore today, lateral L ankle.\"     Pain: 5-6/10       Objective       Treatments:  Therapeutic Exercise (77957): 15 minutes (0 minutes)      Manual Therapy (80867):   minutes      Neuromuscular Re-education (80143):   minutes      Aquatic Therapy (00608):   27 Min 2 units  Forward walkin laps (CGA)  Mini Squats: 2 x 10:  Hip circles:    1 x 10 (B) (CW/CCW)  HS curls  2 x 10 (B)  Hip ABD  2 x 10 (B)  Hip FLX  2 x 10  Hip EXT  2 x 10 (B)  Standing  (B UE support)  Standing Bicycle kicks:  2 x 10 (B)  Heel Raise:  20     "

## 2025-02-28 ENCOUNTER — PATIENT OUTREACH (OUTPATIENT)
Dept: PRIMARY CARE | Facility: CLINIC | Age: 55
End: 2025-02-28
Payer: MEDICAID

## 2025-02-28 DIAGNOSIS — K22.70 BARRETT'S ESOPHAGUS WITHOUT DYSPLASIA: ICD-10-CM

## 2025-02-28 DIAGNOSIS — G62.89 OTHER POLYNEUROPATHY: ICD-10-CM

## 2025-02-28 DIAGNOSIS — J39.8 TRACHEAL STENOSIS: ICD-10-CM

## 2025-02-28 DIAGNOSIS — R06.09 DYSPNEA ON EXERTION: ICD-10-CM

## 2025-02-28 DIAGNOSIS — I95.1 ORTHOSTATIC HYPOTENSION: ICD-10-CM

## 2025-02-28 DIAGNOSIS — K31.84 GASTROPARESIS: ICD-10-CM

## 2025-02-28 DIAGNOSIS — R26.9 GAIT DISTURBANCE, POST-STROKE: ICD-10-CM

## 2025-02-28 DIAGNOSIS — I69.398 GAIT DISTURBANCE, POST-STROKE: ICD-10-CM

## 2025-02-28 DIAGNOSIS — E06.3 HASHIMOTO'S THYROIDITIS: ICD-10-CM

## 2025-02-28 DIAGNOSIS — E10.69 TYPE 1 DIABETES MELLITUS WITH OTHER SPECIFIED COMPLICATION: ICD-10-CM

## 2025-02-28 DIAGNOSIS — I63.9 CEREBROVASCULAR ACCIDENT (CVA), UNSPECIFIED MECHANISM (MULTI): ICD-10-CM

## 2025-02-28 PROCEDURE — 99490 CHRNC CARE MGMT STAFF 1ST 20: CPT | Performed by: INTERNAL MEDICINE

## 2025-02-28 NOTE — PROGRESS NOTES
This RN CM reached out and spoke with Mattie this afternoon. She reports that she is doing better since beginning physical therapy and aquatic therapy.     Mattie reports that she really likes the aquatic therapy sessions. She reports that she is tired afterwards and needs her oxygen when she arrives home.     Mattie reports that [employer] sent her a letter and needs a new order for her oxygen supplies. Provided reassurance I would look into this, as the office has not received any requests.     Mattie reports that she was diagnosed with lymphedema and is having her legs wrapped. She inquired if Dr. Gold can prescribe compression stockings. Explained that I would also work on this and determine what is needed to receive prescription strength stockings.     Reviewed Mattie's medications. She denies any refills but mentioned that pulmonologist prescribed Prednisone as needed and it was sent to [facility] but she has not received it. Suggested that she reach out to [facility] and inquire and state that she needs it shipped to her. My thoughts are that because it is listed as needed, that she would need to request it to be shipped which is not ideal. If she continues to have trouble, she will let this RN know.     Mattie and I discussed transportation. She was waiting for [employer] ride the other night after office hours. This RN CM inquired if Mattie had ever used [employer]. She had not but this RN is going to provide an informational flyer for the services and believes that she can obtain this service as a back up plan in addition to Provide a ride due to her age and she is disabled.     Ensured that Mattie has my availability for any concerns that arise in between our calls.

## 2025-03-04 ENCOUNTER — APPOINTMENT (OUTPATIENT)
Dept: PHYSICAL THERAPY | Facility: CLINIC | Age: 55
End: 2025-03-04
Payer: MEDICAID

## 2025-03-04 DIAGNOSIS — Z91.81 AT RISK FOR INJURY RELATED TO FALL: ICD-10-CM

## 2025-03-04 DIAGNOSIS — R53.1 GENERALIZED WEAKNESS: ICD-10-CM

## 2025-03-04 DIAGNOSIS — R26.9 GAIT ABNORMALITY: Primary | ICD-10-CM

## 2025-03-04 NOTE — PROGRESS NOTES
Physical Therapy Treatment      Patient Name: Mattie Wolfe  MRN: [id number]  Today's Date: 3/4/2025  Visit #7       Insurance:  2025 [place] MCA[place] AUTH REQ AFTER 30 VS SHIRA YR     Assessment:  Trial of aquatic therapy focus of improving ambulatory tolerance, strengthening, and balance.  Pt tolerated all interventions well, SUP with PT in water for additional safety.  Planning on starting next session with 10 minutes of continuous ambulation to continue to work on ambulatory tolerance and endurance.  Pt demoed good balance throughout, as well as righting reactions while walking in water moving out of the way for other individuals walking in the water. Pt is making progress towards goals and would continue to benefit from skilled PT at this time.   Planning on progressing balance with min UE support with standing exercises.  Pt is making progress towards goals and would continue to benefit from skilled PT at this time.   Aquatic PT is required due to, buoyancy of water reduced weight bearing and decreased LE and spinal loading, allowing for more freedom of movement and promotes improved ability to perform functional tasks.  The viscosity of water which is more than 700x that of air, allowed increased reaction time, in turn allowing advanced balance activities to be safely performed and allow patient to be challenged beyond limited of stability without fear of falling; provides the opportunity to work on balance in a safe environment.  The hydrostatic pressure of water facilitates the reduction of edema in the lower extremities, allowing for greater ease of movement.  Aquatics d/t cardio vascular benefits including: improved cardiovascular efficiency including increased stroke volume, decreased HR, and decreased blood pressure with increased cardiac output allowing patient to achieve cardiovascular training effect with less work load.  Aquatics to support upright posture during postural retraining and strengthening.  "  Aquatics allow patient to work on gait with less assistance or without assistive device improving posture and ease of gait training/conditioning.     Patient's response to session: Decreased pain, Increased ROM/joint mobility, Increase motor control, Improved gait, and Increased knowledge and understanding    Plan:  Cont to progress LE strength/endurance for functional mobility as tolerated.      Tx Considerations:  -Schedule Aquatic therapy: Trial of 10 min forward walking in the beginning of the session (to build ambulatory tolerance).   -Ambulation/dynamic gait w/out AD or w/SPC  -Toe taps, step ups etc.    Current Problem:   1. Gait abnormality        2. Generalized weakness        3. At risk for injury related to fall            Subjective     Pt reports, \"nothing new, my ankle is a little more sore today, lateral L ankle.\"     Pain: 5-6/10       Objective       Treatments:  Therapeutic Exercise (01041):   minutes (0 minutes)      Manual Therapy (68393):   minutes      Neuromuscular Re-education (02491):   minutes      Aquatic Therapy (14791):   27 Min 2 units  Forward walkin laps (CGA)  Mini Squats: 2 x 10:  Hip circles:    1 x 10 (B) (CW/CCW)  HS curls  2 x 10 (B)  Hip ABD  2 x 10 (B)  Hip FLX  2 x 10  Hip EXT  2 x 10 (B)  Standing  (B UE support)  Standing Bicycle kicks:  2 x 10 (B)  Heel Raise:  20     "

## 2025-03-06 ENCOUNTER — TREATMENT (OUTPATIENT)
Dept: PHYSICAL THERAPY | Facility: CLINIC | Age: 55
End: 2025-03-06
Payer: MEDICAID

## 2025-03-06 DIAGNOSIS — R26.9 GAIT ABNORMALITY: Primary | ICD-10-CM

## 2025-03-06 DIAGNOSIS — R53.1 GENERALIZED WEAKNESS: ICD-10-CM

## 2025-03-06 DIAGNOSIS — Z91.81 AT RISK FOR INJURY RELATED TO FALL: ICD-10-CM

## 2025-03-06 PROCEDURE — 97530 THERAPEUTIC ACTIVITIES: CPT | Mod: GP

## 2025-03-06 PROCEDURE — 97112 NEUROMUSCULAR REEDUCATION: CPT | Mod: GP

## 2025-03-06 NOTE — PROGRESS NOTES
Endocrinology  03/10/25  (Diabetes Type 1 / PCP= Dr EDIL Gold / Sees Podiatry and an eye doctor/no family history/Metronic Pump and CGM Pt testing glucose 4 x/day with metronic due to fluctuating blood glucose and hypoglycemia . Compliant with diabetic regime. Pti s adherent and benefiting from CGM treatment plan /)         History of Present Illness   Mattie Wolfe is a 54 y.o. year old female with medical history of T1DM c/b retinopathy, neuropathy, nephropathy, gastroparesis, and HLD c/b CVA (2018), epilepsy, Ortega's esophagus,  and orthostatic hypotension, here for T1DM management.     Previous patient of Dr. Wang David.      Today the patient states, that she has been doing well. The patient was originally diagnosed with T2DM, and was seen by Dr. Scherer, and he was the one to determine she in fact T1DM after multiple episodes of DKA. She denies any family hx of diabetes.    Pump download reviewed: 2/9-3/10  Smart guard: 95%  Total average daily dose: 37units  43% basal and 57% bolus  ICR: 1  ISF: 40  Target glucose: 100-120 mg/dL    CGM Interpretation  CGM:   Dates reviewed: 2/9-3/10  Mean glucose: 134  Low alerts: <70  High alerts: >300  Rise/fall alert:  off  >180 mg/dl: 29%  Target  mg/dl: 70%  <70 mg/dl: 1%  Interpretation  Hypoglycemia pattern:  Rare hypoglycemia on download. One value noted and was post-pranidal but was likely d/t over-bolusing meal. She is on a standard prandial dose as opposed to one driven by carb counts.   Hyperglycemia pattern:    Generally post-prandial and mid-day as a reflection of eating fruits as that is what she tolerates in respect to gastroparesis, and the standard prandial dosing.        Diabetes Summary   Diagnosis Date:    in her 40s.         Glucose Meter: has several at home, accu-check and [employer]  Diabetes Tech (CGM or pump):   Medtronic 780G  (has been using for ~1 yr). Previously trialed pump in 2010, but was fostering a child at the time who broke it  but accidentally submering it in the bath tub)          Eye Exam: diabetic retinopathy s/p retinal detachment repair, next appt 3/27/25  Foot Exam:    3/2025, goes back in 3 months                  Dental exam:    has dentures so no longer goes               Diet:   meals are delivered from [employer] and are generally high carb meals. Some are marked with the amount of carbs and some are not.  B: 630-8A, breakfast burrito   L: 12P, variable, whatever is available  D:    variable, whatever is available             Her current regimen is as follows:   Orals:  none                 Injectables: none  Insulin: humalog in pump  Hx of pancreatitis: denies  Hx of medullary thyroid cancer: denies  Complications: retinopathy, neuropathy, nephropathy, HLD c/b CVA, gastroparesis  Last episode of DKA or hyperosmolar coma:  2013 or before      Hypoglycemia:  Frequency and timing of hypoglycemia:  very rare, but she has had a couple values this past week due to decreased food intake 2/2 nausea/gastroparesis  Time of day hypoglycemia usually occurs: middle of the night  Severity of hypoglycemic episode: mild  Hypoglycemic threshold: <70/60  Hypoglycemia symptoms: weak, malaise, fatigue  Last call to EMS or hospitalization: around the time of dx in her early 40s    Review of Systems   General: Denies fever or chills   Head: Denies headache or vision changes   Neck: Denies tenderness or lumps   Cardiac: Denies chest pain or palpitations   Respiratory: Denies shortness of breath (damage to laryngeal nerve when intubated during CVA) or cough   GI: Denies abdominal pain, nausea, or vomiting   Extremities: Denies swelling   Skin: Denies rash     Medications     Current Outpatient Medications   Medication Instructions    atorvastatin (LIPITOR) 40 mg    calcium carbonate (Tums) 200 mg calcium chewable tablet Daily PRN    cholecalciferol (VITAMIN D3) 50,000 Units, Weekly    [START ON 3/17/2025] cholecalciferol (VITAMIN D3) 50 mcg, Daily     fludrocortisone (Florinef) 0.1 mg tablet 1 tablet, Daily (0630)    fluticasone (Flonase) 50 mcg/actuation nasal spray 2 sprays, Each Nostril, Daily, Shake gently. Before first use, prime pump. After use, clean tip and replace cap.    insulin lispro (HumaLOG) 100 unit/mL injection INJECT 70 UNITS VIA INSULIN PUMP DAILY    ketoconazole (NIZOral) 2 % cream Daily    lacosamide (VIMPAT) 150 mg, 2 times daily    mometasone-formoterol (Dulera) 200-5 mcg/actuation inhaler 2 puffs, 2 times daily    montelukast (SINGULAIR) 10 mg, Nightly    multivitamin (Daily Multi-Vitamin) tablet 1 tablet, Daily    omeprazole (PRILOSEC) 40 mg, oral, Daily    potassium chloride CR (Klor-Con) 8 mEq ER tablet 8 mEq, oral, Daily, Do not crush, chew, or split. TAKE WHEN TAKING FLUDROCORTISONE    predniSONE (DELTASONE) 20 mg, As needed    sodium chloride 0.9% 50 mL/hr    terbinafine (LAMISIL) 250 mg, Daily    Ventolin HFA 90 mcg/actuation inhaler     zonisamide (Zonegran) 100 mg capsule Take by mouth. 4 capules qam and 4 capsules at bedtime        History     Past Medical History:   Diagnosis Date    Asthma     Diabetes (Multi)     Diabetic retinopathy (Multi)     Gastrointestinal disorder     Gastroparesis     Gastroparesis    H/O bladder infections     History of stroke     Osteoporosis     Other conditions influencing health status     Diabetes type 1, uncontrolled    Personal history of transient ischemic attack (TIA), and cerebral infarction without residual deficits     History of stroke       Past Surgical History:   Procedure Laterality Date    APPENDECTOMY  04/09/2014    Appendectomy    CATARACT EXTRACTION      HIP SURGERY  04/09/2014    Hip Surgery    MR HEAD ANGIO WO IV CONTRAST  02/28/2020    MR HEAD ANGIO WO IV CONTRAST 2/28/2020 [facility] EMERGENCY LEGACY    MR NECK ANGIO WO IV CONTRAST  02/28/2020    MR NECK ANGIO WO IV CONTRAST 2/28/2020 [facility] EMERGENCY LEGACY    OTHER SURGICAL HISTORY  04/09/2014    Cholecystotomy    OTHER SURGICAL  "HISTORY  11/01/2019    Retinal detachment repair    OTHER SURGICAL HISTORY  06/04/2020    Wrist surgery    RETINAL DETACHMENT SURGERY         Family History   Problem Relation Name Age of Onset    Hypertension Mother      Stroke Mother      Hypertension Father      Other (SUBSTANCE ABUSE) Father      Stroke Other Sibling     Breast cancer Other grandparent     Ovarian cancer Father's Sister          Allergies   Allergen Reactions    Aspirin Unknown    Azithromycin Hives        Social history: denies tobacco, etoh, illicits     Physical Exam   /64   Ht 1.575 m (5' 2\")   Wt 70.3 kg (155 lb)   BMI 28.35 kg/m²   General: Well appearing, no acute distress  Head and Neck: NCAT  Heart: Normal rate and rhythm  Lungs: CTAB no RWR; inspiration can sound stridorous when she is speaking but is chronic and 2/2 prior laryngeal nerve injury  Lungs: Breathing comfortably on room air   Extremities: Warm, left brace on RLE  Skin: No rashes apparent    Labs and Imaging     Lab Results   Component Value Date    HGBA1C 6.0 11/12/2024    HGBA1C 6.2 07/08/2024    HGBA1C 5.8 (H) 04/01/2024     01/16/2025    K 4.1 01/16/2025     (H) 01/16/2025    CO2 25 01/16/2025    BUN 23 01/16/2025    CREATININE 1.11 (H) 01/16/2025    CALCIUM 8.8 01/16/2025    ALBUMIN 4.0 12/19/2024    PROT 6.6 12/19/2024    BILITOT 0.3 12/19/2024    ALKPHOS 166 (H) 12/19/2024    ALT 17 12/19/2024    AST 17 12/19/2024    GLUCOSE 299 (H) 01/16/2025    CHOL 146 04/01/2024    TRIG 74 04/01/2024    HDL 55.5 04/01/2024       Assessment and Plan   Mattie Wolfe is a 54 y.o. year old female with medical history of T1DM c/b retinopathy, neuropathy, nephropathy, gastroparesis, and HLD c/b CVA (2018), epilepsy Ortega's esophagus,  and orthostatic hypotension, here for T1DM management.     #T1DM  - Dx in late 2000s/early 2010s   - Complications include: retinopathy, neuropathy, nephropathy, HLD (CVA)  - Most recent A1c is 6.3% (likely falsely low d/t anemia " seen on 12/2024 CBC). Goal a1c is <7%  - Health maintenance:  ==> According to the ADA, BP goal is <130/80. Patient struggles w/ hypotension  ==> Most recent retinal exam showed retinopathy. Has ongoing care with ophtho  ==> Most recent LDL is 76. Due for repeat in 4/2025. Goal LDL <70, recommend continuing statin therapy.  ==> Most recent urine T pro/Cr ratio is 0.35. Due for repeat in 12/2025. Given elevation we recommend initiation of ACE/ARB therapy. May not be able to tolerate it due to hypotension  ==> Foot exam completed by podiatry, follows with them q3M  ==> TSH  - Medication management:  ==> Adjusted manual basal rate to match auto mode (0.9u/hr -> 0.65u/hr)  ==> Discussed carb ratio. It is 1:1 because she enters a standard 8u CHO prior to every meal for a standing dose of 8u with meals. This is leading to suboptimal meal time coverage and is exacerbated by her high sugar snacks (fruit). She will meet with diabetes education to work on carb counting so a more effective bolus insulin strategy can be implemented. Furthermore, she will also talk to GI about snacks that can be had instead of high sugar fruits.  ==> Basal pen ordered in case of pump failure  ==> Gvoke ordered for hypoglycemia      RTC: 3M       I spent a total of 60 minutes on the date of the service which included preparing to see the patient, face-to-face patient care, completing clinical documentation, obtaining and/or reviewing separately obtained history, performing a medically appropriate examination, counseling and educating the patient/family/caregiver, and ordering medications, tests, or procedures.      Criss Hampton MD   of Medicine  Department of Internal Medicine  [facility]  [facility]

## 2025-03-06 NOTE — PROGRESS NOTES
Physical Therapy Treatment      Patient Name: Mattie Wolfe  MRN: [id number]  Today's Date: 3/6/2025  Visit #: 7  Insurance: 2025 [place] MCAID AUTH REQ AFTER 30 VS SHIRA YR   Time Calculation  Start Time: 1045  Stop Time: 1130  Time Calculation (min): 45 min    1. Gait abnormality        2. Generalized weakness        3. At risk for injury related to fall            ASSESSMENT:  Session focusing on ambulation and balance, pt tolerating well. She has some lateral ankle discomfort in WB this date, likely d/t recent increase in WB activities but will mention to podiatry today. She is highly challenged by step ups w/HHA and walking w/out UE support d/t decreased dynamic balance. Pt to cont to benefit from skilled PT to improve functional mobility and decrease risk of falling.       GOALS:  By Discharge patient will demo:  Sullivan in HEP  No falls during POC  Improvement in the following outcome measures to decrease risk of falls:  5xSTS </= 15sec  TUG </= 15sec  ABC >/= 70%  Pt will be able to complete 6MWT with </= 2 standing rest break.  Gait speed >/= .8m/s    PLAN:  Cont to progress LE strength/endurance for functional mobility as tolerated.       Tx Considerations:  -Schedule Aquatic therapy  -Ambulation/dynamic gait w/out AD or w/SPC  -Toe taps, step ups etc.        SUBJECTIVE:  Pt reports her L ankle has been bothering her more than usual for the last week.     OBJECTIVE:  Treatments:  Therapeutic Exercise (33480):   minutes  Not performed       Manual Therapy (64027):   minutes  Not performed    Neuromuscular Re-education (27100): 10 minutes  WBOS Standing Balance  NBOS Standing Balance  Tandem Stance - too difficult  Marching w/hand hover support, heavy vc to perform slowly w/control to improve balance.     Therapeutic Activitity (71502):  35 minutes  Walking w/RW - for dynamic warmup and to improve ambulation endurance/activity tolerance - x2 set to fatigue   ^Best: ~4:20min   Observation of ankle, and discussion  "of potential pain contributors being d/t increased WB activity as of late.  Step Ups - w/uni HHA primarily for balance pushing min for strength - 4x10, 4\" - challenged R>L  Ambulating fwd/bkwd in II Bars - hand hovering - to decrease reliance on assistive device and improve dynamic balance - x3 sets ea to fatigue              HEP:  Access Code: [email or web address]  URL: [email or web address]  Date: 02/04/2025  Prepared by: David Castano     Exercises  - Sit to Stand Without Arm Support  - 1 x daily - 7 x weekly - 3 sets - 10 reps  - Standing March with Counter Support  - 1 x daily - 7 x weekly - 3 sets - 10 reps  - Walking  - 1 x daily - 7 x weekly - 1 sets - 3-5 reps - 2min hold    "

## 2025-03-07 ENCOUNTER — TREATMENT (OUTPATIENT)
Dept: OCCUPATIONAL THERAPY | Facility: CLINIC | Age: 55
End: 2025-03-07
Payer: MEDICAID

## 2025-03-07 DIAGNOSIS — R53.1 GENERALIZED WEAKNESS: Primary | ICD-10-CM

## 2025-03-07 DIAGNOSIS — I89.0 LYMPHEDEMA OF LEFT LOWER EXTREMITY: ICD-10-CM

## 2025-03-07 DIAGNOSIS — R06.09 DYSPNEA ON MINIMAL EXERTION: ICD-10-CM

## 2025-03-07 DIAGNOSIS — R41.842 VISUAL-SPATIAL IMPAIRMENT: ICD-10-CM

## 2025-03-07 PROCEDURE — 97110 THERAPEUTIC EXERCISES: CPT | Mod: GO

## 2025-03-07 PROCEDURE — 97535 SELF CARE MNGMENT TRAINING: CPT | Mod: GO

## 2025-03-07 ASSESSMENT — ACTIVITIES OF DAILY LIVING (ADL): HOME_MANAGEMENT_TIME_ENTRY: 30

## 2025-03-07 NOTE — PROGRESS NOTES
Occupational Therapy  Occupational Therapy Treatment Note    Patient Name Mattie Wolfe   MRN: [id number]  : 1970  Today's Date: 3/7/2025  Time Calculation  Start Time:   Stop Time:   Time Calculation (min): 53 min   Time Calculation  Start Time:   Stop Time: 1515  Time Calculation (min): 53 min    Visit #:     Insurance:  MEDICAID  Authorization required:  After 30 visits  # of visits approved: 2025: [place] MCA[place] AUTH REQ AFTER 30 VS SHIRA YR    Therapy Diagnoses:   1. Generalized weakness        2. Dyspnea on minimal exertion        3. Visual-spatial impairment        4. Lymphedema of left lower extremity          Assessment:  Patient tolerated treatment well this date, with evidence of reduced LLE swelling. Pt with good tolerance to initiation of /pinch strengthening this date to improve handwriting skill. Pt continues to demonstrate impaired BUE strength (R>L) and endurance. Pt will benefit from further skilled OT to address these remaining functional, objective and subjective deficits to optimize ADL/IADL functioning. With significant improvement of LLE swelling, pt may no longer be appropriate to participate in CDT, however, will benefit from follow-through/check-in with obtaining compression garment.     Plan:      Continue to progress per POC: Continue OT 1-2x/week for 6 weeks.  Interventions: Complete Decongestive Therapy, Manual Lymphatic Drainage, Neuromuscular Reeducation, Self-care/ADL training, Therapeutic Activity, Therapeutic Exercise, Manual Therapy    Next Visit:  - Teach MLD  - MVPT (motor-free visual perceptual test)  - Add to HEP for BUE strength    Goals:  By discharge,      Pt will increase standing tolerance during ADL/IADL performance to 5+ min.  Pt will demo independence with HEP completion.  Pt will demo and verbalize use of energy conservation/joint protection techniques to increase activity tolerance while completing ADL/IADL tasks.   Pt will increase  bilateral shoulder strength to 4+/5 to increase activity tolerance for reaching and functional UE use.   Pt will participate in QuickDash to establish BUE functional baseline.   Pt will participate in further visual perceptual assessment to determine how visual perceptual skills play a role in ADL/IADL impairment.   Pt will demo ability to independently check orthostatic blood pressures during functional activity and positional changes to prevent falls.   Pt will participate in assessment of BLE swelling to determine further OT needs (Met 2/13/25)  Pt will improve handwriting AEB improved legibility and speed using compensatory strategies or inc fine motor strength.    Lymphedema Goals Added 2/13/25:  Demonstrate decreased swelling and softened tissue texture in LLE upon visual inspection and palpation to increase safe functional mobility, ADLS. IADLS, work and leisure activities (GOAL MET 3/7/25)  Decrease circumferential measurements in LLE to within 3-5 cm of RLE circumferential measurements.  Demonstrate increased knowledge with lymphedema skin care precautions to reduce the risk of infection and exacerbation.  Demonstrate independence with the self manual lymph drainage (MLD) to enhance lymphatic flow and decongestion of trunk and lower extremities.  Demonstrate independence with self bandaging/compression techniques and independent understanding of the principles and theory of lymphatic compression.  Be fit with and demonstrate good tolerance to LLE compression garment when the patient is stable, at maximal decongestion.  Demonstrate independence with donning/doffing garment and adherence to wear schedule, adaptive techniques as needed.  Decrease pain, tightness LLE.  Improve LLE functional ROM and strength as needed for safe functional mobility.  Demonstrate independence with home exercise program and compliance with lymphedema exercise precautions.     Subjective:   Pt reports she slept in short stretch  bandage that night after donning during OT session, then took off next day, noticed reduced swelling after removing. Pt reports she liked using the bandages and had good success. Pt's L cataract sx moved to 4/30/25. Pt reports the pool is really helping her back pain.     Significant PMHx and rehab hx: H/o CVA on 1/26/2018 affecting R occipital lobe, and other stroke affecting R optic nerve and causing R eye blindness. Pt reports she was in a coma for over a month after the CVA, on a ventilator, had trouble weaning from ventilator, and ultimately got a trach. Pt reports she spent ~2 years in a SNF post-hospital discharge and re-learned to walk. Pt uses oxygen overnight (3.5 L), completed oxygen therapy in November 2024.   Other hx: H/o L ankle fx July 2022; L eye cataract with tentative cataract sx scheduled for 3/26/25; hilda hearing aides; Type I Diabetes (dx 2009); Epilepsy (on vimpat and zonegran; last seizure 2020); L wrist fx (2019); orthostatic hypotension; HTN; HLD; gastroparesis; Hashimoto's thyroiditis; Leukopenia; non-ischemic cadiomyopathy; asthma.    Have you fallen since last visit: No    Precautions: Moderate fall risk, R eye blind     Pain:  5/10  Location/Type of pain: lower back after <10 min sitting exercise     HEP compliance/understanding: N/A    Objective:   LLE appearance: significantly dec fullness knee, lower leg, and ankle lobule; soft tissue texture; minimal dryness    Endurance: only able to tolerate 8.75 minutes on the arm bike sitting before lower back too painful.     Treatment:     Therapeutic Exercise: 23 minutes  Pro1 SciFit Arm bike in sitting - fwd 8.75 min.  Quinton Digi-Flex hand/finger exerciser -  RUE 5# (green) x10, 7# (blue) x10  Rolyan pinch clip (green) - 6# 2-pt pinch 2x10, 9# 3-pt pinch 2x10    Self Care Home Management: 30 minutes  Discussion of handwriting interventions: improve /pinch strength, use of manuscript ruled paper (lined), repetitions of sentences in print  "vs cursive, 10 min/day  Discussion of appropriate graded compression to meet pt needs and recommendation of compression brands - 15-20 mmHg; Juzo, Jobst, Medi, Paola, Iesha, L&R  OT applied short stretch bandage to LLE for teaching/demo - 4” stockinette base layer, 1-8cm short stretch bandage.   Pt applied short stretch bandage to LLE for practice - 4\" stockinette base layer, 1-8cm short stretch bandage. Pt instructed to apply short stretch bandage as needed to reduce swelling at home, given success of prior use of short stretch bandage and pt satisfaction.   Short stretch bandage doffed as not needed at this time, pt's compression stocking and hilda AFOs donned prior to end of session.    Education:  Short-stretch bandage application, handwriting interventions    HEP Progression: add Rolyan pinch clip 3x10 to improve 2- and 3-pt pinch strength  "

## 2025-03-10 ENCOUNTER — APPOINTMENT (OUTPATIENT)
Dept: ENDOCRINOLOGY | Facility: CLINIC | Age: 55
End: 2025-03-10
Payer: MEDICAID

## 2025-03-10 VITALS
DIASTOLIC BLOOD PRESSURE: 64 MMHG | BODY MASS INDEX: 28.52 KG/M2 | SYSTOLIC BLOOD PRESSURE: 108 MMHG | HEIGHT: 62 IN | WEIGHT: 155 LBS

## 2025-03-10 DIAGNOSIS — E10.69 TYPE 1 DIABETES MELLITUS WITH OTHER SPECIFIED COMPLICATION: ICD-10-CM

## 2025-03-10 LAB
POC FINGERSTICK BLOOD GLUCOSE: 223 MG/DL (ref 70–100)
POC HEMOGLOBIN A1C: 6.3 % (ref 4.2–6.5)

## 2025-03-10 PROCEDURE — 3074F SYST BP LT 130 MM HG: CPT | Performed by: STUDENT IN AN ORGANIZED HEALTH CARE EDUCATION/TRAINING PROGRAM

## 2025-03-10 PROCEDURE — 3078F DIAST BP <80 MM HG: CPT | Performed by: STUDENT IN AN ORGANIZED HEALTH CARE EDUCATION/TRAINING PROGRAM

## 2025-03-10 PROCEDURE — 82962 GLUCOSE BLOOD TEST: CPT | Performed by: STUDENT IN AN ORGANIZED HEALTH CARE EDUCATION/TRAINING PROGRAM

## 2025-03-10 PROCEDURE — 83036 HEMOGLOBIN GLYCOSYLATED A1C: CPT | Performed by: STUDENT IN AN ORGANIZED HEALTH CARE EDUCATION/TRAINING PROGRAM

## 2025-03-10 PROCEDURE — 3008F BODY MASS INDEX DOCD: CPT | Performed by: STUDENT IN AN ORGANIZED HEALTH CARE EDUCATION/TRAINING PROGRAM

## 2025-03-10 PROCEDURE — 99205 OFFICE O/P NEW HI 60 MIN: CPT | Performed by: STUDENT IN AN ORGANIZED HEALTH CARE EDUCATION/TRAINING PROGRAM

## 2025-03-10 RX ORDER — BLOOD SUGAR DIAGNOSTIC
STRIP MISCELLANEOUS
Qty: 100 STRIP | Refills: 3 | Status: SHIPPED | OUTPATIENT
Start: 2025-03-10

## 2025-03-10 RX ORDER — INSULIN GLARGINE 100 [IU]/ML
12 INJECTION, SOLUTION SUBCUTANEOUS NIGHTLY
Qty: 3.6 ML | Refills: 11 | Status: SHIPPED | OUTPATIENT
Start: 2025-03-10

## 2025-03-10 RX ORDER — GLUCAGON INJECTION, SOLUTION 1 MG/.2ML
0.2 INJECTION, SOLUTION SUBCUTANEOUS ONCE AS NEEDED
Qty: 0.4 ML | Refills: 1 | Status: SHIPPED | OUTPATIENT
Start: 2025-03-10

## 2025-03-10 RX ORDER — PEN NEEDLE, DIABETIC 30 GX3/16"
NEEDLE, DISPOSABLE MISCELLANEOUS
Qty: 30 EACH | Refills: 11 | Status: SHIPPED | OUTPATIENT
Start: 2025-03-10

## 2025-03-10 NOTE — PATIENT INSTRUCTIONS
After Visit Summary  It was great seeing you today!    In summary from our visit today, I would like to remind you of the following:    - Please gets labs done at your convenience  - Please meet with diabetes education    Division of Endocrinology   Follow-up appointments:  Please arrive at least 20 minutes prior to your follow up appointments in order to keep visits as close as possible to the scheduled times. If you need to cancel an appointment, please call at least 24 hours in advance.    Please bring your medications or an updated list of medications to each of your visits to help ensure safety in prescribing future medications.    If you are being seen for diabetes, please bring your glucose meter and/or glucose log with 2 weeks of glucose readings to each visit.    Medication Refills: Please request medication refills at the time of your appointment.   - Refills requested by phone or RunMyProcesshart in between visits require at least 3 business days to be processed.    Lab Results: Please allow at least 7 business days for lab results to be reviewed.  - Please note, that some specialty testing may take up to at least 7 business to be completed.   - If there are any urgent issues requiring immediate attention, we will contact you at your provided phone numbers.   - Any non-urgent results will be relayed via Scienion if you have signed up for this service or via a letter through the mail and/or phone call.     Communication with your provider:  - [facility] is highly recommended as the most efficient means to contact your provider. However for urgent concerns please call.   - For phone calls, please call our office Monday- Friday 8am to 4:30pm and your message will be relayed to your provider. Please allows 1-2 business days for a response.  - After hours messages are left with an answering service and will be handled by the clinic the following business day.      Sincerely,   Criss Hampton MD  Division   Endocrinology  [facility]

## 2025-03-11 ENCOUNTER — APPOINTMENT (OUTPATIENT)
Dept: PHYSICAL THERAPY | Facility: CLINIC | Age: 55
End: 2025-03-11
Payer: MEDICAID

## 2025-03-11 DIAGNOSIS — Z91.81 AT RISK FOR INJURY RELATED TO FALL: ICD-10-CM

## 2025-03-11 DIAGNOSIS — R53.1 GENERALIZED WEAKNESS: ICD-10-CM

## 2025-03-11 DIAGNOSIS — R26.9 GAIT ABNORMALITY: Primary | ICD-10-CM

## 2025-03-11 PROCEDURE — 97113 AQUATIC THERAPY/EXERCISES: CPT | Mod: GP

## 2025-03-11 NOTE — PROGRESS NOTES
Physical Therapy Treatment      Patient Name: Mattie Wolfe  MRN: [id number]  Today's Date: 3/11/2025  Visit #8  Time Calculation  Start Time: 1005  Stop Time: 1032  Time Calculation (min): 27 min    Insurance:  2025 [place] [employer] AUTH REQ AFTER 30 VS SHIRA YR     Assessment:  Continued aquatic therapy focus of improving ambulatory tolerance, strengthening, and balance.  Pt tolerated all interventions well, SUP with PT in water for additional safety.  Pt tolerated 10 minutes of water walking well with good tolerance and without increase in pain or SOB, pt was able to hold a conversation throughout the session.  Pt demoed good balance throughout, as well as righting reactions while walking in water moving out of the way for other individuals walking in the water. Pt is making progress towards goals and would continue to benefit from skilled PT at this time.   Planning on progressing balance with min UE support with standing exercises.  Pt is making progress towards goals and would continue to benefit from skilled PT at this time.   Aquatic PT is required due to, buoyancy of water reduced weight bearing and decreased LE and spinal loading, allowing for more freedom of movement and promotes improved ability to perform functional tasks.  The viscosity of water which is more than 700x that of air, allowed increased reaction time, in turn allowing advanced balance activities to be safely performed and allow patient to be challenged beyond limited of stability without fear of falling; provides the opportunity to work on balance in a safe environment.  The hydrostatic pressure of water facilitates the reduction of edema in the lower extremities, allowing for greater ease of movement.  Aquatics d/t cardio vascular benefits including: improved cardiovascular efficiency including increased stroke volume, decreased HR, and decreased blood pressure with increased cardiac output allowing patient to achieve cardiovascular training effect  "with less work load.  Aquatics to support upright posture during postural retraining and strengthening.   Aquatics allow patient to work on gait with less assistance or without assistive device improving posture and ease of gait training/conditioning.     Patient's response to session: Decreased pain, Increased ROM/joint mobility, Increase motor control, Improved gait, and Increased knowledge and understanding    Plan:  Cont to progress LE strength/endurance for functional mobility as tolerated.      Tx Considerations:  -Schedule Aquatic therapy: Trial of 10 min forward walking in the beginning of the session (to build ambulatory tolerance).   -Ambulation/dynamic gait w/out AD or w/SPC  -Toe taps, step ups etc.    Current Problem:   1. Gait abnormality        2. Generalized weakness        3. At risk for injury related to fall            Subjective     Pt reports, \"nothing new, my ankle is a little more sore today, lateral L ankle.\"     Pain: 3/10       Objective       Treatments:  Therapeutic Exercise (88183):   minutes (0 minutes)      Manual Therapy (59042):   minutes      Neuromuscular Re-education (46509):   minutes      Aquatic Therapy (62654):   27 Min 2 units  10 minutes of forward water walking.   Mini Squats: 2 x 10:  Hip circles:    1 x 10 (B) (CW/CCW)  HS curls  2 x 10 (B)  Hip ABD  2 x 10 (B)  Hip FLX  2 x 10  Hip EXT  2 x 10 (B)  Standing march 20 (B UE support)  Standing Bicycle kicks:  2 x 10 (B)  2 cool down laps.     "

## 2025-03-12 ENCOUNTER — APPOINTMENT (OUTPATIENT)
Dept: PHYSICAL THERAPY | Facility: CLINIC | Age: 55
End: 2025-03-12
Payer: MEDICAID

## 2025-03-12 ENCOUNTER — APPOINTMENT (OUTPATIENT)
Dept: OCCUPATIONAL THERAPY | Facility: CLINIC | Age: 55
End: 2025-03-12
Payer: MEDICAID

## 2025-03-12 DIAGNOSIS — Z91.81 AT RISK FOR INJURY RELATED TO FALL: ICD-10-CM

## 2025-03-12 DIAGNOSIS — R06.09 DYSPNEA ON MINIMAL EXERTION: ICD-10-CM

## 2025-03-12 DIAGNOSIS — R26.9 GAIT ABNORMALITY: Primary | ICD-10-CM

## 2025-03-12 DIAGNOSIS — R41.842 VISUAL-SPATIAL IMPAIRMENT: ICD-10-CM

## 2025-03-12 DIAGNOSIS — R53.1 GENERALIZED WEAKNESS: ICD-10-CM

## 2025-03-12 DIAGNOSIS — R53.1 GENERALIZED WEAKNESS: Primary | ICD-10-CM

## 2025-03-12 DIAGNOSIS — I89.0 LYMPHEDEMA OF LEFT LOWER EXTREMITY: ICD-10-CM

## 2025-03-13 ENCOUNTER — INFUSION (OUTPATIENT)
Dept: HEMATOLOGY/ONCOLOGY | Facility: CLINIC | Age: 55
End: 2025-03-13
Payer: MEDICAID

## 2025-03-13 DIAGNOSIS — E55.9 MILD VITAMIN D DEFICIENCY: ICD-10-CM

## 2025-03-13 DIAGNOSIS — Z95.828 PRESENCE OF PERMANENT CENTRAL VENOUS CATHETER: ICD-10-CM

## 2025-03-13 DIAGNOSIS — E10.69 TYPE 1 DIABETES MELLITUS WITH OTHER SPECIFIED COMPLICATION: ICD-10-CM

## 2025-03-13 PROCEDURE — 2500000004 HC RX 250 GENERAL PHARMACY W/ HCPCS (ALT 636 FOR OP/ED): Mod: SE | Performed by: INTERNAL MEDICINE

## 2025-03-13 PROCEDURE — 96523 IRRIG DRUG DELIVERY DEVICE: CPT

## 2025-03-13 RX ORDER — HEPARIN 100 UNIT/ML
500 SYRINGE INTRAVENOUS AS NEEDED
OUTPATIENT
Start: 2025-03-13

## 2025-03-13 RX ORDER — HEPARIN SODIUM,PORCINE/PF 10 UNIT/ML
50 SYRINGE (ML) INTRAVENOUS AS NEEDED
OUTPATIENT
Start: 2025-03-13

## 2025-03-13 RX ORDER — HEPARIN SODIUM,PORCINE/PF 10 UNIT/ML
50 SYRINGE (ML) INTRAVENOUS AS NEEDED
Status: DISCONTINUED | OUTPATIENT
Start: 2025-03-13 | End: 2025-03-13 | Stop reason: HOSPADM

## 2025-03-13 RX ORDER — HEPARIN 100 UNIT/ML
500 SYRINGE INTRAVENOUS AS NEEDED
Status: DISCONTINUED | OUTPATIENT
Start: 2025-03-13 | End: 2025-03-13 | Stop reason: HOSPADM

## 2025-03-13 RX ORDER — HEPARIN SODIUM 1000 [USP'U]/ML
2000 INJECTION, SOLUTION INTRAVENOUS; SUBCUTANEOUS AS NEEDED
OUTPATIENT
Start: 2025-03-13

## 2025-03-13 RX ORDER — HEPARIN SODIUM 1000 [USP'U]/ML
2000 INJECTION, SOLUTION INTRAVENOUS; SUBCUTANEOUS AS NEEDED
Status: DISCONTINUED | OUTPATIENT
Start: 2025-03-13 | End: 2025-03-13 | Stop reason: HOSPADM

## 2025-03-13 RX ADMIN — HEPARIN 500 UNITS: 100 SYRINGE at 09:50

## 2025-03-14 ENCOUNTER — TREATMENT (OUTPATIENT)
Dept: OCCUPATIONAL THERAPY | Facility: CLINIC | Age: 55
End: 2025-03-14
Payer: MEDICAID

## 2025-03-14 DIAGNOSIS — R06.09 DYSPNEA ON MINIMAL EXERTION: ICD-10-CM

## 2025-03-14 DIAGNOSIS — I89.0 LYMPHEDEMA OF LEFT LOWER EXTREMITY: ICD-10-CM

## 2025-03-14 DIAGNOSIS — R41.842 VISUAL-SPATIAL IMPAIRMENT: ICD-10-CM

## 2025-03-14 DIAGNOSIS — R53.1 GENERALIZED WEAKNESS: Primary | ICD-10-CM

## 2025-03-14 PROCEDURE — 97535 SELF CARE MNGMENT TRAINING: CPT | Mod: GO

## 2025-03-14 PROCEDURE — 97112 NEUROMUSCULAR REEDUCATION: CPT | Mod: GO

## 2025-03-14 ASSESSMENT — ACTIVITIES OF DAILY LIVING (ADL): HOME_MANAGEMENT_TIME_ENTRY: 29

## 2025-03-14 NOTE — PROGRESS NOTES
Occupational Therapy  Occupational Therapy Treatment Note    Patient Name Mattie Wolfe   MRN: [id number]  : 1970  Today's Date: 3/7/2025  Time Calculation  Start Time: 0804  Stop Time: 0848  Time Calculation (min): 44 min     Visit #:     Insurance:  MEDICAID  Authorization required:  After 30 visits  # of visits approved: 2025: [place] [employer] AUTH REQ AFTER 30 VS SHIRA YR    Therapy Diagnoses:   1. Generalized weakness        2. Dyspnea on minimal exertion        3. Visual-spatial impairment        4. Lymphedema of left lower extremity          Assessment:  Patient tolerated treatment fair this date demo further evidence to visual spatial processing impairment and need to conduct Motor-Free Visual Perception Test (MVPT). Pt is progressing with goals AEB participation in handwriting intervention. Pt continues to demonstrate impaired visual perceptual skills, impaired RUE coordination and strength, and mildly impaired attention. Pt will benefit from further skilled OT to address these remaining functional, objective and subjective deficits to optimize ADL/IADL functioning.     Plan:      Continue to progress per POC: Continue OT 1-2x/week for 6 weeks.  Interventions: Complete Decongestive Therapy, Manual Lymphatic Drainage, Neuromuscular Reeducation, Self-care/ADL training, Therapeutic Activity, Therapeutic Exercise, Manual Therapy    Next Visit:  - Check in with compression garments  - MVPT (motor-free visual perceptual test)  - Add to HEP for BUE strength    Goals:  By discharge,      Pt will increase standing tolerance during ADL/IADL performance to 5+ min.  Pt will demo independence with HEP completion.  Pt will demo and verbalize use of energy conservation/joint protection techniques to increase activity tolerance while completing ADL/IADL tasks.   Pt will increase bilateral shoulder strength to 4+/5 to increase activity tolerance for reaching and functional UE use.   Pt will participate in QuickDash  to establish BUE functional baseline.   Pt will participate in further visual perceptual assessment to determine how visual perceptual skills play a role in ADL/IADL impairment.   Pt will demo ability to independently check orthostatic blood pressures during functional activity and positional changes to prevent falls.   Pt will participate in assessment of BLE swelling to determine further OT needs (Met 2/13/25)  Pt will improve handwriting AEB improved legibility and speed using compensatory strategies or inc fine motor strength.    Lymphedema Goals Added 2/13/25:  Demonstrate decreased swelling and softened tissue texture in LLE upon visual inspection and palpation to increase safe functional mobility, ADLS. IADLS, work and leisure activities (GOAL MET 3/7/25)  Decrease circumferential measurements in LLE to within 3-5 cm of RLE circumferential measurements.  Demonstrate increased knowledge with lymphedema skin care precautions to reduce the risk of infection and exacerbation.  Demonstrate independence with the self manual lymph drainage (MLD) to enhance lymphatic flow and decongestion of trunk and lower extremities.  Demonstrate independence with self bandaging/compression techniques and independent understanding of the principles and theory of lymphatic compression.  Be fit with and demonstrate good tolerance to LLE compression garment when the patient is stable, at maximal decongestion.  Demonstrate independence with donning/doffing garment and adherence to wear schedule, adaptive techniques as needed.  Decrease pain, tightness LLE.  Improve LLE functional ROM and strength as needed for safe functional mobility.  Demonstrate independence with home exercise program and compliance with lymphedema exercise precautions.     Subjective:   Pt reports she practiced writing every day since her last OT tx, and brings one of the manuscript lined papers for evidence. Pt reports she adjusted the lamp placement/lighting in  her apartment to increase her vision, as she states the light is a huge factor in her handwriting skill.     Significant PMHx and rehab hx: H/o CVA on 1/26/2018 affecting R occipital lobe, and other stroke affecting R optic nerve and causing R eye blindness. Pt reports she was in a coma for over a month after the CVA, on a ventilator, had trouble weaning from ventilator, and ultimately got a trach. Pt reports she spent ~2 years in a SNF post-hospital discharge and re-learned to walk. Pt uses oxygen overnight (3.5 L), completed oxygen therapy in November 2024.   Other hx: H/o L ankle fx July 2022; L eye cataract with tentative cataract sx scheduled for 3/26/25; [facility] hearing aides; Type I Diabetes (dx 2009); Epilepsy (on vimpat and zonegran; last seizure 2020); L wrist fx (2019); orthostatic hypotension; HTN; HLD; gastroparesis; Hashimoto's thyroiditis; Leukopenia; non-ischemic cadiomyopathy; asthma.    Have you fallen since last visit: No    Precautions: Moderate fall risk, R eye blind     Pain:  0/10  Location/Type of pain: N/A    HEP compliance/understanding: N/A    Objective:   Cognition: difficulty following directions; initially follows but then loses track of what she is supposed to be doing, evidencing poor attention/working memory    Function: improved handwriting with use of visual aids/compensatory strategies    Perception: poor visual spatial processing evidenced during handwriting practice    Treatment:     Self Care Home Management: 29 minutes  Handwriting Interventions: tracing and copying practice x4 patterns, using large-lined manuscript paper to write sentence, use of red foam built-up writing utensil, use of red border to promote pt seeing bounds of paper, use of yellow guide paper to focus pt's attention on one line of writing at a time    Neuromuscular Re-education: 15 minutes  Resistance clips to vertical bar in different color patterns x2    Education:  Handwriting interventions    HEP  Progression: practice handwriting with large lined (manuscript) paper 1 sheet per day for 5-10 min at a time. Use red border/tape and brightly colored guide paper; use red foam  to build-up writing utensil    Current HEP:  - Rolyan pinch clip 3x10 to improve 2- and 3-pt pinch strength

## 2025-03-18 ENCOUNTER — APPOINTMENT (OUTPATIENT)
Dept: PHYSICAL THERAPY | Facility: CLINIC | Age: 55
End: 2025-03-18
Payer: MEDICAID

## 2025-03-18 DIAGNOSIS — Z91.81 AT RISK FOR INJURY RELATED TO FALL: ICD-10-CM

## 2025-03-18 DIAGNOSIS — R53.1 GENERALIZED WEAKNESS: ICD-10-CM

## 2025-03-18 DIAGNOSIS — R26.9 GAIT ABNORMALITY: Primary | ICD-10-CM

## 2025-03-18 PROCEDURE — 97113 AQUATIC THERAPY/EXERCISES: CPT | Mod: GP

## 2025-03-18 NOTE — PROGRESS NOTES
Physical Therapy Treatment      Patient Name: Mattie Wolfe  MRN: [id number]  Today's Date: 3/18/2025  Visit #9  Time Calculation  Start Time: 1006  Stop Time: 1034  Time Calculation (min): 28 min    Insurance:  2025 [place] [employer] AUTH REQ AFTER 30 VS SHIRA YR     Assessment:  Continued aquatic therapy focus of improving ambulatory tolerance, strengthening, and balance.  Pt tolerated all interventions well, SUP with PT in water for additional safety.  Pt was able to ambulate 12 minutes without standing or seated rest break, and no SOB noted throughout session.  Pt demoed good balance throughout, as well as righting reactions while walking in water moving out of the way for other individuals walking in the water. Pt is making progress towards goals and would continue to benefit from skilled PT at this time.   Planning on progressing balance with min UE support with standing exercises.  Pt is making progress towards goals and would continue to benefit from skilled PT at this time.   Aquatic PT is required due to, buoyancy of water reduced weight bearing and decreased LE and spinal loading, allowing for more freedom of movement and promotes improved ability to perform functional tasks.  The viscosity of water which is more than 700x that of air, allowed increased reaction time, in turn allowing advanced balance activities to be safely performed and allow patient to be challenged beyond limited of stability without fear of falling; provides the opportunity to work on balance in a safe environment.  The hydrostatic pressure of water facilitates the reduction of edema in the lower extremities, allowing for greater ease of movement.  Aquatics d/t cardio vascular benefits including: improved cardiovascular efficiency including increased stroke volume, decreased HR, and decreased blood pressure with increased cardiac output allowing patient to achieve cardiovascular training effect with less work load.  Aquatics to support upright  "posture during postural retraining and strengthening.   Aquatics allow patient to work on gait with less assistance or without assistive device improving posture and ease of gait training/conditioning.     Patient's response to session: Decreased pain, Increased ROM/joint mobility, Increase motor control, Improved gait, and Increased knowledge and understanding    Plan:  Cont to progress LE strength/endurance for functional mobility as tolerated.      Tx Considerations:  -Schedule Aquatic therapy: Trial of 10 min forward walking in the beginning of the session (to build ambulatory tolerance).   -Ambulation/dynamic gait w/out AD or w/SPC  -Toe taps, step ups etc.    Current Problem:   1. Gait abnormality        2. Generalized weakness        3. At risk for injury related to fall            Subjective     Pt reports, \"I have been okay, nothing really new, ankle is a little stiff this morning\"     Pain: 3/10       Objective       Treatments:  Therapeutic Exercise (30498):   minutes (0 minutes)      Manual Therapy (58530):   minutes      Neuromuscular Re-education (14730):   minutes      Aquatic Therapy (82336):   27 Min 2 units  12 minutes of forward water walking without UE support.   Mini Squats: 2 x 10:  Hip circles:    1 x 10 (B) (CW/CCW)  HS curls  2 x 10 (B)  Hip ABD  2 x 10 (B)  Hip FLX  2 x 10  Hip EXT  2 x 10 (B)  Standing march 20 (B UE support)  Standing Bicycle kicks:  2 x 10 (B)  2 cool down laps.     "

## 2025-03-20 NOTE — PROGRESS NOTES
Physical Therapy Reassessment      Patient Name: Mattie Wolfe  MRN: [id number]  Today's Date: 3/21/2025  Visit #: 10  Insurance: 2025 [place] [employer] AUTH REQ AFTER 30 VS SHIRA YR   Time Calculation  Start Time: 1207  Stop Time: 1249  Time Calculation (min): 42 min    1. Gait abnormality        2. Generalized weakness        3. At risk for injury related to fall            ASSESSMENT:  Pt had fair tolerance to session. Aquatic therapy having significant benefits and carryover to functional mobility as she is able to ambulate w/out rest for over 7min today. Highly challenged by LE strengthening but subjectively benefiting and enjoying challenge. Pt to cont to benefit from skilled PT to address impairments and improve safe functional mobility.   Attempted NeuroCom this date for further assessment/tx of balance w/biofeedback, pt had poor tolerance. She is unable to stand still for prolonged time d/t what were likely sx related to orthostatic hypotension. After approx 8min pt demo'd profound dizziness/lightheadedness, and knee buckling requiring MaxA for steadiness to get to seated rest in chair. Pt reports she is following up w/cardiologist in April for further investigation/tx.         GOALS:  By Discharge patient will demo:  [place] in HEP  No falls during POC  Improvement in the following outcome measures to decrease risk of falls:  5xSTS </= 15sec  TUG </= 15sec  ABC >/= 70%  Pt will be able to complete 6MWT with </= 2 standing rest break.  Gait speed >/= .8m/s    PLAN:  Cont to progress LE strength/endurance for functional mobility as tolerated.       Tx Considerations:  -Schedule Aquatic therapy  -Ambulation/dynamic gait w/out AD or w/SPC  -Toe taps, step ups etc.        SUBJECTIVE:  Pt reports her foot is really sore when she wears her orthotics she wishes her podiatrist would re-evaluate. She also wishes she could stand longer but is limited by what is likely orthostatic hypotension as she gets lightheaded/dizzy.          OBJECTIVE:    SF BARRIOS: NT d/t time constraints.   TUsec  5xSTS: 19sec w/out UE support, CGA for safety- significant SOB, pulsox = 99%, 101bpm  6MWT: 395ft,120m   ^Gait Speed: .57m/s  ABC = 10%      Treatments:  Therapeutic Exercise (73191): 34 minutes  Performing the following for LE strength/endurance and general activity tolerance for carryover to functional mobility:  Walking w/RW - for dynamic warmup and to improve ambulation endurance/activity tolerance - x2 sets ea to fatigue   ^Best: ~7:05min   Cybex Leg Press BLE - 3x10, 50lbs   Cybex Leg Extensions BLE - 2x10, 30lbs  Cybex Leg Curl BLE - 2x10, 30lbs      Manual Therapy (73958):   minutes  Not performed    Neuromuscular Re-education (07868): 8 minutes  Attempted NeuroCom balance LOS assessment - pt unable to stand for prolonged time d/t orthostatic hypotensive sx after approx 8min causing profound dizziness, unsteadiness and knee buckling requiring immediate seated rest, requires MaxA for steadiness to get back to chair.     Therapeutic Activitity (68168):   minutes  Not performed          HEP:  Access Code: [email or web address]  URL: [email or web address]  Date: 2025  Prepared by: David Castano     Exercises  - Sit to Stand Without Arm Support  - 1 x daily - 7 x weekly - 3 sets - 10 reps  - Standing March with Counter Support  - 1 x daily - 7 x weekly - 3 sets - 10 reps  - Walking  - 1 x daily - 7 x weekly - 1 sets - 3-5 reps - 2min hold

## 2025-03-21 ENCOUNTER — TREATMENT (OUTPATIENT)
Dept: PHYSICAL THERAPY | Facility: CLINIC | Age: 55
End: 2025-03-21
Payer: MEDICAID

## 2025-03-21 ENCOUNTER — TREATMENT (OUTPATIENT)
Dept: OCCUPATIONAL THERAPY | Facility: CLINIC | Age: 55
End: 2025-03-21
Payer: MEDICAID

## 2025-03-21 DIAGNOSIS — R26.9 GAIT ABNORMALITY: Primary | ICD-10-CM

## 2025-03-21 DIAGNOSIS — I89.0 LYMPHEDEMA OF LEFT LOWER EXTREMITY: ICD-10-CM

## 2025-03-21 DIAGNOSIS — R06.09 DYSPNEA ON MINIMAL EXERTION: ICD-10-CM

## 2025-03-21 DIAGNOSIS — R53.1 GENERALIZED WEAKNESS: ICD-10-CM

## 2025-03-21 DIAGNOSIS — R41.842 VISUAL-SPATIAL IMPAIRMENT: ICD-10-CM

## 2025-03-21 DIAGNOSIS — R53.1 GENERALIZED WEAKNESS: Primary | ICD-10-CM

## 2025-03-21 DIAGNOSIS — Z91.81 AT RISK FOR INJURY RELATED TO FALL: ICD-10-CM

## 2025-03-21 PROCEDURE — 97112 NEUROMUSCULAR REEDUCATION: CPT | Mod: GO

## 2025-03-21 PROCEDURE — 97110 THERAPEUTIC EXERCISES: CPT | Mod: GP

## 2025-03-21 PROCEDURE — 97112 NEUROMUSCULAR REEDUCATION: CPT | Mod: GP

## 2025-03-21 NOTE — PROGRESS NOTES
Occupational Therapy  Occupational Therapy Treatment Note    Patient Name Mattie Wolfe   MRN: [id number]  : 1970  Today's Date: 3/7/2025  Time Calculation  Start Time: 1304  Stop Time: 1359  Time Calculation (min): 55 min     Visit #:     Insurance:  MEDICAID  Authorization required:  After 30 visits  # of visits approved: 2025: [place] [employer] AUTH REQ AFTER 30 VS SHIRA YR    Therapy Diagnoses:   1. Generalized weakness        2. Dyspnea on minimal exertion        3. Visual-spatial impairment        4. Lymphedema of left lower extremity          Assessment:  Pt participated in MVPT assessment this date, taking duration of session to complete. Pt demonstrated impairments in all five areas of visual-perceptual skill areas: Spatial relationships, Figure-ground discrimination, Visual discrimination, Visual closure, and Visual memory; with the greatest deficits evident in visual discrimination, visual closure, and visual memory. Pt will benefit from further skilled OT to address these remaining functional, objective and subjective deficits to optimize ADL/IADL functioning.     Plan:      Continue to progress per POC: Continue OT 1-2x/week for 6 weeks.  Interventions: Complete Decongestive Therapy, Manual Lymphatic Drainage, Neuromuscular Reeducation, Self-care/ADL training, Therapeutic Activity, Therapeutic Exercise, Manual Therapy    Next Visit:  - Add to HEP for BUE strength    Goals:  By discharge,      Pt will increase standing tolerance during ADL/IADL performance to 5+ min.  Pt will demo independence with HEP completion.  Pt will demo and verbalize use of energy conservation/joint protection techniques to increase activity tolerance while completing ADL/IADL tasks.   Pt will increase bilateral shoulder strength to 4+/5 to increase activity tolerance for reaching and functional UE use.   Pt will participate in QuickDash to establish BUE functional baseline.   Pt will participate in further visual  perceptual assessment to determine how visual perceptual skills play a role in ADL/IADL impairment.   Pt will demo ability to independently check orthostatic blood pressures during functional activity and positional changes to prevent falls.   Pt will participate in assessment of BLE swelling to determine further OT needs (Met 2/13/25)  Pt will improve handwriting AEB improved legibility and speed using compensatory strategies or inc fine motor strength.    Lymphedema Goals Added 2/13/25 (ALL LYMPH GOALS MET AS of 3/21/25)  Demonstrate decreased swelling and softened tissue texture in LLE upon visual inspection and palpation to increase safe functional mobility, ADLS. IADLS, work and leisure activities (GOAL MET 3/7/25)  Decrease circumferential measurements in LLE to within 3-5 cm of RLE circumferential measurements.  Demonstrate increased knowledge with lymphedema skin care precautions to reduce the risk of infection and exacerbation.  Demonstrate independence with the self manual lymph drainage (MLD) to enhance lymphatic flow and decongestion of trunk and lower extremities.  Demonstrate independence with self bandaging/compression techniques and independent understanding of the principles and theory of lymphatic compression.  Be fit with and demonstrate good tolerance to LLE compression garment when the patient is stable, at maximal decongestion.  Demonstrate independence with donning/doffing garment and adherence to wear schedule, adaptive techniques as needed.  Decrease pain, tightness LLE.  Improve LLE functional ROM and strength as needed for safe functional mobility.  Demonstrate independence with home exercise program and compliance with lymphedema exercise precautions.     Subjective:   Pt shows pictures of her writing home exercises and demonstrates that she followed directions and was intentional with her speed and use of adaptive strategies (colored folders for guiding). Pt states the folders were the  "most helpful. \"I find that I have difficulty drawing shapes moreso than lines. I also still had trouble transposing things.\" Pt states she is not sure if she brought her glasses today. Pt able to find her glasses in her coat pocket after extended searching to wear for MVPT assessment. Pt encouraged to always bring glasses to session.    Significant PMHx and rehab hx: H/o CVA on 1/26/2018 affecting R occipital lobe, and other stroke affecting R optic nerve and causing R eye blindness. Pt reports she was in a coma for over a month after the CVA, on a ventilator, had trouble weaning from ventilator, and ultimately got a trach. Pt reports she spent ~2 years in a SNF post-hospital discharge and re-learned to walk. Pt uses oxygen overnight (3.5 L), completed oxygen therapy in November 2024.   Other hx: H/o L ankle fx July 2022; L eye cataract with tentative cataract sx scheduled for 3/26/25; [facility] aides; Type I Diabetes (dx 2009); Epilepsy (on vimpat and zonegran; last seizure 2020); L wrist fx (2019); orthostatic hypotension; HTN; HLD; gastroparesis; Hashimoto's thyroiditis; Leukopenia; non-ischemic cadiomyopathy; asthma.    Have you fallen since last visit: No    Precautions: Moderate fall risk, R eye blind     Pain:  0/10  Location/Type of pain: N/A    HEP compliance/understanding: N/A    Objective:   Perception: poor visual spatial processing evidenced during MVPT.  MVPT Results: 26 (raw score)  - 19 errors  - Significant difficulty with figure ground discrimination, visual discrimination, and visual closure  - Mild difficulty with visual memory  - Spatial relationships WFL    Treatment:     Neuromuscular Re-education: 55 minutes  Review of writing exercises  Participation in MVPT to further assess visual perceptual skills.    Education:  importance of bringing glasses to OT sessions.    HEP Progression: N/A; continue current program.    Current HEP:  - Rolyan pinch clip 3x10 to improve 2- and 3-pt pinch " strength  - Handwriting practice: with large lined (manuscript) paper 1 sheet per day for 5-10 min at a time. Use red border/tape and brightly colored guide paper; use red foam  to build-up writing utensil

## 2025-03-25 ENCOUNTER — APPOINTMENT (OUTPATIENT)
Dept: PHYSICAL THERAPY | Facility: CLINIC | Age: 55
End: 2025-03-25
Payer: MEDICAID

## 2025-03-25 DIAGNOSIS — R26.9 GAIT ABNORMALITY: Primary | ICD-10-CM

## 2025-03-25 DIAGNOSIS — Z91.81 AT RISK FOR INJURY RELATED TO FALL: ICD-10-CM

## 2025-03-25 DIAGNOSIS — R53.1 GENERALIZED WEAKNESS: ICD-10-CM

## 2025-03-25 PROCEDURE — 97113 AQUATIC THERAPY/EXERCISES: CPT | Mod: GP

## 2025-03-25 NOTE — PROGRESS NOTES
Physical Therapy Treatment      Patient Name: Mattie Wolfe  MRN: [id number]  Today's Date: 3/25/2025  Visit #11  Time Calculation  Start Time: 1000  Stop Time: 1030  Time Calculation (min): 30 min    Insurance:  2025 [place] [employer] AUTH REQ AFTER 30 VS SHIRA YR     Assessment:  Continued aquatic therapy focus of improving ambulatory tolerance, strengthening, and balance.  Pt tolerated all interventions well, SUP with PT in water for additional safety.  Pt was able to ambulate 15 minutes without standing or seated rest break, and no SOB noted throughout session.  Pt demoed good balance throughout, as well as righting reactions while walking in water moving out of the way for other individuals walking in the water. Pt is making progress towards goals and would continue to benefit from skilled PT at this time.   Planning on progressing balance with min UE support with standing exercises.  Pt is making progress towards goals and would continue to benefit from skilled PT at this time.   Aquatic PT is required due to, buoyancy of water reduced weight bearing and decreased LE and spinal loading, allowing for more freedom of movement and promotes improved ability to perform functional tasks.  The viscosity of water which is more than 700x that of air, allowed increased reaction time, in turn allowing advanced balance activities to be safely performed and allow patient to be challenged beyond limited of stability without fear of falling; provides the opportunity to work on balance in a safe environment.  The hydrostatic pressure of water facilitates the reduction of edema in the lower extremities, allowing for greater ease of movement.  Aquatics d/t cardio vascular benefits including: improved cardiovascular efficiency including increased stroke volume, decreased HR, and decreased blood pressure with increased cardiac output allowing patient to achieve cardiovascular training effect with less work load.  Aquatics to support upright  "posture during postural retraining and strengthening.   Aquatics allow patient to work on gait with less assistance or without assistive device improving posture and ease of gait training/conditioning.     Patient's response to session: Decreased pain, Increased ROM/joint mobility, Increase motor control, Improved gait, and Increased knowledge and understanding    Plan:  Cont to progress LE strength/endurance for functional mobility as tolerated.      Tx Considerations:  -Schedule Aquatic therapy: Trial of 10 min forward walking in the beginning of the session (to build ambulatory tolerance).   -Ambulation/dynamic gait w/out AD or w/SPC  -Toe taps, step ups etc.    Current Problem:   1. Gait abnormality        2. Generalized weakness        3. At risk for injury related to fall            Subjective     Pt reports, \"I have been okay, nothing really new, ankle is a little stiff this morning\"     Pain: Min/10       Objective       Treatments:  Therapeutic Exercise (94261):   minutes (0 minutes)      Manual Therapy (79976):   minutes      Neuromuscular Re-education (72553):   minutes      Aquatic Therapy (98432):   30 Min 2 units  15 minutes of forward water walking without UE support.   Mini Squats: 2 x 10:  Hip circles:    1 x 10 (B) (CW/CCW)  HS curls  2 x 10 (B)  Hip ABD  2 x 10 (B)  Hip FLX   2 x 10  Hip EXT (not completed this session)  2 x 10 (B)  Standing march 20 (B UE support)  Standing Bicycle kicks:  2 x 10 (B)  2 cool down laps.     "

## 2025-03-27 ENCOUNTER — APPOINTMENT (OUTPATIENT)
Dept: OPHTHALMOLOGY | Facility: CLINIC | Age: 55
End: 2025-03-27
Payer: MEDICAID

## 2025-03-28 ENCOUNTER — TREATMENT (OUTPATIENT)
Dept: OCCUPATIONAL THERAPY | Facility: CLINIC | Age: 55
End: 2025-03-28
Payer: MEDICAID

## 2025-03-28 ENCOUNTER — TELEPHONE (OUTPATIENT)
Dept: NUTRITION | Facility: CLINIC | Age: 55
End: 2025-03-28

## 2025-03-28 ENCOUNTER — NUTRITION (OUTPATIENT)
Dept: NUTRITION | Facility: CLINIC | Age: 55
End: 2025-03-28
Payer: MEDICAID

## 2025-03-28 ENCOUNTER — TREATMENT (OUTPATIENT)
Dept: PHYSICAL THERAPY | Facility: CLINIC | Age: 55
End: 2025-03-28
Payer: MEDICAID

## 2025-03-28 DIAGNOSIS — R26.9 GAIT ABNORMALITY: Primary | ICD-10-CM

## 2025-03-28 DIAGNOSIS — Z91.81 AT RISK FOR INJURY RELATED TO FALL: ICD-10-CM

## 2025-03-28 DIAGNOSIS — R53.1 GENERALIZED WEAKNESS: ICD-10-CM

## 2025-03-28 DIAGNOSIS — R53.1 GENERALIZED WEAKNESS: Primary | ICD-10-CM

## 2025-03-28 DIAGNOSIS — R41.842 VISUAL-SPATIAL IMPAIRMENT: ICD-10-CM

## 2025-03-28 DIAGNOSIS — E10.69 TYPE 1 DIABETES MELLITUS WITH OTHER SPECIFIED COMPLICATION: ICD-10-CM

## 2025-03-28 DIAGNOSIS — R06.09 DYSPNEA ON MINIMAL EXERTION: ICD-10-CM

## 2025-03-28 PROCEDURE — 97110 THERAPEUTIC EXERCISES: CPT | Mod: GP

## 2025-03-28 PROCEDURE — G0108 DIAB MANAGE TRN  PER INDIV: HCPCS | Performed by: EMERGENCY MEDICINE

## 2025-03-28 PROCEDURE — 97112 NEUROMUSCULAR REEDUCATION: CPT | Mod: GO

## 2025-03-28 NOTE — PROGRESS NOTES
Occupational Therapy  Occupational Therapy Treatment Note    Patient Name Mattie Wolfe   MRN: [id number]  : 1970  Today's Date: 3/7/2025  Time Calculation  Start Time: 1310  Stop Time: 1355  Time Calculation (min): 45 min     Visit #:     Insurance:  MEDICAID  Authorization required:  After 30 visits  # of visits approved: 2025: [place] [employer] AUTH REQ AFTER 30 VS SHIRA YR    Therapy Diagnoses:   1. Generalized weakness        2. Dyspnea on minimal exertion        3. Visual-spatial impairment          Assessment:  Reviewed results of MVPT with patient. Pt struggled most with figure ground discrimination, visual discrimination, and visual closure, with mild difficulty with visual memory. Pt demonstrated improved handwriting this date with use of low vision lined paper (smaller lines than manuscript paper) and red/yellow guide sheets. Pt demonstrated difficulty with task switching when performing UB exercise while answering working memory questions. Pt will benefit from further skilled OT to address FMC, UE strength, and cognitive skills to optimize ADL/IADL functioning.     Plan:      Continue to progress per POC: Continue OT 1-2x/week for 6 weeks.  Interventions: Complete Decongestive Therapy, Manual Lymphatic Drainage, Neuromuscular Reeducation, Self-care/ADL training, Therapeutic Activity, Therapeutic Exercise, Manual Therapy    Next Visit:  - Add to HEP for BUE strength    Goals:  By discharge,      Pt will increase standing tolerance during ADL/IADL performance to 5+ min.  Pt will demo independence with HEP completion.  Pt will demo and verbalize use of energy conservation/joint protection techniques to increase activity tolerance while completing ADL/IADL tasks.   Pt will increase bilateral shoulder strength to 4+/5 to increase activity tolerance for reaching and functional UE use.   Pt will participate in QuickDash to establish BUE functional baseline.   Pt will participate in further visual  perceptual assessment to determine how visual perceptual skills play a role in ADL/IADL impairment.   Pt will demo ability to independently check orthostatic blood pressures during functional activity and positional changes to prevent falls.   Pt will participate in assessment of BLE swelling to determine further OT needs (Met 2/13/25)  Pt will improve handwriting AEB improved legibility and speed using compensatory strategies or inc fine motor strength.    Subjective:   Pt reports LLE was the worst swelling ever this past weekend, after having PT session in the pool and exercising at home after, so she used short-stretch bandages to wrap her leg, with good tolerance and results. Pt reports she always wears her compression when exercising. Pt reports she has an appointment with her PCP/Medicaid  next week and will inquire about compression prescription then. Pt reports a friend sent her a letter, and so she wanted to respond. Reports it took her 4 attempts to write a letter in a small card (no lines) that was partially legible.     Significant PMHx and rehab hx: H/o CVA on 1/26/2018 affecting R occipital lobe, and other stroke affecting R optic nerve and causing R eye blindness. Pt reports she was in a coma for over a month after the CVA, on a ventilator, had trouble weaning from ventilator, and ultimately got a trach. Pt reports she spent ~2 years in a SNF post-hospital discharge and re-learned to walk. Pt uses oxygen overnight (3.5 L), completed oxygen therapy in November 2024.   Other hx: H/o L ankle fx July 2022; L eye cataract with tentative cataract sx scheduled for 3/26/25; [facility] aides; Type I Diabetes (dx 2009); Epilepsy (on vimpat and zonegran; last seizure 2020); L wrist fx (2019); orthostatic hypotension; HTN; HLD; gastroparesis; Hashimoto's thyroiditis; Leukopenia; non-ischemic cadiomyopathy; asthma.    Have you fallen since last visit: No    Precautions: Moderate fall risk, low vision  (R eye blind)    Pain:  0/10  Location/Type of pain: N/A    HEP compliance/understanding: N/A    Objective:   Cognition: poor task switching evidenced during UE ex + working memory task performed at the same time    Function: improved handwriting with adaptive strategy use    Strength: tolerated 4# weighted bar    Treatment:     Neuromuscular Re-education:   minutes  Reviewed MVPT results  Handwriting intervention - using red border and yellow guide, write sentence in print then cursive using low vision lined paper. X2 different sentences.  Memory for picture and shapes - picture retention. Pt given 3 minutes to study picture, then tasked with another handwriting intervention (~5 min). Then performed delayed recall task answering 10 questions about the picture.   Handwriting intervention - using yellow guide folder as guide line writing a sentence on blank/un-lined paper.   Weighted bar shoulder ex 2x10 flexion/extension (4# > 6#) while answering questions about picture: 9/10 (one question pt answered incorrectly but immediately self-corrected).   Weighted bar shoulder ex 2x10 (6#) chest press, 1x10 bicep curls.     Education:  importance and utility of MVPT results; re-educated on use of handwriting adaptations at home    HEP Progression: N/A; continue current program.    Current HEP:  - Rolyan pinch clip 3x10 to improve 2- and 3-pt pinch strength  - Handwriting practice: with large lined (manuscript) paper 1 sheet per day for 5-10 min at a time. Use red border/tape and brightly colored guide paper; use red foam  to build-up writing utensil

## 2025-03-28 NOTE — PROGRESS NOTES
Physical Therapy Reassessment      Patient Name: Mattie Wolfe  MRN: [id number]  Today's Date: 3/28/2025  Visit #: 12  Last Reassessment - 3/21/25  Insurance:  [place] [employer] AUTH REQ AFTER 30 VS SHIRA YR   Time Calculation  Start Time: 1216  Stop Time: 1257  Time Calculation (min): 41 min    1. Gait abnormality        2. Generalized weakness        3. At risk for injury related to fall            ASSESSMENT:  Pt making small steady gains w/LE strength and ambulation endurance. It is apparent how profoundly limited in pt is in LE strength and endurance. She relies heavily on RW when fatigued. Will attempt SPC ambulation at start of session next visit to decrease reliance. Pt to cont to benefit from skilled PT to improve safe functional mobility.       GOALS:  By Discharge patient will demo:  [place] in HEP  No falls during POC  Improvement in the following outcome measures to decrease risk of falls:  5xSTS </= 15sec  TUG </= 15sec  ABC >/= 70%  Pt will be able to complete 6MWT with </= 2 standing rest break.  Gait speed >/= .8m/s    PLAN:  Cont to progress LE strength/endurance for functional mobility as tolerated.       Tx Considerations:  -Schedule Aquatic therapy  -Ambulation/dynamic gait w/out AD or w/SPC  -Toe taps, step ups etc.        SUBJECTIVE:  Pt reports her foot is really sore when she wears her orthotics she wishes her podiatrist would re-evaluate. She also wishes she could stand longer but is limited by what is likely orthostatic hypotension as she gets lightheaded/dizzy.         OBJECTIVE:    SF BARRIOS: NT d/t time constraints.   TUsec  5xSTS: 19sec w/out UE support, CGA for safety- significant SOB, pulsox = 99%, 101bpm  6MWT: 395ft,120m   ^Gait Speed: .57m/s  ABC = 10%      Treatments:  Therapeutic Exercise (45919): 41 minutes  Performing the following for LE strength/endurance and general activity tolerance for carryover to functional mobility:  Walking w/RW - for dynamic warmup and to improve  ambulation endurance/activity tolerance - x2 sets ea to fatigue (7:30min, 3:40min)  ^Best: ~7:30min   Cybex Leg Press BLE - 3x10, 60lbs (increased)   Cybex Leg Extensions BLE - 2x10, 30lbs  Cybex Leg Curl BLE - 2x10, 40lbs (increased)  Lateral Stepping in II bar - band at knees, 2sets 5x10' bouts, Green tb      Manual Therapy (69259):   minutes  Not performed    Neuromuscular Re-education (21145):   minutes  Not performed    Therapeutic Activitity (87489):   minutes  Not performed          HEP:  Access Code: [email or web address]  URL: [email or web address]  Date: 02/04/2025  Prepared by: David Castano     Exercises  - Sit to Stand Without Arm Support  - 1 x daily - 7 x weekly - 3 sets - 10 reps  - Standing March with Counter Support  - 1 x daily - 7 x weekly - 3 sets - 10 reps  - Walking  - 1 x daily - 7 x weekly - 1 sets - 3-5 reps - 2min hold

## 2025-03-28 NOTE — PROGRESS NOTES
"Reason for Visit:  Mattie Wolfe is a 54 y.o. female who presents for Initial DSME Visit    DSME - Global Assessment    Marital Status: single.  Support Person:  sister .    What do you hope to gain from this diabetes education visit? Not real sure why doctor sent me here. I don't count my carbs. She is willing to learn how to do.     Have you had diabetes education in the past?  Yes. When: \"been a while\" .  In Your words, what is Diabetes: pancreas isn't working. not able to properly break down sugars effectively. Get sugars filtered into your bloodstream.   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 I'm a hot mess because of it. It's shortening my life.     Readiness to Learn: demonstrates willingness to learn and demonstrates ability to learn  Preferred learning method: listening and doing    How often do you need to have someone help you when you read instructions, pamphlets or other written material from your doctor or pharmacy?   Never    Household Composition: living independently, alone. Has an aid that comes    Demographics:   Difficulties with: vision and physical mobility. Doing PT/OT and aqua therapy for strength and balance. Had stroke in 2018  Highest Level of Education: Post-Graduate Degree  Race/Ethnic Origin: White/  Occupation:  disabled  Work hours:     Health Status:  Smoking/Tobacco Use: No, patient does not smoke or use tobacco.  Alcohol Use: No, patient does not drink alcohol.    Type of Diabetes: Type 1  What year were you diagnosed with diabetes: 15yrs or so  Family History: no    Patient Active Problem List    Diagnosis Date Noted    Lymphedema of left lower extremity 02/18/2025    Visual-spatial impairment 02/07/2025    Generalized weakness 02/04/2025    Gait abnormality 02/04/2025    At risk for injury related to fall 02/04/2025    Orthostatic hypotension 12/30/2024    Dyspnea on exertion 07/15/2024 "    Cardiomyopathy, nonischemic (Multi) 04/29/2024    Congenital dislocation of hip, unilateral 04/29/2024    Presence of permanent central venous catheter 03/06/2024    Difficult intravenous access 01/16/2024    Other constipation 01/16/2024    Bilateral hearing loss 11/01/2023    Arm pain 02/17/2023    Leg pain 02/17/2023    Pain, joint, shoulder region, left 02/17/2023    Shoulder pain 02/17/2023    Ortega's esophagus without dysplasia 02/17/2023    Candida esophagitis (Multi) 02/17/2023    Contact dermatitis 02/17/2023    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/2023    Daily nausea 02/17/2023    Dependent edema 02/17/2023    Diabetic diarrhea (Multi) 02/17/2023    Gastroparesis 02/17/2023    Diabetic neuropathic cachexia 02/17/2023    Type 1 diabetes mellitus 02/17/2023    Dyspnea on minimal exertion 02/17/2023    Gait disturbance, post-stroke 02/17/2023    Hashimoto's thyroiditis 02/17/2023    Hyperlipidemia LDL goal <100 02/17/2023    Muscle weakness 02/17/2023    Neuropathic pain 02/17/2023    Peripheral neuropathy 02/17/2023    Pharyngoesophageal dysphagia 02/17/2023    Seizure (Multi) 02/17/2023    Stroke (Multi) 02/17/2023    Tracheal stenosis 02/17/2023    Urinary incontinence 02/17/2023    Combined form of age-related cataract, left eye 02/06/2025      Lab Results   Component Value Date    HGBA1C 6.3 03/10/2025    HGBA1C 6.0 11/12/2024    HGBA1C 6.2 07/08/2024    HGBA1C 5.8 (H) 04/01/2024    HGBA1C 6.3 (H) 10/06/2023    HGBA1C 8.3 (A) 04/27/2023    HGBA1C 8.3 (H) 01/24/2023       Health Utilization Past 12 Months:   Hospital Admissions: No.  ER Visits: No.  Primary Care Visits: Yes.  Last Eye Exam : patient reports annual screening by optometry/opthalmology. To have right cataract removed next month  Podiatry : trims nails every 3 months  Dentist : Full Dentures    Diabetes Self-Management Skills and Behaviors:   Do you exercise regularly?: Yes. 3-4 times/week. I go to therapy 3 days per week including aquatic therapy  I have damaged lungs and asthma. Does some PT/OT exercises at home.  No. Average amount of hours slept per night: 5 or less      Diabetes Medications: insulin pump  Current Outpatient Medications   Medication Sig Dispense Refill    0.9 % sodium chloride (sodium chloride 0.9%) solution Infuse 50 mL/hr at 50 mL/hr into a venous catheter.      atorvastatin (Lipitor) 40 mg tablet Take 1 tablet (40 mg) by mouth.      blood sugar diagnostic (Accu-Chek Guide test strips) strip Use 2-3x/day as needed to check blood sugar 100 strip 3    calcium carbonate (Tums) 200 mg calcium chewable tablet Chew once daily as needed.      cholecalciferol (Vitamin D3) 50 MCG (2000 UT) tablet Take 1 tablet (50 mcg) by mouth once daily. Do not fill before March 17, 2025. (Patient not taking: Reported on 3/10/2025 Do not fill before March 17, 2025.)      fludrocortisone (Florinef) 0.1 mg tablet Take 1 tablet (0.1 mg) by mouth early in the morning..      fluticasone (Flonase) 50 mcg/actuation nasal spray Administer 2 sprays into each nostril once daily. Shake gently. Before first use, prime pump. After use, clean tip and replace cap. 16 g 11    glucagon (Gvoke HypoPen 2-Pack) 1 mg/0.2 mL auto-injector Inject 0.2 mL under the skin 1 time if needed (hypoglycemia) for up to 1 dose. 0.4 mL 1    insulin glargine (Basaglar KwikPen U-100 Insulin) 100 unit/mL (3 mL) pen Inject 12 Units under the skin once daily at bedtime. Take as directed per insulin instructions. 3.6 mL 11    insulin lispro (HumaLOG) 100 unit/mL injection INJECT 70 UNITS VIA INSULIN PUMP DAILY 20 mL 10    ketoconazole (NIZOral) 2 % cream Apply topically once daily.      lacosamide (Vimpat) 150 mg tablet tablet Take 1 tablet (150 mg) by mouth 2 times a day.      mometasone-formoterol (Dulera) 200-5 mcg/actuation inhaler Inhale 2 puffs twice a day.      montelukast (Singulair) 10 mg tablet Take 1 tablet (10 mg) by mouth once daily at bedtime.      multivitamin (Daily Multi-Vitamin)  "tablet Take 1 tablet by mouth once daily.      omeprazole (PriLOSEC) 40 mg DR capsule TAKE 1 CAPSULE BY MOUTH DAILY 30 capsule 10    pen needle, diabetic 31 gauge x 3/16\" needle Use once daily as needed to inject long acting insulin 30 each 11    potassium chloride CR (Klor-Con) 8 mEq ER tablet Take 1 tablet (8 mEq) by mouth once daily. Do not crush, chew, or split. TAKE WHEN TAKING FLUDROCORTISONE 30 tablet 2    predniSONE (Deltasone) 20 mg tablet Take 1 tablet (20 mg) by mouth if needed.      terbinafine (LamISIL) 250 mg tablet Take 1 tablet (250 mg) by mouth once daily. *EMERGENCY REFILL* (Patient taking differently: Take 1 tablet (250 mg) by mouth. *EMERGENCY REFILL*)      Ventolin HFA 90 mcg/actuation inhaler       zonisamide (Zonegran) 100 mg capsule Take by mouth. 4 capules qam and 4 capsules at bedtime       No current facility-administered medications for this visit.       DM medications as patient is taking them:      Injection/Infusion Sites: abdominal wall.     Monitorng: CGM: Guardian 4    Acute Complications-Safety: Did not have anything with her this visit. \"If I'm going to be gone a while then I'll bring stuff.\"     Hypoglycemia: Frequency: once within less week. Didn't go below 60. Has smart guard on CGM and alarm goes off if below 70.  Hypoglycemia Treatment: peanut butter crackers or juice.   \"Don't yet have glucagon from pharmacy that Dr. Hampton just ordered.\"   Does not carry fast acting sugar with her at all times. Didn't bring any to visit because she just ate breakfast.    Hyperglycemia: None  Hyperglycemia Treatment: drink a lot of water. Sometimes walk around or activity.     Have you reached Menopause? : Yes    Diabetes Assessment:   DM Interferes with other aspects of my life: agree  My level of stress is high: disagree.  I struggle with making changes in my life: disagree.  How do you handle stress: watch tv, talk to someone  Most difficult part of managing DM: diet    DSME - Meal " Planning and Diet Recall  Are you currently following any meal plan:  right now at meals takes set dose of 8 units humalog via pump . All meals delivered by [employer]    Diet Recall:   Meal 1: Breakfast burrito with tortilla, eggs, cheese and power plus 1 cup milk. I drink I milk because I have osteoporosis and I like milk. It does not list how many carbs is in it.   Meal 2: [place] callendar meal like chicken teriayki with rice  Meal 3: burger with cheese without the bun and sliced cucumber  Snacks: when in the day: apple slices with peanut butter and 5 crackers. Handful of grapes    *because of my paresis I have to space out fruits and veggies and steam or cook them.     DSME - Goals and Recommendations    [facility] Diabetes Education Program SMART Behavior Goal Setting:        S - Specific: Exactly what do you want to do        M - Measurable: Use a calendar or chart to track progress        A - Attainable: Take small steps to make bigger changes        R - Realistic: Pick something reasonable that you know you can do        T - Time Oriented: Choose a time limit (No longer than 6 months)    Specific Goal:   I will call [employer] and tell them I have diabetes and need to have the carb counts listed on the food so I can dose my insulin correctly  I will look at ahead at snacks and foods I typically eat that don't have food labels and record the carb counts for future reference.    Measurable: How will I track my goal:  I will keep track of my progress daily by  paper, brianne .    Time Oriented: four weeks.    Topics Covered and Impression:    DSME Topics Covered During Visit:   Reviewed Hypoglycemia Signs/Symptoms/Tx Plan  Healthy Meal Plan  Carb counting  Reading food labels  Finding common foods on exchange list  Insulin to carb ratio    Reviewed Diabetes Technology: Veysofte brianne visual guide to gauge carbs to help when eating at other people homes.    DSME Topics for Follow-Up:   Review Glycemic Goals  "(CGM or SMBG)  Reviewed Hypoglycemia Signs/Symptoms/Tx Plan  Reviewed Hyperglycemia Signs/Symptoms/Tx Plan  Glycemic Pattern Management  Carb counting & tools to gauge  Eating away from home    Materials provided during today's visit:  DM patient guide, 2 handouts on exercise, blood glucose log sheet, A1C handout with patient's level written along with target level, hypo/hyperglycemia signs/symptoms & treatment for hypoglycemia. [employer] healthy meal planning booklet. Handout on apps including Figwee. Wrote out some examples of meals and snacks she eats and carb counts.    Provider Impression: Patient wasn't sure why she was referred. Explained she doesn't carb count and gets meals delivered. Some meals have carbs listed and some don't.  Explained why Dr. Hampton referred her per her last note. Regarding starting carb counting patient said \"I'm open to it.\"  During training she admitted \"this is a lot!\" After some time and with examples she was able to teach back the accurate amount of insulin to give for the amounts for CHO in foods using insulin to carb ratio using 1 unit to 15 grams of carbohydrate. We used this as an example only. Shown how to look up carb counts using internet google search and handout that lists common foods portioned at 15 grams of CHO each. She can look up Heliospectrae brianne on her apple device. Admits with Easter coming she'd like to know how to carb count when eating away from home.   With her vision impairment she is able to see her pump which is in large print. Alarms help \"my neighbors can hear them.\"   Patient changes her own sensor. Sister fills her resevoir because she can't see print.    Doesn't see new GI doctor till end of May. Seeing this provider because she's been throwing up excessive bile  Normally she'd follow a \"gastrparesis diet\" and knows what foods she can tolerate and can't. Now it doesn't seem to matter what she eats and she'll wake up or in morning vomit about 1-2 cups of " "bile. \"Sometimes I'm fine after that and some days I don't feel well after it.\" This month she's had 3 episodes of vomiting.   She thinks \"since my GI started acting up\" that her A1C has come up. Her last value 6.3% and doctor said it can be falsely low due to anemia. Does not always carry fast acting sugar source at all times. Encouraged her to do so.   Unable to ask all questions this visit. Deferred to next visit.   The referring provider is within the same EMR and is able to see the DSMES provided and the outcomes.       Time: I personally spent a total of 60 minutes with the patient providing diabetes self-management education. Visit documentation will be sent electronically to referring provider.          "

## 2025-03-28 NOTE — TELEPHONE ENCOUNTER
Called patient to clarify I will be checking with Dr. Hampton on what insulin to carb ratio she wants patient to follow. Advised her to still continue with goals we set and to do her insulin bolus dosing as she has been. I will call her once I get clarity from the doctor. The referring provider is within the same EMR and is able to see the DSMES provided and the outcomes.

## 2025-03-31 ENCOUNTER — PATIENT OUTREACH (OUTPATIENT)
Dept: PRIMARY CARE | Facility: CLINIC | Age: 55
End: 2025-03-31
Payer: MEDICAID

## 2025-03-31 DIAGNOSIS — J39.8 TRACHEAL STENOSIS: ICD-10-CM

## 2025-03-31 DIAGNOSIS — I95.1 ORTHOSTATIC HYPOTENSION: ICD-10-CM

## 2025-03-31 DIAGNOSIS — R06.09 DYSPNEA ON EXERTION: ICD-10-CM

## 2025-03-31 DIAGNOSIS — E06.3 HASHIMOTO'S THYROIDITIS: ICD-10-CM

## 2025-03-31 DIAGNOSIS — E10.69 TYPE 1 DIABETES MELLITUS WITH OTHER SPECIFIED COMPLICATION: ICD-10-CM

## 2025-03-31 PROCEDURE — 99490 CHRNC CARE MGMT STAFF 1ST 20: CPT | Performed by: INTERNAL MEDICINE

## 2025-03-31 RX ORDER — POTASSIUM CHLORIDE 600 MG/1
8 TABLET, FILM COATED, EXTENDED RELEASE ORAL DAILY
Qty: 30 TABLET | Refills: 2 | Status: SHIPPED | OUTPATIENT
Start: 2025-03-31

## 2025-03-31 RX ORDER — FLUDROCORTISONE ACETATE 0.1 MG/1
0.1 TABLET ORAL
Qty: 30 TABLET | Refills: 2 | Status: SHIPPED | OUTPATIENT
Start: 2025-03-31

## 2025-03-31 NOTE — PROGRESS NOTES
This RN CM reached out and spoke with Mattie this afternoon. She reports that she is doing well.     Mattie and I discussed her recent visit with the diabetic educator. Mattie reports that she is to start counting carbohydrates possibly after her next visit. Reinforced education for carbohydrate counting. Mattie reports that she is going to do 1 unit of insulin for every 15 carbohydrates as a guide but it will be finalized during her next endocrine visit and another visit with the diabetic educator. Mattie reports that she would like her A1c less than 6.3% but we also celebrated that this is a good number and it gives her a goal for the next 3 months.     Mattie also reports that she reached out to [employer] to obtain a list of carbohydrate counts for the meals that she has ordered.     Reviewed Mattie's medications, she reports that she is in need of refills on Potassium and Florinef. Both were pended to Dr. Gold for approval.     Mattie and I discussed her recent physical therapy sessions, she reports that she had an episode of low blood pressure during a session. Mattie reports that other then this one episode, she hasn't felt that her blood pressure has been low.     Reviewed upcoming appointments and ensured that Mattie is aware of the appointment details.     Mattie did report that she really enjoys the aqua therapy sessions. And that her therapy sessions are adding weights and that she is really working her muscles.     While talking with Mattie, this RN noticed that her breathing was as labored or stridulous. Mattie reports that she has noticed that as well and thinks its the mobility and exercises she is completing.     Ensured that she is aware of this RN's availability and contact information for any concerns that arise in between our calls.

## 2025-04-01 ENCOUNTER — APPOINTMENT (OUTPATIENT)
Dept: PHYSICAL THERAPY | Facility: CLINIC | Age: 55
End: 2025-04-01
Payer: MEDICAID

## 2025-04-01 DIAGNOSIS — R53.1 GENERALIZED WEAKNESS: ICD-10-CM

## 2025-04-01 DIAGNOSIS — R26.9 GAIT ABNORMALITY: Primary | ICD-10-CM

## 2025-04-01 DIAGNOSIS — Z91.81 AT RISK FOR INJURY RELATED TO FALL: ICD-10-CM

## 2025-04-01 PROCEDURE — 97113 AQUATIC THERAPY/EXERCISES: CPT | Mod: GP

## 2025-04-01 NOTE — PROGRESS NOTES
Physical Therapy Treatment      Patient Name: Mattie Wolfe  MRN: [id number]  Today's Date: 4/1/2025  Visit #13  Time Calculation  Start Time: 0959  Stop Time: 1035  Time Calculation (min): 36 min    Insurance:  2025 [place] [employer] AUTH REQ AFTER 30 VS SHIRA YR     Assessment:  Continued aquatic therapy focus of improving ambulatory tolerance, strengthening, and balance.  Pt tolerated all interventions well, SUP with PT in water for additional safety.  Pt was able to ambulate 16 minutes without standing or seated rest break, and no SOB noted throughout session.  Quad stretches completed in water and pt had favorable response. Pt demoed good balance throughout, as well as righting reactions while walking in water moving out of the way for other individuals walking in the water. Pt is making progress towards goals and would continue to benefit from skilled PT at this time.   Planning on progressing balance with min UE support with standing exercises.  Pt is making progress towards goals and would continue to benefit from skilled PT at this time.   Aquatic PT is required due to, buoyancy of water reduced weight bearing and decreased LE and spinal loading, allowing for more freedom of movement and promotes improved ability to perform functional tasks.  The viscosity of water which is more than 700x that of air, allowed increased reaction time, in turn allowing advanced balance activities to be safely performed and allow patient to be challenged beyond limited of stability without fear of falling; provides the opportunity to work on balance in a safe environment.  The hydrostatic pressure of water facilitates the reduction of edema in the lower extremities, allowing for greater ease of movement.  Aquatics d/t cardio vascular benefits including: improved cardiovascular efficiency including increased stroke volume, decreased HR, and decreased blood pressure with increased cardiac output allowing patient to achieve cardiovascular  "training effect with less work load.  Aquatics to support upright posture during postural retraining and strengthening.   Aquatics allow patient to work on gait with less assistance or without assistive device improving posture and ease of gait training/conditioning.     Patient's response to session: Decreased pain, Increased ROM/joint mobility, Increase motor control, Improved gait, and Increased knowledge and understanding    Plan:  Cont to progress LE strength/endurance for functional mobility as tolerated.      Tx Considerations:  -Schedule Aquatic therapy: Trial of 10 min forward walking in the beginning of the session (to build ambulatory tolerance).   -Ambulation/dynamic gait w/out AD or w/SPC  -Toe taps, step ups etc.    Current Problem:   1. Gait abnormality        2. Generalized weakness        3. At risk for injury related to fall            Subjective     Pt reports, \"I felt fine after last session with David, but 2 days later I was really sore and it was difficult for me to get around. \"     Pain: Min/10       Objective       Treatments:  Therapeutic Exercise (25122):   minutes (0 minutes)      Manual Therapy (42674):   minutes      Neuromuscular Re-education (68146):   minutes      Aquatic Therapy (37522):   30 Min 2 units  16 minutes of forward water walking without UE support.   Mini Squats: 2 x 10:  Hip circles:    1 x 10 (B) (CW/CCW)  HS curls  2 x 10 (B)  Hip ABD  2 x 10 (B)  Hip FLX   2 x 10  Hip EXT (not completed this session)  2 x 10 (B)  Standing march 20 (B UE support)  Standing Bicycle kicks:  2 x 10 (B)  1 cool down laps.   Quad stretch in pool.     "

## 2025-04-03 ENCOUNTER — APPOINTMENT (OUTPATIENT)
Dept: OCCUPATIONAL THERAPY | Facility: CLINIC | Age: 55
End: 2025-04-03
Payer: MEDICAID

## 2025-04-03 DIAGNOSIS — I89.0 LYMPHEDEMA OF LEFT LOWER EXTREMITY: ICD-10-CM

## 2025-04-03 DIAGNOSIS — R41.842 VISUAL-SPATIAL IMPAIRMENT: ICD-10-CM

## 2025-04-03 DIAGNOSIS — R53.1 GENERALIZED WEAKNESS: Primary | ICD-10-CM

## 2025-04-03 DIAGNOSIS — R06.09 DYSPNEA ON MINIMAL EXERTION: ICD-10-CM

## 2025-04-04 ENCOUNTER — TELEPHONE (OUTPATIENT)
Dept: NUTRITION | Facility: CLINIC | Age: 55
End: 2025-04-04
Payer: MEDICAID

## 2025-04-04 NOTE — TELEPHONE ENCOUNTER
Called patient and emailed log. Asked her to print out or she can use note paper.   Asking her to write out for 3-4 days what she eats during day, carb counts, insulin taken, blood sugar before and 2 hr after meal. Explained this will help her Endocrinologist better understand what insulin to carb ratio would work for her. She will ask her aide on Monday to print out the forms and she agreed for me to call her on Tuesday when she's looking at forms.

## 2025-04-07 ENCOUNTER — TREATMENT (OUTPATIENT)
Dept: PHYSICAL THERAPY | Facility: CLINIC | Age: 55
End: 2025-04-07
Payer: MEDICAID

## 2025-04-07 ENCOUNTER — TREATMENT (OUTPATIENT)
Dept: OCCUPATIONAL THERAPY | Facility: CLINIC | Age: 55
End: 2025-04-07
Payer: MEDICAID

## 2025-04-07 DIAGNOSIS — R53.1 GENERALIZED WEAKNESS: Primary | ICD-10-CM

## 2025-04-07 DIAGNOSIS — R06.09 DYSPNEA ON MINIMAL EXERTION: ICD-10-CM

## 2025-04-07 DIAGNOSIS — I89.0 LYMPHEDEMA OF LEFT LOWER EXTREMITY: ICD-10-CM

## 2025-04-07 DIAGNOSIS — R41.842 VISUAL-SPATIAL IMPAIRMENT: ICD-10-CM

## 2025-04-07 DIAGNOSIS — R26.9 GAIT ABNORMALITY: Primary | ICD-10-CM

## 2025-04-07 DIAGNOSIS — Z91.81 AT RISK FOR INJURY RELATED TO FALL: ICD-10-CM

## 2025-04-07 DIAGNOSIS — R53.1 GENERALIZED WEAKNESS: ICD-10-CM

## 2025-04-07 PROCEDURE — 97535 SELF CARE MNGMENT TRAINING: CPT | Mod: GO

## 2025-04-07 PROCEDURE — 97110 THERAPEUTIC EXERCISES: CPT | Mod: GO

## 2025-04-07 PROCEDURE — 97110 THERAPEUTIC EXERCISES: CPT | Mod: GP,CQ

## 2025-04-07 ASSESSMENT — ACTIVITIES OF DAILY LIVING (ADL): HOME_MANAGEMENT_TIME_ENTRY: 31

## 2025-04-07 NOTE — PROGRESS NOTES
Physical Therapy treatment     Patient Name: Mattie Wolfe  MRN: [id number]  Today's Date: 4/7/2025  Visit #: 14  Last Reassessment - 3/21/25  Insurance: 2025 [place] [employer] AUTH REQ AFTER 30 VS SHIRA YR   Time Calculation  Start Time: 1115  Stop Time: 1200  Time Calculation (min): 45 min    1. Gait abnormality        2. Generalized weakness        3. At risk for injury related to fall            ASSESSMENT:  Pt tolerated session well today.  She was able to ambulate using the SPC two laps on the track with a  generally steady gait, limited by fatigue.  Pt to cont to benefit from skilled PT to improve safe functional mobility.       GOALS:  By Discharge patient will demo:  [place] in HEP  No falls during POC  Improvement in the following outcome measures to decrease risk of falls:  5xSTS </= 15sec  TUG </= 15sec  ABC >/= 70%  Pt will be able to complete 6MWT with </= 2 standing rest break.  Gait speed >/= .8m/s    PLAN:  Cont to progress LE strength/endurance for functional mobility as tolerated.       Tx Considerations:  -Schedule Aquatic therapy  -Ambulation/dynamic gait w/out AD or w/SPC  -Toe taps, step ups etc.        SUBJECTIVE:  Pt states her blood sugar is a little low today at 100.  States she ate some crackers prior to this session.      OBJECTIVE:    Treatments:  Therapeutic Exercise (62753): 45 minutes  Performing the following for LE strength/endurance and general activity tolerance for carryover to functional mobility:  Walking w/RW - for dynamic warmup and to improve ambulation endurance/activity tolerance - x2 sets ea to fatigue (7:00min)  Pt. Ambulated 320' using a SPC with cga.    Cybex Leg Press BLE - 2x15, 60lbs (maintained)   Cybex Leg Extensions BLE - 2x10, 30lbs  Cybex Leg Curl BLE - 2x10, 30lbs   Lateral Stepping in II bar - band at knees, 2sets 5x10' bouts, Green tb   not today.      Manual Therapy (29807):   minutes  Not performed    Neuromuscular Re-education (11910):   minutes  Not  performed    Therapeutic Activitity (14719):   minutes  Not performed          HEP:  Access Code: [email or web address]  URL: [email or web address]  Date: 02/04/2025  Prepared by: David Castano     Exercises  - Sit to Stand Without Arm Support  - 1 x daily - 7 x weekly - 3 sets - 10 reps  - Standing March with Counter Support  - 1 x daily - 7 x weekly - 3 sets - 10 reps  - Walking  - 1 x daily - 7 x weekly - 1 sets - 3-5 reps - 2min hold

## 2025-04-07 NOTE — PROGRESS NOTES
Occupational Therapy  Occupational Therapy Treatment Note    Patient Name Mattie Wolfe   MRN: [id number]  : 1970  Today's Date: 3/7/2025  Time Calculation  Start Time: 1220  Stop Time: 1301  Time Calculation (min): 41 min     Visit #:     Insurance:  MEDICAID  Authorization required:  After 30 visits  # of visits approved: 2025: [place] MCAID AUTH REQ AFTER 30 VS SHIRA YR    Therapy Diagnoses:   1. Generalized weakness        2. Dyspnea on minimal exertion        3. Visual-spatial impairment        4. Lymphedema of left lower extremity          Assessment:  Increased L ankle and ankle lobule swelling noted this date with patient reporting x1 week history. Otherwise, LLE distal circumferential measurements are stable compared to last measurement. New bilateral thigh swelling also noted, with patient recommended to discuss with her cardiologist and PCP in the coming two weeks at follow-up appointments. OT provided healthcare management education, provided new short stretch bandages for pt's continued use at home to manage LLE lymphedema, and wrote/sent script for bilateral knee-high compression garments (15-20mmHg) to pt's PCP. Pt demonstrated inc bilateral shoulder strength this date and will initiate shoulder strengthening HEP.     Plan:      Continue to progress per POC: Continue OT 1-2x/week for 6 weeks.  Interventions: Complete Decongestive Therapy, Manual Lymphatic Drainage, Neuromuscular Reeducation, Self-care/ADL training, Therapeutic Activity, Therapeutic Exercise, Manual Therapy    Goals:  By discharge,      Pt will increase standing tolerance during ADL/IADL performance to 5+ min.  Pt will demo independence with HEP completion.  Pt will demo and verbalize use of energy conservation/joint protection techniques to increase activity tolerance while completing ADL/IADL tasks.   Pt will increase bilateral shoulder strength to 4+/5 to increase activity tolerance for reaching and functional UE use.    Pt will participate in QuickDash to establish BUE functional baseline.   Pt will participate in further visual perceptual assessment to determine how visual perceptual skills play a role in ADL/IADL impairment.   Pt will demo ability to independently check orthostatic blood pressures during functional activity and positional changes to prevent falls.   Pt will participate in assessment of BLE swelling to determine further OT needs (Met 2/13/25)  Pt will improve handwriting AEB improved legibility and speed using compensatory strategies or inc fine motor strength.    Subjective:   Pt reports continued worsened LLE swelling with thigh involvement - PCP and  appt on 4/17. Cataract sx on 4/30. Pt reports she continues to use short stretch bandages to wrap her lower leg and it continues to help reduce her swelling.     Significant PMHx and rehab hx: H/o CVA on 1/26/2018 affecting R occipital lobe, and other stroke affecting R optic nerve and causing R eye blindness. Pt reports she was in a coma for over a month after the CVA, on a ventilator, had trouble weaning from ventilator, and ultimately got a trach. Pt reports she spent ~2 years in a SNF post-hospital discharge and re-learned to walk. Pt uses oxygen overnight (3.5 L), completed oxygen therapy in November 2024.   Other hx: H/o L ankle fx July 2022; L eye cataract with tentative cataract sx scheduled for 3/26/25; [employer] aides; Type I Diabetes (dx 2009); Epilepsy (on vimpat and zonegran; last seizure 2020); L wrist fx (2019); orthostatic hypotension; HTN; HLD; gastroparesis; Hashimoto's thyroiditis; Leukopenia; non-ischemic cadiomyopathy; asthma.    Have you fallen since last visit: No    Precautions: Moderate fall risk, low vision (R eye blind)    Pain:  0/10  Location/Type of pain: N/A    HEP compliance/understanding: Compliant    Objective:   LLE appearance:    Fullness lower leg   - soft tissue texture    Veins mildly visible    Minimal dryness     - applies moisturizer after shower   Fullness at ankle lobule     LLE Skin Appearance/Condition and Girth cm (2/13/25 > Today):   L 20cm below SBP 32.0 > 31.0  L 30cm below SBP 22.7 > 21.7  L 35cm below SBP 20.9 > 20.9  L Ankle lobule 26.5 > 27.6  L Ankle 21.3 > 21.5  L Forefoot 22.0 > 21.9    Strength: pt tolerated 2x10 shoulder strengthening ex with 3# dumbbells     Treatment:     Therapeutic Exercise: 10 minutes  Almas 3# dumbbell ex: 2x10 shld flex, chest press, abd, scaption - added to HEP    Self Care Home Management: 31 minutes  OT assessed skin, tissue texture, noted changes.  OT took circumferential measurements of LLE.   OT discussed healthcare management of inc swelling with patient - with patient demonstrating difficulty with carryover of plan.   OT provided additional -4” stockinette base layer, 1-6cm and 1-8cm short stretch bandage for home use.   OT reviewed post bandaging precautions and pt demonstrated good understanding.     Education:  healthcare management for inc LLE swelling distally and inc thigh swelling; encouraged pt to let her cardiologist (seeing tomorrow) and PCP (seeing next week) know about her increased swelling.     HEP Progression:   Add- (with 2-5# dumbbells at home)  Access Code: [email or web address]  URL: [email or web address]  Date: 04/07/2025  Prepared by: Vielka Garcia    Exercises  - Standing Shoulder Flexion to 90 Degrees with Dumbbells  - 1 x daily - 7 x weekly - 3 sets - 10 reps  - Shoulder Abduction with Dumbbells - Thumbs Up  - 1 x daily - 7 x weekly - 3 sets - 10 reps  - Seated Chest Press with Dumbbells  - 1 x daily - 7 x weekly - 3 sets - 10 reps  - Scaption with Dumbbells  - 1 x daily - 7 x weekly - 3 sets - 10 reps    Current HEP:  - Rolyan pinch clip 3x10 to improve 2- and 3-pt pinch strength  - Handwriting practice: with large lined (manuscript) paper 1 sheet per day for 5-10 min at a time. Use red border/tape and brightly colored guide paper; use red  foam  to build-up writing utensil  - Short stretch bandage application to [facility]

## 2025-04-08 ENCOUNTER — TELEPHONE (OUTPATIENT)
Dept: NUTRITION | Facility: CLINIC | Age: 55
End: 2025-04-08

## 2025-04-08 ENCOUNTER — APPOINTMENT (OUTPATIENT)
Dept: PHYSICAL THERAPY | Facility: CLINIC | Age: 55
End: 2025-04-08
Payer: MEDICAID

## 2025-04-08 DIAGNOSIS — R26.9 GAIT ABNORMALITY: Primary | ICD-10-CM

## 2025-04-08 DIAGNOSIS — R53.1 GENERALIZED WEAKNESS: ICD-10-CM

## 2025-04-08 DIAGNOSIS — Z91.81 AT RISK FOR INJURY RELATED TO FALL: ICD-10-CM

## 2025-04-08 NOTE — TELEPHONE ENCOUNTER
"Called patient to follow-up and see if she printed out forms to log her food intake, carbs, blood sugar and insulin taken. She said she went to the library in order to print the forms, but she could not see the email I sent. I will put in the mail to her the forms. I explained the info could help Dr. Hampton determine appropriate insulin to carb ratio.   Patient agrees to fill out 3-4 days of information. Also states her cardiologist put her on new medicine for orthostatic hypotension \"he said it will effect my blood sugar. I have to take it 4 times a day.\" She could not recall name of medicine and hasn't picked it up yet from pharmacy. The referring provider is within the same EMR and is able to see the DSMES provided and the outcomes.   "

## 2025-04-14 ENCOUNTER — APPOINTMENT (OUTPATIENT)
Dept: HEMATOLOGY/ONCOLOGY | Facility: CLINIC | Age: 55
End: 2025-04-14
Payer: MEDICAID

## 2025-04-15 ENCOUNTER — APPOINTMENT (OUTPATIENT)
Dept: PHYSICAL THERAPY | Facility: CLINIC | Age: 55
End: 2025-04-15
Payer: MEDICAID

## 2025-04-15 DIAGNOSIS — Z91.81 AT RISK FOR INJURY RELATED TO FALL: ICD-10-CM

## 2025-04-15 DIAGNOSIS — R26.9 GAIT ABNORMALITY: Primary | ICD-10-CM

## 2025-04-15 DIAGNOSIS — R53.1 GENERALIZED WEAKNESS: ICD-10-CM

## 2025-04-15 PROCEDURE — 97113 AQUATIC THERAPY/EXERCISES: CPT | Mod: GP

## 2025-04-15 NOTE — PROGRESS NOTES
Physical Therapy Treatment      Patient Name: Mattie Wolfe  MRN: [id number]  Today's Date: 4/15/2025  Visit #13       Insurance:  2025 [place] MCAID AUTH REQ AFTER 30 VS SHIRA YR     Assessment:  Continued aquatic therapy focus of improving ambulatory tolerance, strengthening, and balance.  Pt tolerated all interventions well, SUP with PT in water for additional safety.  Pt was able to ambulate 16 minutes without standing or seated rest break, and no SOB noted throughout session.  Quad stretches completed in water and pt had favorable response. Pt demoed good balance throughout, as well as righting reactions while walking in water moving out of the way for other individuals walking in the water. Pt is making progress towards goals and would continue to benefit from skilled PT at this time.   Planning on progressing balance with min UE support with standing exercises.  Pt is making progress towards goals and would continue to benefit from skilled PT at this time.   Aquatic PT is required due to, buoyancy of water reduced weight bearing and decreased LE and spinal loading, allowing for more freedom of movement and promotes improved ability to perform functional tasks.  The viscosity of water which is more than 700x that of air, allowed increased reaction time, in turn allowing advanced balance activities to be safely performed and allow patient to be challenged beyond limited of stability without fear of falling; provides the opportunity to work on balance in a safe environment.  The hydrostatic pressure of water facilitates the reduction of edema in the lower extremities, allowing for greater ease of movement.  Aquatics d/t cardio vascular benefits including: improved cardiovascular efficiency including increased stroke volume, decreased HR, and decreased blood pressure with increased cardiac output allowing patient to achieve cardiovascular training effect with less work load.  Aquatics to support upright posture during  "postural retraining and strengthening.   Aquatics allow patient to work on gait with less assistance or without assistive device improving posture and ease of gait training/conditioning.     Patient's response to session: Decreased pain, Increased ROM/joint mobility, Increase motor control, Improved gait, and Increased knowledge and understanding    Plan:  Cont to progress LE strength/endurance for functional mobility as tolerated.      Tx Considerations:  -Schedule Aquatic therapy: Trial of 10 min forward walking in the beginning of the session (to build ambulatory tolerance).   -Ambulation/dynamic gait w/out AD or w/SPC  -Toe taps, step ups etc.    Current Problem:   1. Gait abnormality        2. Generalized weakness        3. At risk for injury related to fall            Subjective     Pt reports, \"They put me on a new medication, that I take 3 times a day, I feel a little bit better, but it is messing with my belly\"     Pain: Min/10       Objective       Treatments:  Therapeutic Exercise (33181):   minutes (0 minutes)      Manual Therapy (18324):   minutes      Neuromuscular Re-education (52737):   minutes      Aquatic Therapy (96661):   30 Min 2 units  16 minutes of forward water walking without UE support.   Mini Squats: 2 x 10:  Hip circles:    1 x 10 (B) (CW/CCW)  HS curls  2 x 10 (B)  Hip ABD  2 x 10 (B)  Hip FLX   2 x 10  Hip EXT (not completed this session)  2 x 10 (B)  Standing march 20 (B UE support)  Standing Bicycle kicks:  2 x 10 (B)  1 cool down laps.   Quad stretch in pool.     "

## 2025-04-17 ENCOUNTER — APPOINTMENT (OUTPATIENT)
Dept: PRIMARY CARE | Facility: CLINIC | Age: 55
End: 2025-04-17
Payer: MEDICAID

## 2025-04-17 VITALS
BODY MASS INDEX: 29.44 KG/M2 | SYSTOLIC BLOOD PRESSURE: 140 MMHG | HEART RATE: 82 BPM | OXYGEN SATURATION: 100 % | HEIGHT: 62 IN | DIASTOLIC BLOOD PRESSURE: 90 MMHG | WEIGHT: 160 LBS | RESPIRATION RATE: 14 BRPM

## 2025-04-17 DIAGNOSIS — G40.011 PARTIAL IDIOPATHIC EPILEPSY WITH SEIZURES OF LOCALIZED ONSET, INTRACTABLE, WITH STATUS EPILEPTICUS: ICD-10-CM

## 2025-04-17 DIAGNOSIS — E10.69 TYPE 1 DIABETES MELLITUS WITH OTHER SPECIFIED COMPLICATION: Primary | ICD-10-CM

## 2025-04-17 DIAGNOSIS — G40.909: ICD-10-CM

## 2025-04-17 DIAGNOSIS — I95.1 ORTHOSTATIC HYPOTENSION: ICD-10-CM

## 2025-04-17 DIAGNOSIS — Q65.00 CONGENITAL DISLOCATION OF HIP, UNILATERAL: ICD-10-CM

## 2025-04-17 DIAGNOSIS — R56.9 SEIZURE (MULTI): ICD-10-CM

## 2025-04-17 PROCEDURE — 3044F HG A1C LEVEL LT 7.0%: CPT | Performed by: INTERNAL MEDICINE

## 2025-04-17 PROCEDURE — 3080F DIAST BP >= 90 MM HG: CPT | Performed by: INTERNAL MEDICINE

## 2025-04-17 PROCEDURE — 3008F BODY MASS INDEX DOCD: CPT | Performed by: INTERNAL MEDICINE

## 2025-04-17 PROCEDURE — 3077F SYST BP >= 140 MM HG: CPT | Performed by: INTERNAL MEDICINE

## 2025-04-17 PROCEDURE — 99213 OFFICE O/P EST LOW 20 MIN: CPT | Performed by: INTERNAL MEDICINE

## 2025-04-17 RX ORDER — POTASSIUM CHLORIDE 600 MG/1
8 TABLET, EXTENDED RELEASE ORAL DAILY
Qty: 90 TABLET | Refills: 3 | Status: SHIPPED | OUTPATIENT
Start: 2025-04-17 | End: 2025-04-17

## 2025-04-17 RX ORDER — MIDODRINE HYDROCHLORIDE 5 MG/1
5 TABLET ORAL 3 TIMES DAILY
COMMUNITY
Start: 2025-04-08

## 2025-04-17 RX ORDER — FLUDROCORTISONE ACETATE 0.1 MG/1
0.1 TABLET ORAL
Qty: 90 TABLET | Refills: 3 | Status: SHIPPED | OUTPATIENT
Start: 2025-04-17

## 2025-04-17 RX ORDER — POTASSIUM CHLORIDE 600 MG/1
8 TABLET, EXTENDED RELEASE ORAL DAILY
Qty: 90 TABLET | Refills: 3 | Status: SHIPPED | OUTPATIENT
Start: 2025-04-17

## 2025-04-17 ASSESSMENT — PATIENT HEALTH QUESTIONNAIRE - PHQ9
SUM OF ALL RESPONSES TO PHQ9 QUESTIONS 1 AND 2: 0
1. LITTLE INTEREST OR PLEASURE IN DOING THINGS: NOT AT ALL
2. FEELING DOWN, DEPRESSED OR HOPELESS: NOT AT ALL

## 2025-04-17 NOTE — PROGRESS NOTES
"Subjective   Mattie Wolfe is a 54 y.o. female who presents for  FOLLOW UP   PT HAS NOTICED WT GAIN  STARTED MIDODRINE HAVING   CATARACT SURGERY LEFT EYE NEXT MONDAY   SAYS PHARMACIST TOLD HER POTASSIUM WAS CX IS SHE STILL TO TAKE     HPI   THINGS ARE GOING WELL LATELY    GOT TO OT/PT/AQUATIC THERAPY    STARTED WITH MIDODRINE FOR ORTHOSTATIC HYPOTENSION    NEVER HAD HEART TROUBLE, SEE DR. HAM FOLLOWING DISTANT STROKE, AND DUE TO PVDZ    STILL GET SYMPTOMS OF DIZZINESS BUT NOT AS BAD.    TAKING FLORINEF AND LOW DOSE PROAMATINE/MIDODRINE    MEDICAID DENIED INSULIN PEN PRESCRIBED BY ENDOCRINE DESPITE THE INSULIN PUMP?  Review of Systems   Constitutional:  Negative for chills, diaphoresis and fever.   Respiratory:  Negative for cough, choking, shortness of breath, wheezing and stridor.    Cardiovascular:  Negative for chest pain and leg swelling.   Gastrointestinal:  Negative for constipation, diarrhea, nausea and vomiting.   Musculoskeletal:  Negative for joint swelling and myalgias.       Objective   /90   Pulse 82   Resp 14   Ht 1.575 m (5' 2\")   Wt 72.6 kg (160 lb)   SpO2 100%   BMI 29.26 kg/m²     Physical Exam  Vitals reviewed.   Constitutional:       General: She is not in acute distress.     Appearance: She is not ill-appearing.   HENT:      Right Ear: Tympanic membrane and external ear normal. There is no impacted cerumen.      Left Ear: Tympanic membrane and external ear normal. There is no impacted cerumen.   Cardiovascular:      Rate and Rhythm: Normal rate and regular rhythm.      Pulses: Normal pulses.      Heart sounds:      No gallop.   Pulmonary:      Breath sounds: Normal breath sounds. No wheezing, rhonchi or rales.      Comments: MARKEDLY REDUCED STRIDOROUS NECK SOUNDS THAN BEFORE  Abdominal:      General: Abdomen is flat. Bowel sounds are normal.      Palpations: Abdomen is soft.      Tenderness: There is no guarding or rebound.   Musculoskeletal:      Right lower leg: No edema.    "   Left lower leg: No edema.         Assessment/Plan   Problem List Items Addressed This Visit       Type 1 diabetes mellitus - Primary    Relevant Orders    Referral to Clinical Pharmacy    Seizure (Multi)    FOLLOW WITH NEUROLOGY         Congenital dislocation of hip, unilateral    NOT CURRENTLY CLINICALLY QUIESCENT         Orthostatic hypotension    Relevant Medications    fludrocortisone (Florinef) 0.1 mg tablet    potassium chloride CR (Klor-Con) 8 mEq ER tablet     Patient Instructions    REFILL SENT IN FOR POTASSIUM - NEED TO STAY ON THIS WHEN TAKING FLUDROCORTISONE/FLORINEF    2.  REFERRAL TO PHARMACY CONSULTANT TO ADDRESS THE ISSUE OF LANTUS PEN     3.  FOLLOW UP 4-6 MONTHS OR AS NEEDED    4.  I SUSPECT SOME OF THE WEIGHT GAIN IS INCREASE IN LEAN BODY MASS (MUSCLE) SINCE YOU ARE ON A STABILITY RUN RIGHT NOW.  YOU CAN DISCUSS WITH ENDOCRINOLOGY WHETHER OZEMPIC OR SIMILAR COULD BE USEFUL TO YOU IF WEIGHT GAIN CONTINUES

## 2025-04-17 NOTE — PATIENT INSTRUCTIONS
REFILL SENT IN FOR POTASSIUM - NEED TO STAY ON THIS WHEN TAKING FLUDROCORTISONE/FLORINEF    2.  REFERRAL TO PHARMACY CONSULTANT TO ADDRESS THE ISSUE OF LANTUS PEN     3.  FOLLOW UP 4-6 MONTHS OR AS NEEDED    4.  I SUSPECT SOME OF THE WEIGHT GAIN IS INCREASE IN LEAN BODY MASS (MUSCLE) SINCE YOU ARE ON A STABILITY RUN RIGHT NOW.  YOU CAN DISCUSS WITH ENDOCRINOLOGY WHETHER OZEMPIC OR SIMILAR COULD BE USEFUL TO YOU IF WEIGHT GAIN CONTINUES

## 2025-04-18 ENCOUNTER — APPOINTMENT (OUTPATIENT)
Dept: PHYSICAL THERAPY | Facility: CLINIC | Age: 55
End: 2025-04-18
Payer: MEDICAID

## 2025-04-18 ENCOUNTER — APPOINTMENT (OUTPATIENT)
Dept: OCCUPATIONAL THERAPY | Facility: CLINIC | Age: 55
End: 2025-04-18
Payer: MEDICAID

## 2025-04-18 DIAGNOSIS — R26.9 GAIT ABNORMALITY: Primary | ICD-10-CM

## 2025-04-18 DIAGNOSIS — R53.1 GENERALIZED WEAKNESS: Primary | ICD-10-CM

## 2025-04-18 DIAGNOSIS — R06.09 DYSPNEA ON MINIMAL EXERTION: ICD-10-CM

## 2025-04-18 DIAGNOSIS — R41.842 VISUAL-SPATIAL IMPAIRMENT: ICD-10-CM

## 2025-04-18 DIAGNOSIS — R53.1 GENERALIZED WEAKNESS: ICD-10-CM

## 2025-04-18 DIAGNOSIS — I89.0 LYMPHEDEMA OF LEFT LOWER EXTREMITY: ICD-10-CM

## 2025-04-18 DIAGNOSIS — Z91.81 AT RISK FOR INJURY RELATED TO FALL: ICD-10-CM

## 2025-04-18 NOTE — PROGRESS NOTES
Physical Therapy treatment     Patient Name: Mattie Wolfe  MRN: [id number]  Today's Date: 4/18/2025  Visit #: 15  Last Reassessment - 3/21/25  Insurance: 2025 [place] MCAID AUTH REQ AFTER 30 VS SHIRA YR        1. Gait abnormality        2. Generalized weakness        3. At risk for injury related to fall            ASSESSMENT:  ***      GOALS:  By Discharge patient will demo:  [place] in HEP  No falls during POC  Improvement in the following outcome measures to decrease risk of falls:  5xSTS </= 15sec  TUG </= 15sec  ABC >/= 70%  Pt will be able to complete 6MWT with </= 2 standing rest break.  Gait speed >/= .8m/s    PLAN:  Cont to progress LE strength/endurance for functional mobility as tolerated.       Tx Considerations:  -Ambulation/dynamic gait w/out AD or w/SPC  -Toe taps, step ups etc.        SUBJECTIVE:  ***      OBJECTIVE:    Treatments:  Therapeutic Exercise (24726):   minutes  Performing the following for LE strength/endurance and general activity tolerance for carryover to functional mobility:  Walking w/RW - for dynamic warmup and to improve ambulation endurance/activity tolerance - x2 sets ea to fatigue (7:00min)  Pt. Ambulated 320' using a SPC with cga.    Cybex Leg Press BLE - 2x15, 60lbs (maintained)   Cybex Leg Extensions BLE - 2x10, 30lbs  Cybex Leg Curl BLE - 2x10, 30lbs   Lateral Stepping in II bar - band at knees, 2sets 5x10' bouts, Green tb   not today.      Manual Therapy (37721):   minutes  Not performed    Neuromuscular Re-education (49665):   minutes  Not performed    Therapeutic Activitity (00033):   minutes  Not performed          HEP:  Access Code: [email or web address]  URL: [email or web address]  Date: 02/04/2025  Prepared by: David Castano     Exercises  - Sit to Stand Without Arm Support  - 1 x daily - 7 x weekly - 3 sets - 10 reps  - Standing March with Counter Support  - 1 x daily - 7 x weekly - 3 sets - 10 reps  - Walking  - 1 x daily - 7 x weekly - 1 sets - 3-5 reps -  2min hold

## 2025-04-19 ENCOUNTER — APPOINTMENT (OUTPATIENT)
Dept: CARDIOLOGY | Facility: HOSPITAL | Age: 55
End: 2025-04-19
Payer: MEDICAID

## 2025-04-19 ENCOUNTER — HOSPITAL ENCOUNTER (EMERGENCY)
Facility: HOSPITAL | Age: 55
Discharge: HOME | End: 2025-04-19
Attending: STUDENT IN AN ORGANIZED HEALTH CARE EDUCATION/TRAINING PROGRAM
Payer: MEDICAID

## 2025-04-19 ENCOUNTER — APPOINTMENT (OUTPATIENT)
Dept: RADIOLOGY | Facility: HOSPITAL | Age: 55
End: 2025-04-19
Payer: MEDICAID

## 2025-04-19 ENCOUNTER — HOSPITAL ENCOUNTER (EMERGENCY)
Dept: CARDIOLOGY | Facility: HOSPITAL | Age: 55
End: 2025-04-19
Payer: MEDICAID

## 2025-04-19 VITALS
OXYGEN SATURATION: 100 % | BODY MASS INDEX: 28.4 KG/M2 | TEMPERATURE: 98.4 F | RESPIRATION RATE: 18 BRPM | DIASTOLIC BLOOD PRESSURE: 80 MMHG | HEIGHT: 62 IN | SYSTOLIC BLOOD PRESSURE: 174 MMHG | HEART RATE: 88 BPM | WEIGHT: 154.32 LBS

## 2025-04-19 DIAGNOSIS — M25.512 ACUTE PAIN OF LEFT SHOULDER: Primary | ICD-10-CM

## 2025-04-19 LAB
ALBUMIN SERPL BCP-MCNC: 3.8 G/DL (ref 3.4–5)
ALP SERPL-CCNC: 108 U/L (ref 33–110)
ALT SERPL W P-5'-P-CCNC: 28 U/L (ref 7–45)
ANION GAP SERPL CALC-SCNC: 16 MMOL/L (ref 10–20)
AST SERPL W P-5'-P-CCNC: 27 U/L (ref 9–39)
BASOPHILS # BLD AUTO: 0.02 X10*3/UL (ref 0–0.1)
BASOPHILS NFR BLD AUTO: 0.5 %
BILIRUB SERPL-MCNC: 0.4 MG/DL (ref 0–1.2)
BUN SERPL-MCNC: 23 MG/DL (ref 6–23)
CALCIUM SERPL-MCNC: 9 MG/DL (ref 8.6–10.3)
CARDIAC TROPONIN I PNL SERPL HS: <3 NG/L (ref 0–13)
CHLORIDE SERPL-SCNC: 110 MMOL/L (ref 98–107)
CO2 SERPL-SCNC: 17 MMOL/L (ref 21–32)
CREAT SERPL-MCNC: 1.15 MG/DL (ref 0.5–1.05)
EGFRCR SERPLBLD CKD-EPI 2021: 56 ML/MIN/1.73M*2
EOSINOPHIL # BLD AUTO: 0.01 X10*3/UL (ref 0–0.7)
EOSINOPHIL NFR BLD AUTO: 0.3 %
ERYTHROCYTE [DISTWIDTH] IN BLOOD BY AUTOMATED COUNT: 11.8 % (ref 11.5–14.5)
GLUCOSE SERPL-MCNC: 135 MG/DL (ref 74–99)
HCT VFR BLD AUTO: 38 % (ref 36–46)
HGB BLD-MCNC: 12.2 G/DL (ref 12–16)
IMM GRANULOCYTES # BLD AUTO: 0 X10*3/UL (ref 0–0.7)
IMM GRANULOCYTES NFR BLD AUTO: 0 % (ref 0–0.9)
LYMPHOCYTES # BLD AUTO: 0.79 X10*3/UL (ref 1.2–4.8)
LYMPHOCYTES NFR BLD AUTO: 20.3 %
MAGNESIUM SERPL-MCNC: 1.86 MG/DL (ref 1.6–2.4)
MCH RBC QN AUTO: 31.4 PG (ref 26–34)
MCHC RBC AUTO-ENTMCNC: 32.1 G/DL (ref 32–36)
MCV RBC AUTO: 98 FL (ref 80–100)
MONOCYTES # BLD AUTO: 0.08 X10*3/UL (ref 0.1–1)
MONOCYTES NFR BLD AUTO: 2.1 %
NEUTROPHILS # BLD AUTO: 2.99 X10*3/UL (ref 1.2–7.7)
NEUTROPHILS NFR BLD AUTO: 76.8 %
NRBC BLD-RTO: 0 /100 WBCS (ref 0–0)
PLATELET # BLD AUTO: 200 X10*3/UL (ref 150–450)
POTASSIUM SERPL-SCNC: 4.4 MMOL/L (ref 3.5–5.3)
PROT SERPL-MCNC: 6.7 G/DL (ref 6.4–8.2)
RBC # BLD AUTO: 3.89 X10*6/UL (ref 4–5.2)
SODIUM SERPL-SCNC: 139 MMOL/L (ref 136–145)
WBC # BLD AUTO: 3.9 X10*3/UL (ref 4.4–11.3)

## 2025-04-19 PROCEDURE — 73030 X-RAY EXAM OF SHOULDER: CPT | Mod: LT

## 2025-04-19 PROCEDURE — 2500000004 HC RX 250 GENERAL PHARMACY W/ HCPCS (ALT 636 FOR OP/ED): Mod: JZ

## 2025-04-19 PROCEDURE — 84484 ASSAY OF TROPONIN QUANT: CPT | Performed by: STUDENT IN AN ORGANIZED HEALTH CARE EDUCATION/TRAINING PROGRAM

## 2025-04-19 PROCEDURE — 93005 ELECTROCARDIOGRAM TRACING: CPT

## 2025-04-19 PROCEDURE — 2500000001 HC RX 250 WO HCPCS SELF ADMINISTERED DRUGS (ALT 637 FOR MEDICARE OP): Performed by: STUDENT IN AN ORGANIZED HEALTH CARE EDUCATION/TRAINING PROGRAM

## 2025-04-19 PROCEDURE — 99285 EMERGENCY DEPT VISIT HI MDM: CPT | Performed by: STUDENT IN AN ORGANIZED HEALTH CARE EDUCATION/TRAINING PROGRAM

## 2025-04-19 PROCEDURE — 80053 COMPREHEN METABOLIC PANEL: CPT | Performed by: STUDENT IN AN ORGANIZED HEALTH CARE EDUCATION/TRAINING PROGRAM

## 2025-04-19 PROCEDURE — 71045 X-RAY EXAM CHEST 1 VIEW: CPT

## 2025-04-19 PROCEDURE — 96372 THER/PROPH/DIAG INJ SC/IM: CPT

## 2025-04-19 PROCEDURE — 73030 X-RAY EXAM OF SHOULDER: CPT | Mod: LEFT SIDE | Performed by: RADIOLOGY

## 2025-04-19 PROCEDURE — 71045 X-RAY EXAM CHEST 1 VIEW: CPT | Performed by: RADIOLOGY

## 2025-04-19 PROCEDURE — 2500000001 HC RX 250 WO HCPCS SELF ADMINISTERED DRUGS (ALT 637 FOR MEDICARE OP)

## 2025-04-19 PROCEDURE — 85025 COMPLETE CBC W/AUTO DIFF WBC: CPT | Performed by: STUDENT IN AN ORGANIZED HEALTH CARE EDUCATION/TRAINING PROGRAM

## 2025-04-19 PROCEDURE — 83735 ASSAY OF MAGNESIUM: CPT | Performed by: STUDENT IN AN ORGANIZED HEALTH CARE EDUCATION/TRAINING PROGRAM

## 2025-04-19 RX ORDER — CYCLOBENZAPRINE HCL 5 MG
5 TABLET ORAL ONCE
Status: COMPLETED | OUTPATIENT
Start: 2025-04-19 | End: 2025-04-19

## 2025-04-19 RX ORDER — GABAPENTIN 100 MG/1
100 CAPSULE ORAL 3 TIMES DAILY
Qty: 90 CAPSULE | Refills: 0 | Status: SHIPPED | OUTPATIENT
Start: 2025-04-19 | End: 2025-04-19

## 2025-04-19 RX ORDER — ACETAMINOPHEN 325 MG/1
975 TABLET ORAL ONCE
Status: COMPLETED | OUTPATIENT
Start: 2025-04-19 | End: 2025-04-19

## 2025-04-19 RX ORDER — GABAPENTIN 300 MG/1
300 CAPSULE ORAL ONCE
Status: COMPLETED | OUTPATIENT
Start: 2025-04-19 | End: 2025-04-19

## 2025-04-19 RX ORDER — KETOROLAC TROMETHAMINE 15 MG/ML
15 INJECTION, SOLUTION INTRAMUSCULAR; INTRAVENOUS ONCE
Status: COMPLETED | OUTPATIENT
Start: 2025-04-19 | End: 2025-04-19

## 2025-04-19 RX ORDER — GABAPENTIN 100 MG/1
100 CAPSULE ORAL 3 TIMES DAILY
Qty: 90 CAPSULE | Refills: 0 | Status: SHIPPED | OUTPATIENT
Start: 2025-04-19 | End: 2025-04-25 | Stop reason: SDUPTHER

## 2025-04-19 RX ORDER — ACETAMINOPHEN 325 MG/1
TABLET ORAL
Status: COMPLETED
Start: 2025-04-19 | End: 2025-04-19

## 2025-04-19 RX ADMIN — ACETAMINOPHEN 975 MG: 325 TABLET ORAL at 14:54

## 2025-04-19 RX ADMIN — CYCLOBENZAPRINE HYDROCHLORIDE 5 MG: 5 TABLET, FILM COATED ORAL at 16:00

## 2025-04-19 RX ADMIN — KETOROLAC TROMETHAMINE 15 MG: 15 INJECTION, SOLUTION INTRAMUSCULAR; INTRAVENOUS at 14:54

## 2025-04-19 RX ADMIN — GABAPENTIN 300 MG: 300 CAPSULE ORAL at 16:00

## 2025-04-19 ASSESSMENT — COLUMBIA-SUICIDE SEVERITY RATING SCALE - C-SSRS
2. HAVE YOU ACTUALLY HAD ANY THOUGHTS OF KILLING YOURSELF?: NO
6. HAVE YOU EVER DONE ANYTHING, STARTED TO DO ANYTHING, OR PREPARED TO DO ANYTHING TO END YOUR LIFE?: NO
1. IN THE PAST MONTH, HAVE YOU WISHED YOU WERE DEAD OR WISHED YOU COULD GO TO SLEEP AND NOT WAKE UP?: NO

## 2025-04-19 ASSESSMENT — PAIN DESCRIPTION - FREQUENCY: FREQUENCY: CONSTANT/CONTINUOUS

## 2025-04-19 ASSESSMENT — PAIN SCALES - GENERAL
PAINLEVEL_OUTOF10: 9
PAINLEVEL_OUTOF10: 7
PAINLEVEL_OUTOF10: 9

## 2025-04-19 ASSESSMENT — PAIN DESCRIPTION - LOCATION
LOCATION: SHOULDER

## 2025-04-19 ASSESSMENT — PAIN DESCRIPTION - ONSET: ONSET: GRADUAL

## 2025-04-19 ASSESSMENT — PAIN DESCRIPTION - ORIENTATION
ORIENTATION: LEFT
ORIENTATION: LEFT

## 2025-04-19 ASSESSMENT — PAIN DESCRIPTION - PROGRESSION: CLINICAL_PROGRESSION: GRADUALLY WORSENING

## 2025-04-19 ASSESSMENT — LIFESTYLE VARIABLES
HAVE PEOPLE ANNOYED YOU BY CRITICIZING YOUR DRINKING: NO
EVER FELT BAD OR GUILTY ABOUT YOUR DRINKING: NO
TOTAL SCORE: 0
HAVE YOU EVER FELT YOU SHOULD CUT DOWN ON YOUR DRINKING: NO
EVER HAD A DRINK FIRST THING IN THE MORNING TO STEADY YOUR NERVES TO GET RID OF A HANGOVER: NO

## 2025-04-19 ASSESSMENT — PAIN DESCRIPTION - DESCRIPTORS: DESCRIPTORS: ACHING;SHARP

## 2025-04-19 ASSESSMENT — PAIN - FUNCTIONAL ASSESSMENT: PAIN_FUNCTIONAL_ASSESSMENT: 0-10

## 2025-04-19 NOTE — ED PROVIDER NOTES
EMERGENCY DEPARTMENT ENCOUNTER      Pt Name: Mattie Wolfe  MRN: [id number]  Birthdate 1970  Date of evaluation: 4/19/2025  Provider: Rickey Flores DO    CHIEF COMPLAINT       Chief Complaint   Patient presents with    Shoulder Pain         HISTORY OF PRESENT ILLNESS    Patient is a 54-year-old female present today with a chief plaint of left shoulder left arm pain.  Present for the past day and a half.  No injuries or inciting events.  She has not had pain with this previously.  She describes a shooting pain that goes down the left arm.  No neck pain.  No chest pain. No weakness or decreased  strength. No heavy lifting or trauma. No popping sensation in the shoulder or elbow. No redness or swelling to the arm.           Nursing Notes were reviewed.    PAST MEDICAL HISTORY   Medical History[1]      SURGICAL HISTORY     Surgical History[2]      CURRENT MEDICATIONS       Previous Medications    0.9 % SODIUM CHLORIDE (SODIUM CHLORIDE 0.9%) SOLUTION    Infuse 50 mL/hr at 50 mL/hr into a venous catheter.    ATORVASTATIN (LIPITOR) 40 MG TABLET    Take 1 tablet (40 mg) by mouth.    BLOOD SUGAR DIAGNOSTIC (ACCU-CHEK GUIDE TEST STRIPS) STRIP    Use 2-3x/day as needed to check blood sugar    CALCIUM CARBONATE (TUMS) 200 MG CALCIUM CHEWABLE TABLET    Chew once daily as needed.    CHOLECALCIFEROL (VITAMIN D3) 50 MCG (2000 UT) TABLET    Take 1 tablet (50 mcg) by mouth once daily. Do not fill before March 17, 2025.    FLUDROCORTISONE (FLORINEF) 0.1 MG TABLET    Take 1 tablet (0.1 mg) by mouth early in the morning..    FLUTICASONE (FLONASE) 50 MCG/ACTUATION NASAL SPRAY    Administer 2 sprays into each nostril once daily. Shake gently. Before first use, prime pump. After use, clean tip and replace cap.    GLUCAGON (GVOKE HYPOPEN 2-PACK) 1 MG/0.2 ML AUTO-INJECTOR    Inject 0.2 mL under the skin 1 time if needed (hypoglycemia) for up to 1 dose.    INSULIN GLARGINE (BASAGLAR KWIKPEN U-100 INSULIN) 100 UNIT/ML (3 ML) PEN     "Inject 12 Units under the skin once daily at bedtime. Take as directed per insulin instructions.    INSULIN LISPRO (HUMALOG) 100 UNIT/ML INJECTION    INJECT 70 UNITS VIA INSULIN PUMP DAILY    KETOCONAZOLE (NIZORAL) 2 % CREAM    Apply topically once daily.    LACOSAMIDE (VIMPAT) 150 MG TABLET TABLET    Take 1 tablet (150 mg) by mouth 2 times a day.    MIDODRINE (PROAMATINE) 5 MG TABLET    Take 1 tablet (5 mg) by mouth 3 times a day.    MOMETASONE-FORMOTEROL (DULERA) 200-5 MCG/ACTUATION INHALER    Inhale 2 puffs twice a day.    MONTELUKAST (SINGULAIR) 10 MG TABLET    Take 1 tablet (10 mg) by mouth once daily at bedtime.    MULTIVITAMIN (DAILY MULTI-VITAMIN) TABLET    Take 1 tablet by mouth once daily.    OMEPRAZOLE (PRILOSEC) 40 MG DR CAPSULE    TAKE 1 CAPSULE BY MOUTH DAILY    PEN NEEDLE, DIABETIC 31 GAUGE X 3/16\" NEEDLE    Use once daily as needed to inject long acting insulin    POTASSIUM CHLORIDE CR (KLOR-CON) 8 MEQ ER TABLET    Take 1 tablet (8 mEq) by mouth once daily. Do not crush, chew, or split. TAKE WHEN TAKING FLUDROCORTISONE    PREDNISONE (DELTASONE) 20 MG TABLET    Take 1 tablet (20 mg) by mouth if needed.    TERBINAFINE (LAMISIL) 250 MG TABLET    Take 1 tablet (250 mg) by mouth once daily. *EMERGENCY REFILL*    VENTOLIN HFA 90 MCG/ACTUATION INHALER        ZONISAMIDE (ZONEGRAN) 100 MG CAPSULE    Take by mouth. 4 capules qam and 4 capsules at bedtime       ALLERGIES     Aspirin and Azithromycin    FAMILY HISTORY     Family History[3]       SOCIAL HISTORY     Social History[4]    SCREENINGS                        PHYSICAL EXAM    (up to 7 for level 4, 8 or more for level 5)     ED Triage Vitals [04/19/25 1419]   Temperature Heart Rate Respirations BP   36.9 °C (98.4 °F) 92 18 157/85      Pulse Ox Temp Source Heart Rate Source Patient Position   99 % Tympanic Monitor Lying      BP Location FiO2 (%)     Right arm --       Physical Exam  Vitals and nursing note reviewed.   Constitutional:       General: She is " not in acute distress.     Appearance: Normal appearance. She is well-developed. She is not ill-appearing.   HENT:      Head: Normocephalic and atraumatic.      Right Ear: External ear normal.      Left Ear: External ear normal.      Nose: No congestion or rhinorrhea.      Mouth/Throat:      Mouth: Mucous membranes are moist.      Pharynx: No oropharyngeal exudate or posterior oropharyngeal erythema.   Eyes:      Conjunctiva/sclera: Conjunctivae normal.   Cardiovascular:      Rate and Rhythm: Normal rate and regular rhythm.      Pulses: Normal pulses.      Heart sounds: No murmur heard.     No gallop.   Pulmonary:      Effort: Pulmonary effort is normal. No respiratory distress.      Breath sounds: Normal breath sounds. No stridor. No wheezing, rhonchi or rales.   Abdominal:      General: Bowel sounds are normal. There is no distension.      Palpations: Abdomen is soft.      Tenderness: There is no abdominal tenderness. There is no guarding or rebound.   Musculoskeletal:         General: No swelling or signs of injury.      Right shoulder: Normal.      Left shoulder: Tenderness present. No swelling, deformity or crepitus. Normal range of motion. Normal strength.      Cervical back: Neck supple.      Comments: Patient with full flexion extension, at the shoulder elbow and wrist.  He has she has good  strength.  She has no chest wall tenderness.   Skin:     General: Skin is warm and dry.      Capillary Refill: Capillary refill takes less than 2 seconds.      Coloration: Skin is not jaundiced or pale.      Findings: No rash.      Comments: Pain to even light touch over the skin of the left arm.  There is no rash.  There is no blistering.  There is no edema or warmth.   Neurological:      General: No focal deficit present.      Mental Status: She is alert and oriented to person, place, and time.      Cranial Nerves: No cranial nerve deficit.      Sensory: No sensory deficit.      Gait: Gait normal.   Psychiatric:          Mood and Affect: Mood normal.         Behavior: Behavior normal.         Thought Content: Thought content normal.          DIAGNOSTIC RESULTS     LABS:  Labs Reviewed   CBC WITH AUTO DIFFERENTIAL - Abnormal       Result Value    WBC 3.9 (*)     nRBC 0.0      RBC 3.89 (*)     Hemoglobin 12.2      Hematocrit 38.0      MCV 98      MCH 31.4      MCHC 32.1      RDW 11.8      Platelets 200      Neutrophils % 76.8      Immature Granulocytes %, Automated 0.0      Lymphocytes % 20.3      Monocytes % 2.1      Eosinophils % 0.3      Basophils % 0.5      Neutrophils Absolute 2.99      Immature Granulocytes Absolute, Automated 0.00      Lymphocytes Absolute 0.79 (*)     Monocytes Absolute 0.08 (*)     Eosinophils Absolute 0.01      Basophils Absolute 0.02     COMPREHENSIVE METABOLIC PANEL - Abnormal    Glucose 135 (*)     Sodium 139      Potassium 4.4      Chloride 110 (*)     Bicarbonate 17 (*)     Anion Gap 16      Urea Nitrogen 23      Creatinine 1.15 (*)     eGFR 56 (*)     Calcium 9.0      Albumin 3.8      Alkaline Phosphatase 108      Total Protein 6.7      AST 27      Bilirubin, Total 0.4      ALT 28     MAGNESIUM - Normal    Magnesium 1.86     SERIAL TROPONIN-INITIAL - Normal    Troponin I, High Sensitivity <3      Narrative:     Less than 99th percentile of normal range cutoff-  Female and children under 18 years old <14 ng/L; Male <21 ng/L: Negative  Repeat testing should be performed if clinically indicated.     Female and children under 18 years old 14-50 ng/L; Male 21-50 ng/L:  Consistent with possible cardiac damage and possible increased clinical   risk. Serial measurements may help to assess extent of myocardial damage.     >50 ng/L: Consistent with cardiac damage, increased clinical risk and  myocardial infarction. Serial measurements may help assess extent of   myocardial damage.      NOTE: Children less than 1 year old may have higher baseline troponin   levels and results should be interpreted in  conjunction with the overall   clinical context.     NOTE: Troponin I testing is performed using a different   testing methodology at [facility] than at other   [facility] hospitals. Direct result comparisons should only   be made within the same method.   TROPONIN SERIES- (INITIAL, 1 HR)    Narrative:     The following orders were created for panel order Troponin I Series, High Sensitivity (0, 1 HR).  Procedure                               Abnormality         Status                     ---------                               -----------         ------                     Troponin I, High Sensiti...[[id number]]  Normal              Final result               Troponin, High Sensitivi...[[id number]]                                                   Please view results for these tests on the individual orders.   SERIAL TROPONIN, 1 HOUR       All other labs were within normal range or not returned as of this dictation.    Imaging  XR shoulder left 2+ views   Final Result   Normal radiographs left shoulder        Signed by: John Londono 4/19/2025 3:40 PM   Dictation workstation:   [id number]      XR chest 1 view   Final Result   No evidence of acute intrathoracic abnormality.        Signed by: John Londono 4/19/2025 3:41 PM   Dictation workstation:   [id number]           Procedures  Procedures     EMERGENCY DEPARTMENT COURSE/MDM:   Medical Decision Making  54-year-old male present today with complaint of left shoulder and left arm pain.  Has been for the past day and a half.  She is concerned that she is potentially having a heart attack.  Exam she is otherwise well-appearing.  No trauma to the shoulder.  Obtain x-rays of the left shoulder to evaluate for any acute bony injuries.  Will otherwise obtain cardiac workup.  Differential includes but is not limited to neuropathy, bony injury, ACS, muscle strain. We will proceed with usual chest pain work-up including EKG, chest x-ray, CBC, CMP, serial troponin, BNP  to rule out acute coronary syndrome.        Please see ED course for additional MDM.    ED Course as of 04/19/25 1650   Sat Apr 19, 2025   1447 Left hand Exam: Patient exhibits ability to flex and extend all digits. Including flexion at the proximal and distal interphalangeal joints against resistance.  Median nerve was tested with thumb abduction against resistance and opposition which were normal.  Sensory testing was performed of the median nerve using light touch on the radial and ulnar aspects of digits 1 through 3.  Radial nerve testing was performed with thumb extension against resistance which was normal.  Sensory testing of the radial nerve was normal via light touch to the central and radial aspect of the dorsum of the hand and dorsal radial aspect of the thumb.  Ulnar nerve was tested via abduction of fingers against resistance as well as light touch to the fourth and fifth digits.  Cap refill was less than 2 seconds in all 5 digits.  Alignment check was performed which revealed no malrotation.  No sign of acute compartment syndrome, flexor tenosynovitis, high-pressure injection injury.  No flexor tendon injury.  Patient exhibits good flexion extension at the elbow and shoulder.  She has pain when she tries to abduct beyond approximately 70 degrees.  She has normal sensation.  She has significant pain to light touch over the skin with no rash appreciated. [TL]   1546 Chest x-ray unremarkable.  X-ray of the shoulder with no abnormalities noted. [CL]   1620 EKG performed at 16: 14 and independently reviewed by provider: Reveals NSR with a rate of 94 bpm, normal axis, normal intervals, no ST changes, no T wave abnormalities, no ectopy. No STEMI. [TL]   1633 EKG was without ischemic changes.  Lab work was unremarkable with normal troponin.  Chest x-ray without evidence of pneumothorax, pneumonia, pneumomediastinum, or other abnormalities that would require admission.  Very low concern for ACS at this time as  a cause of the patient's symptoms.  Admission was considered, but not indicated given a heart score less than 4 and low clinical suspicion for ACS.  Patient was given information for follow-up with their primary care provider.  They were in agreement with plan for discharge home.  Return precautions were discussed. [CL]   1649 Patient given a prescription for gabapentin to treat her pain.  Otherwise advised to follow-up with her primary care provider.  Discharged in stable condition. [CL]      ED Course User Index  [CL] Rickey Flores DO  [TL] Heath Reynoso DO         Diagnoses as of 04/19/25 1650   Acute pain of left shoulder        XR shoulder left 2+ views   Final Result   Normal radiographs left shoulder        Signed by: John Londono 4/19/2025 3:40 PM   Dictation workstation:   [id number]      XR chest 1 view   Final Result   No evidence of acute intrathoracic abnormality.        Signed by: John Londono 4/19/2025 3:41 PM   Dictation workstation:   [id number]          Patient and or family in agreement and understanding of treatment plan.  All questions answered.      I reviewed the case with the attending ED physician. The attending ED physician agrees with the plan. Patient and/or patient´s representative was counseled regarding labs, imaging, likely diagnosis, and plan. All questions were answered.    ED Medications administered this visit:    Medications   ketorolac (Toradol) injection 15 mg (15 mg intramuscular Given 4/19/25 1454)   acetaminophen (Tylenol) tablet 975 mg (975 mg oral Given 4/19/25 1454)   cyclobenzaprine (Flexeril) tablet 5 mg (5 mg oral Given 4/19/25 1600)   gabapentin (Neurontin) capsule 300 mg (300 mg oral Given 4/19/25 1600)       New Prescriptions from this visit:    New Prescriptions    GABAPENTIN (NEURONTIN) 100 MG CAPSULE    Take 1 capsule (100 mg) by mouth 3 times a day.       Follow-up:  Kobi Gold MD  [id number] [facility]  [address]  [facility]  [address]  [phone]    Schedule an appointment as soon as possible for a visit           Final Impression:   1. Acute pain of left shoulder          (Please note that portions of this note were completed with a voice recognition program.  Efforts were made to edit the dictations but occasionally words are mis-transcribed.)       Rickey Flores DO  Resident  04/19/25 6462    I performed a history and physical examination of the patient and discussed his management with the resident physician.  I agree with the history, physical, assessment, and plan of care, with the following exceptions:   None    I was present for key and critical portions of the following procedures: None  Time Spent in Critical Care of the patient: None  Time spent in discussions with the patient and family: 30    Heath Reynoso DO         [1]   Past Medical History:  Diagnosis Date    Asthma     Diabetes (Multi)     Diabetic retinopathy (Multi)     Gastrointestinal disorder     Gastroparesis     Gastroparesis    H/O bladder infections     History of stroke     Osteoporosis     Other conditions influencing health status     Diabetes type 1, uncontrolled    Personal history of transient ischemic attack (TIA), and cerebral infarction without residual deficits     History of stroke   [2]   Past Surgical History:  Procedure Laterality Date    APPENDECTOMY  04/09/2014    Appendectomy    CATARACT EXTRACTION      HIP SURGERY  04/09/2014    Hip Surgery    MR HEAD ANGIO WO IV CONTRAST  02/28/2020    MR HEAD ANGIO WO IV CONTRAST 2/28/2020 [facility]    MR NECK ANGIO WO IV CONTRAST  02/28/2020    MR NECK ANGIO WO IV CONTRAST 2/28/2020 [facility]    OTHER SURGICAL HISTORY  04/09/2014    Cholecystotomy    OTHER SURGICAL HISTORY  11/01/2019    Retinal detachment repair    OTHER SURGICAL HISTORY  06/04/2020    Wrist surgery    RETINAL DETACHMENT SURGERY     [3]   Family History  Problem Relation Name Age of Onset    Hypertension Mother       Stroke Mother      Hypertension Father      Other (SUBSTANCE ABUSE) Father      Stroke Other Sibling     Breast cancer Other grandparent     Ovarian cancer Father's Sister     [4]   Social History  Socioeconomic History    Marital status: Single   Tobacco Use    Smoking status: Never    Smokeless tobacco: Never   Substance and Sexual Activity    Alcohol use: Not Currently    Drug use: Not Currently        Heath Reynoso DO  04/20/25 0033

## 2025-04-20 LAB
ATRIAL RATE: 94 BPM
P AXIS: 53 DEGREES
P OFFSET: 196 MS
P ONSET: 138 MS
PR INTERVAL: 164 MS
Q ONSET: 220 MS
QRS COUNT: 16 BEATS
QRS DURATION: 78 MS
QT INTERVAL: 382 MS
QTC CALCULATION(BAZETT): 477 MS
QTC FREDERICIA: 443 MS
R AXIS: 27 DEGREES
T AXIS: 54 DEGREES
T OFFSET: 411 MS
VENTRICULAR RATE: 94 BPM

## 2025-04-21 ENCOUNTER — PRE-ADMISSION TESTING (OUTPATIENT)
Dept: PREADMISSION TESTING | Facility: HOSPITAL | Age: 55
End: 2025-04-21
Payer: MEDICAID

## 2025-04-21 ENCOUNTER — APPOINTMENT (OUTPATIENT)
Dept: PRIMARY CARE | Facility: CLINIC | Age: 55
End: 2025-04-21
Payer: MEDICAID

## 2025-04-21 ENCOUNTER — TELEPHONE (OUTPATIENT)
Dept: ENDOCRINOLOGY | Facility: CLINIC | Age: 55
End: 2025-04-21

## 2025-04-21 VITALS
HEIGHT: 62 IN | DIASTOLIC BLOOD PRESSURE: 72 MMHG | TEMPERATURE: 96.4 F | OXYGEN SATURATION: 100 % | SYSTOLIC BLOOD PRESSURE: 113 MMHG | RESPIRATION RATE: 16 BRPM | WEIGHT: 165.34 LBS | BODY MASS INDEX: 30.43 KG/M2 | HEART RATE: 97 BPM

## 2025-04-21 DIAGNOSIS — I63.9 CEREBROVASCULAR ACCIDENT (CVA), UNSPECIFIED MECHANISM (MULTI): ICD-10-CM

## 2025-04-21 DIAGNOSIS — I95.1 ORTHOSTATIC HYPOTENSION: ICD-10-CM

## 2025-04-21 DIAGNOSIS — Z01.818 ENCOUNTER FOR PREADMISSION TESTING: Primary | ICD-10-CM

## 2025-04-21 DIAGNOSIS — H25.812 COMBINED FORMS OF AGE-RELATED CATARACT OF LEFT EYE: ICD-10-CM

## 2025-04-21 DIAGNOSIS — E10.69 TYPE 1 DIABETES MELLITUS WITH OTHER SPECIFIED COMPLICATION: ICD-10-CM

## 2025-04-21 DIAGNOSIS — E10.9 DM W/O COMPLICATION TYPE I: ICD-10-CM

## 2025-04-21 PROCEDURE — 99244 OFF/OP CNSLTJ NEW/EST MOD 40: CPT | Performed by: NURSE PRACTITIONER

## 2025-04-21 RX ORDER — GLUCAGON INJECTION, SOLUTION 1 MG/.2ML
0.2 INJECTION, SOLUTION SUBCUTANEOUS ONCE AS NEEDED
Qty: 0.4 ML | Refills: 1 | Status: SHIPPED | OUTPATIENT
Start: 2025-04-21

## 2025-04-21 ASSESSMENT — DUKE ACTIVITY SCORE INDEX (DASI)
TOTAL_SCORE: 7.2
CAN YOU WALK INDOORS, SUCH AS AROUND YOUR HOUSE: YES
CAN YOU WALK A BLOCK OR TWO ON LEVEL GROUND: NO
CAN YOU HAVE SEXUAL RELATIONS: NO
CAN YOU TAKE CARE OF YOURSELF (EAT, DRESS, BATHE, OR USE TOILET): YES
CAN YOU CLIMB A FLIGHT OF STAIRS OR WALK UP A HILL: NO
DASI METS SCORE: 3.6
CAN YOU RUN A SHORT DISTANCE: NO
CAN YOU DO YARD WORK LIKE RAKING LEAVES, WEEDING OR PUSHING A MOWER: NO
CAN YOU DO MODERATE WORK AROUND THE HOUSE LIKE VACUUMING, SWEEPING FLOORS OR CARRYING GROCERIES: NO
CAN YOU PARTICIPATE IN STRENOUS SPORTS LIKE SWIMMING, SINGLES TENNIS, FOOTBALL, BASKETBALL, OR SKIING: NO
CAN YOU DO HEAVY WORK AROUND THE HOUSE LIKE SCRUBBING FLOORS OR LIFTING AND MOVING HEAVY FURNITURE: NO
CAN YOU DO LIGHT WORK AROUND THE HOUSE LIKE DUSTING OR WASHING DISHES: YES
CAN YOU PARTICIPATE IN MODERATE RECREATIONAL ACTIVITIES LIKE GOLF, BOWLING, DANCING, DOUBLES TENNIS OR THROWING A BASEBALL OR FOOTBALL: NO

## 2025-04-21 NOTE — CPM/PAT H&P
CPM/PAT Evaluation       Name: Mattie Wolfe (Mattie Wolfe)  /Age: 1970/54 y.o.     In-Person       Chief Complaint: PAT for planned cataract surgery    54 yr old female w/PHx of CVA (2018), seizure disorder, athma, CHF, orthostatic hypotension, HLD, Right chest port, GERD, DM I, PAD, impaired mobility and Left eye combined form of age-related cataract referred to PAT for planned Left cataract extraction w/intraocular lens implantation w/Dr Castillo on 2025     Patient reports feeling overall well, denies fever, cough or recent infection. Denies hx of blood clot. Does report hx of stroke (2018) resulting in long recovery including extended stay in nursing home and now with vision loss to Right eye and impaired mobility.  Additionally reports seizure disorder, adding has not had a seizure for a couple years now.  Reports not as active as desired d/t impaired mobility and Right eye vision loss; currently in facility wheelchair, using BLE braces d/t dropfoot; states ambulates at home independently and can complete personal ADLs; denies cardiac or respiratory symptoms. Past surgical hx includes appendectomy, cholecystectomy, ORIF wrist and eye surgery (bilateral detached retinas); denies past issues with anesthesia.      Followed by PCP (Kobi Gold MD) 2025  Followed by cardiology (Martín Flaherty MD) 2025  Followed by endocrinology (Criss Hampton MD) 3/10/2025        Medical History[1]    Surgical History[2]    Patient  has no history on file for sexual activity.    Family History[3]    Allergies[4]    Prior to Admission medications    Medication Sig Start Date End Date Taking? Authorizing Provider   atorvastatin (Lipitor) 40 mg tablet Take 1 tablet (40 mg) by mouth. 21  Yes Historical Provider, MD   fludrocortisone (Florinef) 0.1 mg tablet Take 1 tablet (0.1 mg) by mouth early in the morning.. 25  Yes Kobi Gold MD   fluticasone (Flonase) 50 mcg/actuation nasal spray  Administer 2 sprays into each nostril once daily. Shake gently. Before first use, prime pump. After use, clean tip and replace cap. 1/16/24  Yes Kobi Gold MD   glucagon (Gvoke HypoPen 2-Pack) 1 mg/0.2 mL auto-injector Inject 0.2 mL under the skin 1 time if needed (hypoglycemia) for up to 1 dose. 3/10/25  Yes Criss Hampton MD   lacosamide (Vimpat) 150 mg tablet tablet Take 1 tablet (150 mg) by mouth 2 times a day. 4/23/24  Yes Historical Provider, MD   midodrine (Proamatine) 5 mg tablet Take 1 tablet (5 mg) by mouth 3 times a day. 4/8/25  Yes Historical Provider, MD   mometasone-formoterol (Dulera) 200-5 mcg/actuation inhaler Inhale 2 puffs twice a day. 6/23/21  Yes Historical Provider, MD   montelukast (Singulair) 10 mg tablet Take 1 tablet (10 mg) by mouth once daily at bedtime. 4/23/24  Yes Historical Provider, MD   multivitamin (Daily Multi-Vitamin) tablet Take 1 tablet by mouth once daily.   Yes Historical Provider, MD   omeprazole (PriLOSEC) 40 mg DR capsule TAKE 1 CAPSULE BY MOUTH DAILY 12/20/24  Yes Kobi Gold MD   potassium chloride CR (Klor-Con) 8 mEq ER tablet Take 1 tablet (8 mEq) by mouth once daily. Do not crush, chew, or split. TAKE WHEN TAKING FLUDROCORTISONE 4/17/25  Yes Kobi Gold MD   predniSONE (Deltasone) 20 mg tablet Take 1 tablet (20 mg) by mouth if needed.   Yes Historical Provider, MD   zonisamide (Zonegran) 100 mg capsule Take by mouth. 4 capules qam and 4 capsules at bedtime 5/25/18  Yes Historical Provider, MD   0.9 % sodium chloride (sodium chloride 0.9%) solution Infuse 50 mL/hr at 50 mL/hr into a venous catheter. 2/7/24   Historical Provider, MD   blood sugar diagnostic (Accu-Chek Guide test strips) strip Use 2-3x/day as needed to check blood sugar 3/10/25   Criss Hampton MD   calcium carbonate (Tums) 200 mg calcium chewable tablet Chew once daily as needed.  Patient not taking: Reported on 4/21/2025    Historical Provider, MD   gabapentin (Neurontin)  "100 mg capsule Take 1 capsule (100 mg) by mouth 3 times a day.  Patient not taking: Reported on 4/21/2025 4/19/25 4/19/26  Teddy Zepeda DO   insulin glargine (Basaglar KwikPen U-100 Insulin) 100 unit/mL (3 mL) pen Inject 12 Units under the skin once daily at bedtime. Take as directed per insulin instructions. 3/10/25   Criss Hampton MD   insulin lispro (HumaLOG) 100 unit/mL injection INJECT 70 UNITS VIA INSULIN PUMP DAILY 5/28/24   Les Rhodes MD   ketoconazole (NIZOral) 2 % cream Apply topically once daily. 3/25/24   Historical Provider, MD   pen needle, diabetic 31 gauge x 3/16\" needle Use once daily as needed to inject long acting insulin 3/10/25   rCiss Hampton MD   terbinafine (LamISIL) 250 mg tablet Take 1 tablet (250 mg) by mouth once daily. *EMERGENCY REFILL*  Patient taking differently: Take 1 tablet (250 mg) by mouth. *EMERGENCY REFILL* 11/21/24   Historical Provider, MD   Ventolin HFA 90 mcg/actuation inhaler  4/23/24   Historical Provider, MD   cholecalciferol (Vitamin D3) 50 MCG (2000 UT) tablet Take 1 tablet (50 mcg) by mouth once daily. Do not fill before March 17, 2025.  Patient not taking: Reported on 3/10/2025 3/17/25 4/21/25  Historical Provider, MD   fludrocortisone (Florinef) 0.1 mg tablet Take 1 tablet (0.1 mg) by mouth early in the morning.. 3/31/25 4/17/25  Kobi Gold MD   gabapentin (Neurontin) 100 mg capsule Take 1 capsule (100 mg) by mouth 3 times a day. 4/19/25 4/19/25  Rickey Flores,    potassium chloride CR (Klor-Con) 8 mEq ER tablet Take 1 tablet (8 mEq) by mouth once daily. Do not crush, chew, or split. TAKE WHEN TAKING FLUDROCORTISONE 3/31/25 4/17/25  Kobi Gold MD   potassium chloride CR (Klor-Con) 8 mEq ER tablet Take 1 tablet (8 mEq) by mouth once daily. Do not crush, chew, or split. TAKE WHEN TAKING FLUDROCORTISONE 4/17/25 4/17/25  Kobi Gold MD        A ten-point review of systems was completed and is otherwise negative except " "for what is mentioned in the HPI above.    Physical Exam  Vitals reviewed.   Constitutional:       Appearance: Normal appearance.   HENT:      Head: Normocephalic.      Mouth/Throat:      Mouth: Mucous membranes are moist.   Eyes:      Pupils: Pupils are equal, round, and reactive to light.      Comments: Right vision loss   Cardiovascular:      Rate and Rhythm: Normal rate and regular rhythm.      Comments: Right chest port; flushed once monthly at [facility] flush on 4/21/2025  Pulmonary:      Effort: Pulmonary effort is normal.      Breath sounds: Normal breath sounds.      Comments: Diminished bases  Abdominal:      General: Bowel sounds are normal.      Comments: Insulin pump & continuous glucose monitor in use to abdomin   Musculoskeletal:      Comments: Currently in facility wheelchair; limited ROM to Left upper extremity; bilat braces in use to BLE d/t footdrop   Skin:     General: Skin is warm and dry.   Neurological:      Mental Status: She is alert and oriented to person, place, and time.   Psychiatric:         Mood and Affect: Mood normal.          PAT AIRWAY:   Airway:     Mallampati::  III    TM distance::  >3 FB      Testing/Diagnostic: CBC, BMP, ecg    Patient Specialist/PCP: Kobi Gold MD (PCP) 4/17/2025; Martín Flaherty MD (cardiology) 4/8/2025; Criss Hampton MD (endocrinology) 3/10/2025    Visit Vitals  /72   Pulse 97   Temp 35.8 °C (96.4 °F) (Temporal)   Resp 16   Ht 1.575 m (5' 2\")   Wt 75 kg (165 lb 5.5 oz)   SpO2 100%   BMI 30.24 kg/m²   OB Status Postmenopausal   Smoking Status Never   BSA 1.81 m²       DASI Risk Score      Flowsheet Row Pre-Admission Testing from 4/21/2025 in [facility]   Can you take care of yourself (eat, dress, bathe, or use toilet)?  2.75 filed at 04/21/2025 1059   Can you walk indoors, such as around your house? 1.75 filed at 04/21/2025 1059   Can you walk a block or two on level ground?  0 filed at 04/21/2025 1059   Can you climb " a flight of stairs or walk up a hill? 0 filed at 04/21/2025 1059   Can you run a short distance? 0 filed at 04/21/2025 1059   Can you do light work around the house like dusting or washing dishes? 2.7 filed at 04/21/2025 1059   Can you do moderate work around the house like vacuuming, sweeping floors or carrying groceries? 0 filed at 04/21/2025 1059   Can you do heavy work around the house like scrubbing floors or lifting and moving heavy furniture?  0 filed at 04/21/2025 1059   Can you do yard work like raking leaves, weeding or pushing a mower? 0 filed at 04/21/2025 1059   Can you have sexual relations? 0 filed at 04/21/2025 1059   Can you participate in moderate recreational activities like golf, bowling, dancing, doubles tennis or throwing a baseball or football? 0 filed at 04/21/2025 1059   Can you participate in strenous sports like swimming, singles tennis, football, basketball, or skiing? 0 filed at 04/21/2025 1059   DASI SCORE 7.2 filed at 04/21/2025 1059   METS Score (Will be calculated only when all the questions are answered) 3.6 filed at 04/21/2025 1059          Caprini DVT Assessment    No data to display       Modified Frailty Index    No data to display       [id number] Stroke Risk Points  Current as of just now        N/A 0 to 9 Points:      Last Change: N/A          The [id number] risk score (Lip GH, et al. 2009. © 2010 [employer]) quantifies the risk of stroke for a patient with atrial fibrillation. For patients without atrial fibrillation or under the age of 18 this score appears as N/A. Higher score values generally indicate higher risk of stroke.        This score is not applicable to this patient. Components are not calculated.          Revised Cardiac Risk Index    No data to display       Apfel Simplified Score    No data to display       Risk Analysis Index Results This Encounter    No data found in the last 10 encounters.       Stop Bang Score      Flowsheet  Row Pre-Admission Testing from 4/21/2025 in [facility]   Do you snore loudly? 0 filed at 04/21/2025 1056   Do you often feel tired or fatigued after your sleep? 0 filed at 04/21/2025 1056   Has anyone ever observed you stop breathing in your sleep? 0 filed at 04/21/2025 1056   Do you have or are you being treated for high blood pressure? 1 filed at 04/21/2025 1059   Recent BMI (Calculated) 28.2 filed at 04/21/2025 1056   Is BMI greater than 35 kg/m2? 0=No filed at 04/21/2025 1056   Age older than 50 years old? 1=Yes filed at 04/21/2025 1056   Is your neck circumference greater than 17 inches (Male) or 16 inches (Female)? 0 filed at 04/21/2025 1059   Gender - Male 0=No filed at 04/21/2025 1056          Prodigy: High Risk  Total Score: 0          ARISCAT Score for Postoperative Pulmonary Complications      Flowsheet Row Pre-Admission Testing from 4/21/2025 in [facility]   Age Calculated Score 3 filed at 04/21/2025 1311   Preoperative SpO2 0 filed at 04/21/2025 1311   Respiratory infection in the last month Either upper or lower (i.e., URI, bronchitis, pneumonia), with fever and antibiotic treatment 0 filed at 04/21/2025 1311   Preoperative anemia (Hgb less than 10 g/dl) 0 filed at 04/21/2025 1311   Surgical incision  0 filed at 04/21/2025 1311   Duration of surgery  0 filed at 04/21/2025 1311   Emergency Procedure  0 filed at 04/21/2025 1311   ARISCAT Total Score  3 filed at 04/21/2025 1311          Petit Perioperative Risk for Myocardial Infarction or Cardiac Arrest (EDITH)      Flowsheet Row Pre-Admission Testing from 4/21/2025 in [facility]   Calculated Age Score 1.12 filed at 04/21/2025 1311   Functional Status  0.65 filed at 04/21/2025 1311   ASA Class  -1.92 filed at 04/21/2025 1311   Creatinine 0 filed at 04/21/2025 1311   Type of Procedure  0.71  [cataract] filed at 04/21/2025 1311   EDITH Total Score  -4.69 filed at 04/21/2025 1311   EDITH % 0.91 filed at 04/21/2025 1311             Assessment and Plan:     # CVA - 2018 resulting in coma 3 weeks, has since recovered, no current residual except for Right eye vision loss and some BLE weakness; uses AFOs bilateral for dropped foot; states ambulates at home independently    # Seizure disorder -continue zonisamide, lacosamide; states no recent seizures, none this year or last year  # Asthma - continue inhalers; wears O2 3.5 L nasal cannula for sleeping  # CHF - stable cardiac status per recent cardiology note dated 4/8/2025  # Orthostatic hypotension - continue midodrine; followed by cardiology  # HLD - continue atorvastatin  # Right chest port - for lab draws, flushed monthly at [facility], next flush scheduled later today (4/21/2025)  # GERD - continue omeprazole  # DM I - metronic pump and CGM in use; followed by endocrinology   # PAD - followed by cardiology  # Impaired mobility - currently in facility wheelchair; braces to BLE; limited ROM to LUE; states currently in physical therapy   # Left eye combined form of age-related cataract  Left cataract extraction w/intraocular lens implantation w/Dr Castillo on 4/30/2025    Ecg complete 4/19/2025- NSR (94 bpm)  Medical hx, Allergies, VS and Labs reviewed  Medications addressed w/pre-op instructions provided    ECHO, 3/26/2024:  CONCLUSIONS:   - Exam indication: Shortness of Breath   - The left ventricle is normal in size. Left ventricular systolic function is   normal. EF = 56 ± 5% (2D biplane)   - Normal left ventricular diastolic function.   - The right ventricle is normal in size. Right ventricular systolic function is   normal.   - There are no significant valvular abnormalities.   - Average GLS of -18%   - No obvious wall motion abnormalities.   - Exam was compared with the prior  echocardiographic exam performed on   06/03/21. Essentially unchanged.         [1]   Past Medical History:  Diagnosis Date    Asthma     Cataract     Chronic kidney disease     Diabetes (Multi)      Diabetes mellitus type I (Multi)     Diabetic retinopathy (Multi)     Gastrointestinal disorder     Gastroparesis     Gastroparesis    GERD (gastroesophageal reflux disease)     H/O bladder infections     History of stroke     HL (hearing loss)     Osteoporosis     Other conditions influencing health status     Diabetes type 1, uncontrolled    Personal history of transient ischemic attack (TIA), and cerebral infarction without residual deficits     History of stroke    Stroke (Multi)     Vision loss    [2]   Past Surgical History:  Procedure Laterality Date    AIRWAY SURGERY      APPENDECTOMY  04/09/2014    Appendectomy    CATARACT EXTRACTION      CHOLECYSTECTOMY      HIP SURGERY  04/09/2014    Hip Surgery    MR HEAD ANGIO WO IV CONTRAST  02/28/2020    MR HEAD ANGIO WO IV CONTRAST 2/28/2020 [facility]    MR NECK ANGIO WO IV CONTRAST  02/28/2020    MR NECK ANGIO WO IV CONTRAST 2/28/2020 [facility]    OTHER SURGICAL HISTORY  04/09/2014    Cholecystotomy    OTHER SURGICAL HISTORY  11/01/2019    Retinal detachment repair    OTHER SURGICAL HISTORY  06/04/2020    Wrist surgery    RETINAL DETACHMENT SURGERY     [3]   Family History  Problem Relation Name Age of Onset    Hypertension Mother      Stroke Mother      Hypertension Father      Other (SUBSTANCE ABUSE) Father      Stroke Other Sibling     Breast cancer Other grandparent     Ovarian cancer Father's Sister     [4]   Allergies  Allergen Reactions    Aspirin Other     GI issues    Azithromycin Hives

## 2025-04-21 NOTE — PREPROCEDURE INSTRUCTIONS
Medication List            Accurate as of April 21, 2025 11:43 AM. Always use your most recent med list.                Accu-Chek Guide test strips  Generic drug: blood sugar diagnostic  Use 2-3x/day as needed to check blood sugar  Medication Adjustments for Surgery: Take/Use as prescribed     atorvastatin 40 mg tablet  Commonly known as: Lipitor  Medication Adjustments for Surgery: Take/Use as prescribed     Basaglar KwikPen U-100 Insulin 100 unit/mL (3 mL) pen  Generic drug: insulin glargine  Inject 12 Units under the skin once daily at bedtime. Take as directed per insulin instructions.  Additional Medication Adjustments for Surgery: Other (Comment)  Notes to patient: Continue as prescribed evening before; take only 1/2 dose day of surgery     Daily Multi-Vitamin tablet  Generic drug: multivitamin  Medication Adjustments for Surgery: Take/Use as prescribed     Dulera 200-5 mcg/actuation inhaler  Generic drug: mometasone-formoterol  Medication Adjustments for Surgery: Take/Use as prescribed     fludrocortisone 0.1 mg tablet  Commonly known as: Florinef  Take 1 tablet (0.1 mg) by mouth early in the morning..  Medication Adjustments for Surgery: Take/Use as prescribed     fluticasone 50 mcg/actuation nasal spray  Commonly known as: Flonase  Administer 2 sprays into each nostril once daily. Shake gently. Before first use, prime pump. After use, clean tip and replace cap.  Medication Adjustments for Surgery: Take/Use as prescribed     gabapentin 100 mg capsule  Commonly known as: Neurontin  Take 1 capsule (100 mg) by mouth 3 times a day.  Medication Adjustments for Surgery: Take/Use as prescribed     Gvoke HypoPen 2-Pack 1 mg/0.2 mL auto-injector  Generic drug: glucagon  Inject 0.2 mL under the skin 1 time if needed (hypoglycemia) for up to 1 dose.  Medication Adjustments for Surgery: Take/Use as prescribed     insulin lispro 100 unit/mL injection  INJECT 70 UNITS VIA INSULIN PUMP DAILY  Medication Adjustments for  "Surgery: Do Not take on the morning of surgery     ketoconazole 2 % cream  Commonly known as: NIZOral  Medication Adjustments for Surgery: Take/Use as prescribed     lacosamide 150 mg tablet tablet  Commonly known as: Vimpat  Medication Adjustments for Surgery: Take/Use as prescribed     midodrine 5 mg tablet  Commonly known as: Proamatine  Medication Adjustments for Surgery: Take/Use as prescribed     montelukast 10 mg tablet  Commonly known as: Singulair  Medication Adjustments for Surgery: Take/Use as prescribed     omeprazole 40 mg DR capsule  Commonly known as: PriLOSEC  TAKE 1 CAPSULE BY MOUTH DAILY  Medication Adjustments for Surgery: Take/Use as prescribed     pen needle, diabetic 31 gauge x 3/16\" needle  Use once daily as needed to inject long acting insulin  Medication Adjustments for Surgery: Take/Use as prescribed     potassium chloride CR 8 mEq ER tablet  Commonly known as: Klor-Con  Take 1 tablet (8 mEq) by mouth once daily. Do not crush, chew, or split. TAKE WHEN TAKING FLUDROCORTISONE  Medication Adjustments for Surgery: Take/Use as prescribed     predniSONE 20 mg tablet  Commonly known as: Deltasone  Medication Adjustments for Surgery: Take/Use as prescribed     sodium chloride 0.9% solution     terbinafine 250 mg tablet  Commonly known as: LamISIL  Medication Adjustments for Surgery: Take/Use as prescribed     Tums 500 mg (200 mg elemental) chewable tablet  Generic drug: calcium carbonate  Medication Adjustments for Surgery: Take/Use as prescribed     Ventolin HFA 90 mcg/actuation inhaler  Generic drug: albuterol  Medication Adjustments for Surgery: Take/Use as prescribed     zonisamide 100 mg capsule  Commonly known as: Zonegran  Medication Adjustments for Surgery: Take/Use as prescribed                     PRE-OPERATIVE INSTRUCTIONS FOR SURGERY    *Do not eat anything after midnight the night of surgery.  This includes food of any kind (including hard candy, cough drops, mints).     You may have " up to 13 ounces of clear liquid until TWO hours prior to your scheduled arrival time  Clear liquids include water, black tea/coffee, (no milk or cream) apple juice and electrolyte drinks (GATORADE).  You may chew gum until TWO hours prior you your surgery/procedure.         -----------    *One of our staff members will call you ONE business day before your surgery, between 11am-2 pm to let you know the time to arrive.  If you have not received a call by 2 pm, call [phone]    *When you arrive at the hospital-->GO TO Registration on the ground floor  *Stop smoking 24 hours prior to surgery.  No Marijuana, CBD Oil or Vaping for 48 hours  *No alcohol 24 hours prior to surgery  *You will need a responsible adult to drive you home  -No acrylic nails or nail polish on at least one fingernail, NO polish on toes for foot surgery  -You may be asked to remove your dentures, partial plate, eyeglasses or contact lenses before going to surgery.  Please bring a case for these items.  -Body piercings need to be removed.  Jewelry and valuables should be left at home.  -Put on loose,  comfortable, clean clothing, that will accommodate bandages    *If you have any further questions about your pre-op instructions,  not mentioned in this handout, then call [phone]*    What you may be asked to bring to surgery:  ___Crutches, walker  ___CPAP machine  ___Urine specimen

## 2025-04-21 NOTE — TELEPHONE ENCOUNTER
RF GVOKE HYPOPEN 2PK 1mg/0.2ml inject 0.2 ml subcutaneous once as needed for hypoglycemia for up to I dose #1 1 RF

## 2025-04-22 ENCOUNTER — INFUSION (OUTPATIENT)
Dept: HEMATOLOGY/ONCOLOGY | Facility: CLINIC | Age: 55
End: 2025-04-22
Payer: MEDICAID

## 2025-04-22 ENCOUNTER — APPOINTMENT (OUTPATIENT)
Dept: PHYSICAL THERAPY | Facility: CLINIC | Age: 55
End: 2025-04-22
Payer: MEDICAID

## 2025-04-22 DIAGNOSIS — Z91.81 AT RISK FOR INJURY RELATED TO FALL: ICD-10-CM

## 2025-04-22 DIAGNOSIS — R53.1 GENERALIZED WEAKNESS: ICD-10-CM

## 2025-04-22 DIAGNOSIS — R26.9 GAIT ABNORMALITY: Primary | ICD-10-CM

## 2025-04-22 DIAGNOSIS — E55.9 MILD VITAMIN D DEFICIENCY: ICD-10-CM

## 2025-04-22 DIAGNOSIS — E10.69 TYPE 1 DIABETES MELLITUS WITH OTHER SPECIFIED COMPLICATION: ICD-10-CM

## 2025-04-22 DIAGNOSIS — Z95.828 PRESENCE OF PERMANENT CENTRAL VENOUS CATHETER: ICD-10-CM

## 2025-04-22 PROCEDURE — 96523 IRRIG DRUG DELIVERY DEVICE: CPT

## 2025-04-22 PROCEDURE — 97113 AQUATIC THERAPY/EXERCISES: CPT | Mod: GP

## 2025-04-22 RX ORDER — HEPARIN SODIUM,PORCINE/PF 10 UNIT/ML
50 SYRINGE (ML) INTRAVENOUS AS NEEDED
Status: DISCONTINUED | OUTPATIENT
Start: 2025-04-22 | End: 2025-04-22 | Stop reason: HOSPADM

## 2025-04-22 RX ORDER — HEPARIN SODIUM,PORCINE/PF 10 UNIT/ML
50 SYRINGE (ML) INTRAVENOUS AS NEEDED
OUTPATIENT
Start: 2025-04-22

## 2025-04-22 RX ORDER — HEPARIN 100 UNIT/ML
500 SYRINGE INTRAVENOUS AS NEEDED
Status: DISCONTINUED | OUTPATIENT
Start: 2025-04-22 | End: 2025-04-22 | Stop reason: HOSPADM

## 2025-04-22 RX ORDER — HEPARIN 100 UNIT/ML
500 SYRINGE INTRAVENOUS AS NEEDED
OUTPATIENT
Start: 2025-04-22

## 2025-04-22 RX ORDER — HEPARIN SODIUM 1000 [USP'U]/ML
2000 INJECTION, SOLUTION INTRAVENOUS; SUBCUTANEOUS AS NEEDED
OUTPATIENT
Start: 2025-04-22

## 2025-04-22 RX ORDER — HEPARIN SODIUM 1000 [USP'U]/ML
2000 INJECTION, SOLUTION INTRAVENOUS; SUBCUTANEOUS AS NEEDED
Status: DISCONTINUED | OUTPATIENT
Start: 2025-04-22 | End: 2025-04-22 | Stop reason: HOSPADM

## 2025-04-22 ASSESSMENT — ENCOUNTER SYMPTOMS
SHORTNESS OF BREATH: 0
CONSTIPATION: 0
CHILLS: 0
JOINT SWELLING: 0
VOMITING: 0
WHEEZING: 0
MYALGIAS: 0
DIARRHEA: 0
FEVER: 0
STRIDOR: 0
NAUSEA: 0
DIAPHORESIS: 0
CHOKING: 0
COUGH: 0

## 2025-04-22 NOTE — PROGRESS NOTES
Physical Therapy Treatment      Patient Name: Mattie Wolfe  MRN: [id number]  Today's Date: 4/22/2025  Visit #14     Time In: 955   Time Out: 1030  Total Time: 35    Insurance:  2025 [place] MCAEL TIRAD[place] REQ AFTER 30 VS SHIRA YR     Assessment:  Continued aquatic therapy focus of improving ambulatory tolerance, strengthening, and balance.  Pt tolerated all interventions well, SUP with PT in water for additional safety.  Pt was able to ambulate 16 minutes without standing or seated rest break, and no SOB noted throughout session.  Pt demoed good balance throughout, as well as righting reactions while walking in water moving out of the way for other individuals walking in the water. Pt is making progress towards goals and would continue to benefit from skilled PT at this time.          Aquatic PT is required due to, buoyancy of water reduced weight bearing and decreased LE and spinal loading, allowing for more freedom of movement and promotes improved ability to perform functional tasks.  The viscosity of water which is more than 700x that of air, allowed increased reaction time, in turn allowing advanced balance activities to be safely performed and allow patient to be challenged beyond limited of stability without fear of falling; provides the opportunity to work on balance in a safe environment.  The hydrostatic pressure of water facilitates the reduction of edema in the lower extremities, allowing for greater ease of movement.  Aquatics d/t cardio vascular benefits including: improved cardiovascular efficiency including increased stroke volume, decreased HR, and decreased blood pressure with increased cardiac output allowing patient to achieve cardiovascular training effect with less work load.  Aquatics to support upright posture during postural retraining and strengthening.   Aquatics allow patient to work on gait with less assistance or without assistive device improving posture and ease of gait training/conditioning.  "    Patient's response to session: Decreased pain, Increased ROM/joint mobility, Increase motor control, Improved gait, and Increased knowledge and understanding    Plan:  Cont to progress LE strength/endurance for functional mobility as tolerated. Inc SL balance exercises and endurance walking. Be aware current POC ends 5/5/25.     Tx Considerations:  -Schedule Aquatic therapy: Trial of inc forward walking in the beginning of the session (to build ambulatory tolerance).   -Ambulation/dynamic gait w/out AD or w/SPC  -Toe taps, step ups etc.    Current Problem:   1. Gait abnormality        2. Generalized weakness        3. At risk for injury related to fall            Subjective     Pt reports, \"Having a lot of new onset L shoulder/arm pain. Went to ED for cardiac workup on Fri of last week. C/f broken arm\"    Per chart review all cardiac workup was negative. Imaging done on chest and L shoulder with no abnormal findings. Pt plans to follow up with PCP about potential imaging for humerus     Pain: 0/10       Objective       Treatments:  Therapeutic Exercise (39997):   minutes (0 minutes)      Manual Therapy (96657):   minutes      Neuromuscular Re-education (47749):   minutes      Aquatic Therapy (85187):   30 Min 2 units  16 minutes of forward water walking without UE support.   Mini Squats: 2 x 10:  Hip circles:    1 x 10 (B) (CW/CCW)  HS curls  2 x 10 (B)  Hip ABD  2 x 10 (B)  Hip FLX   2 x 10  Hip EXT   2 x 10 (B)  Standing march 20 (B UE support)  Standing Bicycle kicks:  2 x 10 (B)    Vielka Osman, PT      "

## 2025-04-25 ENCOUNTER — TREATMENT (OUTPATIENT)
Dept: PHYSICAL THERAPY | Facility: CLINIC | Age: 55
End: 2025-04-25
Payer: MEDICAID

## 2025-04-25 ENCOUNTER — TREATMENT (OUTPATIENT)
Dept: OCCUPATIONAL THERAPY | Facility: CLINIC | Age: 55
End: 2025-04-25
Payer: MEDICAID

## 2025-04-25 ENCOUNTER — PATIENT OUTREACH (OUTPATIENT)
Dept: PRIMARY CARE | Facility: CLINIC | Age: 55
End: 2025-04-25

## 2025-04-25 DIAGNOSIS — Z91.81 AT RISK FOR INJURY RELATED TO FALL: ICD-10-CM

## 2025-04-25 DIAGNOSIS — R26.9 GAIT ABNORMALITY: Primary | ICD-10-CM

## 2025-04-25 DIAGNOSIS — E06.3 HASHIMOTO'S THYROIDITIS: ICD-10-CM

## 2025-04-25 DIAGNOSIS — E10.69 TYPE 1 DIABETES MELLITUS WITH OTHER SPECIFIED COMPLICATION: ICD-10-CM

## 2025-04-25 DIAGNOSIS — M79.2 NEUROPATHIC PAIN: ICD-10-CM

## 2025-04-25 DIAGNOSIS — M79.603 PAIN OF UPPER EXTREMITY, UNSPECIFIED LATERALITY: Primary | ICD-10-CM

## 2025-04-25 DIAGNOSIS — R53.1 GENERALIZED WEAKNESS: Primary | ICD-10-CM

## 2025-04-25 DIAGNOSIS — M25.512 PAIN, JOINT, SHOULDER REGION, LEFT: ICD-10-CM

## 2025-04-25 DIAGNOSIS — R06.09 DYSPNEA ON EXERTION: ICD-10-CM

## 2025-04-25 DIAGNOSIS — I89.0 LYMPHEDEMA OF LEFT LOWER EXTREMITY: ICD-10-CM

## 2025-04-25 DIAGNOSIS — R41.842 VISUAL-SPATIAL IMPAIRMENT: ICD-10-CM

## 2025-04-25 DIAGNOSIS — R53.1 GENERALIZED WEAKNESS: ICD-10-CM

## 2025-04-25 DIAGNOSIS — R06.09 DYSPNEA ON MINIMAL EXERTION: ICD-10-CM

## 2025-04-25 DIAGNOSIS — I63.9 CEREBROVASCULAR ACCIDENT (CVA), UNSPECIFIED MECHANISM (MULTI): ICD-10-CM

## 2025-04-25 DIAGNOSIS — J39.8 TRACHEAL STENOSIS: ICD-10-CM

## 2025-04-25 PROCEDURE — 97140 MANUAL THERAPY 1/> REGIONS: CPT | Mod: GP

## 2025-04-25 PROCEDURE — 97035 APP MDLTY 1+ULTRASOUND EA 15: CPT | Mod: GO

## 2025-04-25 PROCEDURE — 97110 THERAPEUTIC EXERCISES: CPT | Mod: GP

## 2025-04-25 PROCEDURE — 97535 SELF CARE MNGMENT TRAINING: CPT | Mod: GO

## 2025-04-25 PROCEDURE — 97140 MANUAL THERAPY 1/> REGIONS: CPT | Mod: GO

## 2025-04-25 RX ORDER — GABAPENTIN 300 MG/1
300 CAPSULE ORAL NIGHTLY
Qty: 90 CAPSULE | Refills: 0 | Status: SHIPPED | OUTPATIENT
Start: 2025-04-25 | End: 2025-07-24

## 2025-04-25 RX ORDER — LIDOCAINE 50 MG/G
1 PATCH TOPICAL DAILY
Qty: 30 PATCH | Refills: 1 | Status: CANCELLED | OUTPATIENT
Start: 2025-04-25 | End: 2026-04-25

## 2025-04-25 RX ORDER — LIDOCAINE 50 MG/G
1 PATCH TOPICAL DAILY
Qty: 30 PATCH | Refills: 0 | Status: SHIPPED | OUTPATIENT
Start: 2025-04-25 | End: 2026-04-25

## 2025-04-25 RX ORDER — PREDNISONE 20 MG/1
20 TABLET ORAL DAILY PRN
Refills: 0 | Status: CANCELLED | OUTPATIENT
Start: 2025-04-25

## 2025-04-25 ASSESSMENT — ACTIVITIES OF DAILY LIVING (ADL): HOME_MANAGEMENT_TIME_ENTRY: 20

## 2025-04-25 NOTE — PROGRESS NOTES
Physical Therapy treatment     Patient Name: Mattie Wolfe  MRN: [id number]  Today's Date: 4/25/2025  Visit #: 15  Last Reassessment - 3/21/25  Insurance: 2025 [place] MCAID AUTH REQ AFTER 30 VS SHIRA YR   Time Calculation  Start Time: 1216  Stop Time: 1255  Time Calculation (min): 39 min    1. Gait abnormality        2. Generalized weakness        3. At risk for injury related to fall            ASSESSMENT:  Pt presents w/severe neck, shoulder and elbow pain. It is difficult to determine a consistent cause of pt's familiar sx d/t severity of guarding and pain at this time. Most consistent alleviating activity today was cervical distraction improving her pain and strength temporarily. Much of session spent reassuring pt and discussing multitude of factors and strategies to attempt to find relief and maintain gentle ROM. Pt to cont to benefit from skilled PT to improve CLOF.         GOALS:  By Discharge patient will demo:  Nelson in HEP  No falls during POC  Improvement in the following outcome measures to decrease risk of falls:  5xSTS </= 15sec  TUG </= 15sec  ABC >/= 70%  Pt will be able to complete 6MWT with </= 2 standing rest break.  Gait speed >/= .8m/s    PLAN:  Cont to progress LE strength/endurance for functional mobility as tolerated.       Tx Considerations:  -Ambulation/dynamic gait w/out AD or w/SPC  -Toe taps, step ups etc.        SUBJECTIVE:  Pt reports she is not having a good week. Last Friday her shoulder and her arm started hurting. She didn't fall or have any trauma to it. By Saturday it was excruciating and she went to the ED because the pain was so bad. She is unable to use her UE on her RW at this time d/t the pain. They told her it was nerve pain and it has not gotten any better. She has tried heat, ice, Advil, and got Gabapentin. She sees her doctor in a week but doesn't want to wait that long because the pain has not let up.       OBJECTIVE:    Treatments:  Therapeutic Exercise (07731): 15  minutes  Chin Tuck in Supine - most relieving of any move/activity.  Chin tuck in sitting - inconsistently helping/provoking of sx.   Cervical AROM into rotation.   L UT stretch - highly provoking  Cervical AROM  In depth discussion related to potential causes including but not limited to; nerve related pain from cervical spine d/t benefits that manual provided this date, signs/sx's of infection vs fx vs frozen shoulder vs heightened muscle gaurding, however this therapist cannot make these dx.   Reassurance provided to pt throughout to attempt gentle AROM of cervical spine, shoulder, and elbow as tolerated. Emphasized importance of rest while sx calm down.          Manual Therapy (55155): 24 minutes  Performing the following to improve pain, decrease muscle guarding and improve mobility tolerance:  Cervical Distraction (General) - this is most alleviating of sx. (Spurlings positive)  Spurlings Compression - positive.   PROM of cervical ROM  Soft tissue to cervical paraspinals.   PROM w/distraction and very gentle soft tissue to scap and shoulder musculature     Neuromuscular Re-education (16091):   minutes  Not performed    Therapeutic Activitity (57054):   minutes  Not performed          HEP:  Access Code: [email or web address]  URL: [email or web address]  Date: 02/04/2025  Prepared by: David Castano     Exercises  - Sit to Stand Without Arm Support  - 1 x daily - 7 x weekly - 3 sets - 10 reps  - Standing March with Counter Support  - 1 x daily - 7 x weekly - 3 sets - 10 reps  - Walking  - 1 x daily - 7 x weekly - 1 sets - 3-5 reps - 2min hold

## 2025-04-25 NOTE — PROGRESS NOTES
Occupational Therapy  Occupational Therapy Treatment Note    Patient Name Mattie Wolfe   MRN: [id number]  : 1970  Today's Date: 2025  Time Calculation  Start Time: 1302  Stop Time: 1348  Time Calculation (min): 46 min     Visit #:     Insurance:  MEDICAID  Authorization required:  After 30 visits  # of visits approved: 2025: [place] MCAID AUTH REQ AFTER 30 VS SHIRA YR    Therapy Diagnoses:   1. Generalized weakness        2. Dyspnea on minimal exertion        3. Visual-spatial impairment        4. Lymphedema of left lower extremity          Assessment:  Pt exhibits intense, shooting LUE pain this date primarily in elbow x1 week hx and s/p ED visit in which heart attack and fractures were ruled out. Pt demonstrates pain reduction with ultrasound application to the painful area. Pt benefits from assistance for healthcare management of acute LUE condition this date. Will continue to assess.     Plan:      Continue to progress per POC: Continue OT 1-2x/week for 6 weeks.  Interventions: Complete Decongestive Therapy, Manual Lymphatic Drainage, Neuromuscular Reeducation, Self-care/ADL training, Therapeutic Activity, Therapeutic Exercise, Manual Therapy    Goals:  By discharge,      Pt will increase standing tolerance during ADL/IADL performance to 5+ min.  Pt will demo independence with HEP completion.  Pt will demo and verbalize use of energy conservation/joint protection techniques to increase activity tolerance while completing ADL/IADL tasks.   Pt will increase bilateral shoulder strength to 4+/5 to increase activity tolerance for reaching and functional UE use.   Pt will participate in QuickDash to establish BUE functional baseline.   Pt will participate in further visual perceptual assessment to determine how visual perceptual skills play a role in ADL/IADL impairment.   Pt will demo ability to independently check orthostatic blood pressures during functional activity and positional changes to  prevent falls.   Pt will participate in assessment of BLE swelling to determine further OT needs (Met 2/13/25)  Pt will improve handwriting AEB improved legibility and speed using compensatory strategies or inc fine motor strength.    Subjective:   Pt reports sudden onset LUE pain on Friday that became so excruciating by Saturday that she went to the ER; originating at shoulder and shooting down arm, ruled out heart attack and LUE fractures. Pt states they sent her home and told her it is nerve pain. Pt contacted PCP and cannot get in until 5/4. Reports she is taking tylenol, advil, and gabapentin, and nothing is touching the pain except wearing OTC shoulder sling. Pt reports no traumatic cause. Pt reports she has never needed help getting dressed before until this arm pain.   Pt also reports LLE swelling slightly better; Medicaid denying covering 15-20 mmHg compression but PCP signed order, so pt encouraged to go to [facility] or [facility] with prescription to get a discount on OTC compression.     Significant PMHx and rehab hx: H/o CVA on 1/26/2018 affecting R occipital lobe, and other stroke affecting R optic nerve and causing R eye blindness. Pt reports she was in a coma for over a month after the CVA, on a ventilator, had trouble weaning from ventilator, and ultimately got a trach. Pt reports she spent ~2 years in a SNF post-hospital discharge and re-learned to walk. Pt uses oxygen overnight (3.5 L), completed oxygen therapy in November 2024.   Other hx: H/o L ankle fx July 2022; L eye cataract with tentative cataract sx scheduled for 3/26/25; [employer] hearing aides; Type I Diabetes (dx 2009); Epilepsy (on vimpat and zonegran; last seizure 2020); L wrist fx (2019); orthostatic hypotension; HTN; HLD; gastroparesis; Hashimoto's thyroiditis; Leukopenia; non-ischemic cadiomyopathy; asthma.    Have you fallen since last visit: No    Precautions: Moderate fall risk, low vision (R eye blind)    Pain:  0/10  Location/Type of  pain: N/A    HEP compliance/understanding: Compliant    Objective:   ROM: inc shooting, intense pain with L shoulder PROM    Treatment:     Manual Therapy:  11 minutes  LUE progressive stretch/PROM to pain tolerance  Palpating L axilla, shoulder, elbow for abnormalities    Self Care Home Management: 20 minutes  Healthcare management - contacting PCP to move up primary care follow-up and consult for pain management of LUE.    Modalities:       Ultrasound: 15 minutes: US 1.5 W/cm*2, 1 mHz, 8 min, 3cm wand, targeting elbow.     Education: precautions with wearing OTC shoulder sling; pt educated to be cautious on wear time as it may lead to frozen shoulder if overused.     HEP Progression: N/A; continue current program.     Current HEP:  - Rolyan pinch clip 3x10 to improve 2- and 3-pt pinch strength  - Handwriting practice: with large lined (manuscript) paper 1 sheet per day for 5-10 min at a time. Use red border/tape and brightly colored guide paper; use red foam  to build-up writing utensil  - Short stretch bandage application to LLE to dec swelling  - Shoulder strengthening ex: seated abduction, chest press, scaption with dumbbells 2#-5# (Access code: [email or web address]; 4/7/25).

## 2025-04-25 NOTE — PROGRESS NOTES
This RN CM received a message along with Dr. Gold this afternoon from the physical therapist, Vielka GUZMAN, who stated that she was working with Mattie today. She was in the ER last week for acute shoulder pain and she is still having intense pain. They are wondering if Mattie can be seen in office sooner then next week.     This RN SHANA reviewed Mattie's appointments, she is scheduled to see YELENA Cesar on 5/6/25. I was able to find an opening with YELENA Garcia on 5/2/25 at 10:40 am. Mattie was agreeable to this appointment. Updated her chart.     This RN SHANA met with Mattie after her physical therapy appointment. She reports that she has been trying to rotate Tylenol and Aleve/Ibuprofen every 3 hours. She tried heating pad and ice rotation without relief. Mattie also bought Aspercreme from the drug store without relief. She complained that she hasn't slept due to the intense pain.     Inquired about any injuries or trauma to the shoulder. Mattie reports that the pain is now in her Left Elbow and the pain is worse then when she broke her wrist. Mattie reports that therapy tried to do stretches with her arm.     Inquired about any gout diagnosis since it is in her elbow. She states no. She reports that she sleeps in a fetal position and she was watching TV when the pain started in her neck/shoulder.     Reviewed her X-rays from the ER. No abnormalities identified per the ER xray report.     Explained that I would reach out to Radha to see what her recommendations are.     Spoke to Radha, she recommends that Mattie be seen at the walk in orthopedic clinic. Radha recommended that Mattie take the Gabapentin 100 mg that was ordered in the ER at breakfast and late afternoon (3pmish). She is going to order Gabapentin 300 mg at bedtime. Radha also ordered Lidocaine patches for her shoulder/elbow.     Reached back out to Mattie. Updated her on Radha's recommendations. She reports that she will try to obtain transportation to go today but if  not, she has her private duty aide on Monday who can take her. Provided her the addresses for [facility] walk in details and [facility] walk in details as well.     Instructed Mattie if the pain becomes worse, loss of sensation to the arm, feeling of coldness in fingertips she needs to be seen in the ER.     Ensured that she has my availability for any concerns that arise in between our calls.

## 2025-04-28 ENCOUNTER — APPOINTMENT (OUTPATIENT)
Dept: PHARMACY | Facility: HOSPITAL | Age: 55
End: 2025-04-28
Payer: MEDICAID

## 2025-04-28 DIAGNOSIS — E10.69 TYPE 1 DIABETES MELLITUS WITH OTHER SPECIFIED COMPLICATION: ICD-10-CM

## 2025-04-28 PROCEDURE — RXMED WILLOW AMBULATORY MEDICATION CHARGE

## 2025-04-28 RX ORDER — ISOPROPYL ALCOHOL 70 ML/100ML
SWAB TOPICAL
Qty: 100 EACH | Refills: 11 | Status: SHIPPED | OUTPATIENT
Start: 2025-04-28

## 2025-04-28 RX ORDER — INSULIN GLARGINE 100 [IU]/ML
12 INJECTION, SOLUTION SUBCUTANEOUS NIGHTLY
Qty: 15 ML | Refills: 5 | Status: SHIPPED | OUTPATIENT
Start: 2025-04-28 | End: 2026-04-28

## 2025-04-28 RX ORDER — BLOOD SUGAR DIAGNOSTIC
STRIP MISCELLANEOUS
Qty: 100 EACH | Refills: 11 | Status: SHIPPED | OUTPATIENT
Start: 2025-04-28

## 2025-04-28 RX ORDER — DEXTROSE 4 G
TABLET,CHEWABLE ORAL
Qty: 1 EACH | Refills: 0 | Status: SHIPPED | OUTPATIENT
Start: 2025-04-28

## 2025-04-28 RX ORDER — LANCETS 33 GAUGE
EACH MISCELLANEOUS
Qty: 100 EACH | Refills: 11 | Status: SHIPPED | OUTPATIENT
Start: 2025-04-28

## 2025-04-28 RX ORDER — PEN NEEDLE, DIABETIC 30 GX3/16"
NEEDLE, DISPOSABLE MISCELLANEOUS
Qty: 100 EACH | Refills: 0 | Status: SHIPPED | OUTPATIENT
Start: 2025-04-28

## 2025-04-28 NOTE — PROGRESS NOTES
Clinical Pharmacy Appointment    Patient ID: Mattie Wolfe is a 54 y.o. female who presents for Diabetes and Medication Problem.    Pt is here for First appointment.   Referring Provider: Kobi Gold, *  PCP: Kobi Gold MD - last visit: 4/17/25, next visit: 8/13/25    Subjective   HPI  PMH significant for T2DM, hashimoto's hypothyroid, Hx CVA, Cardiomyopathy, orthostatic hypotension, DLD, Ortega's esophagus, gastroparesis, peripheral neuropathy, urinary incontinence, Hx seizure.  Special needs/barriers to therapy: None identified    Medication System Management  Patients preferred pharmacy: [facility] in [place]  Adherence/Organization: No current concerns  Affordability/Accessibility: Patient reports has not been able to get Lantus pens and glucometer testing supplies through insurance      DIABETES MELLITUS Type 1:    Follows with Endocrinology?: Yes, Dr. Hampton  last visit: 3/10/25, next visit: 6/9/25    Pertinent PMH Review  Known diabetic complications:   Nephropathy  Cardiovascular disease  Cerebrovascular disease  Retinopathy  Peripheral neuropathy  Autonomic neuropathy  Hx or FH Hx of MTC/MEN2?: N/A  Pancreatitis?: N/A  Gastroparesis?: Yes  UTI/Yeast Infections?: Yes, Hx UTIs. None recently.    Pharmacological Therapy  Current Medications:   Insulin lispro vials via [employer] ([employer] 780G  Previous Medications: None     Clarifications to above regimen: insulin glargine pen prescribed for backup in case of pump failure, but Medicaid denied   Adverse Effects: None    Glucose Readings  Glucometer/CGM Type: Med[employer]junct CGM  - Has Accuchek Guide as backup    Previous home BG readings: (3/10/25) Avg , TIR 70%, low 1%  Current home BG readings: Download not available via telehealth, patient reports good control and no recent changes    Any episodes of hypoglycemia? Yes, occasional  Did patient treat episode of hypoglycemia appropriately? Yes  Does the patient have a prescription for ready-to-use  Glucagon? Yes, GVOKE Hypopen    Lifestyle  Diet: Meals delivered from [employer]. No recent changes.  Physical Activity: ADL's    Risk Reducing Medications  Statin? Yes  ACE-I/ARB? No, indicated for updated [facility]    Preventative Care  Diabetic Eye Exam: Following with ophthalmology  Monofilament Foot Exam: Following with podiatry  Immunizations Needed: Prevnar, Shingrix, and Tdap  Tobacco Use: non-smoker      Objective   Allergies[1]  Social History     Social History Narrative    Not on file      Medication Review  Current Outpatient Medications   Medication Instructions    alcohol swabs Use to check blood glucose up to three times daily as directed.    atorvastatin (LIPITOR) 40 mg    blood sugar diagnostic (OneTouch Ultra Test) Use to check blood glucose up to three times daily as directed.    blood-glucose meter (OneTouch Ultra2 Meter) misc Use as directed to monitor blood glucose daily.    calcium carbonate (Tums) 200 mg calcium chewable tablet Daily PRN    fludrocortisone (FLORINEF) 0.1 mg, oral, Daily (0630)    fluticasone (Flonase) 50 mcg/actuation nasal spray 2 sprays, Each Nostril, Daily, Shake gently. Before first use, prime pump. After use, clean tip and replace cap.    gabapentin (NEURONTIN) 300 mg, oral, Nightly    glucagon (Gvoke HypoPen 2-Pack) 1 mg/0.2 mL auto-injector 0.2 mL, subcutaneous, Once as needed    HYDROcodone-acetaminophen (Norco) 5-325 mg tablet 1 tablet, oral, Every 6 hours PRN    insulin lispro (HumaLOG) 100 unit/mL injection INJECT 70 UNITS VIA INSULIN PUMP DAILY    ketoconazole (NIZOral) 2 % cream Daily    ketorolac (Acular) 0.4 % ophthalmic solution 1 drop, Left Eye, 4 times daily    lacosamide (VIMPAT) 150 mg, 2 times daily    lancets (OneTouch Delica Plus Lancet) 33 gauge misc Use to check blood glucose up to three times daily as directed.    Lantus Solostar U-100 Insulin 12 Units, subcutaneous, Nightly, For use in case of insulin pump failure.    lidocaine (Lidoderm) 5 % patch 1  "patch, transdermal, Daily, Apply to painful area 12 hours per day, remove for 12 hours.    midodrine (PROAMATINE) 5 mg, 3 times daily    mometasone-formoterol (Dulera) 200-5 mcg/actuation inhaler 2 puffs, 2 times daily    montelukast (SINGULAIR) 10 mg, Nightly    moxifloxacin (Vigamox) 0.5 % ophthalmic solution 1 drop, Left Eye, 4 times daily    multivitamin (Daily Multi-Vitamin) tablet 1 tablet, Daily    omeprazole (PRILOSEC) 40 mg, oral, Daily    pen needle, diabetic 31 gauge x 3/16\" needle Use once daily as needed for insulin injection    potassium chloride CR (Klor-Con) 8 mEq ER tablet 8 mEq, oral, Daily, Do not crush, chew, or split. TAKE WHEN TAKING FLUDROCORTISONE    prednisoLONE acetate (Pred-Forte) 1 % ophthalmic suspension 1 drop, Left Eye, 4 times daily    predniSONE (Deltasone) 20 mg tablet Take 3 tabs (60mg) daily for 3 days, then take 2 tabs (40mg) daily for 3 days, then take 1 tab (20mg) daily for 3 days.    predniSONE (DELTASONE) 20 mg, As needed    sodium chloride 0.9% 50 mL/hr    terbinafine (LAMISIL) 250 mg, Daily    Ventolin HFA 90 mcg/actuation inhaler     zonisamide (Zonegran) 100 mg capsule Take by mouth. 4 capules qam and 4 capsules at bedtime      Vitals  BP Readings from Last 2 Encounters:   04/30/25 173/75   04/29/25 118/80     BMI Readings from Last 1 Encounters:   04/30/25 30.24 kg/m²      Labs  A1C  Lab Results   Component Value Date    HGBA1C 6.3 03/10/2025    HGBA1C 6.0 11/12/2024    HGBA1C 6.2 07/08/2024     BMP  Lab Results   Component Value Date    CALCIUM 9.0 04/19/2025     04/19/2025    K 4.4 04/19/2025    CO2 17 (L) 04/19/2025     (H) 04/19/2025    BUN 23 04/19/2025    CREATININE 1.15 (H) 04/19/2025    EGFR 56 (L) 04/19/2025     LFTs  Lab Results   Component Value Date    ALT 28 04/19/2025    AST 27 04/19/2025    ALKPHOS 108 04/19/2025    BILITOT 0.4 04/19/2025     FLP  Lab Results   Component Value Date    TRIG 74 04/01/2024    CHOL 146 04/01/2024    LDLF 81 " "08/11/2022    LDLCALC 76 04/01/2024    HDL 55.5 04/01/2024     Urine Microalbumin  Lab Results   Component Value Date    MICROALBCREA 363.1 (H) 10/06/2023     Weight Management  Wt Readings from Last 3 Encounters:   04/30/25 75 kg (165 lb 5.5 oz)   04/21/25 75 kg (165 lb 5.5 oz)   04/19/25 70 kg (154 lb 5.2 oz)      There is no height or weight on file to calculate BMI.    Assessment/Plan   Problem List Items Addressed This Visit       Type 1 diabetes mellitus    Patient's A1c is 6.3% on 3/10/25. Goal A1c <7%.  Patient's SMBGs are not available for download, patient reports good control and no recent changes.     Rationale for plan: Patient following with endocrinology for T1DM management, uses Medtronic 780G insulin pump with CGM. Endo ordered basaglar pens to have available in case of pen failure and patient reports insurance is not covering these. Also report she purchases manual testing supplies OTC as backup to CGM. Reviewed coverage and determined insurance requires brand Lantus. Provided Rx for Lantus pens and testing supplies, to be filled and delivered from  [facility]. Continue current regimen and follow-up with endo and PCP as scheduled.    Medication Changes:  CONTINUE  Insulin lispro vials via Medtronic 780G    Additional Instructions  Rx sent for Lantus pens and pen needles. In the event of of pump failure inject 12 units under the skin once daily.  Rx sent for glucometer, test strips, and lancets. Use to check BG as needed for hypoglycemia, symptoms not matching CGM readings, or CGM failure.          Relevant Medications    Lantus Solostar U-100 Insulin 100 unit/mL (3 mL) pen    blood-glucose meter (OneTouch Ultra2 Meter) misc    blood sugar diagnostic (OneTouch Ultra Test)    lancets (OneTouch Delica Plus Lancet) 33 gauge misc    alcohol swabs    pen needle, diabetic 31 gauge x 3/16\" needle     Of note, patient also requesting Rx for compression stockings and disability placard. Request " forwarded to PCP.    Patient Education:  Counseled patient on relevant medication mechanisms of action, expectations, side effects, duration of therapy, contraindications, administration, and monitoring parameters.  All questions and concerns addressed. Contact pharmacist with any further questions or concerns prior to next appointment.    Clinical Pharmacist follow-up: As needed  Patient is followed in [facility]. I spent 10 minutes in the direct care of this patient and 10 minutes performing chart review.     Thank you,  Gunjan Prasad, [facility]  Clinical Pharmacy Specialist  [phone]    Continue all meds under the continuation of care with the referring provider and clinical pharmacy team.  Verbal consent to manage patient's drug therapy was obtained from the patient. They were informed they may decline to participate or withdraw from participation in pharmacy services at any time.         [1]   Allergies  Allergen Reactions    Aspirin Other     GI issues    Azithromycin Hives

## 2025-04-29 ENCOUNTER — APPOINTMENT (OUTPATIENT)
Dept: PRIMARY CARE | Facility: CLINIC | Age: 55
End: 2025-04-29
Payer: MEDICAID

## 2025-04-29 ENCOUNTER — TELEPHONE (OUTPATIENT)
Dept: PRIMARY CARE | Facility: CLINIC | Age: 55
End: 2025-04-29

## 2025-04-29 ENCOUNTER — APPOINTMENT (OUTPATIENT)
Dept: PHYSICAL THERAPY | Facility: CLINIC | Age: 55
End: 2025-04-29
Payer: MEDICAID

## 2025-04-29 ENCOUNTER — OFFICE VISIT (OUTPATIENT)
Dept: PRIMARY CARE | Facility: CLINIC | Age: 55
End: 2025-04-29
Payer: MEDICAID

## 2025-04-29 VITALS
DIASTOLIC BLOOD PRESSURE: 80 MMHG | SYSTOLIC BLOOD PRESSURE: 118 MMHG | OXYGEN SATURATION: 99 % | HEART RATE: 91 BPM | RESPIRATION RATE: 14 BRPM

## 2025-04-29 DIAGNOSIS — R26.9 GAIT ABNORMALITY: Primary | ICD-10-CM

## 2025-04-29 DIAGNOSIS — G62.89 OTHER POLYNEUROPATHY: ICD-10-CM

## 2025-04-29 DIAGNOSIS — E10.69 TYPE 1 DIABETES MELLITUS WITH OTHER SPECIFIED COMPLICATION: ICD-10-CM

## 2025-04-29 DIAGNOSIS — M25.512 ACUTE PAIN OF LEFT SHOULDER: Primary | ICD-10-CM

## 2025-04-29 DIAGNOSIS — R53.1 GENERALIZED WEAKNESS: ICD-10-CM

## 2025-04-29 DIAGNOSIS — Z91.81 AT RISK FOR INJURY RELATED TO FALL: ICD-10-CM

## 2025-04-29 DIAGNOSIS — M79.602 PAIN OF LEFT UPPER EXTREMITY: ICD-10-CM

## 2025-04-29 DIAGNOSIS — H25.812 COMBINED FORM OF AGE-RELATED CATARACT, LEFT EYE: ICD-10-CM

## 2025-04-29 PROCEDURE — 3074F SYST BP LT 130 MM HG: CPT | Performed by: INTERNAL MEDICINE

## 2025-04-29 PROCEDURE — 99214 OFFICE O/P EST MOD 30 MIN: CPT | Performed by: INTERNAL MEDICINE

## 2025-04-29 PROCEDURE — 3079F DIAST BP 80-89 MM HG: CPT | Performed by: INTERNAL MEDICINE

## 2025-04-29 PROCEDURE — 3044F HG A1C LEVEL LT 7.0%: CPT | Performed by: INTERNAL MEDICINE

## 2025-04-29 PROCEDURE — 97113 AQUATIC THERAPY/EXERCISES: CPT | Mod: GP

## 2025-04-29 PROCEDURE — 1036F TOBACCO NON-USER: CPT | Performed by: INTERNAL MEDICINE

## 2025-04-29 RX ORDER — HYDROCODONE BITARTRATE AND ACETAMINOPHEN 5; 325 MG/1; MG/1
1 TABLET ORAL EVERY 6 HOURS PRN
Qty: 24 TABLET | Refills: 0 | Status: SHIPPED | OUTPATIENT
Start: 2025-04-29 | End: 2025-05-05

## 2025-04-29 NOTE — PATIENT INSTRUCTIONS
REPEAT XRAY LEFT SHOULDER AND HUMERUS ALONG WITH NECK XRAY    2.  HYDROCODONE TABLETS ARE SENT IN , I CAN SEND IN 6 DAYS WORTH FOR THE TIME BEING    3.  STILL KEEP TAKING THE GABAPENTIN FOR NOW, TAKE 100 MG IN THE MORNING AND AT NOON, TAKE 300 MG AT NIGHT    4.  HEATING PAD TO THE NECK MIGHT HELP EVERYTHING LOOSEN UP IF THIS IS EMANATING FROM YOUR NECK    5.  ASK YOUR OPTHAMOLOGIC SURGEON WHETHER, IF IT IS NEEDED TO CALM DOWN WHAT SOUNDS TO BE A NERVE PAIN IN YOUR LEFT ARM, WHETHER I CAN SAFELY PRESCRIBE YOU ORAL STEROIDS FOR A SHORT TIME - AS LONG AS IT WON'T INTERFERE WITH OPTHALMIC SURGERY?  I SUSPECT YOU MAY BE HAVING AN UNUSUAL DIABETIC ASSOCIATED ACUTE NERVE INJURY CALLED MONONEURITIS MULTIPLEX - NOT NECESSARILY RELATED TO A NECK NERVE PINCH    6.  FOLLOW UP NEXT WEEK

## 2025-04-29 NOTE — TELEPHONE ENCOUNTER
This Pt called to advise she went to the [facility] and was told she needed to have an appointment and they would not see her????    She said she can not lift her arm and is in much pain.  Asking what she should do?

## 2025-04-29 NOTE — PROGRESS NOTES
Physical Therapy Treatment      Patient Name: Mattie Wolfe  MRN: [id number]  Today's Date: 4/29/2025  Visit #16  Time Calculation  Start Time: 1000  Stop Time: 1030  Time Calculation (min): 30 min  Time In: 10  Time Out: 1030  Total Time: 30    Insurance:  2025 [place] GLENDY TIRADO REQ AFTER 30 VS SHIRA YR     Assessment:  Continued aquatic therapy focus of improving ambulatory tolerance, strengthening, and balance.  Focused on more gentle UE ROM exercises this date secondary to pt inc shoulder and arm pain. Spent time educating the patient on the benefits of physical therapy for her arm pain and reassuring her that she is doing all the correct things to date to manage her discomfort.    Pt tolerated all interventions well, SUP with PT in water for additional safety.  Pt was able to ambulate 17 minutes without standing or seated rest break, and no SOB noted throughout session.  Pt is making progress towards goals and would continue to benefit from skilled PT at this time.          Aquatic PT is required due to, buoyancy of water reduced weight bearing and decreased LE and spinal loading, allowing for more freedom of movement and promotes improved ability to perform functional tasks.  The viscosity of water which is more than 700x that of air, allowed increased reaction time, in turn allowing advanced balance activities to be safely performed and allow patient to be challenged beyond limited of stability without fear of falling; provides the opportunity to work on balance in a safe environment.  The hydrostatic pressure of water facilitates the reduction of edema in the lower extremities, allowing for greater ease of movement.  Aquatics d/t cardio vascular benefits including: improved cardiovascular efficiency including increased stroke volume, decreased HR, and decreased blood pressure with increased cardiac output allowing patient to achieve cardiovascular training effect with less work load.  Aquatics to support upright  posture during postural retraining and strengthening.   Aquatics allow patient to work on gait with less assistance or without assistive device improving posture and ease of gait training/conditioning.     Patient's response to session: Decreased pain, Increased ROM/joint mobility, Increase motor control, Improved gait, and Increased knowledge and understanding    Plan:  Cont to progress LE strength/endurance for functional mobility as tolerated. Inc SL balance exercises and endurance walking. Be aware current POC ends 5/5/25.     Tx Considerations:  -Schedule Aquatic therapy: Trial of inc forward walking in the beginning of the session (to build ambulatory tolerance).   -Ambulation/dynamic gait w/out AD or w/SPC  -Toe taps, step ups etc.    Current Problem:   1. Gait abnormality        2. Generalized weakness        3. At risk for injury related to fall            Subjective     Pt cont to report L UE pain with c/f fracture. Going to PCP today. Gabapentin does not seem to be helping. She is frustrated by this pain. Only comfortable position is prone with arms above head.     Pain: 6/10; worst pain with LUE at night        Objective     Ambulated with SBA in pool with current for 17 min without need for rest breaks  Ttp L brachioradialis and upper trap  No inc in symptoms with median nerve ULTT    Treatments:  Therapeutic Exercise (40276):   minutes (0 minutes)      Manual Therapy (27877):   minutes      Neuromuscular Re-education (87937):   minutes      Aquatic Therapy (89136):   30 Min 2 units  17 minutes of forward water walking without UE support.   Pendulums in deep water 3x10 all directions  Scapular retraction in deep water 3x10   Edu on continuing to complete gentle ROM to LUE as well as exercises prescribed last land PT session       Vielka Osman, PT

## 2025-04-29 NOTE — PROGRESS NOTES
Subjective   Mattie Wolfe is a 54 y.o. female who presents for  FOLLOW UP    NEW CONCERN PT SAYS DAY AFTER VISIT 4/17 STARTED HAVING LEFT SHOULDER PAIN WENT TO ER BY [facility] PAIN WAS SO BAD COULD NOT WAIT FOR SISTER TO TAKE HER TOLD NERVE PAIN GAVE GABAPENTIN FEELS GETTING WORSE PUTTING LIDOCAINE PATCHES. SAYS HURTS WORSE THEN WHEN SHE BROKE LEG IN 4 SPOTS   WENT TO ORTHO CLINIC WAS TURNED AWAY BECAUSE SHE WENT TO ER   HAVING LEFT EYE CATARACT  AND SCAR TISSUE REMOVAL TOMORROW       HPI   HAVING AN EYE SURGERY TOMORROW    HAVE TYPE 1 DIABETES.    WENT TO ORTHO BUT THEY WOULDN'T SEE HER    WAS SEEN IN ER AND TOLD NERVE PAINS    STARTS BEHIND THE LEFT SCAPULA AND SHOOTS DOWN ARM INTO LEFT ELBOW.    NO FALL OR TRAUMA.    DUE TO OSTEOPOROSIS, HAS HAD FRACTURES MISSED IN THE PAST.      Review of Systems   Constitutional:  Negative for chills, diaphoresis and fever.   Respiratory:  Negative for cough, choking, shortness of breath, wheezing and stridor.    Cardiovascular:  Negative for chest pain and leg swelling.   Gastrointestinal:  Negative for constipation, diarrhea, nausea and vomiting.   Musculoskeletal:  Negative for joint swelling and myalgias.        PAIN LEFT SCAPULA/SHOULDER/HUMERUS       Objective   /80   Pulse 91   Resp 14   SpO2 99%     Physical Exam  Vitals reviewed.   Constitutional:       General: She is not in acute distress.     Appearance: She is not ill-appearing.   HENT:      Right Ear: Tympanic membrane and external ear normal. There is no impacted cerumen.      Left Ear: Tympanic membrane and external ear normal. There is no impacted cerumen.   Cardiovascular:      Rate and Rhythm: Normal rate and regular rhythm.      Pulses: Normal pulses.      Heart sounds:      No gallop.   Pulmonary:      Breath sounds: Normal breath sounds. No wheezing, rhonchi or rales.   Abdominal:      General: Abdomen is flat. Bowel sounds are normal.      Palpations: Abdomen is soft.      Tenderness: There is no  guarding or rebound.   Musculoskeletal:      Right lower leg: No edema.      Left lower leg: No edema.      Comments: LEFT ARM EXQUISITELY TENDER AT SCAPULA, LATERAL EPICONDYLE, AND WITH RADIATING PAIN TO THE DORSAL ASPECT  OF LEFT HAND AND FINGERS         Assessment/Plan   Problem List Items Addressed This Visit       Arm pain    Shoulder pain - Primary    Relevant Medications    HYDROcodone-acetaminophen (Norco) 5-325 mg tablet    Other Relevant Orders    XR shoulder left 2+ views    XR humerus left    XR cervical spine 2-3 views    Type 1 diabetes mellitus    Peripheral neuropathy    Combined form of age-related cataract, left eye     Patient Instructions    REPEAT XRAY LEFT SHOULDER AND HUMERUS ALONG WITH NECK XRAY    2.  HYDROCODONE TABLETS ARE SENT IN , I CAN SEND IN 6 DAYS WORTH FOR THE TIME BEING    3.  STILL KEEP TAKING THE GABAPENTIN FOR NOW, TAKE 100 MG IN THE MORNING AND AT NOON, TAKE 300 MG AT NIGHT    4.  HEATING PAD TO THE NECK MIGHT HELP EVERYTHING LOOSEN UP IF THIS IS EMANATING FROM YOUR NECK    5.  ASK YOUR OPTHAMOLOGIC SURGEON WHETHER, IF IT IS NEEDED TO CALM DOWN WHAT SOUNDS TO BE A NERVE PAIN IN YOUR LEFT ARM, WHETHER I CAN SAFELY PRESCRIBE YOU ORAL STEROIDS FOR A SHORT TIME - AS LONG AS IT WON'T INTERFERE WITH OPTHALMIC SURGERY?  I SUSPECT YOU MAY BE HAVING AN UNUSUAL DIABETIC ASSOCIATED ACUTE NERVE INJURY CALLED MONONEURITIS MULTIPLEX - NOT NECESSARILY RELATED TO A NECK NERVE PINCH    6.  FOLLOW UP NEXT WEEK

## 2025-04-30 ENCOUNTER — PHARMACY VISIT (OUTPATIENT)
Dept: PHARMACY | Facility: CLINIC | Age: 55
End: 2025-04-30
Payer: MEDICAID

## 2025-04-30 ENCOUNTER — ANESTHESIA EVENT (OUTPATIENT)
Dept: OPERATING ROOM | Facility: HOSPITAL | Age: 55
End: 2025-04-30
Payer: MEDICAID

## 2025-04-30 ENCOUNTER — ANESTHESIA (OUTPATIENT)
Dept: OPERATING ROOM | Facility: HOSPITAL | Age: 55
End: 2025-04-30
Payer: MEDICAID

## 2025-04-30 ENCOUNTER — TELEPHONE (OUTPATIENT)
Dept: PRIMARY CARE | Facility: CLINIC | Age: 55
End: 2025-04-30
Payer: MEDICAID

## 2025-04-30 ENCOUNTER — HOSPITAL ENCOUNTER (OUTPATIENT)
Facility: HOSPITAL | Age: 55
Setting detail: OUTPATIENT SURGERY
Discharge: HOME | End: 2025-04-30
Attending: OPHTHALMOLOGY | Admitting: OPHTHALMOLOGY
Payer: MEDICAID

## 2025-04-30 VITALS
HEART RATE: 86 BPM | DIASTOLIC BLOOD PRESSURE: 75 MMHG | TEMPERATURE: 96.8 F | HEIGHT: 62 IN | BODY MASS INDEX: 30.43 KG/M2 | SYSTOLIC BLOOD PRESSURE: 173 MMHG | RESPIRATION RATE: 16 BRPM | OXYGEN SATURATION: 100 % | WEIGHT: 165.34 LBS

## 2025-04-30 DIAGNOSIS — R60.0 LEG EDEMA: ICD-10-CM

## 2025-04-30 DIAGNOSIS — I63.9 CEREBROVASCULAR ACCIDENT (CVA), UNSPECIFIED MECHANISM (MULTI): ICD-10-CM

## 2025-04-30 DIAGNOSIS — G58.7 MONONEURITIS MULTIPLEX: Primary | ICD-10-CM

## 2025-04-30 DIAGNOSIS — R26.9 GAIT DISTURBANCE, POST-STROKE: ICD-10-CM

## 2025-04-30 DIAGNOSIS — I69.398 GAIT DISTURBANCE, POST-STROKE: ICD-10-CM

## 2025-04-30 DIAGNOSIS — H54.7 UNSPECIFIED VISUAL LOSS: ICD-10-CM

## 2025-04-30 DIAGNOSIS — H25.812 COMBINED FORM OF AGE-RELATED CATARACT, LEFT EYE: Primary | ICD-10-CM

## 2025-04-30 DIAGNOSIS — R26.2 DIFFICULTY WALKING: ICD-10-CM

## 2025-04-30 LAB
GLUCOSE BLD MANUAL STRIP-MCNC: 103 MG/DL (ref 74–99)
GLUCOSE BLD MANUAL STRIP-MCNC: 87 MG/DL (ref 74–99)

## 2025-04-30 PROCEDURE — 7100000010 HC PHASE TWO TIME - EACH INCREMENTAL 1 MINUTE: Performed by: OPHTHALMOLOGY

## 2025-04-30 PROCEDURE — 82947 ASSAY GLUCOSE BLOOD QUANT: CPT

## 2025-04-30 PROCEDURE — 2500000005 HC RX 250 GENERAL PHARMACY W/O HCPCS: Performed by: ANESTHESIOLOGIST ASSISTANT

## 2025-04-30 PROCEDURE — V2632 POST CHMBR INTRAOCULAR LENS: HCPCS | Performed by: OPHTHALMOLOGY

## 2025-04-30 PROCEDURE — 2500000005 HC RX 250 GENERAL PHARMACY W/O HCPCS: Performed by: OPHTHALMOLOGY

## 2025-04-30 PROCEDURE — 3700000001 HC GENERAL ANESTHESIA TIME - INITIAL BASE CHARGE: Performed by: OPHTHALMOLOGY

## 2025-04-30 PROCEDURE — 2760000001 HC OR 276 NO HCPCS: Performed by: OPHTHALMOLOGY

## 2025-04-30 PROCEDURE — 66984 XCAPSL CTRC RMVL W/O ECP: CPT | Performed by: OPHTHALMOLOGY

## 2025-04-30 PROCEDURE — 3700000002 HC GENERAL ANESTHESIA TIME - EACH INCREMENTAL 1 MINUTE: Performed by: OPHTHALMOLOGY

## 2025-04-30 PROCEDURE — 2500000004 HC RX 250 GENERAL PHARMACY W/ HCPCS (ALT 636 FOR OP/ED): Performed by: OPHTHALMOLOGY

## 2025-04-30 PROCEDURE — 3600000003 HC OR TIME - INITIAL BASE CHARGE - PROCEDURE LEVEL THREE: Performed by: OPHTHALMOLOGY

## 2025-04-30 PROCEDURE — 7100000009 HC PHASE TWO TIME - INITIAL BASE CHARGE: Performed by: OPHTHALMOLOGY

## 2025-04-30 PROCEDURE — 3600000008 HC OR TIME - EACH INCREMENTAL 1 MINUTE - PROCEDURE LEVEL THREE: Performed by: OPHTHALMOLOGY

## 2025-04-30 PROCEDURE — 2500000004 HC RX 250 GENERAL PHARMACY W/ HCPCS (ALT 636 FOR OP/ED): Performed by: ANESTHESIOLOGIST ASSISTANT

## 2025-04-30 DEVICE — STERILE UV AND BLUE LIGHT FILTERING ACRYLIC FOLDABLE ASPHERIC POSTERIOR CHAMBER INTRAOCULAR LENS
Type: IMPLANTABLE DEVICE | Site: EYE | Status: FUNCTIONAL
Brand: CLAREON

## 2025-04-30 RX ORDER — PREDNISONE 20 MG/1
TABLET ORAL
Qty: 18 TABLET | Refills: 0 | Status: SHIPPED | OUTPATIENT
Start: 2025-04-30 | End: 2025-05-09

## 2025-04-30 RX ORDER — TETRACAINE HYDROCHLORIDE 5 MG/ML
1 SOLUTION OPHTHALMIC ONCE
Status: COMPLETED | OUTPATIENT
Start: 2025-04-30 | End: 2025-04-30

## 2025-04-30 RX ORDER — CYCLOPENTOLATE HYDROCHLORIDE 10 MG/ML
1 SOLUTION/ DROPS OPHTHALMIC
Status: COMPLETED | OUTPATIENT
Start: 2025-04-30 | End: 2025-04-30

## 2025-04-30 RX ORDER — TROPICAMIDE 10 MG/ML
1 SOLUTION/ DROPS OPHTHALMIC
Status: COMPLETED | OUTPATIENT
Start: 2025-04-30 | End: 2025-04-30

## 2025-04-30 RX ORDER — PREDNISOLONE ACETATE 10 MG/ML
1 SUSPENSION/ DROPS OPHTHALMIC 4 TIMES DAILY
Qty: 5 ML | Refills: 1 | Status: SHIPPED | OUTPATIENT
Start: 2025-04-30

## 2025-04-30 RX ORDER — SODIUM CHLORIDE 0.9 % (FLUSH) 0.9 %
SYRINGE (ML) INJECTION CONTINUOUS PRN
Status: DISCONTINUED | OUTPATIENT
Start: 2025-04-30 | End: 2025-04-30

## 2025-04-30 RX ORDER — MOXIFLOXACIN 5 MG/ML
1 SOLUTION/ DROPS OPHTHALMIC 4 TIMES DAILY
Qty: 5 ML | Refills: 0 | Status: SHIPPED | OUTPATIENT
Start: 2025-04-30

## 2025-04-30 RX ORDER — EPINEPHRINE 1 MG/ML
INJECTION, SOLUTION, CONCENTRATE INTRAVENOUS AS NEEDED
Status: DISCONTINUED | OUTPATIENT
Start: 2025-04-30 | End: 2025-04-30 | Stop reason: HOSPADM

## 2025-04-30 RX ORDER — MIDAZOLAM HYDROCHLORIDE 1 MG/ML
INJECTION, SOLUTION INTRAMUSCULAR; INTRAVENOUS AS NEEDED
Status: DISCONTINUED | OUTPATIENT
Start: 2025-04-30 | End: 2025-04-30

## 2025-04-30 RX ORDER — PHENYLEPHRINE HYDROCHLORIDE 25 MG/ML
1 SOLUTION/ DROPS OPHTHALMIC
Status: COMPLETED | OUTPATIENT
Start: 2025-04-30 | End: 2025-04-30

## 2025-04-30 RX ORDER — DEXTROSE 50 % IN WATER (D50W) INTRAVENOUS SYRINGE
AS NEEDED
Status: DISCONTINUED | OUTPATIENT
Start: 2025-04-30 | End: 2025-04-30

## 2025-04-30 RX ORDER — KETOROLAC TROMETHAMINE 4 MG/ML
1 SOLUTION/ DROPS OPHTHALMIC 4 TIMES DAILY
Qty: 5 ML | Refills: 1 | Status: SHIPPED | OUTPATIENT
Start: 2025-04-30

## 2025-04-30 RX ORDER — LIDOCAINE HYDROCHLORIDE 10 MG/ML
INJECTION, SOLUTION INFILTRATION; PERINEURAL AS NEEDED
Status: DISCONTINUED | OUTPATIENT
Start: 2025-04-30 | End: 2025-04-30 | Stop reason: HOSPADM

## 2025-04-30 RX ORDER — TETRACAINE HYDROCHLORIDE 5 MG/ML
SOLUTION OPHTHALMIC AS NEEDED
Status: DISCONTINUED | OUTPATIENT
Start: 2025-04-30 | End: 2025-04-30 | Stop reason: HOSPADM

## 2025-04-30 RX ADMIN — CYCLOPENTOLATE HYDROCHLORIDE 1 DROP: 10 SOLUTION OPHTHALMIC at 06:52

## 2025-04-30 RX ADMIN — PHENYLEPHRINE HYDROCHLORIDE 1 DROP: 25 SOLUTION/ DROPS OPHTHALMIC at 06:42

## 2025-04-30 RX ADMIN — TROPICAMIDE 1 DROP: 10 SOLUTION/ DROPS OPHTHALMIC at 07:01

## 2025-04-30 RX ADMIN — DEXTROSE MONOHYDRATE 12.5 G: 25 INJECTION, SOLUTION INTRAVENOUS at 08:05

## 2025-04-30 RX ADMIN — CYCLOPENTOLATE HYDROCHLORIDE 1 DROP: 10 SOLUTION OPHTHALMIC at 06:42

## 2025-04-30 RX ADMIN — PHENYLEPHRINE HYDROCHLORIDE 1 DROP: 25 SOLUTION/ DROPS OPHTHALMIC at 06:52

## 2025-04-30 RX ADMIN — CYCLOPENTOLATE HYDROCHLORIDE 1 DROP: 10 SOLUTION OPHTHALMIC at 07:02

## 2025-04-30 RX ADMIN — TROPICAMIDE 1 DROP: 10 SOLUTION/ DROPS OPHTHALMIC at 06:41

## 2025-04-30 RX ADMIN — Medication: at 07:35

## 2025-04-30 RX ADMIN — TROPICAMIDE 1 DROP: 10 SOLUTION/ DROPS OPHTHALMIC at 06:52

## 2025-04-30 RX ADMIN — TETRACAINE HYDROCHLORIDE 1 DROP: 5 SOLUTION OPHTHALMIC at 06:41

## 2025-04-30 RX ADMIN — PHENYLEPHRINE HYDROCHLORIDE 1 DROP: 25 SOLUTION/ DROPS OPHTHALMIC at 07:01

## 2025-04-30 RX ADMIN — MIDAZOLAM 2 MG: 1 INJECTION INTRAMUSCULAR; INTRAVENOUS at 07:35

## 2025-04-30 SDOH — HEALTH STABILITY: MENTAL HEALTH: CURRENT SMOKER: 0

## 2025-04-30 ASSESSMENT — ENCOUNTER SYMPTOMS
JOINT SWELLING: 0
RESPIRATORY NEGATIVE: 1
DIARRHEA: 0
STRIDOR: 0
CHOKING: 0
CONSTIPATION: 0
NEUROLOGICAL NEGATIVE: 1
CONSTITUTIONAL NEGATIVE: 1
DIAPHORESIS: 0
SHORTNESS OF BREATH: 0
CARDIOVASCULAR NEGATIVE: 1
COUGH: 0
FEVER: 0
WHEEZING: 0
GASTROINTESTINAL NEGATIVE: 1
NAUSEA: 0
VOMITING: 0
MYALGIAS: 0
CHILLS: 0

## 2025-04-30 ASSESSMENT — PAIN SCALES - GENERAL
PAINLEVEL_OUTOF10: 0 - NO PAIN
PAIN_LEVEL: 0
PAINLEVEL_OUTOF10: 0 - NO PAIN
PAINLEVEL_OUTOF10: 0 - NO PAIN

## 2025-04-30 ASSESSMENT — PAIN - FUNCTIONAL ASSESSMENT
PAIN_FUNCTIONAL_ASSESSMENT: 0-10

## 2025-04-30 ASSESSMENT — COLUMBIA-SUICIDE SEVERITY RATING SCALE - C-SSRS
1. IN THE PAST MONTH, HAVE YOU WISHED YOU WERE DEAD OR WISHED YOU COULD GO TO SLEEP AND NOT WAKE UP?: NO
6. HAVE YOU EVER DONE ANYTHING, STARTED TO DO ANYTHING, OR PREPARED TO DO ANYTHING TO END YOUR LIFE?: NO
2. HAVE YOU ACTUALLY HAD ANY THOUGHTS OF KILLING YOURSELF?: NO

## 2025-04-30 NOTE — ANESTHESIA POSTPROCEDURE EVALUATION
Patient: Mattie Wolfe    Procedure Summary       Date: 04/30/25 Room / Location: [facility] 02 / [facility]    Anesthesia Start: 0729 Anesthesia Stop: 0816    Procedure: LEFT CATARACT EXTRACTION WITH INTRAOCULAR LENS IMPLANTATION (Left) Diagnosis:       Combined form of age-related cataract, left eye      (Combined form of age-related cataract, left eye [H25.812])    Surgeons: Mason Castillo MD Responsible Provider: Flakito Del Valle MD    Anesthesia Type: MAC ASA Status: 3            Anesthesia Type: MAC    Vitals Value Taken Time   /72 04/30/25 08:16   Temp 36 °C (96.8 °F) 04/30/25 08:16   Pulse 88 04/30/25 08:16   Resp 18 04/30/25 08:16   SpO2 100 % 04/30/25 08:16       Anesthesia Post Evaluation    Patient participation: complete - patient cannot participate  Level of consciousness: awake  Pain score: 0  Pain management: adequate  Airway patency: patent  Cardiovascular status: acceptable  Respiratory status: acceptable  Hydration status: acceptable  Postoperative Nausea and Vomiting: none  Comments: Glucose 112mg/dL        There were no known notable events for this encounter.

## 2025-04-30 NOTE — TELEPHONE ENCOUNTER
Pt had surgery today with Ophthalmology and asked the surgeon if it was ok for you to prescribe her oral steroids for a short period.  The surgeon advised her it would not be a problem.

## 2025-04-30 NOTE — H&P
"History Of Present Illness  Mattie Wolfe is a 54 y.o. female presenting with cataract, left eye.     Past Medical History  Medical History[1]    Surgical History  Surgical History[2]     Social History  She reports that she has never smoked. She has never used smokeless tobacco. She reports that she does not currently use alcohol. She reports that she does not currently use drugs.    Family History  Family History[3]     Allergies  Aspirin and Azithromycin    Review of Systems   Constitutional: Negative.    Respiratory: Negative.     Cardiovascular: Negative.    Gastrointestinal: Negative.    Neurological: Negative.         Physical Exam  Cardiovascular:      Rate and Rhythm: Normal rate.   Pulmonary:      Effort: Pulmonary effort is normal.   Abdominal:      Palpations: Abdomen is soft.   Skin:     General: Skin is warm.   Neurological:      Mental Status: She is alert. Mental status is at baseline.   Psychiatric:         Mood and Affect: Mood normal.         Thought Content: Thought content normal.          Last Recorded Vitals  Blood pressure 172/79, pulse 90, temperature 36.5 °C (97.7 °F), temperature source Temporal, resp. rate 18, height 1.575 m (5' 2\"), weight 75 kg (165 lb 5.5 oz), SpO2 100%.    Relevant Results        Current Outpatient Medications   Medication Instructions    alcohol swabs Use to check blood glucose up to three times daily as directed.    atorvastatin (LIPITOR) 40 mg    blood sugar diagnostic (OneTouch Ultra Test) Use to check blood glucose up to three times daily as directed.    blood-glucose meter (OneTouch Ultra2 Meter) misc Use as directed to monitor blood glucose daily.    calcium carbonate (Tums) 200 mg calcium chewable tablet Daily PRN    fludrocortisone (FLORINEF) 0.1 mg, oral, Daily (0630)    fluticasone (Flonase) 50 mcg/actuation nasal spray 2 sprays, Each Nostril, Daily, Shake gently. Before first use, prime pump. After use, clean tip and replace cap.    gabapentin (NEURONTIN) " "300 mg, oral, Nightly    glucagon (Gvoke HypoPen 2-Pack) 1 mg/0.2 mL auto-injector 0.2 mL, subcutaneous, Once as needed    HYDROcodone-acetaminophen (Norco) 5-325 mg tablet 1 tablet, oral, Every 6 hours PRN    insulin lispro (HumaLOG) 100 unit/mL injection INJECT 70 UNITS VIA INSULIN PUMP DAILY    ketoconazole (NIZOral) 2 % cream Daily    lacosamide (VIMPAT) 150 mg, 2 times daily    lancets (OneTouch Delica Plus Lancet) 33 gauge misc Use to check blood glucose up to three times daily as directed.    Lantus Solostar U-100 Insulin 12 Units, subcutaneous, Nightly, For use in case of insulin pump failure.    lidocaine (Lidoderm) 5 % patch 1 patch, transdermal, Daily, Apply to painful area 12 hours per day, remove for 12 hours.    midodrine (PROAMATINE) 5 mg, 3 times daily    mometasone-formoterol (Dulera) 200-5 mcg/actuation inhaler 2 puffs, 2 times daily    montelukast (SINGULAIR) 10 mg, Nightly    multivitamin (Daily Multi-Vitamin) tablet 1 tablet, Daily    omeprazole (PRILOSEC) 40 mg, oral, Daily    pen needle, diabetic 31 gauge x 3/16\" needle Use once daily as needed for insulin injection    potassium chloride CR (Klor-Con) 8 mEq ER tablet 8 mEq, oral, Daily, Do not crush, chew, or split. TAKE WHEN TAKING FLUDROCORTISONE    predniSONE (DELTASONE) 20 mg, As needed    sodium chloride 0.9% 50 mL/hr    terbinafine (LAMISIL) 250 mg, Daily    Ventolin HFA 90 mcg/actuation inhaler     zonisamide (Zonegran) 100 mg capsule Take by mouth. 4 capules qam and 4 capsules at bedtime          Assessment & Plan  Combined form of age-related cataract, left eye      Mattie Wolfe is a 54 y.o. female presenting with cataract, left eye here for phaco/intraocular lens (IOL) insertion, left eye          Desean Levy MD         [1]   Past Medical History:  Diagnosis Date    Asthma     Cataract     Chronic kidney disease     Diabetes (Multi)     Diabetes mellitus type I (Multi)     Diabetic retinopathy (Multi)     " Gastrointestinal disorder     Gastroparesis     Gastroparesis    GERD (gastroesophageal reflux disease)     H/O bladder infections     History of stroke     HL (hearing loss)     Osteoporosis     Other conditions influencing health status     Diabetes type 1, uncontrolled    Personal history of transient ischemic attack (TIA), and cerebral infarction without residual deficits     History of stroke    Stroke (Multi)     Vision loss    [2]   Past Surgical History:  Procedure Laterality Date    AIRWAY SURGERY      APPENDECTOMY  04/09/2014    Appendectomy    CATARACT EXTRACTION      CHOLECYSTECTOMY      HIP SURGERY  04/09/2014    Hip Surgery    MR HEAD ANGIO WO IV CONTRAST  02/28/2020    MR HEAD ANGIO WO IV CONTRAST 2/28/2020 [facility]    MR NECK ANGIO WO IV CONTRAST  02/28/2020    MR NECK ANGIO WO IV CONTRAST 2/28/2020 [facility]    OTHER SURGICAL HISTORY  04/09/2014    Cholecystotomy    OTHER SURGICAL HISTORY  11/01/2019    Retinal detachment repair    OTHER SURGICAL HISTORY  06/04/2020    Wrist surgery    RETINAL DETACHMENT SURGERY     [3]   Family History  Problem Relation Name Age of Onset    Hypertension Mother      Stroke Mother      Hypertension Father      Other (SUBSTANCE ABUSE) Father      Stroke Other Sibling     Breast cancer Other grandparent     Ovarian cancer Father's Sister

## 2025-04-30 NOTE — ANESTHESIA PREPROCEDURE EVALUATION
Patient: Mattie Wolfe    Procedure Information       Date/Time: 04/30/25 0730    Procedure: LEFT CATARACT EXTRACTION WITH INTRAOCULAR LENS IMPLANTATION (Left)    Location: [facility] 02 / [facility]    Surgeons: Mason Castillo MD            Relevant Problems   Anesthesia   (+) Difficult intravenous access      Cardiac   (+) Hyperlipidemia LDL goal <100      Pulmonary   (+) Dyspnea on exertion   (+) Dyspnea on minimal exertion      Neuro   (+) Peripheral neuropathy   (+) Seizure (Multi)   (+) Stroke (Multi)      GI   (+) Pharyngoesophageal dysphagia      Endocrine   (+) Diabetic diarrhea (Multi)   (+) Diabetic neuropathic cachexia   (+) Type 1 diabetes mellitus      HEENT   (+) Bilateral hearing loss      ID   (+) Candida esophagitis (Multi)       Clinical information reviewed:   Tobacco  Allergies  Meds   Med Hx  Surg Hx   Fam Hx  Soc Hx        NPO Detail:  NPO/Void Status  Date of Last Liquid: 04/30/25  Time of Last Liquid: 0000  Date of Last Solid: 04/30/25  Time of Last Solid: 0000         Physical Exam    Airway  Mallampati: I  TM distance: >3 FB  Neck ROM: full  Mouth opening: 3 or more finger widths     Cardiovascular - normal exam   Dental     (+) upper dentures, lower dentures     Pulmonary - normal exam   Abdominal (+) obese             Anesthesia Plan    History of general anesthesia?: yes  History of complications of general anesthesia?: no    ASA 3     MAC     The patient is not a current smoker.  Patient was previously instructed to abstain from smoking on day of procedure.  Patient did not smoke on day of procedure.    intravenous induction   Anesthetic plan and risks discussed with patient.    Plan discussed with CAA.

## 2025-04-30 NOTE — OP NOTE
LEFT CATARACT EXTRACTION WITH INTRAOCULAR LENS IMPLANTATION (L) Operative Note     Date: 2025  OR Location: [facility] OR    Name: Mattie Wolfe, : 1970, Age: 54 y.o., MRN: [id number], Sex: female    Diagnosis  Pre-op Diagnosis      * Combined form of age-related cataract, left eye [H25.812] Post-op Diagnosis     * Combined form of age-related cataract, left eye [H25.812]     Procedures  LEFT CATARACT EXTRACTION WITH INTRAOCULAR LENS IMPLANTATION  91495 - HI XCAPSL CTRC RMVL INSJ IO LENS PROSTH W/O ECP      Surgeons      * Mason Castillo - Primary    Resident/Fellow/Other Assistant:  Surgeons and Role:  * No surgeons found with a matching role *    Staff:   Callie: Kamilah    Anesthesia Staff: Anesthesiologist: Flakito Del Valle MD  C-AA: JOSELYN Lafleur    Procedure Summary  Anesthesia: Monitor Anesthesia Care  ASA: III  Estimated Blood Loss: <5 mL  Intra-op Medications:   Administrations occurring from 0730 to 0825 on 25:   Medication Name Total Dose   tobramycin-dexamethasone (Tobradex) ophthalmic ointment 0.5 inch   tetracaine (PF) 0.5 % ophthalmic solution 1 drop   dextrose 50% in water IV syringe 12.5 g   midazolam (Versed) injection 1 mg/mL 2 mg   sodium chloride 0.9 % flush Cannot be calculated              Anesthesia Record               Intraprocedure I/O Totals          Intake    sodium chloride 0.9 % flush 5.00 mL    Total Intake 5 mL          Specimen: No specimens collected              Drains and/or Catheters: * None in log *    Tourniquet Times:         Implants:  Implants       Type Name Action Serial No.      Lens LENS, INTRAOCULAR, SY60WF 21.5 - [id number] - [id number] Implanted [id number]              Findings: Cataract, left eye    Indications: Mattie Wolfe is an 54 y.o. female who is having surgery for Combined form of age-related cataract, left eye [H25.812].     The patient was seen in the preoperative area. The risks, benefits, complications, treatment  options, non-operative alternatives, expected recovery and outcomes were discussed with the patient. The possibilities of reaction to medication, pulmonary aspiration, injury to surrounding structures, bleeding, recurrent infection, the need for additional procedures, failure to diagnose a condition, and creating a complication requiring transfusion or operation were discussed with the patient. The patient concurred with the proposed plan, giving informed consent.  The site of surgery was properly noted/marked if necessary per policy. The patient has been actively warmed in preoperative area. Preoperative antibiotics are not indicated. Venous thrombosis prophylaxis are not indicated.    Procedure Details:   The patient was placed in the supine position on the operating room table where appropriate blood pressure and cardiac monitoring were initiated. The patient was prepped and draped in the usual sterile fashion for intraocular surgery. This included instillation of Betadine 5% onto the ocular surface followed by irrigation with balance salt solution a minute or two later. A lid speculum was placed and the operating microscope was positioned. One paracentesis stab incision was made to the left of the planned cataract incision with a 15-degree supersharp blade. 1 ml of preservative free lidocaine was injected into the AC. Viscoat was used to replace the aqueous humor. A temporal clear corneal wound was fashioned beginning at the limbus with a 2.4 mm keratome, extending 2 mm into clear cornea before entering the anterior chamber. A continuous tear circular capsulorhexis of approximately 5 mm in diameter was performed. Hydrodissection was performed using a Jacobson canula. The endothelium was coated with viscoat again. Using the Ozil handpiece on the Afluenta Lens Removal System, the nucleus was emulsified and aspirated using a divide-and-conquer technique. Residual cortex was removed from the eye with the  irrigation/aspiration bimanually. ProVisc was used to inflate the capsular bag. The lens implant was inspected and found to be free of visible defects. The lens used was an Eze model SY60WF, power 21.50 diopter intraocular lens. The lens was inserted into the capsular bag using the [place] insertion mechanism. The lens was centered with a [place] hook. Residual viscoelastic was removed from the eye with the irrigation/aspiration instrument. The wounds were checked and found to be watertight. . The lid speculum was removed and the eye was dressed with Tobradex ointment, eye pad, tape, and shield. The patient tolerated the procedure well, and there were no complications.     Evidence of Infection: No   Complications:  None; patient tolerated the procedure well.    Disposition: PACU - hemodynamically stable.  Condition: stable                 Additional Details: Patch and sheild on for four hours, start PF, Ketorolac, and Moxifloxacin later today     Attending Attestation:     Mason Castillo  Phone Number: [phone]

## 2025-05-01 ENCOUNTER — TELEPHONE (OUTPATIENT)
Dept: PHARMACY | Facility: HOSPITAL | Age: 55
End: 2025-05-01

## 2025-05-01 ENCOUNTER — APPOINTMENT (OUTPATIENT)
Dept: OPHTHALMOLOGY | Facility: CLINIC | Age: 55
End: 2025-05-01
Payer: MEDICAID

## 2025-05-01 DIAGNOSIS — Z96.1 PSEUDOPHAKIA OF LEFT EYE: Primary | ICD-10-CM

## 2025-05-01 PROCEDURE — 99024 POSTOP FOLLOW-UP VISIT: CPT | Performed by: OPHTHALMOLOGY

## 2025-05-01 ASSESSMENT — EXTERNAL EXAM - RIGHT EYE: OD_EXAM: NORMAL

## 2025-05-01 ASSESSMENT — TONOMETRY
IOP_METHOD: GOLDMANN APPLANATION
OS_IOP_MMHG: 16

## 2025-05-01 ASSESSMENT — EXTERNAL EXAM - LEFT EYE: OS_EXAM: NORMAL

## 2025-05-01 ASSESSMENT — ENCOUNTER SYMPTOMS: EYES NEGATIVE: 1

## 2025-05-01 ASSESSMENT — SLIT LAMP EXAM - LIDS
COMMENTS: NORMAL
COMMENTS: NORMAL

## 2025-05-01 ASSESSMENT — VISUAL ACUITY
OS_SC: 20/40+2
METHOD: SNELLEN - LINEAR
OS_PH_SC: 20/30-2

## 2025-05-01 NOTE — ASSESSMENT & PLAN NOTE
Patient's A1c is 6.3% on 3/10/25. Goal A1c <7%.  Patient's SMBGs are not available for download, patient reports good control and no recent changes.     Rationale for plan: Patient following with endocrinology for T1DM management, uses Medtronic 780G insulin pump with CGM. Endo ordered basaglar pens to have available in case of pen failure and patient reports insurance is not covering these. Also report she purchases manual testing supplies OTC as backup to CGM. Reviewed coverage and determined insurance requires brand Lantus. Provided Rx for Lantus pens and testing supplies, to be filled and delivered from  [facility] [employer]. Continue current regimen and follow-up with endo and PCP as scheduled.    Medication Changes:  CONTINUE  Insulin lispro vials via Medtronic 780G    Additional Instructions  Rx sent for Lantus pens and pen needles. In the event of of pump failure inject 12 units under the skin once daily.  Rx sent for glucometer, test strips, and lancets. Use to check BG as needed for hypoglycemia, symptoms not matching CGM readings, or CGM failure.

## 2025-05-01 NOTE — PROGRESS NOTES
Assessment/Plan   Diagnoses and all orders for this visit:  Pseudophakia of left eye    POD#1, doing well, clear cornea.    Plan:  Pred Qid  Moxi QID  Ketorolac QID  Return precautions.  See in 1 week.

## 2025-05-02 ENCOUNTER — TREATMENT (OUTPATIENT)
Dept: PHYSICAL THERAPY | Facility: CLINIC | Age: 55
End: 2025-05-02
Payer: MEDICAID

## 2025-05-02 ENCOUNTER — APPOINTMENT (OUTPATIENT)
Dept: PRIMARY CARE | Facility: CLINIC | Age: 55
End: 2025-05-02
Payer: MEDICAID

## 2025-05-02 ENCOUNTER — TELEPHONE (OUTPATIENT)
Dept: NUTRITION | Facility: CLINIC | Age: 55
End: 2025-05-02

## 2025-05-02 ENCOUNTER — TREATMENT (OUTPATIENT)
Dept: OCCUPATIONAL THERAPY | Facility: CLINIC | Age: 55
End: 2025-05-02
Payer: MEDICAID

## 2025-05-02 ENCOUNTER — TELEPHONE (OUTPATIENT)
Dept: PRIMARY CARE | Facility: CLINIC | Age: 55
End: 2025-05-02

## 2025-05-02 DIAGNOSIS — R41.842 VISUAL-SPATIAL IMPAIRMENT: ICD-10-CM

## 2025-05-02 DIAGNOSIS — I89.0 LYMPHEDEMA OF LEFT LOWER EXTREMITY: ICD-10-CM

## 2025-05-02 DIAGNOSIS — R26.9 GAIT ABNORMALITY: Primary | ICD-10-CM

## 2025-05-02 DIAGNOSIS — R06.09 DYSPNEA ON MINIMAL EXERTION: ICD-10-CM

## 2025-05-02 DIAGNOSIS — R53.1 GENERALIZED WEAKNESS: Primary | ICD-10-CM

## 2025-05-02 DIAGNOSIS — R53.1 GENERALIZED WEAKNESS: ICD-10-CM

## 2025-05-02 DIAGNOSIS — Z91.81 AT RISK FOR INJURY RELATED TO FALL: ICD-10-CM

## 2025-05-02 PROCEDURE — 97535 SELF CARE MNGMENT TRAINING: CPT | Mod: GO

## 2025-05-02 PROCEDURE — 97140 MANUAL THERAPY 1/> REGIONS: CPT | Mod: GO

## 2025-05-02 PROCEDURE — 97110 THERAPEUTIC EXERCISES: CPT | Mod: GO

## 2025-05-02 ASSESSMENT — ACTIVITIES OF DAILY LIVING (ADL): HOME_MANAGEMENT_TIME_ENTRY: 22

## 2025-05-02 NOTE — TELEPHONE ENCOUNTER
"Called patient regarding her upcoming appointment to remind her to bring in her log as we continue training on carb counting. She tells me she's at physical therapy now. Her sugar \"crashed\" and the squad had to be called. She also had a \"crash\" at her sister's recently. She wants to reschedule her follow-up appointment with me and talk with Dr. Hampton first. I could not gather from the phone call what the precipitating factor was other than she did some physical activity. Asked if she was skipping meals and I didn't get a clear response. She agreed I would reschedule her to be seen in 1-2 weeks. The referring provider is within the same EMR and is able to see the DSMES provided and the outcomes.   "

## 2025-05-02 NOTE — TELEPHONE ENCOUNTER
DAREN HUFF,  MS. C HAD A FEW LOW GLUCOSE ATTACKS FRIDAY, ONE IN A WAITING ROOM FOR PT.  SHE RESPONDED WELL TO GLUCOSE.  CAN YOU CONTACT HER AND MAKE SURE SHE HAS BEEN IN TOUCH WITH HER ENDOCRINOLOGIST TO HELP ADJUST HER INSULIN PUMP, AND ALSO ASK WHETHER THE STEROIDS ARE HELPING HER LEFT ARM PAIN?  BRISSA

## 2025-05-02 NOTE — PROGRESS NOTES
Occupational Therapy  Occupational Therapy Treatment Note    Patient Name Mattie Wolfe   MRN: [id number]  : 1970  Today's Date: 2025  Time Calculation  Start Time: 1302  Stop Time: 1350  Time Calculation (min): 48 min     Visit #: 10/30    Insurance:  MEDICAID  Authorization required:  After 30 visits  # of visits approved: 2025: [place] MCAID AUTH REQ AFTER 30 VS SHIRA YR    Therapy Diagnoses:   1. Generalized weakness        2. Dyspnea on minimal exertion        3. Visual-spatial impairment        4. Lymphedema of left lower extremity          Assessment:  Pt presents with more subdued affect, dec processing speed, dec activity tolerance, and continued LUE pain. Pt also observed to have evolving, striped rash on LUE and L neck with small concern for shingles rash in the context of new onset LUE pain. Pt benefits from time spent assisting her with coordinating her healthcare to manage her new rash and LUE pain. Pt with low tolerance to exercise today, with significant fatigue symptoms observed. At the end of OT session, once returned to waiting room and awaiting start of OP PT session, pt became significantly lethargic; PT facilitated assessment by cardiac-pulmonary rehab and pt found to have low blood sugar (25). EMS was called and pt recovered without requiring trip to emergency room. Pt exhibits inc medical complexity over the last couple of weeks and will benefit from continued skilled OT to assist with daily adaptations d/t LUE pain and healthcare management of multiple conditions.     Plan:      Continue to progress per POC: Continue OT 1-2x/week for 6 weeks.  Interventions: Complete Decongestive Therapy, Manual Lymphatic Drainage, Neuromuscular Reeducation, Self-care/ADL training, Therapeutic Activity, Therapeutic Exercise, Manual Therapy    Goals:  By discharge,      Pt will increase standing tolerance during ADL/IADL performance to 5+ min.  Pt will demo independence with HEP completion.  Pt  will demo and verbalize use of energy conservation/joint protection techniques to increase activity tolerance while completing ADL/IADL tasks.   Pt will increase bilateral shoulder strength to 4+/5 to increase activity tolerance for reaching and functional UE use.   Pt will participate in QuickDash to establish BUE functional baseline.   Pt will participate in further visual perceptual assessment to determine how visual perceptual skills play a role in ADL/IADL impairment.   Pt will demo ability to independently check orthostatic blood pressures during functional activity and positional changes to prevent falls.   Pt will participate in assessment of BLE swelling to determine further OT needs (Met 2/13/25)  Pt will improve handwriting AEB improved legibility and speed using compensatory strategies or inc fine motor strength.    Subjective:   Pt reports she saw her PCP on Tuesday, and all of the interventions Dr Gold added are helping her manage the pain better. She also endorses she had her L cataract surgery on Wednesday and it well well, continuing her steroids and use of patches + glasses as instructed. Pt reports having extremely low blood sugar episode asymptomatic on Tuesday night requiring a shot. She states that her sister nearly took her to the ER, but was able to get her sugar back up.    Per her PCP, Kobi Gold, on 4/29, when she was seen for acute L arm pain:   REPEAT XRAY LEFT SHOULDER AND HUMERUS ALONG WITH NECK XRAY  HYDROCODONE TABLETS ARE SENT IN , I CAN SEND IN 6 DAYS WORTH FOR THE TIME BEING  STILL KEEP TAKING THE GABAPENTIN FOR NOW, TAKE 100 MG IN THE MORNING AND AT NOON, TAKE 300 MG AT NIGHT  HEATING PAD TO THE NECK MIGHT HELP EVERYTHING LOOSEN UP IF THIS IS EMANATING FROM YOUR NECK  ASK YOUR OPTHAMOLOGIC SURGEON WHETHER, IF IT IS NEEDED TO CALM DOWN WHAT SOUNDS TO BE A NERVE PAIN IN YOUR LEFT ARM, WHETHER I CAN SAFELY PRESCRIBE YOU ORAL STEROIDS FOR A SHORT TIME - AS LONG AS IT  WON'T INTERFERE WITH OPTHALMIC SURGERY?  I SUSPECT YOU MAY BE HAVING AN UNUSUAL DIABETIC ASSOCIATED ACUTE NERVE INJURY CALLED MONONEURITIS MULTIPLEX - NOT NECESSARILY RELATED TO A NECK NERVE PINCH  FOLLOW UP NEXT WEEK    Significant PMHx and rehab hx: H/o CVA on 1/26/2018 affecting R occipital lobe, and other stroke affecting R optic nerve and causing R eye blindness. Pt reports she was in a coma for over a month after the CVA, on a ventilator, had trouble weaning from ventilator, and ultimately got a trach. Pt reports she spent ~2 years in a SNF post-hospital discharge and re-learned to walk. Pt uses oxygen overnight (3.5 L), completed oxygen therapy in November 2024.   Other hx: H/o L ankle fx July 2022; L eye cataract with tentative cataract sx scheduled for 3/26/25; [facility] hearing aides; Type I Diabetes (dx 2009); Epilepsy (on vimpat and zonegran; last seizure 2020); L wrist fx (2019); orthostatic hypotension; HTN; HLD; gastroparesis; Hashimoto's thyroiditis; Leukopenia; non-ischemic cadiomyopathy; asthma.    Have you fallen since last visit: No    Precautions: Moderate fall risk, low vision (R eye blind)    Pain:  6/10  Location/Type of pain: Pt reports she took medicine before coming to appt, including tylenol and gabapentin.     HEP compliance/understanding: Compliant    Objective:   Pt presents with stripe-like rash along back of L arm just proximal to elbow.  Rash seems to spread up L side of neck.     ROM: pt with very low tolerance for shoulder PROM due to pain    Strength: tolerates 3# dumbbell shoulder and elbow ex with RUE with fatigue symptoms/low endurance    Activity tolerance: dec     Affect: subdued     Cognition: slowed processing    Coordination: pt needs significant (max A) hand-over-hand assist and verbal/tactile cues to complete washer maze.     Treatment:     Manual Therapy: 10 minutes  OT provided slow progressive stretch to LUE with focus on shoulder and elbow with pt in supine    Therapeutic  Exercise: 16 minutes  RUE 3# dumbbell ex: shoulder flexion, chest press, shoulder abduction, scaption, curls  Washer maze x2 using RUE with instruction to move at wrist/hand and reduce proximal compensatory movement patterns    Self Care Home Management: 22 minutes  Healthcare management - continued investigation of origin of LUE pain and assisting pt to connect with providers and understand pain management plan.   Healthcare management of LUE/L neck evolving rash and connecting pt to primary care to determine if c/f shingles.     Education: what shingles rash looks like    HEP Progression: N/A; continue current program.     Current HEP:  - Rolyan pinch clip 3x10 to improve 2- and 3-pt pinch strength  - Handwriting practice: with large lined (manuscript) paper 1 sheet per day for 5-10 min at a time. Use red border/tape and brightly colored guide paper; use red foam  to build-up writing utensil  - Short stretch bandage application to LLE to dec swelling  - Shoulder strengthening ex: seated abduction, chest press, scaption with dumbbells 2#-5# (Access code: [email or web address]; 4/7/25).

## 2025-05-02 NOTE — PROGRESS NOTES
"Physical Therapy treatment     Patient Name: Mattie Wolfe  MRN: [id number]  Today's Date: 5/2/2025  Visit #: 15  Last Reassessment - 3/21/25  Insurance: 2025 [place] MCAID AUTH REQ AFTER 30 VS SHIRA YR        1. Gait abnormality        2. Generalized weakness        3. At risk for injury related to fall            ASSESSMENT:  ***      GOALS:  By Discharge patient will demo:  Verdi in HEP  No falls during POC  Improvement in the following outcome measures to decrease risk of falls:  5xSTS </= 15sec  TUG </= 15sec  ABC >/= 70%  Pt will be able to complete 6MWT with </= 2 standing rest break.  Gait speed >/= .8m/s    PLAN:  Cont to progress LE strength/endurance for functional mobility as tolerated.       Tx Considerations:  -Ambulation/dynamic gait w/out AD or w/SPC  -Toe taps, step ups etc.        SUBJECTIVE:  Per PCP chart review, \"I SUSPECT YOU MAY BE HAVING AN UNUSUAL DIABETIC ASSOCIATED ACUTE NERVE INJURY CALLED MONONEURITIS MULTIPLEX - NOT NECESSARILY RELATED TO A NECK NERVE PINCH \"    Pt reports       OBJECTIVE:    Treatments:  Therapeutic Exercise (69345):   minutes  Performing the following for LE strength/endurance and general activity tolerance for carryover to functional mobility:  Walking w/RW - for dynamic warmup and to improve ambulation endurance/activity tolerance - x2 sets ea to fatigue (7:00min)  Pt. Ambulated 320' using a SPC with cga.    Cybex Leg Press BLE - 2x15, 60lbs (maintained)   Cybex Leg Extensions BLE - 2x10, 30lbs  Cybex Leg Curl BLE - 2x10, 30lbs   Lateral Stepping in II bar - band at knees, 2sets 5x10' bouts, Green tb   not today.      Manual Therapy (16319):   minutes  Not performed    Neuromuscular Re-education (74005):   minutes  Not performed    Therapeutic Activitity (10862):   minutes  Not performed          HEP:  Access Code: [email or web address]  URL: [email or web address]  Date: 02/04/2025  Prepared by: David Castano     Exercises  - Sit to Stand Without Arm Support  " - 1 x daily - 7 x weekly - 3 sets - 10 reps  - Standing March with Counter Support  - 1 x daily - 7 x weekly - 3 sets - 10 reps  - Walking  - 1 x daily - 7 x weekly - 1 sets - 3-5 reps - 2min hold

## 2025-05-05 ENCOUNTER — HOSPITAL ENCOUNTER (OUTPATIENT)
Dept: RADIOLOGY | Facility: HOSPITAL | Age: 55
Discharge: HOME | End: 2025-05-05
Payer: MEDICAID

## 2025-05-05 DIAGNOSIS — M25.512 ACUTE PAIN OF LEFT SHOULDER: ICD-10-CM

## 2025-05-05 PROCEDURE — 73060 X-RAY EXAM OF HUMERUS: CPT | Mod: LEFT SIDE | Performed by: RADIOLOGY

## 2025-05-05 PROCEDURE — 72040 X-RAY EXAM NECK SPINE 2-3 VW: CPT | Performed by: RADIOLOGY

## 2025-05-05 PROCEDURE — 73030 X-RAY EXAM OF SHOULDER: CPT | Mod: LEFT SIDE | Performed by: RADIOLOGY

## 2025-05-05 PROCEDURE — 73030 X-RAY EXAM OF SHOULDER: CPT | Mod: LT

## 2025-05-05 PROCEDURE — 73060 X-RAY EXAM OF HUMERUS: CPT | Mod: LT

## 2025-05-05 PROCEDURE — 72040 X-RAY EXAM NECK SPINE 2-3 VW: CPT

## 2025-05-06 ENCOUNTER — TELEPHONE (OUTPATIENT)
Dept: PRIMARY CARE | Facility: CLINIC | Age: 55
End: 2025-05-06

## 2025-05-06 ENCOUNTER — APPOINTMENT (OUTPATIENT)
Dept: PRIMARY CARE | Facility: CLINIC | Age: 55
End: 2025-05-06
Payer: MEDICAID

## 2025-05-06 ENCOUNTER — TELEPHONE (OUTPATIENT)
Age: 55
End: 2025-05-06

## 2025-05-06 ENCOUNTER — OFFICE VISIT (OUTPATIENT)
Dept: PRIMARY CARE | Facility: CLINIC | Age: 55
End: 2025-05-06
Payer: MEDICAID

## 2025-05-06 ENCOUNTER — TREATMENT (OUTPATIENT)
Dept: PHYSICAL THERAPY | Facility: CLINIC | Age: 55
End: 2025-05-06
Payer: MEDICAID

## 2025-05-06 VITALS
RESPIRATION RATE: 16 BRPM | OXYGEN SATURATION: 99 % | HEART RATE: 98 BPM | DIASTOLIC BLOOD PRESSURE: 80 MMHG | SYSTOLIC BLOOD PRESSURE: 124 MMHG

## 2025-05-06 DIAGNOSIS — R26.9 GAIT ABNORMALITY: Primary | ICD-10-CM

## 2025-05-06 DIAGNOSIS — I69.398 GAIT DISTURBANCE, POST-STROKE: ICD-10-CM

## 2025-05-06 DIAGNOSIS — G58.7 MONONEURITIS MULTIPLEX: ICD-10-CM

## 2025-05-06 DIAGNOSIS — E10.69 TYPE 1 DIABETES MELLITUS WITH OTHER SPECIFIED COMPLICATION: ICD-10-CM

## 2025-05-06 DIAGNOSIS — Z12.31 ENCOUNTER FOR SCREENING MAMMOGRAM FOR BREAST CANCER: ICD-10-CM

## 2025-05-06 DIAGNOSIS — G56.32 RADIAL NEUROPATHY, LEFT: Primary | ICD-10-CM

## 2025-05-06 DIAGNOSIS — R26.2 DIFFICULTY WALKING: ICD-10-CM

## 2025-05-06 DIAGNOSIS — Z91.81 AT RISK FOR INJURY RELATED TO FALL: ICD-10-CM

## 2025-05-06 DIAGNOSIS — R53.1 GENERALIZED WEAKNESS: ICD-10-CM

## 2025-05-06 DIAGNOSIS — R26.9 GAIT DISTURBANCE, POST-STROKE: ICD-10-CM

## 2025-05-06 PROCEDURE — 3044F HG A1C LEVEL LT 7.0%: CPT | Performed by: INTERNAL MEDICINE

## 2025-05-06 PROCEDURE — 97113 AQUATIC THERAPY/EXERCISES: CPT | Mod: GP

## 2025-05-06 PROCEDURE — 99214 OFFICE O/P EST MOD 30 MIN: CPT | Performed by: INTERNAL MEDICINE

## 2025-05-06 PROCEDURE — 3079F DIAST BP 80-89 MM HG: CPT | Performed by: INTERNAL MEDICINE

## 2025-05-06 PROCEDURE — 3074F SYST BP LT 130 MM HG: CPT | Performed by: INTERNAL MEDICINE

## 2025-05-06 RX ORDER — OXYCODONE HYDROCHLORIDE 5 MG/1
5 TABLET ORAL EVERY 6 HOURS PRN
Qty: 24 TABLET | Refills: 0 | Status: SHIPPED | OUTPATIENT
Start: 2025-05-06 | End: 2025-05-12

## 2025-05-06 RX ORDER — GLUCAGON INJECTION, SOLUTION 1 MG/.2ML
0.2 INJECTION, SOLUTION SUBCUTANEOUS ONCE AS NEEDED
Qty: 0.4 ML | Refills: 1 | Status: SHIPPED | OUTPATIENT
Start: 2025-05-06

## 2025-05-06 ASSESSMENT — ENCOUNTER SYMPTOMS
MYALGIAS: 0
CHILLS: 0
SHORTNESS OF BREATH: 0
STRIDOR: 0
COUGH: 0
CONSTIPATION: 0
DIARRHEA: 0
CHOKING: 0
DIAPHORESIS: 0
WHEEZING: 0
JOINT SWELLING: 0
NAUSEA: 0
VOMITING: 0
FEVER: 0

## 2025-05-06 NOTE — TELEPHONE ENCOUNTER
FYI....So, the EMG is only done at the hospitals.  They are all booked till Mid June!  I got her scheduled for June 17th.      I asked Nadja for a number to someone I can contact to see if I can get that appointment moved up.  I will let you know.

## 2025-05-06 NOTE — TELEPHONE ENCOUNTER
Yenny Fraser RN the [facility]  is scheduled to meet with Ms. Segundo at the [facility] at 11 am 5/7/25. She will set up [employer] Mobile brianne and connect pt to  [employer] account.

## 2025-05-06 NOTE — TELEPHONE ENCOUNTER
Spoke with pt 5/6 about her recent hypoglycemic episode.     Pt reports that her guardian 4 sensor has been reading incorrectly. On 4/30 she was hypoglycemic before cataract surgery but asymptomatic. Guardian sensor was reading 85 mg/dL but her manual finger stick was 50 mg/dL she was incoherent and received glucagon injection from a family member.     Pt’s most recent hypoglycemic episode was on 5/2 after an appointment with her occupational therapist.  Pt was walking through the lobby on her way to her physical therapist when she experienced a syncopal episode. She woke up shortly after with staff surrounding her. Pt did not experience hypoglycemic symptoms prior to passing out. Pt was able to consume soda and jose crackers after she woke up, but is unsure if she received emergency glucagon from medical staff that assisted her. She reports that paramedics checked her blood glucose and it was 26 mg/dL.     Pt received new guardian sensor and infusion set from [employer], and applied her new guardian sensor to her insulin pump on 5/4. Pt's blood glucose is currently reading 137 mg/dL on her insulin pump. Pt has changed the low alarm settings on her guardian sensor to alert her if her blood glucose drops <100 mg/dL.     Pt reports that she does not have any glucagon pens left. Pended refill for Dr. Hampton to sign. Attempted to instruct pt on connecting her insulin pump to the [employer] mobile brianne so we are able to see her insulin pump settings and cgm readings in real time, however pt could not successfully log into her account. Will contact MA staff at [facility] to see if they can assist pt in connecting her pump to this brianne, and to our [employer] office account.

## 2025-05-06 NOTE — TELEPHONE ENCOUNTER
Called patient to discuss episode of hypoglycemia recently. She answered but the call was dropped due to poor connection. I called her back and she did not answer so I left a voicemail requesting she call the clinic back. Clinic phone number provided.

## 2025-05-06 NOTE — PATIENT INSTRUCTIONS
THE SEVERITY OF YOUR NERVE PAIN IS CONSISTENT WITH THE NERVE INJURY THAT CAN HAPPEN IN DIABETES, IT IS CALLED MONONEURITIS MULTIPLEX    2.  EMG TEST IS ORDERED TO CONFIRM THIS SUSPICION, BECAUSE IF IT DOESN'T START GETTING BETTER SOON, YOU MAY NEED A STRONGER ANTI-INFLAMMATORY MEDICATION TO HELP IT HEAL    3.  GO AHEAD AND COMPLETE THE PREDNISONE TAPER    4.  THE POSSIBILITY OF THE TYLENOL IN THE HYDROCODONE TABLET HAVING AFFECTED INSULIN PUMP SENSOR IS QUITE LOW, BUT TO TAKE TYLENOL OUT OF THE EQUATION, I SENT IN A SCRIPT FOR PLAIN OXYCODONE WITHOUT TYLENOL SO YOU CAN AT LEAST FEEL COMFORTABLE TO TAKE SOME MEDICATION AT NIGHT SO YOU CAN SLEEP    5.  ONCE EMG COMES BACK I'LL KNOW HOW TO PROCEED FURTHER    6.  DISABILITY PLACARD IS ORDERED FOR YOU    7.  FOLLOW UP AFTER EMG

## 2025-05-06 NOTE — PROGRESS NOTES
Physical Therapy Treatment      Patient Name: Mattie Wolfe  MRN: [id number]  Today's Date: 5/6/2025  Visit #17  Time Calculation  Start Time: 0940  Stop Time: 1015  Time Calculation (min): 35 min      Insurance:  2025 [place] MCAID AUTH REQ AFTER 30 VS SHIRA YR     Assessment:  Continued aquatic therapy focus of improving ambulatory tolerance, strengthening, and balance.  Pt tolerated all exercises well with L shoulder and arm pain responding positively to aquatic therapy. Focused on more gentle UE ROM exercises this date secondary to pt inc shoulder and arm pain. Spent time educating the patient on the benefits of physical therapy for her arm pain and reassuring her that she is doing all the correct things to date to manage her discomfort.  Pt tolerated all interventions well, SUP with PT in water for additional safety.  Pt was able to ambulate 15 minutes without standing or seated rest break, and no SOB noted throughout session. Pt encouraged to monitor symptoms and blood sugar post therapy session and throughout the day.  Pt is making progress towards goals and would continue to benefit from skilled PT at this time.          Aquatic PT is required due to, buoyancy of water reduced weight bearing and decreased LE and spinal loading, allowing for more freedom of movement and promotes improved ability to perform functional tasks.  The viscosity of water which is more than 700x that of air, allowed increased reaction time, in turn allowing advanced balance activities to be safely performed and allow patient to be challenged beyond limited of stability without fear of falling; provides the opportunity to work on balance in a safe environment.  The hydrostatic pressure of water facilitates the reduction of edema in the lower extremities, allowing for greater ease of movement.  Aquatics d/t cardio vascular benefits including: improved cardiovascular efficiency including increased stroke volume, decreased HR, and decreased blood  pressure with increased cardiac output allowing patient to achieve cardiovascular training effect with less work load.  Aquatics to support upright posture during postural retraining and strengthening.   Aquatics allow patient to work on gait with less assistance or without assistive device improving posture and ease of gait training/conditioning.     Patient's response to session: Decreased pain, Increased ROM/joint mobility, Increase motor control, Improved gait, and Increased knowledge and understanding    Plan:  Cont to progress LE strength/endurance for functional mobility as tolerated. Inc SL balance exercises and endurance walking.      Tx Considerations:  -Schedule Aquatic therapy: Trial of inc forward walking in the beginning of the session (to build ambulatory tolerance).   -Ambulation/dynamic gait w/out AD or w/SPC  -Toe taps, step ups etc.    Current Problem:   1. Gait abnormality        2. Generalized weakness        3. At risk for injury related to fall            Subjective   Since last visit pt had episode of low blood sugar while in waiting room for PT. Notes that her blood sugar readings may be affected by her current meds but is going to see PCP today about this concern as well as L arm pain. Had xrays 5/5/25        Xray humerus:   FINDINGS:  No fracture or dislocation is evident.  There is partial  visualization of a port line with the tip projecting over the distal  SVC.      IMPRESSION:  No osseous injury is evident.      Xray C-spine:  FINDINGS:  The cervical spine is seen to C7/T1.  Vertebral body height and  alignment are  maintained. No fracture or dislocation is evident.  Mild multilevel degenerative changes seen of the cervical spine.   No  prevertebral soft tissue swelling is evident.  There is a port on the  right chest wall with the line tracking down over the mediastinum as  seen on these radiographs.      IMPRESSION:  Mild degenerative change of the cervical spine without osseous  injury  Evident    Pain: 6/10; worst pain with LUE at night        Objective     Ambulated with SBA in pool with current for 15 min without need for rest breaks  Ttp L brachioradialis and upper trap  No inc in symptoms with median nerve ULTT  Dec L shoulder/arm pain with scapular retraction and c-spine retraction     Treatments:  Therapeutic Exercise (77053):   minutes (0 minutes)      Manual Therapy (10407):   minutes      Neuromuscular Re-education (09582):   minutes      Aquatic Therapy (29632):   30 Min 2 units  15 minutes of forward water walking without UE support.   Pendulums in deep water 3x10 all directions  Scapular retraction in deep water 3x10   C-spine retraction 3x10  Elbow flex/ext 3x10   Edu on continuing to complete gentle ROM to LUE as well as exercises prescribed last land PT session       Vielka Osman, PT

## 2025-05-06 NOTE — PROGRESS NOTES
Subjective   Mattie Wolfe is a 54 y.o. female who presents for  1 WK FOLLOW UP  PT SAYS LEFT ARM IS WORSE SINCE LAST WEEK   SEEING A [employer] NURSE TOMORROW FOR INSULIN PUMP   HPI   HAS HAD LOW BLOOD SUGAR    HAPPENED BEFORE STARTING PREDNISONE    GETTING NEW PUMP DUE TO THE INSULIN ISSUES    WON'T TAKE NORCO AFRAID THAT THE TYLENOL IS IMPACTING INSULIN PUMP SENSOR      Review of Systems   Constitutional:  Negative for chills, diaphoresis and fever.   Respiratory:  Negative for cough, choking, shortness of breath, wheezing and stridor.    Cardiovascular:  Negative for chest pain and leg swelling.   Gastrointestinal:  Negative for constipation, diarrhea, nausea and vomiting.   Musculoskeletal:  Negative for joint swelling and myalgias.        PAIN LEFT SCAPULA/SHOULDER/HUMERUS       Objective   /80   Pulse 98   Resp 16   SpO2 99%     Physical Exam  Vitals reviewed.   Constitutional:       General: She is not in acute distress.     Appearance: She is not ill-appearing.   HENT:      Right Ear: Tympanic membrane and external ear normal. There is no impacted cerumen.      Left Ear: Tympanic membrane and external ear normal. There is no impacted cerumen.   Cardiovascular:      Rate and Rhythm: Normal rate and regular rhythm.      Pulses: Normal pulses.      Heart sounds:      No gallop.   Pulmonary:      Breath sounds: Normal breath sounds. No wheezing, rhonchi or rales.   Abdominal:      General: Abdomen is flat. Bowel sounds are normal.      Palpations: Abdomen is soft.      Tenderness: There is no guarding or rebound.   Musculoskeletal:      Right lower leg: No edema.      Left lower leg: No edema.      Comments: LEFT ARM EXQUISITELY TENDER AT SCAPULA, LATERAL EPICONDYLE, AND WITH RADIATING PAIN TO THE DORSAL ASPECT  OF LEFT HAND AND FINGERS         Assessment/Plan   Problem List Items Addressed This Visit       Type 1 diabetes mellitus    Relevant Orders    EMG & nerve conduction    Gait disturbance,  post-stroke    Relevant Orders    Disability Placard    Peripheral neuropathy    Relevant Medications    oxyCODONE (Roxicodone) 5 mg immediate release tablet    Other Relevant Orders    EMG & nerve conduction     Other Visit Diagnoses         Radial neuropathy, left    -  Primary    Relevant Orders    EMG & nerve conduction      Encounter for screening mammogram for breast cancer        Relevant Orders    BI mammo bilateral screening tomosynthesis      Difficulty walking        Relevant Orders    Disability Placard          Patient Instructions    THE SEVERITY OF YOUR NERVE PAIN IS CONSISTENT WITH THE NERVE INJURY THAT CAN HAPPEN IN DIABETES, IT IS CALLED MONONEURITIS MULTIPLEX    2.  EMG TEST IS ORDERED TO CONFIRM THIS SUSPICION, BECAUSE IF IT DOESN'T START GETTING BETTER SOON, YOU MAY NEED A STRONGER ANTI-INFLAMMATORY MEDICATION TO HELP IT HEAL    3.  GO AHEAD AND COMPLETE THE PREDNISONE TAPER    4.  THE POSSIBILITY OF THE TYLENOL IN THE HYDROCODONE TABLET HAVING AFFECTED INSULIN PUMP SENSOR IS QUITE LOW, BUT TO TAKE TYLENOL OUT OF THE EQUATION, I SENT IN A SCRIPT FOR PLAIN OXYCODONE WITHOUT TYLENOL SO YOU CAN AT LEAST FEEL COMFORTABLE TO TAKE SOME MEDICATION AT NIGHT SO YOU CAN SLEEP    5.  ONCE EMG COMES BACK I'LL KNOW HOW TO PROCEED FURTHER    6.  DISABILITY PLACARD IS ORDERED FOR YOU    7.  FOLLOW UP AFTER EMG

## 2025-05-07 NOTE — PROGRESS NOTES
Department of Gastroenterology & Hepatology  Initial Consult Note  Date of Service:  May 7, 2025         Patient: Mattie Wolfe    Medical Record: [id number]  Reason for Admission / Consultation: chronic gastroparesis  Gastroenterology Attending: Tone Nuno MD  Referring Provider: MD Kobi Mancuso MD  [address]  [address]  [place] [address]         My final recommendations will be communicated back to the requesting physician by way of shared medical record or letter via US mail         Impression: 54-year-old female with past medical history of type 1 diabetes on insulin pump, epilepsy, stroke in January 2018, Hashimoto disease, history of appendectomy and cholecystectomy, gastroparesis status post POP in 2019 history establish GI care    Gastroparesis  Overall doing well with rare episodes of vomiting  Her symptoms are only 2-3 times per month  We discussed about eating 1-2 small meals when these episodes happen to see any improvement in her symptoms.  If any worsening of symptoms we will consider as needed Reglan    History of Ortega's esophagus  Last EGD in 2023 showed normal esophagus  Continue Meprazole 40 mg daily    Constipation  Currently having 1 bowel movement every day or every other day and using as needed MiraLAX  Continue that for now    Colon cancer screening  Colonoscopy     Tone Nuno MD  Gastroenterology, Hepatology and Nutrition  Advanced Endoscopy  =========================================================================  History of Present Illness:     Mattie Wolfe  is a 54 y.o.   has a past medical history of Asthma, Cataract, Chronic kidney disease, Diabetes (Multi), Diabetes mellitus type I (Multi), Diabetic retinopathy (Multi), Gastrointestinal disorder, Gastroparesis, GERD (gastroesophageal reflux disease), H/O bladder infections, History of stroke, HL (hearing loss), Osteoporosis, Other conditions influencing health status, Personal history  of transient ischemic attack (TIA), and cerebral infarction without residual deficits, Stroke (Multi), and Vision loss. who is here to establish GI care.  She was following Dr. Wolfe at [facility] for gastroparesis which was diagnosed in 2019 with abnormal gastric emptying study showing 52% retention at 4 hours.  She underwent POP procedure in 2019 and her symptoms improved significantly she has transient worsening of her symptoms after 6 months but then it got better.  She has a EGD done in 2023 by Dr. Wolfe and pyloroplasty was patent and healthy.  Gastric biopsies were normal.   He also has history of Ortega's esophagus without dysplasia but EGD in 2023 was normal esophagus.   Today she states that overall her gastroparesis is under control.  But 2-3 times a month she does vomit in the morning and sometimes she feels better after vomiting but sometimes she feels sick the entire day after vomiting.  Denies any nausea or abdominal pain.  She does take omeprazole 40 mg daily and rarely uses Tums for acid reflux.   She is having 1 bowel movement every day or every other day and uses MiraLAX if no bowel movement for a day.  Her last colonoscopy was more than 5 years ago but she does not remember exactly he had polyps or not.   No family history of colon cancer    She has an insulin pump and eats 3 meals a day    EGD 10/23   - Normal esophagus.                        - A pyloroplasty was found, characterized by                        healthy appearing mucosa.                        - Normal examined duodenum.                        - Several biopsies were obtained in the stomach     EGD 2017  2017 [facility] Impression: - Normal duodenal bulb and second portion of the   duodenum.   - Normal stomach.   - 3 cm hiatal hernia.   - Esophageal mucosal changes consistent with   short-segment Ortega's esophagus. Biopsied.   - Monilial esophagitis. Cells for cytology obtained.       Gastric Emptying Study 6/14/2019  [facility] IMPRESSION:   DELAYED RATE OF GASTRIC EMPTYING OF SOLID MEAL.   >50% GASTRIC RETENTION AT 4 HOURS IS CONSISTENT WITH VERY SEVERE   GASTROPARESIS.   RESULT:   Solid study demonstrates:   - 62% gastric retention at 1hr (normal range, 37-90%),   - 62% gastric retention at 2hr (normal range, 30-60%), and   - 52% gastric retention at 4hr (normal range, 0-10%).     Gastric emptying study in 2023  GES showed 11% retention at 4hrs.     CT Abd/Pelvis 12/11 2018 [facility] IMPRESSION:   Stable indwelling jejunostomy tube. No bowel obstruction.   Mild left-sided hydroureteronephrosis and stable diffuse bladder wall thickening.        Pertinent Review of Systems:    Per HPI rest 12 point ROS negative      Past Medical History:    Medical History[1]     Past Surgical History:  Surgical History[2]     Family History:  Family History[3]     Social History:  Social History     Tobacco Use    Smoking status: Never    Smokeless tobacco: Never   Substance Use Topics    Alcohol use: Not Currently        Allergies:  RX Allergies[4]      Medications:  Medications Ordered Prior to Encounter[5]         Physical Exam:  There were no vitals taken for this visit.   General appearance: well appearing, alert, in no acute distress  Skin:  no rashes or lesions  Head: normal  Eyes: Anicteric sclera.   ENT: No oral erythema  Lungs: lungs clear to auscultation, no wheezing or rhonchi  Heart: RRR without murmur  Abdomen: Insulin pump, left upper quadrant scar from prior PEG tube , abdomen soft, non-tender. Bowel sounds normal.   Extremities: Extremities normal.   Musculoskeletal: Muscular strength grossly intact  Neuro: CN2-12 grossly intact     Labs:  Current Medications[6]     No results found for this or any previous visit (from the past 96 hours).      SIGNATURE: Tone Nuno MD PATIENT NAME: Mattie Wolfe   DATE: May 7, 2025 MRN: [id number]   TIME: 3:01 PM           [1]   Past Medical History:  Diagnosis Date    Asthma     Cataract     Chronic  kidney disease     Diabetes (Multi)     Diabetes mellitus type I (Multi)     Diabetic retinopathy (Multi)     Gastrointestinal disorder     Gastroparesis     Gastroparesis    GERD (gastroesophageal reflux disease)     H/O bladder infections     History of stroke     HL (hearing loss)     Osteoporosis     Other conditions influencing health status     Diabetes type 1, uncontrolled    Personal history of transient ischemic attack (TIA), and cerebral infarction without residual deficits     History of stroke    Stroke (Multi)     Vision loss    [2]   Past Surgical History:  Procedure Laterality Date    AIRWAY SURGERY      APPENDECTOMY  04/09/2014    Appendectomy    CATARACT EXTRACTION      CHOLECYSTECTOMY      HIP SURGERY  04/09/2014    Hip Surgery    MR HEAD ANGIO WO IV CONTRAST  02/28/2020    MR HEAD ANGIO WO IV CONTRAST 2/28/2020 [facility]    MR NECK ANGIO WO IV CONTRAST  02/28/2020    MR NECK ANGIO WO IV CONTRAST 2/28/2020 [facility]    OTHER SURGICAL HISTORY  04/09/2014    Cholecystotomy    OTHER SURGICAL HISTORY  11/01/2019    Retinal detachment repair    OTHER SURGICAL HISTORY  06/04/2020    Wrist surgery    RETINAL DETACHMENT SURGERY     [3]   Family History  Problem Relation Name Age of Onset    Hypertension Mother      Stroke Mother      Hypertension Father      Other (SUBSTANCE ABUSE) Father      Stroke Other Sibling     Breast cancer Other grandparent     Ovarian cancer Father's Sister     [4]   Allergies  Allergen Reactions    Aspirin Other     GI issues    Azithromycin Hives   [5]   Current Outpatient Medications on File Prior to Visit   Medication Sig Dispense Refill    0.9 % sodium chloride (sodium chloride 0.9%) solution Infuse 50 mL/hr at 50 mL/hr into a venous catheter.      alcohol swabs Use to check blood glucose up to three times daily as directed. 100 each 11    atorvastatin (Lipitor) 40 mg tablet Take 1 tablet (40 mg) by mouth.      blood sugar diagnostic (OneTouch Ultra  Test) Use to check blood glucose up to three times daily as directed. 100 each 11    blood-glucose meter (OneTouch Ultra2 Meter) misc Use as directed to monitor blood glucose daily. 1 each 0    calcium carbonate (Tums) 200 mg calcium chewable tablet Chew once daily as needed. (Patient not taking: Reported on 2025)      fludrocortisone (Florinef) 0.1 mg tablet Take 1 tablet (0.1 mg) by mouth early in the morning.. 90 tablet 3    fluticasone (Flonase) 50 mcg/actuation nasal spray Administer 2 sprays into each nostril once daily. Shake gently. Before first use, prime pump. After use, clean tip and replace cap. 16 g 11    gabapentin (Neurontin) 300 mg capsule Take 1 capsule (300 mg) by mouth once daily at bedtime. 90 capsule 0    glucagon (Gvoke HypoPen 2-Pack) 1 mg/0.2 mL auto-injector Inject 0.2 mL under the skin 1 time if needed (hypoglycemia) for up to 1 dose. 0.4 mL 1    [] HYDROcodone-acetaminophen (Norco) 5-325 mg tablet Take 1 tablet by mouth every 6 hours if needed for severe pain (7 - 10) for up to 6 days. 24 tablet 0    insulin lispro (HumaLOG) 100 unit/mL injection INJECT 70 UNITS VIA INSULIN PUMP DAILY 20 mL 10    ketoconazole (NIZOral) 2 % cream Apply topically once daily.      ketorolac (Acular) 0.4 % ophthalmic solution Administer 1 drop into the left eye 4 times a day. 5 mL 1    lacosamide (Vimpat) 150 mg tablet tablet Take 1 tablet (150 mg) by mouth 2 times a day.      lancets (OneTouch Delica Plus Lancet) 33 gauge misc Use to check blood glucose up to three times daily as directed. 100 each 11    Lantus Solostar U-100 Insulin 100 unit/mL (3 mL) pen Inject 12 Units under the skin once daily at bedtime. For use in case of insulin pump failure. 15 mL 5    lidocaine (Lidoderm) 5 % patch Place 1 patch over 12 hours on the skin once daily. Apply to painful area 12 hours per day, remove for 12 hours. 30 patch 0    midodrine (Proamatine) 5 mg tablet Take 1 tablet (5 mg) by mouth 3 times a day.    "   mometasone-formoterol (Dulera) 200-5 mcg/actuation inhaler Inhale 2 puffs twice a day.      montelukast (Singulair) 10 mg tablet Take 1 tablet (10 mg) by mouth once daily at bedtime.      moxifloxacin (Vigamox) 0.5 % ophthalmic solution Administer 1 drop into the left eye 4 times a day. 5 mL 0    multivitamin (Daily Multi-Vitamin) tablet Take 1 tablet by mouth once daily.      omeprazole (PriLOSEC) 40 mg DR capsule TAKE 1 CAPSULE BY MOUTH DAILY 30 capsule 10    oxyCODONE (Roxicodone) 5 mg immediate release tablet Take 1 tablet (5 mg) by mouth every 6 hours if needed for severe pain (7 - 10) for up to 6 days. PLAIN OXYCODONE HAS NO TYLENOL IN IT 24 tablet 0    pen needle, diabetic 31 gauge x 3/16\" needle Use once daily as needed for insulin injection 100 each 0    potassium chloride CR (Klor-Con) 8 mEq ER tablet Take 1 tablet (8 mEq) by mouth once daily. Do not crush, chew, or split. TAKE WHEN TAKING FLUDROCORTISONE 90 tablet 3    prednisoLONE acetate (Pred-Forte) 1 % ophthalmic suspension Administer 1 drop into the left eye 4 times a day. 5 mL 1    predniSONE (Deltasone) 20 mg tablet Take 1 tablet (20 mg) by mouth if needed.      predniSONE (Deltasone) 20 mg tablet Take 3 tabs (60mg) daily for 3 days, then take 2 tabs (40mg) daily for 3 days, then take 1 tab (20mg) daily for 3 days. 18 tablet 0    terbinafine (LamISIL) 250 mg tablet Take 1 tablet (250 mg) by mouth once daily. *EMERGENCY REFILL* (Patient not taking: Reported on 4/30/2025)      Ventolin HFA 90 mcg/actuation inhaler       zonisamide (Zonegran) 100 mg capsule Take by mouth. 4 capules qam and 4 capsules at bedtime      [DISCONTINUED] glucagon (Gvoke HypoPen 2-Pack) 1 mg/0.2 mL auto-injector Inject 0.2 mL under the skin 1 time if needed (hypoglycemia) for up to 1 dose. 0.4 mL 1     No current facility-administered medications on file prior to visit.   [6]   Current Outpatient Medications:     0.9 % sodium chloride (sodium chloride 0.9%) solution, " Infuse 50 mL/hr at 50 mL/hr into a venous catheter., Disp: , Rfl:     alcohol swabs, Use to check blood glucose up to three times daily as directed., Disp: 100 each, Rfl: 11    atorvastatin (Lipitor) 40 mg tablet, Take 1 tablet (40 mg) by mouth., Disp: , Rfl:     blood sugar diagnostic (OneTouch Ultra Test), Use to check blood glucose up to three times daily as directed., Disp: 100 each, Rfl: 11    blood-glucose meter (OneTouch Ultra2 Meter) misc, Use as directed to monitor blood glucose daily., Disp: 1 each, Rfl: 0    calcium carbonate (Tums) 200 mg calcium chewable tablet, Chew once daily as needed., Disp: , Rfl:     fludrocortisone (Florinef) 0.1 mg tablet, Take 1 tablet (0.1 mg) by mouth early in the morning.., Disp: 90 tablet, Rfl: 3    fluticasone (Flonase) 50 mcg/actuation nasal spray, Administer 2 sprays into each nostril once daily. Shake gently. Before first use, prime pump. After use, clean tip and replace cap., Disp: 16 g, Rfl: 11    gabapentin (Neurontin) 300 mg capsule, Take 1 capsule (300 mg) by mouth once daily at bedtime., Disp: 90 capsule, Rfl: 0    glucagon (Gvoke HypoPen 2-Pack) 1 mg/0.2 mL auto-injector, Inject 0.2 mL under the skin 1 time if needed (hypoglycemia) for up to 1 dose., Disp: 0.4 mL, Rfl: 1    insulin lispro (HumaLOG) 100 unit/mL injection, INJECT 70 UNITS VIA INSULIN PUMP DAILY, Disp: 20 mL, Rfl: 10    ketoconazole (NIZOral) 2 % cream, Apply topically once daily., Disp: , Rfl:     ketorolac (Acular) 0.4 % ophthalmic solution, Administer 1 drop into the left eye 4 times a day., Disp: 5 mL, Rfl: 1    lacosamide (Vimpat) 150 mg tablet tablet, Take 1 tablet (150 mg) by mouth 2 times a day., Disp: , Rfl:     lancets (OneTouch Delica Plus Lancet) 33 gauge misc, Use to check blood glucose up to three times daily as directed., Disp: 100 each, Rfl: 11    Lantus Solostar U-100 Insulin 100 unit/mL (3 mL) pen, Inject 12 Units under the skin once daily at bedtime. For use in case of insulin  "pump failure., Disp: 15 mL, Rfl: 5    lidocaine (Lidoderm) 5 % patch, Place 1 patch over 12 hours on the skin once daily. Apply to painful area 12 hours per day, remove for 12 hours., Disp: 30 patch, Rfl: 0    midodrine (Proamatine) 5 mg tablet, Take 1 tablet (5 mg) by mouth 3 times a day., Disp: , Rfl:     mometasone-formoterol (Dulera) 200-5 mcg/actuation inhaler, Inhale 2 puffs twice a day., Disp: , Rfl:     montelukast (Singulair) 10 mg tablet, Take 1 tablet (10 mg) by mouth once daily at bedtime., Disp: , Rfl:     moxifloxacin (Vigamox) 0.5 % ophthalmic solution, Administer 1 drop into the left eye 4 times a day., Disp: 5 mL, Rfl: 0    multivitamin (Daily Multi-Vitamin) tablet, Take 1 tablet by mouth once daily., Disp: , Rfl:     omeprazole (PriLOSEC) 40 mg DR capsule, TAKE 1 CAPSULE BY MOUTH DAILY, Disp: 30 capsule, Rfl: 10    oxyCODONE (Roxicodone) 5 mg immediate release tablet, Take 1 tablet (5 mg) by mouth every 6 hours if needed for severe pain (7 - 10) for up to 6 days. PLAIN OXYCODONE HAS NO TYLENOL IN IT, Disp: 24 tablet, Rfl: 0    pen needle, diabetic 31 gauge x 3/16\" needle, Use once daily as needed for insulin injection, Disp: 100 each, Rfl: 0    potassium chloride CR (Klor-Con) 8 mEq ER tablet, Take 1 tablet (8 mEq) by mouth once daily. Do not crush, chew, or split. TAKE WHEN TAKING FLUDROCORTISONE, Disp: 90 tablet, Rfl: 3    prednisoLONE acetate (Pred-Forte) 1 % ophthalmic suspension, Administer 1 drop into the left eye 4 times a day., Disp: 5 mL, Rfl: 1    predniSONE (Deltasone) 20 mg tablet, Take 1 tablet (20 mg) by mouth if needed., Disp: , Rfl:     predniSONE (Deltasone) 20 mg tablet, Take 3 tabs (60mg) daily for 3 days, then take 2 tabs (40mg) daily for 3 days, then take 1 tab (20mg) daily for 3 days., Disp: 18 tablet, Rfl: 0    terbinafine (LamISIL) 250 mg tablet, Take 1 tablet (250 mg) by mouth once daily. *EMERGENCY REFILL*, Disp: , Rfl:     Ventolin HFA 90 mcg/actuation inhaler, , Disp: , " Rfl:     zonisamide (Zonegran) 100 mg capsule, Take by mouth. 4 capules qam and 4 capsules at bedtime, Disp: , Rfl:

## 2025-05-08 ENCOUNTER — TELEPHONE (OUTPATIENT)
Age: 55
End: 2025-05-08

## 2025-05-08 ENCOUNTER — APPOINTMENT (OUTPATIENT)
Dept: GASTROENTEROLOGY | Facility: CLINIC | Age: 55
End: 2025-05-08
Payer: MEDICAID

## 2025-05-08 VITALS
BODY MASS INDEX: 31.47 KG/M2 | SYSTOLIC BLOOD PRESSURE: 103 MMHG | HEIGHT: 62 IN | HEART RATE: 89 BPM | DIASTOLIC BLOOD PRESSURE: 65 MMHG | WEIGHT: 171 LBS

## 2025-05-08 DIAGNOSIS — Z12.11 COLON CANCER SCREENING: Primary | ICD-10-CM

## 2025-05-08 DIAGNOSIS — K22.70 BARRETT'S ESOPHAGUS WITHOUT DYSPLASIA: ICD-10-CM

## 2025-05-08 DIAGNOSIS — K31.84 GASTROPARESIS: ICD-10-CM

## 2025-05-08 DIAGNOSIS — E10.69 TYPE 1 DIABETES MELLITUS WITH OTHER SPECIFIED COMPLICATION: Primary | ICD-10-CM

## 2025-05-08 PROCEDURE — 3044F HG A1C LEVEL LT 7.0%: CPT | Performed by: INTERNAL MEDICINE

## 2025-05-08 PROCEDURE — 3074F SYST BP LT 130 MM HG: CPT | Performed by: INTERNAL MEDICINE

## 2025-05-08 PROCEDURE — 3008F BODY MASS INDEX DOCD: CPT | Performed by: INTERNAL MEDICINE

## 2025-05-08 PROCEDURE — 99204 OFFICE O/P NEW MOD 45 MIN: CPT | Performed by: INTERNAL MEDICINE

## 2025-05-08 PROCEDURE — 1036F TOBACCO NON-USER: CPT | Performed by: INTERNAL MEDICINE

## 2025-05-08 PROCEDURE — 3078F DIAST BP <80 MM HG: CPT | Performed by: INTERNAL MEDICINE

## 2025-05-08 NOTE — TELEPHONE ENCOUNTER
Spoke with patient she had a low to the 60s today and BG is currently 82.     Dropped basal rate 0.65 -> 0.50 units/hr.     Will be in manual mode for another 48 hrs. Then she will be able to switch from manual to smart guard. I have asked her to reach out to [employer] to determine if her pump will automatically transition to smartguard or if she will need to manually select the smartguard option. Once we know this we will also change target range from 99 - 120 to 110 -120.

## 2025-05-08 NOTE — PROGRESS NOTES
Physical Therapy treatment     Patient Name: Mattie Wolfe  MRN: [id number]  Today's Date: 5/9/2025  Visit #: 20  Last Reassessment - 3/21/25  Insurance: 2025 [place] [employer] AUTH REQ AFTER 30 VS SHIRA YR   Time Calculation  Start Time: 1213  Stop Time: 1258  Time Calculation (min): 45 min    1. Gait abnormality        2. Generalized weakness        3. At risk for injury related to fall            ASSESSMENT:  Pt has made great gains in LE strength/endurance which have had gradual carryover to functional mobility. Despite these gains she cont's to have decreased LE strength/endurance and relies heavily on BUE support for steadiness to perform functional mobility (demo'd by her near fall during TUG w/SPC). Pt additionally cont's to present w/unstable comorbidities including but not limited to unstable blood sugar d/t T1DM, and unstable BP d/t orthostatic hypotension. Pt will cont to benefit from skilled PT to address her impairments to facilitate improvement in safe functional mobility.        GOALS:  By Discharge patient will demo:  [place] in HEP met  Improvement in the following outcome measures to decrease risk of falls:  5xSTS </= 15sec met  TUG </= 15sec met  Pt will be able to complete 6MWT with </= 2 standing rest break. Met    Updated 5/9/25:  Gait speed >/= .8m/s during 6MWT  Pt will ambulate >/= 10min to improve ambulation endurance  TUG w/SPC w/supervision </= 15sec  Pt will ambulate for >/= 5min w/SPC to decrease reliance on UE support.   ABC >/= 70% in progress  No falls during POC met, in progress      PLAN:  Cont to progress LE strength/endurance for functional mobility as tolerated.       Next Session Considerations:  Add stairs, SPC ambulation, cone navigation w/SPC, cont cybex strengthening.         SUBJECTIVE:  Pt reports she is doing better. Her arm pain is getting very gradually better, she started taking new medication which is helping.   Her cataract surgery went well, she feels she is seeing much  better.   For her blood sugar, she got a new pump, and new targets to favor high blood sugar to prevent recurrence of hypoglycemia.       OBJECTIVE:    Treatments:  Therapeutic Exercise (08153): 21 minutes  Performing the following for LE strength/endurance and general activity tolerance for carryover to functional mobility:  Cybex Leg Press BLE - 3x10, 85lbs (increased)   Cybex Leg Extensions BLE - 2x10, 35lbs (increased)  Cybex Leg Curl BLE - 3x10, 40lbs (increased)       Manual Therapy (31441):   minutes  Not performed    Neuromuscular Re-education (66685):   minutes  Not performed    Therapeutic Activitity (05496):  24 minutes  Walking w/RW - to improve ambulation endurance/activity tolerance - x1 sets ea to fatigue   ^Best = (8:29min)  TUsec w/RW  5xSTS: 11sec w/out UE support w/out physical assist (improved)  6MWT: 395ft,120m (NT)  ^Gait Speed: .57m/s (NT)  Walking Duration: 8:29min (improved)  ABC = 10%  TUG w/SPC = 19.5sec ModA for steadiness, near fall   Discussing above outcome measures, discussing goals and POC moving fwd.           HEP:  Access Code: [email or web address]  URL: [email or web address]  Date: 2025  Prepared by: David Castano     Exercises  - Sit to Stand Without Arm Support  - 1 x daily - 7 x weekly - 3 sets - 10 reps  - Standing March with Counter Support  - 1 x daily - 7 x weekly - 3 sets - 10 reps  - Walking  - 1 x daily - 7 x weekly - 1 sets - 3-5 reps - 2min hold

## 2025-05-08 NOTE — TELEPHONE ENCOUNTER
Called [facility] to confirm pump supplies that pt receives. Pt receives guardian 4 sensors, Medtronic infusion sets- alba advanced all in one infusion set 9mm 23inch tubing, and [employer]Med paradime reservoirs. Pt has not received test strips or blood glucose meter from [facility]. Representative requested updated documentation, as pt's former documentation . Faxed Dr. Hampton's clinical note from 2025. Pt's insulin pump prescription expires 2025.

## 2025-05-09 ENCOUNTER — TREATMENT (OUTPATIENT)
Dept: OCCUPATIONAL THERAPY | Facility: CLINIC | Age: 55
End: 2025-05-09
Payer: MEDICAID

## 2025-05-09 ENCOUNTER — APPOINTMENT (OUTPATIENT)
Dept: PHYSICAL THERAPY | Facility: CLINIC | Age: 55
End: 2025-05-09
Payer: MEDICAID

## 2025-05-09 DIAGNOSIS — Z91.81 AT RISK FOR INJURY RELATED TO FALL: ICD-10-CM

## 2025-05-09 DIAGNOSIS — R53.1 GENERALIZED WEAKNESS: Primary | ICD-10-CM

## 2025-05-09 DIAGNOSIS — R41.842 VISUAL-SPATIAL IMPAIRMENT: ICD-10-CM

## 2025-05-09 DIAGNOSIS — R26.9 GAIT ABNORMALITY: Primary | ICD-10-CM

## 2025-05-09 DIAGNOSIS — R53.1 GENERALIZED WEAKNESS: ICD-10-CM

## 2025-05-09 DIAGNOSIS — R06.09 DYSPNEA ON MINIMAL EXERTION: ICD-10-CM

## 2025-05-09 PROCEDURE — 97110 THERAPEUTIC EXERCISES: CPT | Mod: GP,KX

## 2025-05-09 PROCEDURE — 97110 THERAPEUTIC EXERCISES: CPT | Mod: GO

## 2025-05-09 PROCEDURE — 97530 THERAPEUTIC ACTIVITIES: CPT | Mod: GP,KX

## 2025-05-09 PROCEDURE — 97530 THERAPEUTIC ACTIVITIES: CPT | Mod: GO

## 2025-05-09 RX ORDER — BLOOD SUGAR DIAGNOSTIC
1 STRIP MISCELLANEOUS 3 TIMES DAILY
Qty: 300 STRIP | Refills: 3 | Status: SHIPPED | OUTPATIENT
Start: 2025-05-09 | End: 2025-05-09

## 2025-05-09 RX ORDER — CALCIUM CITRATE/VITAMIN D3 200MG-6.25
1 TABLET ORAL 3 TIMES DAILY
Qty: 100 EACH | Refills: 11 | Status: SHIPPED | OUTPATIENT
Start: 2025-05-09 | End: 2025-05-09 | Stop reason: SDUPTHER

## 2025-05-09 NOTE — PROGRESS NOTES
Occupational Therapy  Occupational Therapy Treatment Note    Patient Name Mattie Wolfe   MRN: [id number]  : 1970  Today's Date: 2025  Time Calculation  Start Time: 1125  Stop Time: 1206  Time Calculation (min): 41 min     Visit #:     Insurance:  MEDICAID  Authorization required: Yes, after 30 visits  # of visits approved: 2025: [place] MCA[employer] AUTH REQ AFTER 30 VS SHIRA YR    Therapy Diagnoses:   1. Generalized weakness        2. Dyspnea on minimal exertion        3. 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 Pt participates in UE gross and fine motor coordination and strengthening ex this date with greater activity tolerance. Pt tends to avoid using LUE for coordination and bimanual tasks, with reduced bilateral integration observed. Pt demo difficulty with coordinating shoulder exercises with resistance band. Pt will benefit from continued skilled OT to further address UE strengthening and coordination both proximally and distally, and assess her vision and need for visual adaptations now s/p L cataract sx in order to remediate remaining deficits and optimize ADL/IADL functioning.     Plan:      Continue to progress per POC: Continue OT 1-2x/week for 6 weeks.  Interventions: Complete Decongestive Therapy, Manual Lymphatic Drainage, Neuromuscular Reeducation, Self-care/ADL training, Therapeutic Activity, Therapeutic Exercise, Manual Therapy    Next Visit:  - Re-assessment next tx (including vision) - decide recert vs discharge  - Writing intervention now that vision is subjectively improved    Goals:  By discharge,      Pt will increase standing tolerance during ADL/IADL performance to 5+ min.  Pt will demo independence with HEP completion.  Pt will demo and verbalize use of energy conservation/joint protection techniques to  increase activity tolerance while completing ADL/IADL tasks.   Pt will increase bilateral shoulder strength to 4+/5 to increase activity tolerance for reaching and functional UE use.   Pt will participate in QuickDash to establish BUE functional baseline.   Pt will participate in further visual perceptual assessment to determine how visual perceptual skills play a role in ADL/IADL impairment. (Met 3/21/25)  Pt will demo ability to independently check orthostatic blood pressures during functional activity and positional changes to prevent falls.   Pt will participate in assessment of BLE swelling to determine further OT needs (Met 2/13/25)  Pt will improve handwriting AEB improved legibility and speed using compensatory strategies or inc fine motor strength.  New goal 5/9: Pt will demo improved hilda  strength +5#.  New goal 5/9: Pt will demo improved hilda FMC -5 sec on 9 Hole Peg Test.    Subjective:   Pt reports overall doing better, got a new insulin pump that connects to her phone. She also reports her arm is doing better and understands it is related to her Diabetes and may last for months, per Dr. Gold.     Per Dr. Kobi Gold, PCP, at 1-week follow-up on 5/6/25:  Mattie Wolfe is a 54 y.o. female who presents for 1 WK FOLLOW UP. PT SAYS LEFT ARM IS WORSE SINCE LAST WEEK. SEEING A [employer] NURSE TOMORROW FOR INSULIN PUMP.  HPI: HAS HAD LOW BLOOD SUGAR, HAPPENED BEFORE STARTING PREDNISONE, GETTING NEW PUMP DUE TO THE INSULIN ISSUES, WON'T TAKE NORCO AFRAID THAT THE TYLENOL IS IMPACTING INSULIN PUMP SENSOR.  Review of Systems: PAIN LEFT SCAPULA/SHOULDER/HUMERUS   Objective: /80, pulse 98, resp 16, SpO2 99%; Musculoskeletal- LEFT ARM EXQUISITELY TENDER AT SCAPULA, LATERAL EPICONDYLE, AND WITH RADIATING PAIN TO THE DORSAL ASPECT  OF LEFT HAND AND FINGERS.  Patient Instructions:   THE SEVERITY OF YOUR NERVE PAIN IS CONSISTENT WITH THE NERVE INJURY THAT CAN HAPPEN IN DIABETES, IT IS CALLED  MONONEURITIS MULTIPLEX  EMG TEST IS ORDERED TO CONFIRM THIS SUSPICION, BECAUSE IF IT DOESN'T START GETTING BETTER SOON, YOU MAY NEED A STRONGER ANTI-INFLAMMATORY MEDICATION TO HELP IT HEAL  GO AHEAD AND COMPLETE THE PREDNISONE TAPER  THE POSSIBILITY OF THE TYLENOL IN THE HYDROCODONE TABLET HAVING AFFECTED INSULIN PUMP SENSOR IS QUITE LOW, BUT TO TAKE TYLENOL OUT OF THE EQUATION, I SENT IN A SCRIPT FOR PLAIN OXYCODONE WITHOUT TYLENOL SO YOU CAN AT LEAST FEEL COMFORTABLE TO TAKE SOME MEDICATION AT NIGHT SO YOU CAN SLEEP  ONCE EMG COMES BACK I'LL KNOW HOW TO PROCEED FURTHER  DISABILITY PLACARD IS ORDERED FOR YOU  FOLLOW UP AFTER EMG    Significant PMHx and rehab hx: H/o CVA on 1/26/2018 affecting R occipital lobe, and other stroke affecting R optic nerve and causing R eye blindness. Pt reports she was in a coma for over a month after the CVA, on a ventilator, had trouble weaning from ventilator, and ultimately got a trach. Pt reports she spent ~2 years in a SNF post-hospital discharge and re-learned to walk. Pt uses oxygen overnight (3.5 L), completed oxygen therapy in November 2024.   Other hx: H/o L ankle fx July 2022; L eye cataract with tentative cataract sx scheduled for 3/26/25; hilda hearing aides; Type I Diabetes (dx 2009); Epilepsy (on vimpat and zonegran; last seizure 2020); L wrist fx (2019); orthostatic hypotension; HTN; HLD; gastroparesis; Hashimoto's thyroiditis; Leukopenia; non-ischemic cadiomyopathy; asthma.    Have you fallen since last visit: No    Precautions: Moderate fall risk, low vision (R eye blind)    Pain: in L arm, does not rate  Location/Type of pain: pt reports she took her pain meds 45 min before start of session and so her L arm is doing ok.    HEP compliance/understanding: Intermittent compliance since onset of mononeuritis multiplex in LUE; pt reports she does best with UE strengthening in the pool with aqua PT.     Objective:   Cognition: improved processing speed compared to last tx  session; requires cues for activity pacing and body mechanics repeatedly with low integration of cues    Positioning/Alignment: observed proximal compensatory movement patterns at hilda shoulders/elbows during fine motor strengthening/coordination activity with theraputty; observed proximal compensatory movement patterns (shoulder elevation) during shoulder row/tricep extension ex with tband.     Function: pt took ~6 min to remove 10 beads (4 perler, 4 regular, 2 large) from medium-soft red theraputty.    Strength: improved activity tolerance for strengthening BUEs this date; tolerated x10 reps shoulder row and tricep extension with green tband with mod assist for body mechanics     Treatment:     Therapeutic Exercise: 18 minutes  Hand helper 2x10 bilaterally - 15# > 20#  Green resistance band exercises 1x10 bilaterally - shoulder rows and tricep extension    Therapeutic Activity: 23 minutes  Putty ex - press-outs, cone cuts alternating UEs, with cues to dec proximal compensations and focus on forearm/wrist/hand/digit movement  Putty ex to facilitate FMC/strength - removing 10 beads from medium-soft theraputty     Education: OT POC (reassessment next tx session), body mechanics during exercise, building awareness of compensatory movement patterns    HEP Progression:   Add-  - Theraputty medium-soft removing beads  - Access Code: [email or web address]  URL: [email or web address]  Date: 05/09/2025  Prepared by: Vielka Garcia  Program Notes  Perform in sitting for safety.  Exercises  - Standing Shoulder Row with Anchored Resistance  - 1 x daily - 7 x weekly - 3 sets - 10 reps  - Shoulder extension with resistance - Neutral  - 1 x daily - 7 x weekly - 3 sets - 10 reps    Current HEP:  - Rolyan pinch clip 3x10 to improve 2- and 3-pt pinch strength  - Handwriting practice: with large lined (manuscript) paper 1 sheet per day for 5-10 min at a time. Use red border/tape and brightly colored guide paper; use red  foam  to build-up writing utensil  - Short stretch bandage application to LLE to dec swelling  - Shoulder strengthening ex: seated abduction, chest press, scaption with dumbbells 2#-5# (Access code: [email or web address]; 4/7/25).

## 2025-05-09 NOTE — TELEPHONE ENCOUNTER
Spoke with MsLor Yaneth 5/9. Pt requests a new prescription for accucheck guide test strips be sent to [facility]. Pt was instructed to call or message us on [email or web address] if her insurance does not cover these test strips.

## 2025-05-09 NOTE — TELEPHONE ENCOUNTER
Received fax from [facility] 5/9. [employer] products are the only approved test strips for pt's insurance.

## 2025-05-11 RX ORDER — CALCIUM CITRATE/VITAMIN D3 200MG-6.25
1 TABLET ORAL 3 TIMES DAILY
Qty: 100 EACH | Refills: 11 | Status: SHIPPED | OUTPATIENT
Start: 2025-05-11

## 2025-05-12 ENCOUNTER — APPOINTMENT (OUTPATIENT)
Dept: PRIMARY CARE | Facility: CLINIC | Age: 55
End: 2025-05-12
Payer: MEDICAID

## 2025-05-12 ENCOUNTER — INFUSION (OUTPATIENT)
Dept: HEMATOLOGY/ONCOLOGY | Facility: CLINIC | Age: 55
End: 2025-05-12
Payer: MEDICAID

## 2025-05-12 DIAGNOSIS — E10.69 TYPE 1 DIABETES MELLITUS WITH OTHER SPECIFIED COMPLICATION: ICD-10-CM

## 2025-05-12 DIAGNOSIS — E55.9 MILD VITAMIN D DEFICIENCY: ICD-10-CM

## 2025-05-12 DIAGNOSIS — Z95.828 PRESENCE OF PERMANENT CENTRAL VENOUS CATHETER: ICD-10-CM

## 2025-05-12 PROCEDURE — 96523 IRRIG DRUG DELIVERY DEVICE: CPT

## 2025-05-12 PROCEDURE — 2500000004 HC RX 250 GENERAL PHARMACY W/ HCPCS (ALT 636 FOR OP/ED): Mod: JZ,SE | Performed by: INTERNAL MEDICINE

## 2025-05-12 RX ORDER — HEPARIN 100 UNIT/ML
500 SYRINGE INTRAVENOUS AS NEEDED
Status: DISCONTINUED | OUTPATIENT
Start: 2025-05-12 | End: 2025-05-12 | Stop reason: HOSPADM

## 2025-05-12 RX ORDER — HEPARIN SODIUM,PORCINE/PF 10 UNIT/ML
50 SYRINGE (ML) INTRAVENOUS AS NEEDED
OUTPATIENT
Start: 2025-05-12

## 2025-05-12 RX ORDER — HEPARIN SODIUM,PORCINE/PF 10 UNIT/ML
50 SYRINGE (ML) INTRAVENOUS AS NEEDED
Status: DISCONTINUED | OUTPATIENT
Start: 2025-05-12 | End: 2025-05-12 | Stop reason: HOSPADM

## 2025-05-12 RX ORDER — HEPARIN 100 UNIT/ML
500 SYRINGE INTRAVENOUS AS NEEDED
OUTPATIENT
Start: 2025-05-12

## 2025-05-12 RX ORDER — HEPARIN SODIUM 1000 [USP'U]/ML
2000 INJECTION, SOLUTION INTRAVENOUS; SUBCUTANEOUS AS NEEDED
OUTPATIENT
Start: 2025-05-12

## 2025-05-12 RX ORDER — HEPARIN SODIUM 1000 [USP'U]/ML
2000 INJECTION, SOLUTION INTRAVENOUS; SUBCUTANEOUS AS NEEDED
Status: DISCONTINUED | OUTPATIENT
Start: 2025-05-12 | End: 2025-05-12 | Stop reason: HOSPADM

## 2025-05-12 RX ADMIN — Medication 500 UNITS: at 09:45

## 2025-05-13 ENCOUNTER — APPOINTMENT (OUTPATIENT)
Dept: PHYSICAL THERAPY | Facility: CLINIC | Age: 55
End: 2025-05-13
Payer: MEDICAID

## 2025-05-13 DIAGNOSIS — R26.9 GAIT ABNORMALITY: Primary | ICD-10-CM

## 2025-05-13 DIAGNOSIS — Z91.81 AT RISK FOR INJURY RELATED TO FALL: ICD-10-CM

## 2025-05-13 DIAGNOSIS — R53.1 GENERALIZED WEAKNESS: ICD-10-CM

## 2025-05-13 PROCEDURE — 97113 AQUATIC THERAPY/EXERCISES: CPT | Mod: GP

## 2025-05-13 NOTE — PROGRESS NOTES
Physical Therapy Treatment      Patient Name: Mattie Wolfe  MRN: [id number]  Today's Date: 5/13/2025  Visit #21  Time Calculation  Start Time: 0930  Stop Time: 1000  Time Calculation (min): 30 min      Insurance:  2025 [place] MCAID AUTH REQ AFTER 30 VS SHIRA YR     Assessment:  Continued aquatic therapy focus of improving ambulatory tolerance, strengthening, and balance.  Pt tolerated all exercises well with L shoulder and arm pain responding positively to aquatic therapy. Focused on building ambulatory endurance in the pool as well as gentle UE ROM exercises this date secondary to shoulder and arm pain. Spent time educating the patient on the benefits of physical therapy for her arm pain and reassuring her that she is doing all the correct things to date to manage her discomfort.  Pt tolerated all interventions well, SUP with PT in water for additional safety.  Pt was able to ambulate 15 minutes without standing or seated rest break, and no SOB noted throughout session. Pt encouraged to monitor symptoms and blood sugar post therapy session and throughout the day.  Pt is making progress towards goals and would continue to benefit from skilled PT at this time.          Aquatic PT is required due to, buoyancy of water reduced weight bearing and decreased LE and spinal loading, allowing for more freedom of movement and promotes improved ability to perform functional tasks.  The viscosity of water which is more than 700x that of air, allowed increased reaction time, in turn allowing advanced balance activities to be safely performed and allow patient to be challenged beyond limited of stability without fear of falling; provides the opportunity to work on balance in a safe environment.  The hydrostatic pressure of water facilitates the reduction of edema in the lower extremities, allowing for greater ease of movement.  Aquatics d/t cardio vascular benefits including: improved cardiovascular efficiency including increased stroke  volume, decreased HR, and decreased blood pressure with increased cardiac output allowing patient to achieve cardiovascular training effect with less work load.  Aquatics to support upright posture during postural retraining and strengthening.   Aquatics allow patient to work on gait with less assistance or without assistive device improving posture and ease of gait training/conditioning.     Patient's response to session: Decreased pain, Increased ROM/joint mobility, Increase motor control, Improved gait, and Increased knowledge and understanding    Plan:  Cont to progress LE strength/endurance for functional mobility as tolerated. Inc SL balance exercises and endurance walking.      Tx Considerations:  - nerve glides for UE nerve related pain   -Ambulation/dynamic gait w/out AD or w/SPC  -Toe taps, step ups etc.    Current Problem:   1. Gait abnormality        2. Generalized weakness        3. At risk for injury related to fall            Subjective   Pt is feeling well overall. Some minor improvement in shoulder and arm pain with most discomfort located at the elbow/brachioradialis muscle. Got a new pump from her PCP so her blood sugar is well controlled. Reports she is taking gabapentin regularly for the UE nerve related pain.        Xray humerus:   FINDINGS:  No fracture or dislocation is evident.  There is partial  visualization of a port line with the tip projecting over the distal  SVC.      IMPRESSION:  No osseous injury is evident.      Xray C-spine:  FINDINGS:  The cervical spine is seen to C7/T1.  Vertebral body height and  alignment are  maintained. No fracture or dislocation is evident.  Mild multilevel degenerative changes seen of the cervical spine.   No  prevertebral soft tissue swelling is evident.  There is a port on the  right chest wall with the line tracking down over the mediastinum as  seen on these radiographs.      IMPRESSION:  Mild degenerative change of the cervical spine without osseous  injury  Evident    Pain: 5/10; worst pain with LUE at night        Objective     Ambulated with SBA in pool with current for 20 min without need for rest breaks  Ttp L brachioradialis   No inc in symptoms with median nerve ULTT  Dec L shoulder/arm pain with scapular retraction and c-spine retraction     Treatments:  Therapeutic Exercise (95857):   minutes (0 minutes)      Manual Therapy (03917):   minutes      Neuromuscular Re-education (77709):   minutes      Aquatic Therapy (09432):   30 Min 2 units  20 minutes of forward water walking without UE support.   Squats 2x10  Hip ext 2x10 B  Marches 2x10 B  HR 2x10  Pendulums in deep water 3x10 all directions  Scapular retraction in deep water 3x10   C-spine retraction 3x10  Elbow flex/ext 3x10   Edu on continuing to complete gentle ROM to LUE as well as exercises prescribed last land PT session       Vielka Osman, PT

## 2025-05-14 ENCOUNTER — APPOINTMENT (OUTPATIENT)
Dept: OPHTHALMOLOGY | Facility: CLINIC | Age: 55
End: 2025-05-14
Payer: MEDICAID

## 2025-05-14 DIAGNOSIS — Z96.1 PSEUDOPHAKIA OF LEFT EYE: Primary | ICD-10-CM

## 2025-05-14 PROCEDURE — 99024 POSTOP FOLLOW-UP VISIT: CPT | Performed by: OPHTHALMOLOGY

## 2025-05-14 ASSESSMENT — CONF VISUAL FIELD
OD_SUPERIOR_TEMPORAL_RESTRICTION: 1
OD_INFERIOR_TEMPORAL_RESTRICTION: 1
OS_NORMAL: 1
OD_SUPERIOR_NASAL_RESTRICTION: 1
OS_INFERIOR_NASAL_RESTRICTION: 0
OS_SUPERIOR_NASAL_RESTRICTION: 0
OS_INFERIOR_TEMPORAL_RESTRICTION: 0
OS_SUPERIOR_TEMPORAL_RESTRICTION: 0
OD_INFERIOR_NASAL_RESTRICTION: 1
METHOD: COUNTING FINGERS

## 2025-05-14 ASSESSMENT — ENCOUNTER SYMPTOMS
EYES NEGATIVE: 1
MUSCULOSKELETAL NEGATIVE: 0
PSYCHIATRIC NEGATIVE: 0
HEMATOLOGIC/LYMPHATIC NEGATIVE: 0
ALLERGIC/IMMUNOLOGIC NEGATIVE: 0
RESPIRATORY NEGATIVE: 0
NEUROLOGICAL NEGATIVE: 0
CONSTITUTIONAL NEGATIVE: 0
CARDIOVASCULAR NEGATIVE: 0
GASTROINTESTINAL NEGATIVE: 0
ENDOCRINE NEGATIVE: 0

## 2025-05-14 ASSESSMENT — TONOMETRY
OS_IOP_MMHG: 12
IOP_METHOD: GOLDMANN APPLANATION

## 2025-05-14 ASSESSMENT — SLIT LAMP EXAM - LIDS
COMMENTS: NORMAL
COMMENTS: NORMAL

## 2025-05-14 ASSESSMENT — EXTERNAL EXAM - LEFT EYE: OS_EXAM: NORMAL

## 2025-05-14 ASSESSMENT — EXTERNAL EXAM - RIGHT EYE: OD_EXAM: NORMAL

## 2025-05-14 ASSESSMENT — VISUAL ACUITY
OS_SC: 20/25
OS_SC+: +3
METHOD: SNELLEN - LINEAR
OD_SC: NLP

## 2025-05-14 NOTE — PATIENT INSTRUCTIONS
Drops instructions:    Stop Tan top  Decrease Pink and grey to Twice daily for 2 weeks then once daily for 1 week then stop.

## 2025-05-15 ENCOUNTER — APPOINTMENT (OUTPATIENT)
Dept: PHYSICAL THERAPY | Facility: CLINIC | Age: 55
End: 2025-05-15
Payer: MEDICAID

## 2025-05-15 DIAGNOSIS — R26.9 GAIT ABNORMALITY: Primary | ICD-10-CM

## 2025-05-15 DIAGNOSIS — R53.1 GENERALIZED WEAKNESS: ICD-10-CM

## 2025-05-15 DIAGNOSIS — Z91.81 AT RISK FOR INJURY RELATED TO FALL: ICD-10-CM

## 2025-05-15 PROCEDURE — 97113 AQUATIC THERAPY/EXERCISES: CPT | Mod: GP

## 2025-05-19 NOTE — PROGRESS NOTES
Assessment/Plan   Diagnoses and all orders for this visit:  Pseudophakia of left eye    POW#2, doing well, clear cornea.    Plan:  Taper Pred Qid  Stop Moxi QID  Decrease Ketorolac to BID until empty.  Return precautions.  See in 1 month.

## 2025-05-19 NOTE — PROGRESS NOTES
Reason for Visit:  Mattie Wolfe is a 54 y.o. female who presents for Follow-up DSME Visit    DSME - Global Assessment    How do you manage your diabetes when you are sick?: drink more fluids and test blood sugar more often, call doctor    Does your culture or Judaism require any of the following: no  Who does the grocery shopping? self  Who does the cooking in the home? Self    How often do you eat out? Not that often because my meals comes delivered. Aid comes M-F.  On weekends she is on her own with food.   How many meals do you eat in per day: three.  Which meals do you tend to skip: none  What do you drink with and between meals: water  Snacks: after dinner and sometimes before breakfast and lunch    Patient Active Problem List    Diagnosis Date Noted    Lymphedema of left lower extremity 02/18/2025    Visual-spatial impairment 02/07/2025    Generalized weakness 02/04/2025    Gait abnormality 02/04/2025    At risk for injury related to fall 02/04/2025    Orthostatic hypotension 12/30/2024    Dyspnea on exertion 07/15/2024    Cardiomyopathy, nonischemic (Multi) 04/29/2024    Congenital dislocation of hip, unilateral 04/29/2024    Presence of permanent central venous catheter 03/06/2024    Difficult intravenous access 01/16/2024    Other constipation 01/16/2024    Bilateral hearing loss 11/01/2023    Arm pain 02/17/2023    Leg pain 02/17/2023    Pain, joint, shoulder region, left 02/17/2023    Shoulder pain 02/17/2023    Ortega's esophagus without dysplasia 02/17/2023    Candida esophagitis (Multi) 02/17/2023    Contact dermatitis 02/17/2023    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/2023    Daily nausea 02/17/2023    Dependent edema 02/17/2023    Diabetic diarrhea (Multi) 02/17/2023    Gastroparesis 02/17/2023    Diabetic neuropathic cachexia 02/17/2023    Type 1 diabetes mellitus 02/17/2023    Dyspnea on minimal exertion 02/17/2023    Gait disturbance, post-stroke 02/17/2023    Hashimoto's thyroiditis 02/17/2023     Hyperlipidemia LDL goal <100 02/17/2023    Muscle weakness 02/17/2023    Neuropathic pain 02/17/2023    Peripheral neuropathy 02/17/2023    Pharyngoesophageal dysphagia 02/17/2023    Seizure (Multi) 02/17/2023    Stroke (Multi) 02/17/2023    Tracheal stenosis 02/17/2023    Urinary incontinence 02/17/2023    Combined form of age-related cataract, left eye 02/06/2025        Lab Results   Component Value Date    HGBA1C 6.3 03/10/2025    HGBA1C 6.0 11/12/2024    HGBA1C 6.2 07/08/2024    HGBA1C 5.8 (H) 04/01/2024    HGBA1C 6.3 (H) 10/06/2023    HGBA1C 8.3 (A) 04/27/2023    HGBA1C 8.3 (H) 01/24/2023         Diabetes Medications: insulin pump  Current Outpatient Medications   Medication Instructions    alcohol swabs Use to check blood glucose up to three times daily as directed.    atorvastatin (LIPITOR) 40 mg    blood sugar diagnostic (True Metrix Glucose Test Strip) 1 each, miscellaneous, 3 times daily, Use to check blood glucose up to three times daily as directed    blood-glucose meter (OneTouch Ultra2 Meter) misc Use as directed to monitor blood glucose daily.    calcium carbonate (Tums) 200 mg calcium chewable tablet Daily PRN    fludrocortisone (FLORINEF) 0.1 mg, oral, Daily (0630)    fluticasone (Flonase) 50 mcg/actuation nasal spray 2 sprays, Each Nostril, Daily, Shake gently. Before first use, prime pump. After use, clean tip and replace cap.    gabapentin (NEURONTIN) 300 mg, oral, Nightly    glucagon (Gvoke HypoPen 2-Pack) 1 mg/0.2 mL auto-injector 0.2 mL, subcutaneous, Once as needed    insulin lispro (HumaLOG) 100 unit/mL injection INJECT 70 UNITS VIA INSULIN PUMP DAILY    ketoconazole (NIZOral) 2 % cream Daily    ketorolac (Acular) 0.4 % ophthalmic solution 1 drop, Left Eye, 4 times daily    lacosamide (VIMPAT) 150 mg, 2 times daily    lancets (OneTouch Delica Plus Lancet) 33 gauge misc Use to check blood glucose up to three times daily as directed.    Lantus Solostar U-100 Insulin 12 Units, subcutaneous,  "Nightly, For use in case of insulin pump failure.    lidocaine (Lidoderm) 5 % patch 1 patch, transdermal, Daily, Apply to painful area 12 hours per day, remove for 12 hours.    midodrine (PROAMATINE) 5 mg, 3 times daily    mometasone-formoterol (Dulera) 200-5 mcg/actuation inhaler 2 puffs, 2 times daily    montelukast (SINGULAIR) 10 mg, Nightly    moxifloxacin (Vigamox) 0.5 % ophthalmic solution 1 drop, Left Eye, 4 times daily    multivitamin (Daily Multi-Vitamin) tablet 1 tablet, Daily    omeprazole (PRILOSEC) 40 mg, oral, Daily    oxyCODONE (ROXICODONE) 5 mg, oral, Every 6 hours PRN, PLAIN OXYCODONE HAS NO TYLENOL IN IT    pen needle, diabetic 31 gauge x 3/16\" needle Use once daily as needed for insulin injection    potassium chloride CR (Klor-Con) 8 mEq ER tablet 8 mEq, oral, Daily, Do not crush, chew, or split. TAKE WHEN TAKING FLUDROCORTISONE    prednisoLONE acetate (Pred-Forte) 1 % ophthalmic suspension 1 drop, Left Eye, 4 times daily    predniSONE (DELTASONE) 20 mg, As needed    sodium chloride 0.9% 50 mL/hr    terbinafine (LAMISIL) 250 mg, Daily    Ventolin HFA 90 mcg/actuation inhaler     zonisamide (Zonegran) 100 mg capsule Take by mouth. 4 capules qam and 4 capsules at bedtime          DM medications as patient is taking them:      DSME - Goals and Recommendations    [facility] Diabetes Education Program SMART Behavior Goal Setting:        S - Specific: Exactly what do you want to do        M - Measurable: Use a calendar or chart to track progress        A - Attainable: Take small steps to make bigger changes        R - Realistic: Pick something reasonable that you know you can do        T - Time Oriented: Choose a time limit (No longer than 6 months)    Specific goal from prior visit:  1. I will call [employer] and tell them I have diabetes and need to have the carb counts listed on the food so I can dose my insulin correctly.   Meeting goal. Says they are doing better job of sending the " "carbs counts on food.     2. Look at ahead at snacks and foods you typically eat that don't have food labels.  Write out carb counts so you don't have to look it up each time.  Did not meet goal.       Goal Met, mostly meeting, not met or ongoing    New Specific Goal   1.Check your blood sugar before aqua therapy.   If below 100 then eat a snack containing 15 grams of carbs (peanut butter roll up or 3-4 peanut butter crackers) as these are snacks she has available.    2.Aim for 45-60 grams carbs per meal and 15 grams at snacks until you meet with RD.     3. Breakfast  On mornings you plan to have protein shake with 36 grams of carbs you need to add 1 piece of fruit to it.   1 shake  had 36 grams of carbs and is not enough carbohydrate to take 6 units. You need to add more carbs to that meal. Try adding a piece of fruit.    4. She wants to recommit to filling out 4 days of food log with carb count, insulin and BG before and 2 hr after as this will help her to see and Dr. Hampton what ICR might work for her.     Measurable: How will I track my goal:  I will keep track of my progress daily by chart.    Time Oriented: one month    Topics Covered and Impression:    DSME Topics Covered During Visit:   Being Physically Active  Reviewed Hypoglycemia Signs/Symptoms/Tx Plan  Healthy Meal Plan  Macro nutrients  Balance blood sugar by pairing fiber, protein and healthy fats with carbs  Label reading  How to identify carb counts of food without label       DSME Topics for Follow-Up:   Review Glycemic Goals (CGM or SMBG)  Reviewed Hypoglycemia Signs/Symptoms/Tx Plan  Carb counting  Review food log    Materials provided during today's visit: Log sheets     Provider Impression: Two month follow-up. Would like patient to demonstrate she can figure out carb counts of foods that don't have a label on them. 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 \"I'm willing, but it's hard sometimes.\" "   Patient had 2 severe low episodes recently within 2 days, without symptoms and squad had to be called.   Per MD note 5/8/25 Basal rate decreased 0.65 -> 0.50 units/hr.   Patient was directed to reach out to [employer] and they provided her with a new pump.   Her meal time bolus has been decreased from 8 units to 6 units per meal and her target BG relaxed.   She had 1 episode of low since. She noticed it after aqua therapy and reattaching her insulin pump. She did a lot more activity than in prior sessions. It was 45 minutes. Once she put her pump was put back on it alerted her she was in 50's. She did have symptoms. Had glucose tabs, yogurt pouch and vitamin water. Then once home she said it came up and ate lunch.  Alerts have been changed so that when blood sugar nears 100 it alerts her and Dr. Hampton's office. She thinks getting the new pump is making a difference. She now has the pump and CGM settings on her phone via healthcare link on her phone. Said she was told by [employer] nurse not to worry about counting carbs yet. F  For this visit she brought in packages of foods she might eat or drink.   I asked patient what she wants to do regarding counting carbs. Said she is willing to learn, but admits it is hard at times.     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 At the very end of the visit she showed little she wrote out and one item was very helpful. It identified that her BG goes too low when she has 36 grams of carbs via protein shake. Her pre breakfast blood sugar 120, she had protein shake with 36 grams carbs, took 6 units insulin  ia pump and 2 hr later BG dropped to 78. Showed her this and explained that BG should not drop after meal let alone that much. Suggested she add a piece of fruit to that and to aim at meals for 45-60 grams carbohydrate. Explained how her learning carb counting would really help reduce lows and highs. To treat low blood sugar she keeps glucose tablets  with her. And states she knows to take 2-4. Drinks whole letty sun pouch. She won't get her Gvoke pen till June.     Brought in 2 meal containers she'd eat the whole package: Honey chicken bowl = 71 grams of carbs, 2 g fiber and 11 grams protein and beef teriayki 43 grams of carbs with 2 grams fiber and 14 grams protein or Pulled pork bbq sandwich 46 grams carbs and 17 grams protein. She would benefit adding more protein to meals, but that is not easy for her to do.   Also challenged with food options because of gastroparesis (can't eat a lot of fresh veggies, can't do nuts or seeds, has to limit cheese). Protein shake for breakfast 36 grams carbs and 10 grams protein. Using this one because it was on sale. Usually uses one with lower carbs than that.   Challenges include she relies on other for food and can't seem to add protein easily to meals.   Admits she'd like to have more fresh produce, but she only has so much income. Does try to buy salad materials, but can't eat too much salad due to gastroparesis.   She's recently gone to the ER and had problem with left radial nerve. She will have test Danyell second.   She's concerned about her weight gain. She was on steroids. Says she used to weight 200 pounds and now is up to 160 and doesn't want to gain. She wants to lose weight. Explained that given recent events and for safety our first focus is getting her blood sugars stable with food intake and insulin. Weight management is still important.  Will ask referring MD to refer patient to RD.   The referring provider is within the same EMR and is able to see the DSMES provided and the outcomes.       Time: I personally spent a total of 60 minutes with the patient providing diabetes self-management education. Visit documentation will be sent electronically to referring provider.

## 2025-05-20 ENCOUNTER — APPOINTMENT (OUTPATIENT)
Dept: PHYSICAL THERAPY | Facility: CLINIC | Age: 55
End: 2025-05-20
Payer: MEDICAID

## 2025-05-20 ENCOUNTER — NUTRITION (OUTPATIENT)
Dept: NUTRITION | Facility: CLINIC | Age: 55
End: 2025-05-20
Payer: MEDICAID

## 2025-05-20 DIAGNOSIS — Z91.81 AT RISK FOR INJURY RELATED TO FALL: ICD-10-CM

## 2025-05-20 DIAGNOSIS — R53.1 GENERALIZED WEAKNESS: ICD-10-CM

## 2025-05-20 DIAGNOSIS — E10.69 TYPE 1 DIABETES MELLITUS WITH OTHER SPECIFIED COMPLICATION: ICD-10-CM

## 2025-05-20 DIAGNOSIS — R26.9 GAIT ABNORMALITY: Primary | ICD-10-CM

## 2025-05-20 PROCEDURE — G0108 DIAB MANAGE TRN  PER INDIV: HCPCS | Performed by: EMERGENCY MEDICINE

## 2025-05-20 PROCEDURE — 97113 AQUATIC THERAPY/EXERCISES: CPT | Mod: GP

## 2025-05-20 NOTE — PROGRESS NOTES
Physical Therapy Treatment      Patient Name: Mattie Wolfe  MRN: [id number]  Today's Date: 5/20/2025  Visit #23  Time Calculation  Start Time: 0933  Stop Time: 1018  Time Calculation (min): 45 min    Insurance:  2025 [place] [employer] AUTH REQ AFTER 30 VS SHIRA YR     Assessment:  Continued aquatic therapy focus of improving ambulatory tolerance, strengthening, and balance.  Pt tolerated all exercises well with L shoulder and arm pain responding positively to aquatic therapy. More time spent in the water today, in which the pt tolerated well without complications. Focused on building ambulatory endurance in the pool as well as gentle UE ROM exercises this date secondary to shoulder and arm pain. Spent time educating the patient on the benefits of physical therapy for her arm pain and reassuring her that she is doing all the correct things to date to manage her discomfort.  Pt tolerated all interventions well, SUP with PT in water for additional safety.  Pt was able to ambulate 15 minutes without standing or seated rest break, and no SOB noted throughout session. Pt encouraged to monitor symptoms and blood sugar post therapy session and throughout the day.  Pt is making progress towards goals and would continue to benefit from skilled PT at this time.     Aquatic PT is required due to, buoyancy of water reduced weight bearing and decreased LE and spinal loading, allowing for more freedom of movement and promotes improved ability to perform functional tasks.  The viscosity of water which is more than 700x that of air, allowed increased reaction time, in turn allowing advanced balance activities to be safely performed and allow patient to be challenged beyond limited of stability without fear of falling; provides the opportunity to work on balance in a safe environment.  The hydrostatic pressure of water facilitates the reduction of edema in the lower extremities, allowing for greater ease of movement.  Aquatics d/t cardio  "vascular benefits including: improved cardiovascular efficiency including increased stroke volume, decreased HR, and decreased blood pressure with increased cardiac output allowing patient to achieve cardiovascular training effect with less work load.  Aquatics to support upright posture during postural retraining and strengthening.   Aquatics allow patient to work on gait with less assistance or without assistive device improving posture and ease of gait training/conditioning.     Patient's response to session: Decreased pain, Increased ROM/joint mobility, Increase motor control, Improved gait, and Increased knowledge and understanding    Plan:  Cont to progress LE strength/endurance for functional mobility as tolerated. Inc SL balance exercises and endurance walking.      Tx Considerations:  - nerve glides for UE nerve related pain   -Ambulation/dynamic gait w/out AD or w/SPC  -Toe taps, step ups etc.    Current Problem:   1. Gait abnormality        2. Generalized weakness        3. At risk for injury related to fall            Subjective   Pt reports, \"I have been, good my diabetes has been more controlled and I have been feeling better, the only thing, is my L shoulder\"       Xray humerus:   FINDINGS:  No fracture or dislocation is evident.  There is partial  visualization of a port line with the tip projecting over the distal  SVC.      IMPRESSION:  No osseous injury is evident.      Xray C-spine:  FINDINGS:  The cervical spine is seen to C7/T1.  Vertebral body height and  alignment are  maintained. No fracture or dislocation is evident.  Mild multilevel degenerative changes seen of the cervical spine.   No  prevertebral soft tissue swelling is evident.  There is a port on the  right chest wall with the line tracking down over the mediastinum as  seen on these radiographs.      IMPRESSION:  Mild degenerative change of the cervical spine without osseous injury  Evident    Pain: 5/10; worst pain with LUE at night "        Objective     Ambulated with SBA in pool with current for 22 min without need for rest breaks  Ttp L brachioradialis   No inc in symptoms with median nerve ULTT  Dec L shoulder/arm pain with scapular retraction and c-spine retraction     Treatments:  Therapeutic Exercise (71088):   minutes (0 minutes)      Manual Therapy (21499):   minutes      Neuromuscular Re-education (81184):   minutes      Aquatic Therapy (58970):   44 Min 3 units  22 minutes of forward water walking without UE support.   Squats 2x10  Hip ext 2x10 B  Marches 2x10 B  Standing bicycle kick 20 reps  HS Curls: 2 x 10 (B)  HR 2x10  Pendulums in deep water 3x10 all directions  Scapular retraction in deep water 3x10   C-spine retraction 3x10  Elbow flex/ext 3x10   Edu on continuing to complete gentle ROM to LUE as well as exercises prescribed last land PT session

## 2025-05-21 ENCOUNTER — TELEPHONE (OUTPATIENT)
Dept: PRIMARY CARE | Facility: CLINIC | Age: 55
End: 2025-05-21

## 2025-05-21 DIAGNOSIS — E10.69 TYPE 1 DIABETES MELLITUS WITH OTHER SPECIFIED COMPLICATION: Primary | ICD-10-CM

## 2025-05-21 NOTE — TELEPHONE ENCOUNTER
Raciel with [facility] scheduling calling to inform you pt has been scheduled any questions call [phone] opt 2 then 1

## 2025-05-22 ENCOUNTER — PATIENT OUTREACH (OUTPATIENT)
Dept: PRIMARY CARE | Facility: CLINIC | Age: 55
End: 2025-05-22
Payer: MEDICAID

## 2025-05-22 DIAGNOSIS — E06.3 HASHIMOTO'S THYROIDITIS: ICD-10-CM

## 2025-05-22 DIAGNOSIS — E10.69 TYPE 1 DIABETES MELLITUS WITH OTHER SPECIFIED COMPLICATION: ICD-10-CM

## 2025-05-22 DIAGNOSIS — M25.512 ACUTE PAIN OF LEFT SHOULDER: ICD-10-CM

## 2025-05-22 DIAGNOSIS — R26.9 GAIT DISTURBANCE, POST-STROKE: ICD-10-CM

## 2025-05-22 DIAGNOSIS — K31.84 GASTROPARESIS: ICD-10-CM

## 2025-05-22 DIAGNOSIS — I69.398 GAIT DISTURBANCE, POST-STROKE: ICD-10-CM

## 2025-05-22 NOTE — PROGRESS NOTES
Physical Therapy treatment     Patient Name: Mattie Wolfe  MRN: [id number]  Today's Date: 5/23/2025  Visit #: 24  Last Reassessment - 5/09/25  Insurance: 2025 [place] [employer] AUTH REQ AFTER 30 VS SHIRA YR   Time Calculation  Start Time: 1215  Stop Time: 1255  Time Calculation (min): 40 min    1. Gait abnormality        2. Generalized weakness        3. At risk for injury related to fall            ASSESSMENT:  Pt has good participation throughout session, fatigued by end. She has increased SOB this date. She relates this to running out of Prednisone which usually helps her breathing. Pt demo's profound unsteadiness when ambulating w/SPC and demo's poor control of anterior/posterior ARVIND resulting in speeding up and slowing down inconsistently during gait. CGA for safety as she is inconsistently able to correct LOB independently occasionally needs Stuart. She is making small gains in LE strength but cont's to have significant LE weakness overall. Pt will cont to benefit from skilled PT to improve safe functional mobility.       GOALS:  By Discharge patient will demo:  Owen in HEP met  Improvement in the following outcome measures to decrease risk of falls:  5xSTS </= 15sec met  TUG </= 15sec met  Pt will be able to complete 6MWT with </= 2 standing rest break. Met    Updated 5/9/25:  Gait speed >/= .8m/s during 6MWT  Pt will ambulate >/= 10min to improve ambulation endurance  TUG w/SPC w/supervision </= 15sec  Pt will ambulate for >/= 5min w/SPC to decrease reliance on UE support.   ABC >/= 70% in progress  No falls during POC met, in progress      PLAN:  Cont to progress LE strength/endurance for functional mobility as tolerated.       Next Session Considerations:  Stairs, cont SPC ambulation, cone navigation w/SPC, cont cybex strengthening.         SUBJECTIVE:  The arm is still painful, she is getting an EMG soon. She is only taking pain meds for the pain at night. The eye is doing much better.       OBJECTIVE:    TUG:  12sec w/RW  5xSTS: 11sec w/out UE support w/out physical assist (improved)  6MWT: 395ft,120m (NT)  ^Gait Speed: .57m/s (NT)  Walking Duration: 8:29min (improved)  ABC = 10%  TUG w/SPC = 19.5sec ModA for steadiness, near fall     Treatments:  Therapeutic Exercise (13832): 23 minutes  Performing the following for LE strength/endurance and general activity tolerance for carryover to functional mobility:  Cybex Leg Press BLE - 3x10, 85lbs (increased)   Cybex Leg Extensions BLE - 2x10, 35lbs (increased)  Cybex Leg Curl BLE - 3x10, 40lbs (increased)       Manual Therapy (48348):   minutes  Not performed    Neuromuscular Re-education (95989):   minutes  Not performed    Therapeutic Activitity (77016):  17 minutes  Walking w/SPC - to fatigue - 3:50min, limited by SOB.   Fwd/Bkwd/Sidestepping around square - 2 sets ea to fatigue ~2min.   Walking w/RW - to improve ambulation endurance/activity tolerance - x1 sets ea to fatigue   ^Best = (8:29min)  Walking w/RW - to fatigue at end of session ~2:30min          HEP:  Access Code: [email or web address]  URL: [email or web address]  Date: 02/04/2025  Prepared by: David Castano     Exercises  - Sit to Stand Without Arm Support  - 1 x daily - 7 x weekly - 3 sets - 10 reps  - Standing March with Counter Support  - 1 x daily - 7 x weekly - 3 sets - 10 reps  - Walking  - 1 x daily - 7 x weekly - 1 sets - 3-5 reps - 2min hold

## 2025-05-22 NOTE — PROGRESS NOTES
Care Management Monthly Outreach  Chart review completed  Confirmation of at least 2 patient identifiers  Change in insurance? No    Has patient been to ER/Urgent Care since last outreach? No    Last Office Visit with PCP: 5/6/2025   Next Office Visit with PCP: 6/4/2025   APC Collaboration: Pharmacy    Chronic Conditions and Outreach Summary:   Type 1 diabetes mellitus with other specified complication    Gait disturbance, post-stroke    Acute pain of left shoulder    Hashimoto's thyroiditis    Gastroparesis    This RN CM reached out and spoke with Mattie today. She reports that her Left arm/shoulder pain remains but that it is slightly better. She is awaiting her EMG testing in 2 weeks. Mattie reports that she broke down and used the Oxycodone that was ordered and is taking it at night. She also reports that her Aqua therapy is helping too. She is concerned that she will not be able to continue Aqua therapy as the pool she goes to is shutting down for 3 months of repairs. Provided reassurance, that once confirmed, this RN will work with transportation benefits to ensure she is able to complete the therapies. Mattie is interested in a service dog potentially. This RN CM was able to research and will send via mail an informational packet on a potential company that provides services dogs, its caused paws with a cause. Mattie reports that her blood sugars are doing better with the new insulin pump/glucose monitor. Provided education regarding increasing her protein into her diet as well as maintaining the carbohydrates that the diabetic educator recommended. Also was able to schedule Mattie with the office nutritionist for further education as well. Reviewed her medications. She denies any refill needs. Ensured that Mattie has my availability for any concerns that arise in between our calls.     Medications:   Are there medication changes since last visit? No  Refills needed? No    Social Drivers of Health: Deferred  Care  Gaps Addressed? Deferred  Care Plan addressed: Yes    Upcoming Appointments:   Future Appointments       Date / Time Provider Department Dept Phone    5/23/2025 12:15 PM Samson Castano, PT [facility] [phone]    5/27/2025 9:30 AM Linden Yanez, PT [facility] [phone]    5/29/2025 9:30 AM Linden Yanez, PT [facility] [phone]    6/2/2025 2:30 PM [facility]     6/4/2025 9:00 AM (Arrive by 8:45 AM) Mary Granado RD, LD [facility] [phone]    6/9/2025 9:30 AM [facility] [address] [facility]  [phone]    6/9/2025 2:30 PM Criss Hampton MD [facility] [phone]    6/23/2025 11:40 AM (Arrive by 11:25 AM) Kobi Gold MD [facility] [phone]    7/14/2025 9:30 AM [facility] [address] [facility]  [phone]    7/21/2025 8:30 AM Tone Nuno MD; PAR ENDO 2; PAR ENDOSC1 POOL ROOM [facility] [phone]    8/11/2025 9:30 AM [facility] [address] [facility]  [phone]    8/13/2025 10:50 AM (Arrive by 10:35 AM) Kobi Gold MD [facility] [phone]    9/8/2025 9:30 AM [facility] [address] [facility]  [phone]          Blood Pressures Reviewed  BP Readings from Last 3 Encounters:   05/08/25 103/65   05/06/25 124/80   04/30/25 173/75     Labs Reviewed:  Lab Results   Component Value Date    CREATININE 1.15 (H) 04/19/2025    GLUCOSE 135 (H) 04/19/2025    ALKPHOS 108 04/19/2025    K 4.4 04/19/2025    PROT 6.7 04/19/2025     04/19/2025    CALCIUM 9.0 04/19/2025    AST 27 04/19/2025    ALT 28 04/19/2025    BUN 23 04/19/2025    MG 1.86 04/19/2025    GFRF 57 (A) 08/11/2022     Lab Results   Component Value Date    TRIG 74 04/01/2024    CHOL 146 04/01/2024    LDLCALC 76 04/01/2024    HDL 55.5 04/01/2024     Lab Results   Component Value Date    HGBA1C 6.3 03/10/2025     HGBA1C 6.0 11/12/2024    HGBA1C 6.2 07/08/2024     Lab Results   Component Value Date    WBC 3.9 (L) 04/19/2025    RBC 3.89 (L) 04/19/2025    HGB 12.2 04/19/2025     04/19/2025   No other concerns at this time.  Agreeable to continue monthly outreaches.  Encouraged to call if questions or concerns arise.    Sierra Whitten RN

## 2025-05-23 ENCOUNTER — APPOINTMENT (OUTPATIENT)
Dept: PHYSICAL THERAPY | Facility: CLINIC | Age: 55
End: 2025-05-23
Payer: MEDICAID

## 2025-05-23 DIAGNOSIS — R26.9 GAIT ABNORMALITY: Primary | ICD-10-CM

## 2025-05-23 DIAGNOSIS — Z91.81 AT RISK FOR INJURY RELATED TO FALL: ICD-10-CM

## 2025-05-23 DIAGNOSIS — R53.1 GENERALIZED WEAKNESS: ICD-10-CM

## 2025-05-23 PROCEDURE — 97530 THERAPEUTIC ACTIVITIES: CPT | Mod: GP

## 2025-05-23 PROCEDURE — 97110 THERAPEUTIC EXERCISES: CPT | Mod: GP

## 2025-05-27 ENCOUNTER — APPOINTMENT (OUTPATIENT)
Dept: PHYSICAL THERAPY | Facility: CLINIC | Age: 55
End: 2025-05-27
Payer: MEDICAID

## 2025-05-27 DIAGNOSIS — Z91.81 AT RISK FOR INJURY RELATED TO FALL: ICD-10-CM

## 2025-05-27 DIAGNOSIS — R53.1 GENERALIZED WEAKNESS: ICD-10-CM

## 2025-05-27 DIAGNOSIS — R26.9 GAIT ABNORMALITY: Primary | ICD-10-CM

## 2025-05-27 PROCEDURE — 97113 AQUATIC THERAPY/EXERCISES: CPT | Mod: GP

## 2025-05-27 NOTE — PROGRESS NOTES
Physical Therapy Treatment      Patient Name: Mattie Wolfe  MRN: [id number]  Today's Date: 5/27/2025  Visit #25  Time Calculation  Start Time: 0930  Stop Time: 1010  Time Calculation (min): 40 min    Insurance:  2025 [place] [employer] AUTH REQ AFTER 30 VS SHIRA YR     Assessment:  Continued aquatic therapy focus of improving ambulatory tolerance, strengthening, and balance.  Pt tolerated all exercises well with L shoulder and arm pain responding positively to aquatic therapy. More time spent in the water today, in which the pt tolerated well without complications. Focused on building ambulatory endurance in the pool as well as gentle UE ROM exercises this date secondary to shoulder and arm pain. Spent time educating the patient on the benefits of physical therapy for her arm pain and reassuring her that she is doing all the correct things to date to manage her discomfort.  Pt tolerated all interventions well, SUP with PT in water for additional safety.  Pt was able to ambulate 15 minutes without standing or seated rest break, and no SOB noted throughout session. Pt encouraged to monitor symptoms and blood sugar post therapy session and throughout the day.  Pt is making progress towards goals and would continue to benefit from skilled PT at this time.     Aquatic PT is required due to, buoyancy of water reduced weight bearing and decreased LE and spinal loading, allowing for more freedom of movement and promotes improved ability to perform functional tasks.  The viscosity of water which is more than 700x that of air, allowed increased reaction time, in turn allowing advanced balance activities to be safely performed and allow patient to be challenged beyond limited of stability without fear of falling; provides the opportunity to work on balance in a safe environment.  The hydrostatic pressure of water facilitates the reduction of edema in the lower extremities, allowing for greater ease of movement.  Aquatics d/t cardio  "vascular benefits including: improved cardiovascular efficiency including increased stroke volume, decreased HR, and decreased blood pressure with increased cardiac output allowing patient to achieve cardiovascular training effect with less work load.  Aquatics to support upright posture during postural retraining and strengthening.   Aquatics allow patient to work on gait with less assistance or without assistive device improving posture and ease of gait training/conditioning.     Patient's response to session: Decreased pain, Increased ROM/joint mobility, Increase motor control, Improved gait, and Increased knowledge and understanding    Plan:  Cont to progress LE strength/endurance for functional mobility as tolerated. Inc SL balance exercises and endurance walking.      Tx Considerations:  - nerve glides for UE nerve related pain   -Ambulation/dynamic gait w/out AD or w/SPC  -Toe taps, step ups etc.    Current Problem:   1. Gait abnormality        2. Generalized weakness        3. At risk for injury related to fall            Subjective   Pt reports, \"I have been okay, my blood sugar level was 128 this morning, So I am feeling good today\"       Xray humerus:   FINDINGS:  No fracture or dislocation is evident.  There is partial  visualization of a port line with the tip projecting over the distal  SVC.      IMPRESSION:  No osseous injury is evident.      Xray C-spine:  FINDINGS:  The cervical spine is seen to C7/T1.  Vertebral body height and  alignment are  maintained. No fracture or dislocation is evident.  Mild multilevel degenerative changes seen of the cervical spine.   No  prevertebral soft tissue swelling is evident.  There is a port on the  right chest wall with the line tracking down over the mediastinum as  seen on these radiographs.      IMPRESSION:  Mild degenerative change of the cervical spine without osseous injury  Evident    Pain: 5/10; worst pain with LUE at night        Objective     Ambulated " with SBA in pool with current for 22 min without need for rest breaks  Ttp L brachioradialis   No inc in symptoms with median nerve ULTT  Dec L shoulder/arm pain with scapular retraction and c-spine retraction     Treatments:  Therapeutic Exercise (87221):   minutes (0 minutes)      Manual Therapy (70286):   minutes      Neuromuscular Re-education (32009):   minutes      Aquatic Therapy (16815):   40 Min 3 units  22 minutes of forward water walking without UE support.   Squats 2x10  Hip ext 2x10 B  Marches 2x10 B  Standing bicycle kick 20 reps  HS Curls: 2 x 10 (B)  HR 2x10  Pendulums in deep water 3x10 all directions  Scapular retraction in deep water 3x10   C-spine retraction 3x10  Elbow flex/ext 3x10   Edu on continuing to complete gentle ROM to LUE as well as exercises prescribed last land PT session

## 2025-05-29 ENCOUNTER — APPOINTMENT (OUTPATIENT)
Dept: PHYSICAL THERAPY | Facility: CLINIC | Age: 55
End: 2025-05-29
Payer: MEDICAID

## 2025-05-29 DIAGNOSIS — R26.9 GAIT ABNORMALITY: Primary | ICD-10-CM

## 2025-05-29 DIAGNOSIS — R53.1 GENERALIZED WEAKNESS: ICD-10-CM

## 2025-05-29 DIAGNOSIS — Z91.81 AT RISK FOR INJURY RELATED TO FALL: ICD-10-CM

## 2025-05-29 PROCEDURE — 97113 AQUATIC THERAPY/EXERCISES: CPT | Mod: GP

## 2025-05-29 NOTE — PROGRESS NOTES
Physical Therapy Treatment      Patient Name: Mattie Wolfe  MRN: [id number]  Today's Date: 5/29/2025  Visit #26  Time Calculation  Start Time: 0931  Stop Time: 1010  Time Calculation (min): 39 min    Insurance:  2025 [place] [employer] AUTH REQ AFTER 30 VS SHIRA YR     Assessment:  Continued aquatic therapy focus of improving ambulatory tolerance, strengthening, and balance.  Pt tolerated all exercises well with L shoulder and arm pain responding positively to aquatic therapy. More time spent in the water today, in which the pt tolerated well without complications. Focused on building ambulatory endurance in the pool as well as gentle UE ROM exercises this date secondary to shoulder and arm pain. Spent time educating the patient on the benefits of physical therapy for her arm pain and reassuring her that she is doing all the correct things to date to manage her discomfort.  Pt tolerated all interventions well, SUP with PT in water for additional safety.  Pt tolerated all interventions well with good tolerance.  Trial of DLS exercises and pt reported posterior shoulder irritation and lateral epicondalgia symptoms.  No rest breaks required throughout the session.     Pt is making progress towards goals and would continue to benefit from skilled PT at this time.     Aquatic PT is required due to, buoyancy of water reduced weight bearing and decreased LE and spinal loading, allowing for more freedom of movement and promotes improved ability to perform functional tasks.  The viscosity of water which is more than 700x that of air, allowed increased reaction time, in turn allowing advanced balance activities to be safely performed and allow patient to be challenged beyond limited of stability without fear of falling; provides the opportunity to work on balance in a safe environment.  The hydrostatic pressure of water facilitates the reduction of edema in the lower extremities, allowing for greater ease of movement.  Aquatics d/t cardio  "vascular benefits including: improved cardiovascular efficiency including increased stroke volume, decreased HR, and decreased blood pressure with increased cardiac output allowing patient to achieve cardiovascular training effect with less work load.  Aquatics to support upright posture during postural retraining and strengthening.   Aquatics allow patient to work on gait with less assistance or without assistive device improving posture and ease of gait training/conditioning.     Patient's response to session: Decreased pain, Increased ROM/joint mobility, Increase motor control, Improved gait, and Increased knowledge and understanding    Plan:  Cont to progress LE strength/endurance for functional mobility as tolerated. Inc SL balance exercises and endurance walking.      Tx Considerations:  - nerve glides for UE nerve related pain   -Ambulation/dynamic gait w/out AD or w/SPC  -Toe taps, step ups etc.    Current Problem:   1. Gait abnormality        2. Generalized weakness        3. At risk for injury related to fall            Subjective   Pt reports, \"I have been alright I had another scare this morning, my blood sugar monitor was off and it said that I was really low, but it finally worked correctly and I am at a safe level, I also didn't have any symptoms, so I had a feeling it was off. \"       Xray humerus:   FINDINGS:  No fracture or dislocation is evident.  There is partial  visualization of a port line with the tip projecting over the distal  SVC.      IMPRESSION:  No osseous injury is evident.      Xray C-spine:  FINDINGS:  The cervical spine is seen to C7/T1.  Vertebral body height and  alignment are  maintained. No fracture or dislocation is evident.  Mild multilevel degenerative changes seen of the cervical spine.   No  prevertebral soft tissue swelling is evident.  There is a port on the  right chest wall with the line tracking down over the mediastinum as  seen on these radiographs.    "   IMPRESSION:  Mild degenerative change of the cervical spine without osseous injury  Evident    Pain: 5/10; worst pain with LUE at night        Objective     Ambulated with SBA in pool with current for 22 min without need for rest breaks  Ttp L brachioradialis   No inc in symptoms with median nerve ULTT  Dec L shoulder/arm pain with scapular retraction and c-spine retraction     Treatments:  Therapeutic Exercise (95511):   minutes (0 minutes)      Manual Therapy (39138):   minutes      Neuromuscular Re-education (25830):   minutes      Aquatic Therapy (96573):  38 Min 3 units  22 minutes of forward water walking without UE support.   Squats 2x10  Hip ext 2x10 B  Marches 2x10 B  Standing bicycle kick 20 reps  HS Curls: 2 x 10 (B)  HR 2x10  Pendulums in deep water 3x10 all directions  Scapular retraction in deep water 3x10   C-spine retraction 3x10  Elbow flex/ext 3x10   Edu on continuing to complete gentle ROM to LUE as well as exercises prescribed last land PT session

## 2025-06-02 ENCOUNTER — HOSPITAL ENCOUNTER (OUTPATIENT)
Dept: NEUROLOGY | Facility: HOSPITAL | Age: 55
Discharge: HOME | End: 2025-06-02
Payer: MEDICAID

## 2025-06-02 DIAGNOSIS — G58.7 MONONEURITIS MULTIPLEX: ICD-10-CM

## 2025-06-02 DIAGNOSIS — G56.32 RADIAL NEUROPATHY, LEFT: ICD-10-CM

## 2025-06-02 DIAGNOSIS — E10.69 TYPE 1 DIABETES MELLITUS WITH OTHER SPECIFIED COMPLICATION: ICD-10-CM

## 2025-06-02 PROCEDURE — 95886 MUSC TEST DONE W/N TEST COMP: CPT

## 2025-06-03 ENCOUNTER — TELEPHONE (OUTPATIENT)
Dept: PRIMARY CARE | Facility: CLINIC | Age: 55
End: 2025-06-03

## 2025-06-03 ENCOUNTER — APPOINTMENT (OUTPATIENT)
Dept: PHYSICAL THERAPY | Facility: CLINIC | Age: 55
End: 2025-06-03
Payer: MEDICAID

## 2025-06-03 DIAGNOSIS — R26.9 GAIT ABNORMALITY: Primary | ICD-10-CM

## 2025-06-03 DIAGNOSIS — Z91.81 AT RISK FOR INJURY RELATED TO FALL: ICD-10-CM

## 2025-06-03 DIAGNOSIS — M54.12 CERVICAL RADICULOPATHY AT C6: ICD-10-CM

## 2025-06-03 DIAGNOSIS — R53.1 GENERALIZED WEAKNESS: ICD-10-CM

## 2025-06-03 DIAGNOSIS — M54.12 CERVICAL RADICULOPATHY AT C7: Primary | ICD-10-CM

## 2025-06-03 NOTE — TELEPHONE ENCOUNTER
DAREN HUFF,  PLEASE LET MS. JEAN KNOW THAT EMG CLEARLY SHOWS THE PAIN IN LEFT ARM IS COMING FROM HER NECK.  SHE SHOULD HAVE MRI OF C-SPINE URGENTLY.  SHE HAS SOME SORT OF IMPLANT, MAYBE A BLADDER STIMULATOR OR PAIN PUMP?  SHE HAD AN MRI IN 2023 OF HER BRAIN.  PLEASE FIND OUT THE TYPE OF IMPLANT(S) SHE HAS AND WHETHER THESE WERE PLACED BEFORE 2023 WHEN SHE HAD HER LAST MRI.  IF THEY WERE IN PLACE ALREADY IN 2023, THEN SHE WILL BE ABLE TO HAVE MRI SAFELY AND WE CAN PROCEED.  IF THE IMPLANTS WERE PLACED SINCE 2023, THEN WE WILL NEED TO HAVE RADIOLOGY DETERMINE WHETHER THE IMPLANT IS SAFE FOR MRI SCANNER.  BRISSA

## 2025-06-03 NOTE — PROGRESS NOTES
Nutrition Initial Assessment:     Patient Mattie Wolfe is a 54 y.o. female being seen at [facility] who was referred by Dr. Criss Hampton on 5/21/25 for   1. Type 1 diabetes mellitus with other specified complication  Referral to Nutrition Services        Nutrition Assessment    Problem List[1]    Nutrition History:  Food & Nutrition History: Mattie is having this visit to work on carbohydrate counting. She also would like to promote weight loss because she has experienced unintended weight gain recently. She has been meeting with diabetes educator Frederic and this RDN reviewed her notes before the visit. Patient relies on home delivered meals. Sometimes they have labels on them such as [employer]'s meals. Other times they are prepared by [employer] and don't feature food labels. Patient reached out to [employer] to request nutrition facts for the meals, she hasn't received the facts yet but said someone is working on it.  She would like to include more fresh produce in her diet because [employer] mostly sends canned fruit/cooked vegetables. She is in the process of inquiring whether she can visit the food pantry in [place] to supplement her diet, ideally with some produce.  Nutrition-Related Medication Use: Prescription Medication Use: She has had more hypoglycemia since she got a new type of pump, has been needing to eat more carbohydrate before bed to prevent lows. Is waking up about 4/7 nights with hypoglycemia. Noted she has been treating hypoglycemia with grape flavored [employer]ammers - one of these only contains 5 grams of carbohydrates and she was under the impression they contained as much carbohydrate as regular juice. She also sometimes uses Jif To Go which contains 8 g carbohydrate/serving.  GI Symptoms:  (she prefers to eat 3 meals + 3 snacks/day to spread food out due to gastroparesis)    Diet Recall:  Meal 1: B: breakfast burrito or a meal replacement shake ([employer] or  [facility])  Snack 1: piece of fruit  Meal 2: L: a meal from [employer] - the cuisine rotates between items such as a stir foley, General 's chicken, pasta, beans and rice, sandwich  Snack 2: piece of fruit, or sometimes vegetables - sweet potato, green beans, a little salad or cucumbers (can't have too much due to gastroparesis)  Meal 3: D: another [employer]  Snack 3: always has a snack before bed to prevent overnight hypoglycemia. 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 She uses a rollator for ambulation and has lower mobility/physical activity aside from her sessions with therapy.    Food Security/Insecurity: Food / Nutrition Program Participation:  (she said she does not qualify for SNAP due to receiving disability)    Anthropometrics:  Deferred weight today    Weight History:   Daily Weight  05/08/25 : 77.6 kg (171 lb)  04/30/25 : 75 kg (165 lb 5.5 oz)  04/21/25 : 75 kg (165 lb 5.5 oz)  04/19/25 : 70 kg (154 lb 5.2 oz)  04/17/25 : 72.6 kg (160 lb)  03/10/25 : 70.3 kg (155 lb)  01/06/25 : 70.3 kg (155 lb)  12/19/24 : 69.9 kg (154 lb)  11/12/24 : 68.5 kg (151 lb)  07/09/24 : 67.6 kg (149 lb)  134-148# (2023)  137-141# (2022)  130-137# (2021)  137# (10/2020)    Weight Change %:  Weight History / % Weight Change: She reports UBW of 135#. Per medical record she has gained about 21# / 14% in 6 months    Nutrition Focused Physical Exam Findings:  Subcutaneous Fat Loss:   Defer Subcutaneous Fat Loss Assessment: Defer all  Defer All Reason: doesn't appear to have subcutaneous fat losses    Muscle Wasting:  Defer Muscle Wasting Assessment: Defer  all  Defer All Reason: doesn't appear to have muscle wasting    Nutrition Significant Labs:  CBC Trend:   Recent Labs     04/19/25  1607 12/19/24  1316 02/07/24  0930 10/06/23  1117   WBC 3.9* 4.2* 5.1 3.4*   NRBC 0.0  --  0.0 0.0   RBC 3.89* 3.60* 3.80* 3.56*   HGB 12.2 11.4* 11.7* 10.9*   HCT 38.0 34.7* 36.7 35.7*   MCV 98 96 97 100   MCH 31.4 31.7 30.8 30.6   MCHC 32.1 32.9 31.9* 30.5*   RDW 11.8 12.0 12.4 13.2    211 214 181   , RFP + Serum Mag Trend:   Recent Labs     04/19/25  1607 01/16/25  1132 12/19/24  1316 10/17/23  1022 10/06/23  1117 02/29/20  0614 02/28/20  0754   GLUCOSE 135* 299* 131*   < > 139*   < > 230*    139 141   < > 139   < > 138   K 4.4 4.1 4.1   < > 5.1   < > 4.7   * 108* 111*   < > 109*   < > 109*   CO2 17* 25 24   < > 21   < > 25   ANIONGAP 16 10 10   < > 14   < > 9*   BUN 23 23 27*   < > 25*   < > 34*   CREATININE 1.15* 1.11* 1.28*   < > 1.10*   < > 1.17*   EGFR 56* 58* 49*   < > 59*  --   --    CALCIUM 9.0 8.8 9.1   < > 9.0   < > 8.9   ALBUMIN 3.8  --  4.0  --  3.7   < > 3.3*   MG 1.86  --   --   --   --   --  2.00    < > = values in this interval not displayed.   , LFT Trend:   Recent Labs     04/19/25  1607 12/19/24  1316 10/06/23  1117 02/28/20  0754 02/27/20  2332   ALBUMIN 3.8 4.0 3.7   < > 3.7   BILITOT 0.4 0.3 0.3   < > 0.2   BILIDIR  --   --   --   --  0.0   ALKPHOS 108 166* 390*   < > 234*   ALT 28 17 71*   < > 36   AST 27 17 40*   < > 25   PROT 6.7 6.6 6.5   < > 6.3*    < > = values in this interval not displayed.   , DM Specific Labs Trend (Includes HgbA1C, antibodies & fasting insulin):   Recent Labs     03/10/25  0859 11/12/24  1124 07/08/24  1336   HGBA1C 6.3 6.0 6.2   , Lipid Panel Trend:    Recent Labs     04/01/24  1153 10/06/23  1117 08/11/22  0926 06/21/21  1012 02/28/20  0754   CHOL 146 145 170 137 168   HDL 55.5 52.3 62.0 47.0 42.0   LDLCALC 76 77*  --   --   --    LDLF  --   --  81 70 98   VLDL 15 16 27 20 28   TRIG 74 78 133 98 138   , Iron Panel  "+ Serum Ferritin Trend: No results for input(s): \"IRON\", \"UIBC\", \"TIBC\", \"IRONSAT\", \"FERRITIN\" in the last 60677 hours., Vitamin B12:   Lab Results   Component Value Date    BHMZEEKK03 364 12/19/2024    , Folate:   Lab Results   Component Value Date    FOLATE 20.4 10/06/2023    , and Vitamin D:   Lab Results   Component Value Date    VITD25 9 (L) 12/19/2024      Medications:  Current Outpatient Medications   Medication Instructions    alcohol swabs Use to check blood glucose up to three times daily as directed.    atorvastatin (LIPITOR) 40 mg    blood sugar diagnostic (True Metrix Glucose Test Strip) 1 each, miscellaneous, 3 times daily, Use to check blood glucose up to three times daily as directed    blood-glucose meter (OneTouch Ultra2 Meter) misc Use as directed to monitor blood glucose daily.    calcium carbonate (Tums) 200 mg calcium chewable tablet Daily PRN    fludrocortisone (FLORINEF) 0.1 mg, oral, Daily (0630)    fluticasone (Flonase) 50 mcg/actuation nasal spray 2 sprays, Each Nostril, Daily, Shake gently. Before first use, prime pump. After use, clean tip and replace cap.    gabapentin (NEURONTIN) 300 mg, oral, Nightly    glucagon (Gvoke HypoPen 2-Pack) 1 mg/0.2 mL auto-injector 0.2 mL, subcutaneous, Once as needed    insulin lispro (HumaLOG) 100 unit/mL injection INJECT 70 UNITS VIA INSULIN PUMP DAILY    ketoconazole (NIZOral) 2 % cream Daily    ketorolac (Acular) 0.4 % ophthalmic solution 1 drop, Left Eye, 4 times daily    lacosamide (VIMPAT) 150 mg, 2 times daily    lancets (OneTouch Delica Plus Lancet) 33 gauge misc Use to check blood glucose up to three times daily as directed.    Lantus Solostar U-100 Insulin 12 Units, subcutaneous, Nightly, For use in case of insulin pump failure.    lidocaine (Lidoderm) 5 % patch 1 patch, transdermal, Daily, Apply to painful area 12 hours per day, remove for 12 hours.    midodrine (PROAMATINE) 5 mg, 3 times daily    mometasone-formoterol (Dulera) 200-5 " "mcg/actuation inhaler 2 puffs, 2 times daily    montelukast (SINGULAIR) 10 mg, Nightly    moxifloxacin (Vigamox) 0.5 % ophthalmic solution 1 drop, Left Eye, 4 times daily    multivitamin (Daily Multi-Vitamin) tablet 1 tablet, Daily    omeprazole (PRILOSEC) 40 mg, oral, Daily    pen needle, diabetic 31 gauge x 3/16\" needle Use once daily as needed for insulin injection    potassium chloride CR (Klor-Con) 8 mEq ER tablet 8 mEq, oral, Daily, Do not crush, chew, or split. TAKE WHEN TAKING FLUDROCORTISONE    prednisoLONE acetate (Pred-Forte) 1 % ophthalmic suspension 1 drop, Left Eye, 4 times daily    predniSONE (DELTASONE) 20 mg, As needed    sodium chloride 0.9% 50 mL/hr    terbinafine (LAMISIL) 250 mg, Daily    Ventolin HFA 90 mcg/actuation inhaler     zonisamide (Zonegran) 100 mg capsule Take by mouth. 4 capules qam and 4 capsules at bedtime      Nutrition Diagnosis   Malnutrition Diagnosis  Patient has Malnutrition Diagnosis: No    Nutrition Diagnosis  Patient has Nutrition Diagnosis: Yes  Diagnosis Status (1): New  Nutrition Diagnosis 1: Food and nutrition related knowledge deficit  Related to (1): lack of adequate prior exposure to information  As Evidenced by (1): she has questions about managing carbohydrate counting and weight  Additional Nutrition Diagnosis: Diagnosis 2  Diagnosis Status (2): New  Nutrition Diagnosis 2: Unintended weight gain  Related to (2): unclear etiology  As Evidenced by (2): weight has increased about 21 pounds / 14% in 6 months     Nutrition Interventions/Recommendations   Nutrition Prescription: Oral nutrition Carbohydrate counting / controlled diet    Nutrition Interventions:   Food and Nutrient Delivery: Meals & Snacks: Carbohydrate-modified diet  Goals: 1. She will read labels or use the carbohydrate guide provided today to count carbohydrates at meals and snacks. If [employer] provides nutrition information, she will use that for carbohydrate counting. 2. She will write down " foods and their carbohydrate content so she has a quick reference list to refer to after she looks them up once, especially for foods that don't have the carbohydrate content easily accessible on the label     Nutrition Education:   Nutrition Education Content: Content related nutrition education  label reading, using a carbohydrate foods list    Educational Handouts Provided: Planning Healthy Meals and ADA Hypoglycemia, and mailed Mobile Pantry locations/details      Readiness to Change : Good  Level of Understanding : Good  Anticipated Compliant : Good     Nutrition Monitoring and Evaluation   Food and Nutrient Intake  Monitoring and Evaluation Plan: Meal/snack pattern, Carbohydrate intake                               Follow Up: she plans to continue following up with Frederic, and once hypoglycemia is improved and she has nutrition facts available for [employer] she will arrange a follow up with dietitian.     Time Spent  Prep time on day of patient encounter: 5 minutes  Time spent directly with patient, family or caregiver: 55 minutes  Additional Time Spent on Patient Care Activities: 10 minutes  Documentation Time: 10 minutes  Other Time Spent: 0 minutes  Total: 80 minutes             [1]   Patient Active Problem List  Diagnosis    Arm pain    Leg pain    Pain, joint, shoulder region, left    Shoulder pain    Ortega's esophagus without dysplasia    Candida esophagitis (Multi)    Contact dermatitis    Conversion disorder    Daily nausea    Dependent edema    Diabetic diarrhea (Multi)    Gastroparesis    Diabetic neuropathic cachexia    Type 1 diabetes mellitus    Dyspnea on minimal exertion    Gait disturbance, post-stroke    Hashimoto's thyroiditis    Hyperlipidemia LDL goal <100    Muscle weakness    Neuropathic pain    Peripheral neuropathy    Pharyngoesophageal dysphagia    Seizure (Multi)    Stroke (Multi)    Tracheal stenosis    Urinary incontinence    Bilateral hearing loss    Difficult intravenous  access    Other constipation    Presence of permanent central venous catheter    Cardiomyopathy, nonischemic (Multi)    Congenital dislocation of hip, unilateral    Dyspnea on exertion    Orthostatic hypotension    Generalized weakness    Gait abnormality    At risk for injury related to fall    Visual-spatial impairment    Lymphedema of left lower extremity    Combined form of age-related cataract, left eye

## 2025-06-04 ENCOUNTER — APPOINTMENT (OUTPATIENT)
Dept: PRIMARY CARE | Facility: CLINIC | Age: 55
End: 2025-06-04
Payer: MEDICAID

## 2025-06-04 DIAGNOSIS — E10.69 TYPE 1 DIABETES MELLITUS WITH OTHER SPECIFIED COMPLICATION: Primary | ICD-10-CM

## 2025-06-04 PROCEDURE — 97802 MEDICAL NUTRITION INDIV IN: CPT | Performed by: DIETITIAN, REGISTERED

## 2025-06-04 NOTE — PATIENT INSTRUCTIONS
Introduced patient to carbohydrate counting. Explained the following:   - foods that contribute carbohydrate (fruit, grains, legumes, milk/yogurt, starchy vegetables, sugar added to foods, sugar-sweetened beverages)  - foods that contribute no/minimal carbohydrate (protein, fats, non-starchy vegetables)  - how to use portions of food that are 15 grams of carbohydrate to facilitate counting, gave examples of such portions. Discussed some example of mixed meals she might receive from [email or web address] and how she can make an estimate of their carbohydrate content, such as a fist equals the size of 1 cup, and about 1/2 cup cooked potatoes = 15 grams carbohydrate.   - how to read a food label to count carbohydrate  - advised that for now, the goal is to establish a solid foundation of counting carbohydrates so that she has a more accurate count of carbohydrates to enter into her pump. In the future, we can work on controlling the amount of carbohydrates to help with weight management / blood sugar control - such as about 45 grams per meal and about 15-20 grams per snack.     2. Discussed following through with getting connected with the food pantry in [place] to potentially have greater access to produce. Discussed that cooking veggies or removing skin from fresh fruit might help her tolerate it better due to gastroparesis.   Advised I'll reach out to the Senior Produce Voucher Program in [place] to see if they provide these vouchers for disabled individuals instead of just seniors. I inquired after the visit and gave her a call to let her know she wouldn't qualify since she is under 60 years of age. I will mail her a few of the produce distribution sites (Mobile Pantries) in the area so she could visit one or more of those for supplemental groceries/produce.     3. Discussed treatment of hypoglycemia. She carries [employer] Jammers and had been under the impression they contained as much sugar as juice. RDN reviewed the label  "with her and explained 1 pouch = 5 grams carbohydrate. Also reviewed the label for peanut butter which is 8 grams carbohydrate and is rich in fat.   Reviewed the \"Rule of 15\" with her and encouraged her to treat hypoglycemia with 15 grams quick-acting carbohydrate that doesn't contain fat. Suggested next time she should purchase grape Juicy Juice (16 grams carbohydrate per box) and after she has treated hypoglycemia then she can eat a [employer] peanut butter pack.    "

## 2025-06-05 ENCOUNTER — TELEPHONE (OUTPATIENT)
Dept: PRIMARY CARE | Facility: CLINIC | Age: 55
End: 2025-06-05

## 2025-06-05 ENCOUNTER — APPOINTMENT (OUTPATIENT)
Dept: PHYSICAL THERAPY | Facility: CLINIC | Age: 55
End: 2025-06-05
Payer: MEDICAID

## 2025-06-05 DIAGNOSIS — Z91.81 AT RISK FOR INJURY RELATED TO FALL: ICD-10-CM

## 2025-06-05 DIAGNOSIS — R53.1 GENERALIZED WEAKNESS: ICD-10-CM

## 2025-06-05 DIAGNOSIS — R26.9 GAIT ABNORMALITY: Primary | ICD-10-CM

## 2025-06-05 PROCEDURE — 97113 AQUATIC THERAPY/EXERCISES: CPT | Mod: GP

## 2025-06-05 NOTE — PROGRESS NOTES
Physical Therapy Treatment      Patient Name: Mattie Wolfe  MRN: [id number]  Today's Date: 6/5/2025  Visit #27       Insurance:  2025 [place] [employer] AUTH REQ AFTER 30 VS SHIRA YR     Assessment:  Pt arrived late to aquatic therapy today, limiting the amount, of interventions that could be completed this session.  Continued aquatic therapy focus of improving ambulatory tolerance, strengthening, and balance.  Focused on building ambulatory endurance in the pool as well as gentle UE ROM exercises this date secondary to shoulder and arm pain. Spent time educating the patient on the benefits of physical therapy for her arm pain and reassuring her that she is doing all the correct things to date to manage her discomfort.  Pt tolerated all interventions well, SUP with PT in water for additional safety.  Pt tolerated all interventions well with good tolerance.  Trial of DLS exercises and pt reported posterior shoulder irritation and lateral epicondalgia symptoms.  No rest breaks required throughout the session.     Pt is making progress towards goals and would continue to benefit from skilled PT at this time.     Aquatic PT is required due to, buoyancy of water reduced weight bearing and decreased LE and spinal loading, allowing for more freedom of movement and promotes improved ability to perform functional tasks.  The viscosity of water which is more than 700x that of air, allowed increased reaction time, in turn allowing advanced balance activities to be safely performed and allow patient to be challenged beyond limited of stability without fear of falling; provides the opportunity to work on balance in a safe environment.  The hydrostatic pressure of water facilitates the reduction of edema in the lower extremities, allowing for greater ease of movement.  Aquatics d/t cardio vascular benefits including: improved cardiovascular efficiency including increased stroke volume, decreased HR, and decreased blood pressure with increased  "cardiac output allowing patient to achieve cardiovascular training effect with less work load.  Aquatics to support upright posture during postural retraining and strengthening.   Aquatics allow patient to work on gait with less assistance or without assistive device improving posture and ease of gait training/conditioning.     Patient's response to session: Decreased pain, Increased ROM/joint mobility, Increase motor control, Improved gait, and Increased knowledge and understanding    Plan:  Cont to progress LE strength/endurance for functional mobility as tolerated. Inc SL balance exercises and endurance walking.      Tx Considerations:  - nerve glides for UE nerve related pain   -Ambulation/dynamic gait w/out AD or w/SPC  -Toe taps, step ups etc.    Current Problem:   1. Gait abnormality        2. Generalized weakness        3. At risk for injury related to fall            Subjective   Pt reports, \"I have been alright I had another scare this morning, my blood sugar monitor was off and it said that I was really low, but it finally worked correctly and I am at a safe level, I also didn't have any symptoms, so I had a feeling it was off. \"       Xray humerus:   FINDINGS:  No fracture or dislocation is evident.  There is partial  visualization of a port line with the tip projecting over the distal  SVC.      IMPRESSION:  No osseous injury is evident.      Xray C-spine:  FINDINGS:  The cervical spine is seen to C7/T1.  Vertebral body height and  alignment are  maintained. No fracture or dislocation is evident.  Mild multilevel degenerative changes seen of the cervical spine.   No  prevertebral soft tissue swelling is evident.  There is a port on the  right chest wall with the line tracking down over the mediastinum as  seen on these radiographs.      IMPRESSION:  Mild degenerative change of the cervical spine without osseous injury  Evident    Pain: 5/10; worst pain with LUE at night        Objective     Ambulated " with SBA in pool with current for 22 min without need for rest breaks  Ttp L brachioradialis   No inc in symptoms with median nerve ULTT  Dec L shoulder/arm pain with scapular retraction and c-spine retraction     Treatments:  Therapeutic Exercise (27867):   minutes (0 minutes)      Manual Therapy (08482):   minutes      Neuromuscular Re-education (58596):   minutes      Aquatic Therapy (71011):  20 Min 1 units  20 minutes of forward water walking without UE support.   Not completed this session  Squats 2x10  Hip ext 2x10 B  Marches 2x10 B  Standing bicycle kick 20 reps  HS Curls: 2 x 10 (B)  HR 2x10  Pendulums in deep water 3x10 all directions  Scapular retraction in deep water 3x10   C-spine retraction 3x10  Elbow flex/ext 3x10   Edu on continuing to complete gentle ROM to LUE as well as exercises prescribed last land PT session

## 2025-06-05 NOTE — TELEPHONE ENCOUNTER
DAREN KHALIL,  I SPOKE WITH MS. JEAN AND LET HER KNOW ABOUT THE C7 RADICULOPATHY ISSUE SEEN ON EMG.  SHE CONFIRMED SHE HAS NO IMPLANT, NO PACEMAKER, NO PAIN PUMP, NO BLADDER STIMULATOR, BUT DOES HAVE A SMALL ARNALDO IN WRIST FROM 2019, AND A MEDIPORT PLACED IN 2024.  I PUT IN ORDER FOR MRI C-SPINE, PERHAPS WE CAN SEE IF THIS CAN BE URGENT BUT DOES NOT NEED TO BE STAT.  SHE REMAINS IN SIGNIFICANT AND UNRELENTING PAIN, THX

## 2025-06-06 ENCOUNTER — APPOINTMENT (OUTPATIENT)
Dept: GASTROENTEROLOGY | Facility: CLINIC | Age: 55
End: 2025-06-06
Payer: MEDICAID

## 2025-06-07 NOTE — PROGRESS NOTES
Endocrinology  06/09/25    #Diabetes Type 1 dx: in pts 40's   PCP: Dr EDIL Gold, next apt 6/23/25  Podiatry: every 3 mths  eye doctor: Mason Castillo MD, [facility]- seen 5/14/25  No family history DM  Nutrition: Lyssa Edgardo 5/20/2025; Shantell Chisholmner 6/4/2025  Medtronic Pump and CGM- Linked, but not up to date today  Pt testing glucose 4 x/day with metronic due to fluctuating blood glucose and hypoglycemia . Pt is adherent and benefiting from CGM treatment plan.  Was having some lows- squad was called while pt was at therapy for bg in 20's; automode turned off at night- trouble with bolus wizzard per pt     Lab Results   Component Value Date    HGBA1C 6.1 06/09/2025         History of Present Illness   Mattie Wolfe is a 54 y.o. year old female with medical history of T1DM c/b retinopathy, neuropathy, nephropathy, gastroparesis, and HLD c/b CVA (2018), epilepsy, Ortega's esophagus,  and orthostatic hypotension, here for T1DM management.     Interim hx:  Since our last appt the patient has established care w/ GI. She is feeling well. She turned off bolus wizard and thus is manually bolusing prior to her meals.    She is concerned about developing hypoglycemia overnight and therefore she is eating carbs prior to bed.     [id number] Pump settings  99% auto mode  52 total units/day  50%basal 50%bolus  ICR: 1:1 but always enters 6 gCHO w/ meals so essentially fixed dose of 6u w meals  ISF: 40  Goal glucose: 100    CGM Interpretation  CGM:   Dates reviewed: 5/11-6/9  Mean glucose: 154 (GMI 7%)  >180 mg/dl:  29%  Target  mg/dl: 69%  <70 mg/dl: 2%  Interpretation  Hypoglycemia pattern:  rare overnight hypoglycemia. Basal suspends appropriately. I am not sure that these are true hypoglycemic events or this is the result of sensor compression as patient does not correlate hypoglycemic events on sensor to CGM (asymptomatic)  Hyperglycemia pattern:   some post-prandial hyperglycemia likely related to  using fixed dose insulin     HPI:  Patient established care with me in 3/30756. Previous patient of Dr. Wang David.  The patient was originally diagnosed with T2DM, and was seen by Dr. Scherer, and he was the one to determine she in fact T1DM after multiple episodes of DKA. She denies any family hx of diabetes.        Diabetes Summary   Diagnosis Date:    in her 40s.         Glucose Meter: has several at home, accu-check and medtronic  Diabetes Tech (CGM or pump):   Medtronic 780G  (has been using for ~1 yr). Previously trialed pump in 2010, but was fostering a child at the time who broke it but accidentally submering it in the bath tub)          Eye Exam: diabetic retinopathy s/p retinal detachment repair  Foot Exam:    3/2025, goes back in 3 months                  Dental exam:    has dentures so no longer goes               Diet:   meals are delivered from [employer] and are generally high carb meals. Some are marked with the amount of carbs and some are not.  B: 630-8A, breakfast burrito   L: 12P, variable, whatever is available  D:    variable, whatever is available             Her current regimen is as follows:   Orals:  none                 Injectables: none  Insulin: humalog in pump  Hx of pancreatitis: denies  Hx of medullary thyroid cancer: denies  Complications: retinopathy, neuropathy, nephropathy, HLD c/b CVA, gastroparesis  Last episode of DKA or hyperosmolar coma:  2013 or before      Hypoglycemia:  Frequency and timing of hypoglycemia:  very rare, but she has had a couple values this past week due to decreased food intake 2/2 nausea/gastroparesis  Time of day hypoglycemia usually occurs: middle of the night  Severity of hypoglycemic episode: mild  Hypoglycemic threshold: <70/60  Hypoglycemia symptoms: weak, malaise, fatigue  Last call to EMS or hospitalization: around the time of dx in her early 40s    Review of Systems   General: Denies fever or chills   Head: Denies headache or vision changes    Neck: Denies tenderness or lumps   Cardiac: Denies chest pain or palpitations   Respiratory: Denies shortness of breath (damage to laryngeal nerve when intubated during CVA) or cough   GI: Denies abdominal pain, nausea, or vomiting   Extremities: Denies swelling   Skin: Denies rash     Medications     Current Outpatient Medications   Medication Instructions    alcohol swabs Use to check blood glucose up to three times daily as directed.    atorvastatin (LIPITOR) 40 mg    blood sugar diagnostic (True Metrix Glucose Test Strip) 1 each, miscellaneous, 3 times daily, Use to check blood glucose up to three times daily as directed    blood-glucose meter (OneTouch Ultra2 Meter) misc Use as directed to monitor blood glucose daily.    calcium carbonate (Tums) 200 mg calcium chewable tablet Daily PRN    fludrocortisone (FLORINEF) 0.1 mg, oral, Daily (0630)    fluticasone (Flonase) 50 mcg/actuation nasal spray 2 sprays, Each Nostril, Daily, Shake gently. Before first use, prime pump. After use, clean tip and replace cap.    gabapentin (Neurontin) 100 mg capsule 1 capsule, 3 times daily (0900,1400,1900)    gabapentin (NEURONTIN) 300 mg, oral, Nightly    glucagon (Gvoke HypoPen 2-Pack) 1 mg/0.2 mL auto-injector 0.2 mL, subcutaneous, Once as needed    insulin lispro (HumaLOG) 100 unit/mL injection INJECT 70 UNITS VIA INSULIN PUMP DAILY    ketoconazole (NIZOral) 2 % cream Daily    ketorolac (Acular) 0.4 % ophthalmic solution 1 drop, Left Eye, 4 times daily    lacosamide (VIMPAT) 150 mg, 2 times daily    lancets (OneTouch Delica Plus Lancet) 33 gauge misc Use to check blood glucose up to three times daily as directed.    Lantus Solostar U-100 Insulin 12 Units, subcutaneous, Nightly, For use in case of insulin pump failure.    lidocaine (Lidoderm) 5 % patch 1 patch, transdermal, Daily, Apply to painful area 12 hours per day, remove for 12 hours.    midodrine (PROAMATINE) 5 mg, 3 times daily    mometasone-formoterol (Dulera) 200-5  "mcg/actuation inhaler 2 puffs, 2 times daily    montelukast (SINGULAIR) 10 mg, Nightly    moxifloxacin (Vigamox) 0.5 % ophthalmic solution 1 drop, Left Eye, 4 times daily    multivitamin (Daily Multi-Vitamin) tablet 1 tablet, Daily    omeprazole (PRILOSEC) 40 mg, oral, Daily    pen needle, diabetic 31 gauge x 3/16\" needle Use once daily as needed for insulin injection    potassium chloride CR (Klor-Con) 8 mEq ER tablet 8 mEq, oral, Daily, Do not crush, chew, or split. TAKE WHEN TAKING FLUDROCORTISONE    prednisoLONE acetate (Pred-Forte) 1 % ophthalmic suspension 1 drop, Left Eye, 4 times daily    predniSONE (DELTASONE) 20 mg, As needed    sodium chloride 0.9% 50 mL/hr    terbinafine (LAMISIL) 250 mg, Daily    Ventolin HFA 90 mcg/actuation inhaler     zonisamide (Zonegran) 100 mg capsule Take by mouth. 4 capules qam and 4 capsules at bedtime        History     Past Medical History:   Diagnosis Date    Asthma     Cataract     Chronic kidney disease     Diabetes (Multi)     Diabetes mellitus type I (Multi)     Diabetic retinopathy (Multi)     Gastrointestinal disorder     Gastroparesis     Gastroparesis    GERD (gastroesophageal reflux disease)     H/O bladder infections     History of stroke     HL (hearing loss)     Osteoporosis     Other conditions influencing health status     Diabetes type 1, uncontrolled    Personal history of transient ischemic attack (TIA), and cerebral infarction without residual deficits     History of stroke    Stroke (Multi)     Vision loss        Past Surgical History:   Procedure Laterality Date    AIRWAY SURGERY      APPENDECTOMY  04/09/2014    Appendectomy    CATARACT EXTRACTION      CHOLECYSTECTOMY      HIP SURGERY  04/09/2014    Hip Surgery    MR HEAD ANGIO WO IV CONTRAST  02/28/2020    MR HEAD ANGIO WO IV CONTRAST 2/28/2020 [facility]GENCY [facility]    MR NECK ANGIO WO IV CONTRAST  02/28/2020    MR NECK ANGIO WO IV CONTRAST 2/28/2020 [facility] EMERGENCY LEGACY    OTHER SURGICAL HISTORY  " "04/09/2014    Cholecystotomy    OTHER SURGICAL HISTORY  11/01/2019    Retinal detachment repair    OTHER SURGICAL HISTORY  06/04/2020    Wrist surgery    RETINAL DETACHMENT SURGERY         Family History   Problem Relation Name Age of Onset    Hypertension Mother      Stroke Mother      Hypertension Father      Other (SUBSTANCE ABUSE) Father      Pancreatic cancer Mother's Sister      Ovarian cancer Father's Sister      Stroke Other Sibling     Breast cancer Other grandparent         Allergies   Allergen Reactions    Aspirin Other     GI issues    Azithromycin Hives        Social history: denies tobacco, etoh, illicits     Physical Exam   /83   Ht 1.575 m (5' 2\")   Wt 74.4 kg (164 lb)   BMI 30.00 kg/m²   General: Well appearing, no acute distress  Head and Neck: NCAT  Neuro: alert and oriented  Lungs: Breathing comfortably on room air   Extremities: Warm, left brace on RLE  Skin: No rashes apparent    Labs and Imaging     Lab Results   Component Value Date    HGBA1C 6.1 06/09/2025    HGBA1C 6.3 03/10/2025    HGBA1C 6.0 11/12/2024     04/19/2025    K 4.4 04/19/2025     (H) 04/19/2025    CO2 17 (L) 04/19/2025    BUN 23 04/19/2025    CREATININE 1.15 (H) 04/19/2025    CALCIUM 9.0 04/19/2025    ALBUMIN 3.8 04/19/2025    PROT 6.7 04/19/2025    BILITOT 0.4 04/19/2025    ALKPHOS 108 04/19/2025    ALT 28 04/19/2025    AST 27 04/19/2025    GLUCOSE 135 (H) 04/19/2025    CHOL 146 04/01/2024    TRIG 74 04/01/2024    HDL 55.5 04/01/2024       Assessment and Plan   Mattie Wolfe is a 54 y.o. year old female with medical history of T1DM c/b retinopathy, neuropathy, nephropathy, gastroparesis, and HLD c/b CVA (2018), epilepsy Ortega's esophagus,  and orthostatic hypotension, here for T1DM management.     T1DM fairly controlled. TIR close to 70%; A1c unreliable d/t anemia. I recommended she turn bolus wizard back on and liberalize correction to try to prevent any hypoglycemia following meals. She is going to " confirm hypoglycemia with fingerstick when alerted overnight. Can stop with snack. If true hypoglycemia is proven the patient should change smart guard target from 100 -> 110. She is comfortable making pump changes on her own.    #T1DM  - Dx in late 2000s/early 2010s   - Complications include: retinopathy, neuropathy, nephropathy, HLD (CVA)  - Most recent A1c is 6.1% (likely falsely low d/t anemia seen on 12/2024 CBC). Goal a1c is <7%  - Health maintenance:  ==> According to the ADA, BP goal is <130/80. Patient struggles w/ hypotension  ==> Most recent retinal exam showed retinopathy. Has ongoing care with ophtho  ==> Most recent LDL is 76. Due for repeat in 4/2025. Goal LDL <70, recommend continuing statin therapy.  ==> Most recent urine T pro/Cr ratio is 0.35. Due for repeat in 12/2025. Given elevation we recommend initiation of ACE/ARB therapy. May not be able to tolerate it due to hypotension  ==> Foot exam completed by podiatry, follows with them q3M  - Medication management:  ==> turn back on bolus wizard  ==> liberalize correction 40 -> 50  ==> continue 6u prior to meals  ==> continue smart guard  ==> Has basal and bolus pens + plan in AVS from 6/9/25 in case of pump failure.  ==> has glucagon at home  - referral to clinical pharmacy placed for comanagement      RTC: 11/2025      I spent a total of 43 minutes on the date of the service which included preparing to see the patient, face-to-face patient care, completing clinical documentation, obtaining and/or reviewing separately obtained history, performing a medically appropriate examination, counseling and educating the patient/family/caregiver, and ordering medications, tests, or procedures.      Criss Hampton MD   of Medicine  Department of Medicine  [facility]  [facility]

## 2025-06-09 ENCOUNTER — APPOINTMENT (OUTPATIENT)
Dept: ENDOCRINOLOGY | Facility: CLINIC | Age: 55
End: 2025-06-09
Payer: MEDICAID

## 2025-06-09 ENCOUNTER — INFUSION (OUTPATIENT)
Dept: HEMATOLOGY/ONCOLOGY | Facility: CLINIC | Age: 55
End: 2025-06-09
Payer: MEDICAID

## 2025-06-09 VITALS
WEIGHT: 164 LBS | DIASTOLIC BLOOD PRESSURE: 83 MMHG | BODY MASS INDEX: 30.18 KG/M2 | SYSTOLIC BLOOD PRESSURE: 129 MMHG | HEIGHT: 62 IN

## 2025-06-09 DIAGNOSIS — Z95.828 PRESENCE OF PERMANENT CENTRAL VENOUS CATHETER: ICD-10-CM

## 2025-06-09 DIAGNOSIS — E10.69 TYPE 1 DIABETES MELLITUS WITH OTHER SPECIFIED COMPLICATION: ICD-10-CM

## 2025-06-09 DIAGNOSIS — E55.9 MILD VITAMIN D DEFICIENCY: ICD-10-CM

## 2025-06-09 LAB
POC FINGERSTICK BLOOD GLUCOSE: 137 MG/DL (ref 70–100)
POC HEMOGLOBIN A1C: 6.1 % (ref 4.2–6.5)

## 2025-06-09 PROCEDURE — 3008F BODY MASS INDEX DOCD: CPT | Performed by: STUDENT IN AN ORGANIZED HEALTH CARE EDUCATION/TRAINING PROGRAM

## 2025-06-09 PROCEDURE — 96523 IRRIG DRUG DELIVERY DEVICE: CPT

## 2025-06-09 PROCEDURE — 3044F HG A1C LEVEL LT 7.0%: CPT | Performed by: STUDENT IN AN ORGANIZED HEALTH CARE EDUCATION/TRAINING PROGRAM

## 2025-06-09 PROCEDURE — 83036 HEMOGLOBIN GLYCOSYLATED A1C: CPT | Performed by: STUDENT IN AN ORGANIZED HEALTH CARE EDUCATION/TRAINING PROGRAM

## 2025-06-09 PROCEDURE — 2500000004 HC RX 250 GENERAL PHARMACY W/ HCPCS (ALT 636 FOR OP/ED): Mod: SE | Performed by: INTERNAL MEDICINE

## 2025-06-09 PROCEDURE — 3079F DIAST BP 80-89 MM HG: CPT | Performed by: STUDENT IN AN ORGANIZED HEALTH CARE EDUCATION/TRAINING PROGRAM

## 2025-06-09 PROCEDURE — 82962 GLUCOSE BLOOD TEST: CPT | Performed by: STUDENT IN AN ORGANIZED HEALTH CARE EDUCATION/TRAINING PROGRAM

## 2025-06-09 PROCEDURE — 99215 OFFICE O/P EST HI 40 MIN: CPT | Performed by: STUDENT IN AN ORGANIZED HEALTH CARE EDUCATION/TRAINING PROGRAM

## 2025-06-09 PROCEDURE — 3074F SYST BP LT 130 MM HG: CPT | Performed by: STUDENT IN AN ORGANIZED HEALTH CARE EDUCATION/TRAINING PROGRAM

## 2025-06-09 RX ORDER — HEPARIN SODIUM,PORCINE/PF 10 UNIT/ML
50 SYRINGE (ML) INTRAVENOUS AS NEEDED
OUTPATIENT
Start: 2025-06-09

## 2025-06-09 RX ORDER — GABAPENTIN 100 MG/1
1 CAPSULE ORAL
COMMUNITY
Start: 2025-05-12

## 2025-06-09 RX ORDER — HEPARIN 100 UNIT/ML
500 SYRINGE INTRAVENOUS AS NEEDED
OUTPATIENT
Start: 2025-06-09

## 2025-06-09 RX ORDER — HEPARIN SODIUM 1000 [USP'U]/ML
2000 INJECTION, SOLUTION INTRAVENOUS; SUBCUTANEOUS AS NEEDED
OUTPATIENT
Start: 2025-06-09

## 2025-06-09 RX ORDER — HEPARIN 100 UNIT/ML
500 SYRINGE INTRAVENOUS AS NEEDED
Status: DISCONTINUED | OUTPATIENT
Start: 2025-06-09 | End: 2025-06-09 | Stop reason: HOSPADM

## 2025-06-09 RX ADMIN — HEPARIN 500 UNITS: 100 SYRINGE at 09:36

## 2025-06-09 NOTE — PATIENT INSTRUCTIONS
After Visit Summary  It was great seeing you today!    In summary from our visit today, I would like to remind you of the following:    - check your overnight lows with a finger stick. If the fingerstick and sensor correlate we may need to change your target blood sugar. If they do not correlate and the finger stick is higher we may need to work with medtronic either regarding sensor accuracy or placement  - liberalize sensitivity from 40 to 50  - turn bolus wizard back on    - if pump fails:  --> 20 units lantus every 24 hrs  --> 6 unit of humalog with meals  --> correction: 1unit for every 50 points >150 3x/day prior to meals and as needed    Division of Endocrinology   Follow-up appointments:  Please arrive at least 20 minutes prior to your follow up appointments in order to keep visits as close as possible to the scheduled times. If you need to cancel an appointment, please call at least 24 hours in advance.    Please bring your medications or an updated list of medications to each of your visits to help ensure safety in prescribing future medications.    If you are being seen for diabetes, please bring your glucose meter and/or glucose log with 2 weeks of glucose readings to each visit.    Medication Refills: Please request medication refills at the time of your appointment.   - Refills requested by phone or [employer] in between visits require at least 3 business days to be processed.    Lab Results: Please allow at least 7 business days for lab results to be reviewed.  - Please note, that some specialty testing may take up to at least 7 business to be completed.   - If there are any urgent issues requiring immediate attention, we will contact you at your provided phone numbers.   - Any non-urgent results will be relayed via [employer] if you have signed up for this service or via a letter through the mail and/or phone call.     Communication with your provider:  - [facility] is highly recommended  as the most efficient means to contact your provider. However for urgent concerns please call.   - For phone calls, please call our office Monday- Friday 8am to 4:30pm and your message will be relayed to your provider. Please allows 1-2 business days for a response.  - After hours messages are left with an answering service and will be handled by the clinic the following business day.      Sincerely,   Criss Hampton MD  Division of Endocrinology  [facility]

## 2025-06-10 ENCOUNTER — APPOINTMENT (OUTPATIENT)
Dept: PHYSICAL THERAPY | Facility: CLINIC | Age: 55
End: 2025-06-10
Payer: MEDICAID

## 2025-06-10 ENCOUNTER — TELEPHONE (OUTPATIENT)
Dept: ENDOCRINOLOGY | Facility: CLINIC | Age: 55
End: 2025-06-10

## 2025-06-10 DIAGNOSIS — E10.9 TYPE 1 DIABETES MELLITUS WITHOUT COMPLICATION: ICD-10-CM

## 2025-06-10 DIAGNOSIS — R53.1 GENERALIZED WEAKNESS: ICD-10-CM

## 2025-06-10 DIAGNOSIS — Z91.81 AT RISK FOR INJURY RELATED TO FALL: ICD-10-CM

## 2025-06-10 DIAGNOSIS — R26.9 GAIT ABNORMALITY: Primary | ICD-10-CM

## 2025-06-10 PROCEDURE — 97113 AQUATIC THERAPY/EXERCISES: CPT | Mod: GP

## 2025-06-10 RX ORDER — INSULIN LISPRO 100 [IU]/ML
INJECTION, SOLUTION INTRAVENOUS; SUBCUTANEOUS
Qty: 70 ML | Refills: 10 | Status: SHIPPED | OUTPATIENT
Start: 2025-06-10

## 2025-06-10 NOTE — PROGRESS NOTES
Physical Therapy Treatment      Patient Name: Mattie Wolfe  MRN: [id number]  Today's Date: 6/10/2025  Visit #28  Time Calculation  Start Time: 0919  Stop Time: 0958  Time Calculation (min): 39 min    Insurance:  2025 [place] [employer] AUTH REQ AFTER 30 VS SHIRA YR     Assessment:  Continued aquatic therapy focus of improving ambulatory tolerance, strengthening, and balance.  Focused on building ambulatory endurance in the pool as well as gentle UE ROM exercises this date secondary to shoulder and arm pain. Extra time spent in pool this session, pt fatigued towards the end of the session. Seated rest break after therapy session. Pt tolerated all interventions well with good tolerance.     Pt is making progress towards goals and would continue to benefit from skilled PT at this time.     Aquatic PT is required due to, buoyancy of water reduced weight bearing and decreased LE and spinal loading, allowing for more freedom of movement and promotes improved ability to perform functional tasks.  The viscosity of water which is more than 700x that of air, allowed increased reaction time, in turn allowing advanced balance activities to be safely performed and allow patient to be challenged beyond limited of stability without fear of falling; provides the opportunity to work on balance in a safe environment.  The hydrostatic pressure of water facilitates the reduction of edema in the lower extremities, allowing for greater ease of movement.  Aquatics d/t cardio vascular benefits including: improved cardiovascular efficiency including increased stroke volume, decreased HR, and decreased blood pressure with increased cardiac output allowing patient to achieve cardiovascular training effect with less work load.  Aquatics to support upright posture during postural retraining and strengthening.   Aquatics allow patient to work on gait with less assistance or without assistive device improving posture and ease of gait training/conditioning.  "    Patient's response to session: Decreased pain, Increased ROM/joint mobility, Increase motor control, Improved gait, and Increased knowledge and understanding    Plan:  Cont to progress LE strength/endurance for functional mobility as tolerated. Inc SL balance exercises and endurance walking.      Tx Considerations:  - nerve glides for UE nerve related pain   -Ambulation/dynamic gait w/out AD or w/SPC  -Toe taps, step ups etc.    Current Problem:   1. Gait abnormality        2. Generalized weakness        3. At risk for injury related to fall            Subjective   Pt reports, \"Nothing new, Im going to get an MRI on the L shoulder on Friday. \"       Xray humerus:   FINDINGS:  No fracture or dislocation is evident.  There is partial  visualization of a port line with the tip projecting over the distal  SVC.      IMPRESSION:  No osseous injury is evident.      Xray C-spine:  FINDINGS:  The cervical spine is seen to C7/T1.  Vertebral body height and  alignment are  maintained. No fracture or dislocation is evident.  Mild multilevel degenerative changes seen of the cervical spine.   No  prevertebral soft tissue swelling is evident.  There is a port on the  right chest wall with the line tracking down over the mediastinum as  seen on these radiographs.      IMPRESSION:  Mild degenerative change of the cervical spine without osseous injury  Evident    Pain: 5/10; worst pain with LUE at night        Objective     Ambulated with SBA in pool with current for 22 min without need for rest breaks  Ttp L brachioradialis   No inc in symptoms with median nerve ULTT  Dec L shoulder/arm pain with scapular retraction and c-spine retraction     Treatments:  Therapeutic Exercise (47863):   minutes (0 minutes)      Manual Therapy (87525):   minutes      Neuromuscular Re-education (22930):   minutes      Aquatic Therapy (82087):  39 Min 3 units  16 minutes of forward water walking without UE support.   Squats 2x10  Hip ext 2x10 " B  Marches 2x10 B  Standing bicycle kick 20 reps  HS Curls: 2 x 10 (B)  HR 2x10  Pendulums in deep water 3x10 all directions  Scapular retraction in deep water 3x10   C-spine retraction 3x10  Elbow flex/ext 3x10   Edu on continuing to complete gentle ROM to LUE as well as exercises prescribed last land PT session

## 2025-06-10 NOTE — TELEPHONE ENCOUNTER
RF: Insulin lispro (humalog) vial 70 units once daily via insulin pump #70ml (90 day supply)   To exact care please

## 2025-06-12 ENCOUNTER — APPOINTMENT (OUTPATIENT)
Dept: PHYSICAL THERAPY | Facility: CLINIC | Age: 55
End: 2025-06-12
Payer: MEDICAID

## 2025-06-12 DIAGNOSIS — R53.1 GENERALIZED WEAKNESS: ICD-10-CM

## 2025-06-12 DIAGNOSIS — R26.9 GAIT ABNORMALITY: Primary | ICD-10-CM

## 2025-06-12 DIAGNOSIS — Z91.81 AT RISK FOR INJURY RELATED TO FALL: ICD-10-CM

## 2025-06-12 PROCEDURE — 97113 AQUATIC THERAPY/EXERCISES: CPT | Mod: GP

## 2025-06-12 NOTE — PROGRESS NOTES
Physical Therapy Treatment      Patient Name: Mattie Wolfe  MRN: [id number]  Today's Date: 6/12/2025  Visit #29  Time Calculation  Start Time: 0930  Stop Time: 1010  Time Calculation (min): 40 min    Insurance:  2025 [place] [employer] AUTH REQ AFTER 30 VS SHIRA YR     Assessment:  Continued aquatic therapy focus of improving ambulatory tolerance, strengthening, and balance.  Focused on building ambulatory endurance in the pool as well as gentle UE ROM exercises this date secondary to shoulder and arm pain. Extra time spent in pool this session, pt fatigued towards the end of the session. Seated rest break after therapy session. Pt tolerated all interventions well with good tolerance.     Pt is making progress towards goals and would continue to benefit from skilled PT at this time.     Aquatic PT is required due to, buoyancy of water reduced weight bearing and decreased LE and spinal loading, allowing for more freedom of movement and promotes improved ability to perform functional tasks.  The viscosity of water which is more than 700x that of air, allowed increased reaction time, in turn allowing advanced balance activities to be safely performed and allow patient to be challenged beyond limited of stability without fear of falling; provides the opportunity to work on balance in a safe environment.  The hydrostatic pressure of water facilitates the reduction of edema in the lower extremities, allowing for greater ease of movement.  Aquatics d/t cardio vascular benefits including: improved cardiovascular efficiency including increased stroke volume, decreased HR, and decreased blood pressure with increased cardiac output allowing patient to achieve cardiovascular training effect with less work load.  Aquatics to support upright posture during postural retraining and strengthening.   Aquatics allow patient to work on gait with less assistance or without assistive device improving posture and ease of gait training/conditioning.  "    Patient's response to session: Decreased pain, Increased ROM/joint mobility, Increase motor control, Improved gait, and Increased knowledge and understanding    Plan:  Cont to progress LE strength/endurance for functional mobility as tolerated. Inc SL balance exercises and endurance walking.      Tx Considerations:  - nerve glides for UE nerve related pain   -Ambulation/dynamic gait w/out AD or w/SPC  -Toe taps, step ups etc.    Current Problem:   1. Gait abnormality        2. Generalized weakness        3. At risk for injury related to fall            Subjective   Pt reports, \"Today my shoulder is actually not to bad, but I am getting the MRI done tomorrow, other than that nothing new. \"       Xray humerus:   FINDINGS:  No fracture or dislocation is evident.  There is partial  visualization of a port line with the tip projecting over the distal  SVC.      IMPRESSION:  No osseous injury is evident.      Xray C-spine:  FINDINGS:  The cervical spine is seen to C7/T1.  Vertebral body height and  alignment are  maintained. No fracture or dislocation is evident.  Mild multilevel degenerative changes seen of the cervical spine.   No  prevertebral soft tissue swelling is evident.  There is a port on the  right chest wall with the line tracking down over the mediastinum as  seen on these radiographs.      IMPRESSION:  Mild degenerative change of the cervical spine without osseous injury  Evident    Pain: 5/10; worst pain with LUE at night        Objective     Ambulated with SBA in pool with current for 22 min without need for rest breaks  Ttp L brachioradialis   No inc in symptoms with median nerve ULTT  Dec L shoulder/arm pain with scapular retraction and c-spine retraction     Treatments:  Therapeutic Exercise (19113):   minutes (0 minutes)      Manual Therapy (14806):   minutes      Neuromuscular Re-education (99074):   minutes      Aquatic Therapy (65986):  40 Min 3 units  16 minutes of forward water walking without " UE support.   Squats 2x10  Hip ext 2x10 B  Marches 2x10 B  Standing bicycle kick 20 reps  HS Curls: 2 x 10 (B)  HR 2x10  Pendulums in deep water 3x10 all directions  Scapular retraction in deep water 3x10   C-spine retraction 3x10  Elbow flex/ext 3x10   Edu on continuing to complete gentle ROM to LUE as well as exercises prescribed last land PT session

## 2025-06-12 NOTE — PROGRESS NOTES
Physical Therapy Reassessment     Patient Name: Mattie Wolfe  MRN: [id number]  Today's Date: 6/13/2025  Visit #: 30/30  Last Reassessment - 3/21/25  Insurance: 2025 [place] MCAID AUTH REQ AFTER 30 VS SHIRA YR   Time Calculation  Start Time: 1118  Stop Time: 1158  Time Calculation (min): 40 min    1. Gait abnormality        2. Generalized weakness        3. At risk for injury related to fall            ASSESSMENT:  Pt presenting for reassessment in [facility]. Pt has made steady gains in overall activity tolerance, LE strength, and endurance which is having good carryover to functional mobility. Despite gains she cont's to be limited in strength, endurance and dynamic balance. Pt has a multitude of unstable factors that contribute to her decreased safety w/functional mobility including but not limited to severe orthostatic hypotension, blood sugar, and vision impairments. As we begin to progress to less restrictive AD (SPC) it becomes much more apparent how unsteady she cont's to be. This is demo'd during her dynamic balance performance on FGA which indicates a cont'd high risk of falling. Pt is benefiting most from aquatic therapy as it improves maryam her orthostatic hypotension and allows for participation in activity. I believe pt would regress w/out this. Based on these findings, recommending pt cont w/skilled PT to address impairments and improve safety w/functional mobility.         GOALS: (Last updated/assessed 6/13)  By Discharge patient will demo:  [place] in HEP met  Improvement in the following outcome measures to decrease risk of falls:  5xSTS </= 15sec met  TUG </= 15sec met  Pt will be able to complete 6MWT with </= 2 standing rest break. Met  Gait speed >/= .8m/s during 6MWT in progress, near met  Pt will ambulate >/= 10min to improve ambulation endurance in progress, near met  TUG w/SPC w/supervision </= 15sec met  Pt will ambulate for >/= 5min w/SPC to decrease reliance on UE support. In progress  ABC >/= 70%  in progress  No falls during POC met, in progress  Pt will improve on FGA w/SPC by >/= 4pts consistent w/MCID to improve safety w/functional mobility. Newly established.       PLAN:  Cont to progress LE strength/endurance for functional mobility as tolerated.       Next Session Considerations:  Add stairs, SPC ambulation, FGA items w/SPC as tolerated.         SUBJECTIVE:  Pt reports she is a little out of breath today. She just came from an MRI and had to walk a bit while there so she is a little tired already.   She feels coming to PT has helped immensely, and she wasn't able to do as much before coming here. She feels her endurance is a lot better and she is doing the most she has done in a while at home.   Pt reports her doctor ordered an MRI because they believe her arm pain is coming from a cervical nerve. Overall, the neck is getting better slowly.         OBJECTIVE:    Treatments:  Therapeutic Exercise (99174): minutes  Not performed      Manual Therapy (26294):   minutes  Not performed    Neuromuscular Re-education (55589):   minutes  Not performed    Therapeutic Activitity (31330):  40 minutes  Walking w/RW - to improve ambulation endurance/activity tolerance - x1 sets ea to fatigue   ^Best = (8:29min)    TUsec w/RW  5xSTS: 11sec w/out UE support w/out physical assist (NT d/t goal met)  6MWT: 929.5ft, 283.3m (improved)  ^Gait Speed: .79m/s (improved)  Walking Duration: 8:29min (NT)  ABC = 10%  TUG w/SPC = 9.5sec (carried cane)  TUG w/out AD = 9sec  FGA = 9/30 - w/out SPC, would likely need SPC and still be significantly impaired.       Discussing above outcome measures, discussing goals and POC moving fwd.   Discussed multitude of factors that are contributing to unsteadiness maryam w/static standing balance. (Orthostatic hypotension)          HEP:  Access Code: [email or web address]  URL: [email or web address]  Date: 2025  Prepared by: David Castano     Exercises  - Sit to Stand Without  Arm Support  - 1 x daily - 7 x weekly - 3 sets - 10 reps  - Standing March with Counter Support  - 1 x daily - 7 x weekly - 3 sets - 10 reps  - Walking  - 1 x daily - 7 x weekly - 1 sets - 3-5 reps - 2min hold

## 2025-06-13 ENCOUNTER — APPOINTMENT (OUTPATIENT)
Dept: PHYSICAL THERAPY | Facility: CLINIC | Age: 55
End: 2025-06-13
Payer: MEDICAID

## 2025-06-13 ENCOUNTER — HOSPITAL ENCOUNTER (OUTPATIENT)
Dept: RADIOLOGY | Facility: HOSPITAL | Age: 55
Discharge: HOME | End: 2025-06-13
Payer: MEDICAID

## 2025-06-13 DIAGNOSIS — R26.9 GAIT ABNORMALITY: Primary | ICD-10-CM

## 2025-06-13 DIAGNOSIS — Z91.81 AT RISK FOR INJURY RELATED TO FALL: ICD-10-CM

## 2025-06-13 DIAGNOSIS — M54.12 CERVICAL RADICULOPATHY AT C7: ICD-10-CM

## 2025-06-13 DIAGNOSIS — R53.1 GENERALIZED WEAKNESS: ICD-10-CM

## 2025-06-13 PROCEDURE — 72141 MRI NECK SPINE W/O DYE: CPT | Performed by: STUDENT IN AN ORGANIZED HEALTH CARE EDUCATION/TRAINING PROGRAM

## 2025-06-13 PROCEDURE — 72141 MRI NECK SPINE W/O DYE: CPT

## 2025-06-13 PROCEDURE — 97530 THERAPEUTIC ACTIVITIES: CPT | Mod: GP

## 2025-06-16 ENCOUNTER — PATIENT OUTREACH (OUTPATIENT)
Dept: PRIMARY CARE | Facility: CLINIC | Age: 55
End: 2025-06-16

## 2025-06-16 ENCOUNTER — DOCUMENTATION (OUTPATIENT)
Dept: PRIMARY CARE | Facility: CLINIC | Age: 55
End: 2025-06-16
Payer: MEDICAID

## 2025-06-16 DIAGNOSIS — J39.8 TRACHEAL STENOSIS: ICD-10-CM

## 2025-06-16 DIAGNOSIS — M54.12 CERVICAL RADICULOPATHY AT C7: ICD-10-CM

## 2025-06-16 DIAGNOSIS — J44.9 CHRONIC OBSTRUCTIVE PULMONARY DISEASE, UNSPECIFIED COPD TYPE (MULTI): Primary | ICD-10-CM

## 2025-06-16 DIAGNOSIS — E06.3 HASHIMOTO'S THYROIDITIS: ICD-10-CM

## 2025-06-16 DIAGNOSIS — K31.84 GASTROPARESIS: ICD-10-CM

## 2025-06-16 DIAGNOSIS — R26.9 GAIT DISTURBANCE, POST-STROKE: ICD-10-CM

## 2025-06-16 DIAGNOSIS — I69.398 GAIT DISTURBANCE, POST-STROKE: ICD-10-CM

## 2025-06-16 DIAGNOSIS — R06.09 DYSPNEA ON EXERTION: ICD-10-CM

## 2025-06-16 DIAGNOSIS — M54.12 CERVICAL RADICULOPATHY: Primary | ICD-10-CM

## 2025-06-16 DIAGNOSIS — M54.12 CERVICAL RADICULOPATHY AT C6: ICD-10-CM

## 2025-06-16 DIAGNOSIS — E10.69 TYPE 1 DIABETES MELLITUS WITH OTHER SPECIFIED COMPLICATION: ICD-10-CM

## 2025-06-16 RX ORDER — PREDNISONE 20 MG/1
20 TABLET ORAL AS NEEDED
Qty: 10 TABLET | Refills: 0 | Status: SHIPPED | OUTPATIENT
Start: 2025-06-16

## 2025-06-16 SDOH — SOCIAL STABILITY: SOCIAL NETWORK: HOW OFTEN DO YOU ATTEND MEETINGS OF THE CLUBS OR ORGANIZATIONS YOU BELONG TO?: 1 TO 4 TIMES PER YEAR

## 2025-06-16 SDOH — SOCIAL STABILITY: SOCIAL INSECURITY
WITHIN THE LAST YEAR, HAVE YOU BEEN RAPED OR FORCED TO HAVE ANY KIND OF SEXUAL ACTIVITY BY YOUR PARTNER OR EX-PARTNER?: NO

## 2025-06-16 SDOH — SOCIAL STABILITY: SOCIAL INSECURITY: WITHIN THE LAST YEAR, HAVE YOU BEEN HUMILIATED OR EMOTIONALLY ABUSED IN OTHER WAYS BY YOUR PARTNER OR EX-PARTNER?: NO

## 2025-06-16 SDOH — HEALTH STABILITY: MENTAL HEALTH: HOW MANY DRINKS CONTAINING ALCOHOL DO YOU HAVE ON A TYPICAL DAY WHEN YOU ARE DRINKING?: PATIENT DOES NOT DRINK

## 2025-06-16 SDOH — SOCIAL STABILITY: SOCIAL NETWORK: HOW OFTEN DO YOU GET TOGETHER WITH FRIENDS OR RELATIVES?: ONCE A WEEK

## 2025-06-16 SDOH — ECONOMIC STABILITY: HOUSING INSECURITY: AT ANY TIME IN THE PAST 12 MONTHS, WERE YOU HOMELESS OR LIVING IN A SHELTER (INCLUDING NOW)?: NO

## 2025-06-16 SDOH — ECONOMIC STABILITY: GENERAL
WHICH OF THE FOLLOWING DO YOU KNOW HOW TO USE AND HAVE ACCESS TO EVERY DAY? (CHOOSE ALL THAT APPLY): SMARTPHONE WITH CELLULAR DATA PLAN;DESKTOP COMPUTER, LAPTOP COMPUTER, OR TABLET WITH BROADBAND INTERNET CONNECTION

## 2025-06-16 SDOH — SOCIAL STABILITY: SOCIAL INSECURITY: WITHIN THE LAST YEAR, HAVE YOU BEEN AFRAID OF YOUR PARTNER OR EX-PARTNER?: NO

## 2025-06-16 SDOH — HEALTH STABILITY: PHYSICAL HEALTH
HOW OFTEN DO YOU NEED TO HAVE SOMEONE HELP YOU WHEN YOU READ INSTRUCTIONS, PAMPHLETS, OR OTHER WRITTEN MATERIAL FROM YOUR DOCTOR OR PHARMACY?: ALWAYS

## 2025-06-16 SDOH — HEALTH STABILITY: MENTAL HEALTH: ON AVERAGE, HOW MANY MINUTES DO YOU ENGAGE IN EXERCISE AT THIS LEVEL?: 0 MIN

## 2025-06-16 SDOH — ECONOMIC STABILITY: HOUSING INSECURITY: IN THE PAST 12 MONTHS, HOW MANY TIMES HAVE YOU MOVED WHERE YOU WERE LIVING?: 0

## 2025-06-16 SDOH — SOCIAL STABILITY: SOCIAL NETWORK
IN A TYPICAL WEEK, HOW MANY TIMES DO YOU TALK ON THE PHONE WITH FAMILY, FRIENDS, OR NEIGHBORS?: MORE THAN THREE TIMES A WEEK

## 2025-06-16 SDOH — ECONOMIC STABILITY: TRANSPORTATION INSECURITY: IN THE PAST 12 MONTHS, HAS LACK OF TRANSPORTATION KEPT YOU FROM MEDICAL APPOINTMENTS OR FROM GETTING MEDICATIONS?: YES

## 2025-06-16 SDOH — ECONOMIC STABILITY: GENERAL: HOW HARD IS IT FOR YOU TO PAY FOR THE VERY BASICS LIKE FOOD, HOUSING, MEDICAL CARE, AND HEATING?: NOT VERY HARD

## 2025-06-16 SDOH — ECONOMIC STABILITY: FOOD INSECURITY: WITHIN THE PAST 12 MONTHS, YOU WORRIED THAT YOUR FOOD WOULD RUN OUT BEFORE YOU GOT THE MONEY TO BUY MORE.: NEVER TRUE

## 2025-06-16 SDOH — ECONOMIC STABILITY: HOUSING INSECURITY: IN THE LAST 12 MONTHS, WAS THERE A TIME WHEN YOU WERE NOT ABLE TO PAY THE MORTGAGE OR RENT ON TIME?: NO

## 2025-06-16 SDOH — HEALTH STABILITY: PHYSICAL HEALTH: ON AVERAGE, HOW MANY DAYS PER WEEK DO YOU ENGAGE IN MODERATE TO STRENUOUS EXERCISE (LIKE A BRISK WALK)?: 0 DAYS

## 2025-06-16 SDOH — HEALTH STABILITY: MENTAL HEALTH
DO YOU FEEL STRESS - TENSE, RESTLESS, NERVOUS, OR ANXIOUS, OR UNABLE TO SLEEP AT NIGHT BECAUSE YOUR MIND IS TROUBLED ALL THE TIME - THESE DAYS?: NOT AT ALL

## 2025-06-16 SDOH — ECONOMIC STABILITY: FOOD INSECURITY: WITHIN THE PAST 12 MONTHS, THE FOOD YOU BOUGHT JUST DIDN'T LAST AND YOU DIDN'T HAVE MONEY TO GET MORE.: NEVER TRUE

## 2025-06-16 SDOH — HEALTH STABILITY: MENTAL HEALTH: HOW OFTEN DO YOU HAVE SIX OR MORE DRINKS ON ONE OCCASION?: NEVER

## 2025-06-16 SDOH — HEALTH STABILITY: MENTAL HEALTH: HOW OFTEN DO YOU HAVE A DRINK CONTAINING ALCOHOL?: NEVER

## 2025-06-16 SDOH — SOCIAL STABILITY: SOCIAL INSECURITY
WITHIN THE LAST YEAR, HAVE YOU BEEN KICKED, HIT, SLAPPED, OR OTHERWISE PHYSICALLY HURT BY YOUR PARTNER OR EX-PARTNER?: NO

## 2025-06-16 SDOH — ECONOMIC STABILITY: GENERAL: IN THE PAST 12 MONTHS HAS THE ELECTRIC, GAS, OIL, OR WATER COMPANY THREATENED TO SHUT OFF SERVICES IN YOUR HOME?: NO

## 2025-06-16 SDOH — SOCIAL STABILITY: SOCIAL NETWORK: HOW OFTEN DO YOU ATTEND CHURCH OR RELIGIOUS SERVICES?: 1 TO 4 TIMES PER YEAR

## 2025-06-16 SDOH — SOCIAL STABILITY: SOCIAL INSECURITY: ARE YOU MARRIED, WIDOWED, DIVORCED, SEPARATED, NEVER MARRIED, OR LIVING WITH A PARTNER?: NEVER MARRIED

## 2025-06-16 SDOH — SOCIAL STABILITY: SOCIAL NETWORK
DO YOU BELONG TO ANY CLUBS OR ORGANIZATIONS SUCH AS CHURCH GROUPS, UNIONS, FRATERNAL OR ATHLETIC GROUPS, OR SCHOOL GROUPS?: YES

## 2025-06-16 ASSESSMENT — ACTIVITIES OF DAILY LIVING (ADL): LACK_OF_TRANSPORTATION: YES

## 2025-06-16 ASSESSMENT — LIFESTYLE VARIABLES
SKIP TO QUESTIONS 9-10: 1
AUDIT-C TOTAL SCORE: 0

## 2025-06-16 NOTE — PROGRESS NOTES
"Care Management Monthly Outreach  Chart review completed  Confirmation of at least 2 patient identifiers  Change in insurance? No    Has patient been to ER/Urgent Care since last outreach? No    Last Office Visit with PCP: 5/6/2025   Next Office Visit with PCP: 6/23/2025   APC Collaboration: n/a    Chronic Conditions and Outreach Summary:   Type 1 diabetes mellitus with other specified complication    Gait disturbance, post-stroke    Hashimoto's thyroiditis    Cervical radiculopathy at C6    Cervical radiculopathy at C7    Dyspnea on exertion    Tracheal stenosis    This RN CM received a call back from Mattie. She reports that she is doing okay. She is nervously waiting for her MRI results from Friday. She reports that the pain is improved a bit with therapy but that there are still nights that are really back and sometimes now she is having pain on both sides. She tried the Gabapentin but reports that it makes her loopy therefore she is only taking it on an as needed basis at night. She is worried about being \"hooked\" on pills. Voiced understanding and provided medication education on the drug properties of Gabapentin and that it is a non opioid. We reviewed her preventative exams and were able to schedule her mammogram. She is scheduled for her colonoscopy next month as well. Mattie reports that she is to have another nocturnal pulse ox testing done per her oxygen company and that they sent paperwork to Dr. Gold, however, do not see the paperwork. Left a message for Silvia. Will also let Dr. Gold's medical assistant know to be on the lookout for any paperwork as well. Ensured that Mattie has my availability for any concerns that arise in between our calls.     Medications:   Are there medication changes since last visit? Yes - Multiple changes-No longer taking Atorvastatin, Not taking Gabapentin 100 mg, Taking Gabapentin 300 mg as needed, post cataract surgery eye drops completed, Lamisil therapy " completed  Refills needed? Yes - Prednisone     Social Drivers of Health: Addressed today  Care Gaps Addressed? Addressed today  Care Plan addressed: Yes    Upcoming Appointments:   Future Appointments       Date / Time Provider Department Dept Phone    6/17/2025 9:30 AM Linden Yanez, PT [facility] [phone]    6/19/2025 9:30 AM Linden Yanez, PT [facility] [phone]    6/20/2025 2:45 PM Samson Castano, PT [facility] [phone]    6/20/2025 3:45 PM Vielka Garcia, OT [facility] [phone]    6/23/2025 11:40 AM (Arrive by 11:25 AM) Kobi Gold MD [facility] [phone]    6/24/2025 9:30 AM Linden Yanez, PT [facility] [phone]    6/26/2025 9:30 AM Linden Yanez, PT [facility] [phone]    6/27/2025 2:45 PM Samson Castano, PT [facility] [phone]    7/11/2025 11:15 AM (Arrive by 11:00 AM) [facility] 1 [facility] [phone]    7/14/2025 9:30 AM [facility] 20 [facility]  [phone]    7/21/2025 8:30 AM Tone Nuno MD; PAR ENDO 2; PAR ENDOSC1 POOL ROOM [facility] [phone]    7/29/2025 9:00 AM Kobi You PharmD [facility]  Arrive at: [facility] [phone]    8/11/2025 9:30 AM [facility] 20 [facility]  [phone]    8/13/2025 10:50 AM (Arrive by 10:35 AM) Kobi Gold MD [facility] [phone]    9/8/2025 9:30 AM [facility] 20 [facility]  [phone]    11/24/2025 11:30 AM Criss Hampton MD [facility] [phone]    2/23/2026 11:30 AM Criss Hampton MD [facility] [phone]    5/11/2026 11:30 AM Cirss Hampton MD [facility] [phone]          Blood Pressures Reviewed  BP Readings from Last 3 Encounters:   06/09/25 129/83   05/08/25 103/65   05/06/25 124/80     Labs  Reviewed:  Lab Results   Component Value Date    CREATININE 1.15 (H) 04/19/2025    GLUCOSE 135 (H) 04/19/2025    ALKPHOS 108 04/19/2025    K 4.4 04/19/2025    PROT 6.7 04/19/2025     04/19/2025    CALCIUM 9.0 04/19/2025    AST 27 04/19/2025    ALT 28 04/19/2025    BUN 23 04/19/2025    MG 1.86 04/19/2025    GFRF 57 (A) 08/11/2022     Lab Results   Component Value Date    TRIG 74 04/01/2024    CHOL 146 04/01/2024    LDLCALC 76 04/01/2024    HDL 55.5 04/01/2024     Lab Results   Component Value Date    HGBA1C 6.1 06/09/2025    HGBA1C 6.3 03/10/2025    HGBA1C 6.0 11/12/2024     Lab Results   Component Value Date    WBC 3.9 (L) 04/19/2025    RBC 3.89 (L) 04/19/2025    HGB 12.2 04/19/2025     04/19/2025   No other concerns at this time.  Agreeable to continue monthly outreaches.  Encouraged to call if questions or concerns arise.    Sierra Whitten RN

## 2025-06-16 NOTE — PROGRESS NOTES
Care Management Monthly Outreach  Chart review completed    Last Office Visit: 5/6/2025  Next Office Visit: 6/23/2025   APC: n/a    Has patient been to ER/Urgent Care since last outreach? No    Outreach attempted: left message on voicemail requesting return call.    Sierra Whitten RN

## 2025-06-17 ENCOUNTER — APPOINTMENT (OUTPATIENT)
Dept: NEUROLOGY | Facility: HOSPITAL | Age: 55
End: 2025-06-17
Payer: MEDICAID

## 2025-06-17 ENCOUNTER — APPOINTMENT (OUTPATIENT)
Dept: PHYSICAL THERAPY | Facility: CLINIC | Age: 55
End: 2025-06-17
Payer: MEDICAID

## 2025-06-17 DIAGNOSIS — R26.9 GAIT ABNORMALITY: Primary | ICD-10-CM

## 2025-06-17 DIAGNOSIS — Z91.81 AT RISK FOR INJURY RELATED TO FALL: ICD-10-CM

## 2025-06-17 DIAGNOSIS — R53.1 GENERALIZED WEAKNESS: ICD-10-CM

## 2025-06-19 ENCOUNTER — APPOINTMENT (OUTPATIENT)
Dept: PHYSICAL THERAPY | Facility: CLINIC | Age: 55
End: 2025-06-19
Payer: MEDICAID

## 2025-06-19 DIAGNOSIS — R26.9 GAIT ABNORMALITY: Primary | ICD-10-CM

## 2025-06-19 DIAGNOSIS — Z91.81 AT RISK FOR INJURY RELATED TO FALL: ICD-10-CM

## 2025-06-19 DIAGNOSIS — R53.1 GENERALIZED WEAKNESS: ICD-10-CM

## 2025-06-19 PROCEDURE — 97113 AQUATIC THERAPY/EXERCISES: CPT | Mod: GP

## 2025-06-19 NOTE — PROGRESS NOTES
Physical Therapy Treatment      Patient Name: Mattie Wolfe  MRN: [id number]  Today's Date: 6/19/2025  Visit #31  Time Calculation  Start Time: 0920  Stop Time: 1001  Time Calculation (min): 41 min    Insurance:  2025 [place] MCA[place] AUTH REQ AFTER 30 VS SHIRA YR     Assessment:  Continued aquatic therapy focus of improving ambulatory tolerance, strengthening, and balance.  Focused on building ambulatory endurance in the pool as well as gentle UE ROM exercises this date secondary to shoulder and arm pain. Extra time spent in pool this session.  Seated rest break after therapy session. Pt tolerated all interventions well with good tolerance.  Pt is making progress towards goals and would continue to benefit from skilled PT at this time.     Aquatic PT is required due to, buoyancy of water reduced weight bearing and decreased LE and spinal loading, allowing for more freedom of movement and promotes improved ability to perform functional tasks.  The viscosity of water which is more than 700x that of air, allowed increased reaction time, in turn allowing advanced balance activities to be safely performed and allow patient to be challenged beyond limited of stability without fear of falling; provides the opportunity to work on balance in a safe environment.  The hydrostatic pressure of water facilitates the reduction of edema in the lower extremities, allowing for greater ease of movement.  Aquatics d/t cardio vascular benefits including: improved cardiovascular efficiency including increased stroke volume, decreased HR, and decreased blood pressure with increased cardiac output allowing patient to achieve cardiovascular training effect with less work load.  Aquatics to support upright posture during postural retraining and strengthening.   Aquatics allow patient to work on gait with less assistance or without assistive device improving posture and ease of gait training/conditioning.     Patient's response to session: Decreased  "pain, Increased ROM/joint mobility, Increase motor control, Improved gait, and Increased knowledge and understanding    Plan:  Cont to progress LE strength/endurance for functional mobility as tolerated. Inc SL balance exercises and endurance walking.      Tx Considerations:  - nerve glides for UE nerve related pain   -Ambulation/dynamic gait w/out AD or w/SPC  -Toe taps, step ups etc.    Current Problem:   1. Gait abnormality        2. Generalized weakness        3. At risk for injury related to fall            Subjective   Pt reports, \"I am going to have a consult with spine surgeon next week and follow up with my neurologist as well. \"       Xray humerus:   FINDINGS:  No fracture or dislocation is evident.  There is partial  visualization of a port line with the tip projecting over the distal  SVC.      IMPRESSION:  No osseous injury is evident.      Xray C-spine:  FINDINGS:  The cervical spine is seen to C7/T1.  Vertebral body height and  alignment are  maintained. No fracture or dislocation is evident.  Mild multilevel degenerative changes seen of the cervical spine.   No  prevertebral soft tissue swelling is evident.  There is a port on the  right chest wall with the line tracking down over the mediastinum as  seen on these radiographs.      IMPRESSION:  Mild degenerative change of the cervical spine without osseous injury  Evident    Pain: 5/10; worst pain with LUE at night        Objective     Ambulated with SBA in pool with current for 22 min without need for rest breaks  Ttp L brachioradialis   No inc in symptoms with median nerve ULTT  Dec L shoulder/arm pain with scapular retraction and c-spine retraction     Treatments:  Therapeutic Exercise (07623):   minutes (0 minutes)      Manual Therapy (17581):   minutes      Neuromuscular Re-education (93200):   minutes      Aquatic Therapy (00685):  41 Min 3 units  16 minutes of forward water walking without UE support.   Squats 2x10  Hip ext 2x10 B  Marches " 2x10 B  Standing bicycle kick 20 reps  HS Curls: 2 x 10 (B)  HR 2x10  Pendulums in deep water 3x10 all directions  Scapular retraction in deep water 3x10   C-spine retraction 3x10  Elbow flex/ext 3x10   Edu on continuing to complete gentle ROM to LUE as well as exercises prescribed last land PT session

## 2025-06-20 ENCOUNTER — TREATMENT (OUTPATIENT)
Dept: OCCUPATIONAL THERAPY | Facility: CLINIC | Age: 55
End: 2025-06-20
Payer: MEDICAID

## 2025-06-20 ENCOUNTER — APPOINTMENT (OUTPATIENT)
Dept: PHYSICAL THERAPY | Facility: CLINIC | Age: 55
End: 2025-06-20
Payer: MEDICAID

## 2025-06-20 DIAGNOSIS — R41.842 VISUAL-SPATIAL IMPAIRMENT: ICD-10-CM

## 2025-06-20 DIAGNOSIS — R53.1 GENERALIZED WEAKNESS: Primary | ICD-10-CM

## 2025-06-20 DIAGNOSIS — R26.9 GAIT ABNORMALITY: Primary | ICD-10-CM

## 2025-06-20 DIAGNOSIS — Z91.81 AT RISK FOR INJURY RELATED TO FALL: ICD-10-CM

## 2025-06-20 DIAGNOSIS — R53.1 GENERALIZED WEAKNESS: ICD-10-CM

## 2025-06-20 DIAGNOSIS — R06.09 DYSPNEA ON MINIMAL EXERTION: ICD-10-CM

## 2025-06-20 PROCEDURE — 97530 THERAPEUTIC ACTIVITIES: CPT | Mod: GO

## 2025-06-20 NOTE — PROGRESS NOTES
Occupational Therapy  Occupational Therapy Reassessment    Patient Name Mattie Wolfe   MRN: [id number]  : 1970  Today's Date: 25  Time Calculation  Start Time: 1546  Stop Time: 1629  Time Calculation (min): 43 min     Visit #:     Insurance:  MEDICAID  Authorization required: Yes, after 30 visits  # of visits approved: 2025: [place] MCAID AUTH REQ AFTER 30 VS SHIRA YR    Therapy Diagnoses:   1. Generalized weakness        2. Dyspnea on minimal exertion        3. Visual-spatial impairment          Assessment:  Pt participated in reassessment this date. Pt demo improved proximal RUE strength, but decreased distal RUE strength. Pt also demo decreased LUE strength throughout, likely d/t recent development of cervical radiculopathy affecting LUE > RUE. While hilda  strength did not significantly differ from evaluation values, L pinch strength was significantly decreased. Pt demo maintenance of R FMC via 9 Hole peg test, and mild improvement of L FMC. Pt continues to demonstrated impaired rapid alternating movements and finger-to-nose, indicating some cerebellar dysfunction. Pt participated in discussion of updated POC and goal-setting. Pt will benefit from continued skilled OT for UE strengthening, coordination training, visual-perceptual intervention with adaptation training, and cognitive intervention to optimize ADL/IADL safety and participation.     Plan:      Continue OT 1x/week for 12 weeks, then re-assess.  Interventions: Neuromuscular Reeducation, Self-care/ADL training, Therapeutic Activity, Therapeutic Exercise, Manual Therapy    Next Visit:  - Cognitive assessment  - Distal UE strengthening and coordination training    Goals:  By discharge,      Pt will increase standing tolerance during ADL/IADL performance to 5+ min. (Goal Met)  Pt will demo independence with HEP completion. (Progressing)  Pt will demo and verbalize use of energy conservation/joint protection techniques to increase  activity tolerance while completing ADL/IADL tasks. (Progressing)  Pt will increase bilateral shoulder strength to 4+/5 to increase activity tolerance for reaching and functional UE use. (Goal met with NEPTALI; Progressing with JOSEF, update)  Pt will participate in QuickDash to establish BUE functional baseline. (Discontinue)  Pt will participate in further visual perceptual assessment to determine how visual perceptual skills play a role in ADL/IADL impairment. (Met 3/21/25)  Pt will demo ability to independently check orthostatic blood pressures during functional activity and positional changes to prevent falls. (Progressing)  Pt will participate in assessment of BLE swelling to determine further OT needs (Met 2/13/25)  Pt will improve handwriting AEB improved legibility and speed using compensatory strategies or inc fine motor strength. (Progressing)  New goal 5/9: Pt will demo improved hilda  strength +5#. (Progressing)  New goal 5/9: Pt will demo improved hilda FMC -5 sec on 9 Hole Peg Test. (Progressing)    Subjective:   Pt reports they did the MRI and EMG and now will go see a neurosurgeon next Tuesday for herniated cervical discs and cervical radiculopathy. Pt reports she continues to use her theraputty + beads at home.     MRI IMPRESSION from 6/5/2025:  (Slightly limited study due to motion degradation).  Please refer to C4-C5, C5-C6, and C6-C7 description above for  pertinent details. Notably, there is edematous enlargement of the  proximal extraforaminal left C6 nerve root due to for intraforaminal  compression (severe left C5-C6 foraminal stenosis) and edematous  enlargement of the intraforaminal left C7 nerve root (likely due to  compression the intradural left C7 nerve root by left paracentral  disc extrusion with inferior migration).  Additional spondylotic changes notable for moderate canal stenosis at C4-C5-C5-C6 with mild mass effect on the ventral and dorsal cord by disc protrusions, ligamentum flavum  thickening, and thickening of the posterior longitudinal ligament. There is also severe bilateral foraminal stenosis at C4-C5.    Significant PMHx and rehab hx: H/o CVA on 1/26/2018 affecting R occipital lobe, and other stroke affecting R optic nerve and causing R eye blindness. Pt reports she was in a coma for over a month after the CVA, on a ventilator, had trouble weaning from ventilator, and ultimately got a trach. Pt reports she spent ~2 years in a SNF post-hospital discharge and re-learned to walk. Pt uses oxygen overnight (3.5 L), completed oxygen therapy in November 2024.   Other hx: H/o L ankle fx July 2022; L eye cataract with tentative cataract sx scheduled for 3/26/25; hilda hearing aides; Type I Diabetes (dx 2009); Epilepsy (on vimpat and zonegran; last seizure 2020); L wrist fx (2019); orthostatic hypotension; HTN; HLD; gastroparesis; Hashimoto's thyroiditis; Leukopenia; non-ischemic cadiomyopathy; asthma.    Have you fallen since last visit: No    Precautions: Moderate fall risk, low vision (R eye blind)    Pain: in distal RUE, does not rate  Location/Type of pain: pt reports the cervical radiculopathy pain is now greater in her R arm vs L.     HEP compliance/understanding: Intermittent compliance since onset of mononeuritis multiplex in [facility]; pt reports she does best with UE strengthening in the pool with aqua PT.     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  Visual tracking WFL hilda.  Peripheral vision *impaired L eye (not tested R eye)    ROM:    *R-hand dominant  BUEs WNL    Strength:  Upper Extremity  Generalized weakness  -Shoulder flexion: (R) 4-/5 (was 3+/5 at eval); (L) 4+/5 (was 4/5 at eval)  -Elbow flexion: (R) 4+/5 (was 4/5 at eval); (L) 4-/5 (was 4+/5 at eval; likely due to recent cervical radiculopathy pain in [facility]).   -Forearm supination: (R) 3+/5 (was 4+/5 at eval); (L) 3/5 (was 4+/5 at  eval)  -Forearm pronation: (R) 4-/5 (was 4+/5 at eval); (L) 3+/5 (was 4+/5 at eval)  -Wrist flexion: (R) 3/5 (was 4+/5 at eval); (L) 3+/5 (was 4+/5 at eval)  -Wrist extension: (R) 4-/5 (was 4+/5 at eval); (L) 3+/5 (was 4+/5 at eval)    R - 40# (was 44.5# at eval)  L - 35# (was 36.5# at eval)    R pinch- 9.5# (2-pt; was 10# at eval), 14# (3-pt; was 19# at eval), 15# (lateral; was 14# at eval)  L pinch- 6# (2-pt; was 8# at eval), 8# (3-pt; was 14# at eval), 9.5# (lateral; was 12# at eval)    Hand Function & Coordination:  -Rapid alternating movements (RUBY)- impaired bilaterally (LUE worse)  -Finger opposition: WFL bilaterally (visual deficits make this task difficult)  -Finger to nose: WFL (L worse)  -9 Hole Peg Test: 26 sec (R; was 25), 31 sec (L; was 35)    Treatment:     Therapeutic Activity: 43 minutes  Pt participated in objective measures for reassessment this date.  Pt received red  exercise ball (medium-soft) and black pinch exerciser (11#) > added to HEP     Education: OT POC + goal-setting, purpose of RUBY assessment    HEP Progression:   Add-  - Rolyan black pinch clip exercises (2-pt, 3-pt, and lateral pinch)  - Red theraball  exercises    Current HEP:  - [place] pinch clip 3x10 to improve 2- and 3-pt pinch strength (blue)  - Handwriting practice: with large lined (manuscript) paper 1 sheet per day for 5-10 min at a time. Use red border/tape and brightly colored guide paper; use red foam  to build-up writing utensil  - Short stretch bandage application to LLE to dec swelling  - Shoulder strengthening ex: seated abduction, chest press, scaption with dumbbells 2#-5# (Access code: [email or web address]; 4/7/25).   - Theraputty (medium-soft) removing beads  - Shoulder rows and shoulder extensions with anchored resistance (Access Code: [email or web address]; 5/9/25)

## 2025-06-23 ENCOUNTER — APPOINTMENT (OUTPATIENT)
Dept: ENDOCRINOLOGY | Facility: CLINIC | Age: 55
End: 2025-06-23
Payer: MEDICAID

## 2025-06-24 ENCOUNTER — APPOINTMENT (OUTPATIENT)
Dept: PHYSICAL THERAPY | Facility: CLINIC | Age: 55
End: 2025-06-24
Payer: MEDICAID

## 2025-06-24 DIAGNOSIS — R53.1 GENERALIZED WEAKNESS: ICD-10-CM

## 2025-06-24 DIAGNOSIS — Z91.81 AT RISK FOR INJURY RELATED TO FALL: ICD-10-CM

## 2025-06-24 DIAGNOSIS — R26.9 GAIT ABNORMALITY: Primary | ICD-10-CM

## 2025-06-26 ENCOUNTER — APPOINTMENT (OUTPATIENT)
Dept: PHYSICAL THERAPY | Facility: CLINIC | Age: 55
End: 2025-06-26
Payer: MEDICAID

## 2025-06-26 ENCOUNTER — TREATMENT (OUTPATIENT)
Dept: OCCUPATIONAL THERAPY | Facility: CLINIC | Age: 55
End: 2025-06-26
Payer: MEDICAID

## 2025-06-26 DIAGNOSIS — R26.9 GAIT ABNORMALITY: Primary | ICD-10-CM

## 2025-06-26 DIAGNOSIS — Z91.81 AT RISK FOR INJURY RELATED TO FALL: ICD-10-CM

## 2025-06-26 DIAGNOSIS — R06.09 DYSPNEA ON MINIMAL EXERTION: ICD-10-CM

## 2025-06-26 DIAGNOSIS — R53.1 GENERALIZED WEAKNESS: Primary | ICD-10-CM

## 2025-06-26 DIAGNOSIS — R41.842 VISUAL-SPATIAL IMPAIRMENT: ICD-10-CM

## 2025-06-26 DIAGNOSIS — R53.1 GENERALIZED WEAKNESS: ICD-10-CM

## 2025-06-26 PROCEDURE — 97113 AQUATIC THERAPY/EXERCISES: CPT | Mod: GP

## 2025-06-26 PROCEDURE — 97129 THER IVNTJ 1ST 15 MIN: CPT | Mod: GO

## 2025-06-26 PROCEDURE — 97530 THERAPEUTIC ACTIVITIES: CPT | Mod: GO

## 2025-06-26 NOTE — PROGRESS NOTES
Physical Therapy Treatment      Patient Name: Mattie Wolfe  MRN: [id number]  Today's Date: 6/26/2025  Visit #32  Time Calculation  Start Time: 0930  Stop Time: 1000  Time Calculation (min): 30 min    Insurance:  2025 [place] [employer] AUTH REQ AFTER 30 VS SHIRA YR     Assessment:  Continued aquatic therapy focus of improving ambulatory tolerance, strengthening, and balance.  Focused on building ambulatory endurance in the pool as well as gentle UE ROM exercises this date secondary to shoulder and arm pain. Extra time spent in pool this session.  Seated rest break after therapy session. Pt tolerated all interventions well with good tolerance.  Pt is making progress towards goals and would continue to benefit from skilled PT at this time.     Aquatic PT is required due to, buoyancy of water reduced weight bearing and decreased LE and spinal loading, allowing for more freedom of movement and promotes improved ability to perform functional tasks.  The viscosity of water which is more than 700x that of air, allowed increased reaction time, in turn allowing advanced balance activities to be safely performed and allow patient to be challenged beyond limited of stability without fear of falling; provides the opportunity to work on balance in a safe environment.  The hydrostatic pressure of water facilitates the reduction of edema in the lower extremities, allowing for greater ease of movement.  Aquatics d/t cardio vascular benefits including: improved cardiovascular efficiency including increased stroke volume, decreased HR, and decreased blood pressure with increased cardiac output allowing patient to achieve cardiovascular training effect with less work load.  Aquatics to support upright posture during postural retraining and strengthening.   Aquatics allow patient to work on gait with less assistance or without assistive device improving posture and ease of gait training/conditioning.     Patient's response to session: Decreased  "pain, Increased ROM/joint mobility, Increase motor control, Improved gait, and Increased knowledge and understanding    Plan:  Cont to progress LE strength/endurance for functional mobility as tolerated. Inc SL balance exercises and endurance walking.      Tx Considerations:  - nerve glides for UE nerve related pain   -Ambulation/dynamic gait w/out AD or w/SPC  -Toe taps, step ups etc.    Current Problem:   1. Gait abnormality        2. Generalized weakness        3. At risk for injury related to fall            Subjective   Pt reports, \"I am going to have a consult with spine surgeon next week and follow up with my neurologist as well. \"       Xray humerus:   FINDINGS:  No fracture or dislocation is evident.  There is partial  visualization of a port line with the tip projecting over the distal  SVC.      IMPRESSION:  No osseous injury is evident.      Xray C-spine:  FINDINGS:  The cervical spine is seen to C7/T1.  Vertebral body height and  alignment are  maintained. No fracture or dislocation is evident.  Mild multilevel degenerative changes seen of the cervical spine.   No  prevertebral soft tissue swelling is evident.  There is a port on the  right chest wall with the line tracking down over the mediastinum as  seen on these radiographs.      IMPRESSION:  Mild degenerative change of the cervical spine without osseous injury  Evident    Pain: 5/10; worst pain with LUE at night        Objective     Ambulated with SBA in pool with current for 22 min without need for rest breaks  Ttp L brachioradialis   No inc in symptoms with median nerve ULTT  Dec L shoulder/arm pain with scapular retraction and c-spine retraction     Treatments:  Therapeutic Exercise (58121):   minutes (0 minutes)      Manual Therapy (85785):   minutes      Neuromuscular Re-education (66924):   minutes      Aquatic Therapy (31497):  30 Min 2 units  15 minutes of forward water walking without UE support.   Squats 2x10  Hip ext 2x10 B  Marches " 2x10 B  Standing bicycle kick 20 reps  HS Curls: 2 x 10 (B)  HR 2x10  Pendulums in deep water 3x10 all directions  Scapular retraction in deep water 3x10   C-spine retraction 3x10  Elbow flex/ext 3x10   Edu on continuing to complete gentle ROM to LUE as well as exercises prescribed last land PT session

## 2025-06-26 NOTE — PROGRESS NOTES
Occupational Therapy  Occupational Therapy Treatment    Patient Name Mattie Wolfe   MRN: [id number]  : 1970  Today's Date: 2025  Time Calculation  Start Time: 1436  Stop Time: 1503  Time Calculation (min): 27 min     Visit #:     Insurance:  MEDICAID  Authorization required: Yes, after 30 visits  # of visits approved: 2025: [place] [employer] AUTH REQ AFTER 30 VS SHIRA YR    Therapy Diagnoses:   1. Generalized weakness        2. Dyspnea on minimal exertion        3. Visual-spatial impairment          Assessment:  Pt participated in cognitive assessment this date to establish baseline giving pt's ongoing concerns regarding her memory. Pt scored 25/30 on SLUMS this date, indicating high range of mild neurocognitive disorder. Pt educated on purpose of assessment and purpose of score for informing her cognitive strengths vs areas to target with intervention. Pt will benefit from continued skilled OT for UE strengthening, coordination training, visual-perceptual intervention with adaptation training, and cognitive intervention to optimize ADL/IADL safety and participation.     Plan:      Continue OT 1x/week for 12 weeks, then re-assess.  Interventions: Neuromuscular Reeducation, Self-care/ADL training, Therapeutic Activity, Therapeutic Exercise, Manual Therapy    Next Visit:  - Distal UE strengthening and coordination training    Goals:  By discharge,      Pt will demo independence with HEP completion.  Pt will demo and verbalize use of energy conservation/joint protection techniques to increase activity tolerance while completing ADL/IADL tasks.  Pt will increase R shoulder strength to 4+/5 to increase activity tolerance for reaching and functional UE use.   Pt will demo ability to independently check orthostatic blood pressures during functional activity and positional changes to prevent falls.   Pt will improve handwriting AEB improved legibility and speed using compensatory strategies or inc fine motor  strength.   Pt will demo improved hilda  strength +5#.   Pt will demo improved hilda FMC -5 sec on 9 Hole Peg Test.     Subjective:   Pt reports her new insulin pump is working great and she has not had any episodes of hypoglycemia since getting it replaced.    Significant PMHx and rehab hx: H/o CVA on 1/26/2018 affecting R occipital lobe, and other stroke affecting R optic nerve and causing R eye blindness. Pt reports she was in a coma for over a month after the CVA, on a ventilator, had trouble weaning from ventilator, and ultimately got a trach. Pt reports she spent ~2 years in a SNF post-hospital discharge and re-learned to walk. Pt uses oxygen overnight (3.5 L), completed oxygen therapy in November 2024.   Other hx: H/o L ankle fx July 2022; L eye cataract with tentative cataract sx scheduled for 3/26/25; hilda hearing aides; Type I Diabetes (dx 2009); Epilepsy (on vimpat and zonegran; last seizure 2020); L wrist fx (2019); orthostatic hypotension; HTN; HLD; gastroparesis; Hashimoto's thyroiditis; Leukopenia; non-ischemic cadiomyopathy; asthma.    Have you fallen since last visit: No    Precautions: Moderate fall risk, low vision (R eye blind)    Pain: 3/10  Location/Type of pain: bilateral elbows    HEP compliance/understanding: Intermittent compliance since onset of mononeuritis multiplex in LUE; pt reports she does best with UE strengthening in the pool with aqua PT.     Objective:   Pt's hilda wrists appear mildly swollen this date.   Pain with activity RUE > LUE *change from last tx sesssion.    Cognition:  SLUMS-  -Attention, Immediate recall, and Orientation: 3/3  -Delayed recall with interference: 4/5  -Numeric calculation and registration: 2/3  -Memory immediate recall with interference (time constraint): 3/3  -Registration and digit span: 1/2  -Visual spatial and executive function: 4/6 (incorrect hour markers)  -Executive function plus extrapolation: 8/8  -TOTAL SCORE: 25/30    Treatment:     Cognitive  "Function Intervention: 15 minutes  Cognitive assessment: SLUMS    Therapeutic Activity: 12 minutes  Neon red (medium-soft) theraputty activities:  Press-outs with BUEs  Bananagram activity: pt instructed to make 5-letter word with letter tiles by first removing them from putty, then picking up and moving to \"board\" of putty, then pushing each letter into the putty to spell the word.      Education: Regarding score on SLUMS and cognitive strengths + weaknesses/areas for intervention    HEP Progression: N/A; continue current program.    Current HEP:  - Rolyan pinch clip 3x10 to improve 2- and 3-pt pinch strength (blue)  - Handwriting practice: with large lined (manuscript) paper 1 sheet per day for 5-10 min at a time. Use red border/tape and brightly colored guide paper; use red foam  to build-up writing utensil  - Short stretch bandage application to LLE to dec swelling  - Shoulder strengthening ex: seated abduction, chest press, scaption with dumbbells 2#-5# (Access code: [email or web address]; 4/7/25).   - Theraputty (medium-soft) removing beads  - Shoulder rows and shoulder extensions with anchored resistance (Access Code: [email or web address]; 5/9/25)  - Rolyan black pinch clip exercises (2-pt, 3-pt, and lateral pinch)  - Red theraball  exercises  "

## 2025-06-27 ENCOUNTER — APPOINTMENT (OUTPATIENT)
Dept: PHYSICAL THERAPY | Facility: CLINIC | Age: 55
End: 2025-06-27
Payer: MEDICAID

## 2025-06-27 DIAGNOSIS — Z91.81 AT RISK FOR INJURY RELATED TO FALL: ICD-10-CM

## 2025-06-27 DIAGNOSIS — R53.1 GENERALIZED WEAKNESS: ICD-10-CM

## 2025-06-27 DIAGNOSIS — R26.9 GAIT ABNORMALITY: Primary | ICD-10-CM

## 2025-07-01 ENCOUNTER — APPOINTMENT (OUTPATIENT)
Dept: PHYSICAL THERAPY | Facility: CLINIC | Age: 55
End: 2025-07-01
Payer: MEDICAID

## 2025-07-01 DIAGNOSIS — R53.1 GENERALIZED WEAKNESS: ICD-10-CM

## 2025-07-01 DIAGNOSIS — R26.9 GAIT ABNORMALITY: Primary | ICD-10-CM

## 2025-07-01 DIAGNOSIS — Z91.81 AT RISK FOR INJURY RELATED TO FALL: ICD-10-CM

## 2025-07-01 PROCEDURE — 97113 AQUATIC THERAPY/EXERCISES: CPT | Mod: GP

## 2025-07-01 NOTE — PROGRESS NOTES
Physical Therapy Treatment      Patient Name: Mattie Wolfe  MRN: [id number]  Today's Date: 7/1/2025  Visit #33  Time Calculation  Start Time: 0929  Stop Time: 0959  Time Calculation (min): 30 min    Insurance:  2025 [place] [employer] AUTH REQ AFTER 30 VS SHIRA YR     Assessment:  Continued aquatic therapy focus of improving ambulatory tolerance, strengthening, and balance.  Pt was in water prior to start of PT, water walking for 10 min.  Extra time spent in pool this session.  Seated rest break after therapy session. Pt tolerated all interventions well with good tolerance.  Pt is making progress towards goals and would continue to benefit from skilled PT at this time.     Aquatic PT is required due to, buoyancy of water reduced weight bearing and decreased LE and spinal loading, allowing for more freedom of movement and promotes improved ability to perform functional tasks.  The viscosity of water which is more than 700x that of air, allowed increased reaction time, in turn allowing advanced balance activities to be safely performed and allow patient to be challenged beyond limited of stability without fear of falling; provides the opportunity to work on balance in a safe environment.  The hydrostatic pressure of water facilitates the reduction of edema in the lower extremities, allowing for greater ease of movement.  Aquatics d/t cardio vascular benefits including: improved cardiovascular efficiency including increased stroke volume, decreased HR, and decreased blood pressure with increased cardiac output allowing patient to achieve cardiovascular training effect with less work load.  Aquatics to support upright posture during postural retraining and strengthening.   Aquatics allow patient to work on gait with less assistance or without assistive device improving posture and ease of gait training/conditioning.     Patient's response to session: Decreased pain, Increased ROM/joint mobility, Increase motor control, Improved  "gait, and Increased knowledge and understanding    Plan:  Cont to progress LE strength/endurance for functional mobility as tolerated. Inc SL balance exercises and endurance walking.      Tx Considerations:  - nerve glides for UE nerve related pain   -Ambulation/dynamic gait w/out AD or w/SPC  -Toe taps, step ups etc.    Current Problem:   1. Gait abnormality        2. Generalized weakness        3. At risk for injury related to fall            Subjective   Pt reports, \"things have been okay, nothing really new since the last time I was in, and blood sugar has been good as well\"       Xray humerus:   FINDINGS:  No fracture or dislocation is evident.  There is partial  visualization of a port line with the tip projecting over the distal  SVC.      IMPRESSION:  No osseous injury is evident.      Xray C-spine:  FINDINGS:  The cervical spine is seen to C7/T1.  Vertebral body height and  alignment are  maintained. No fracture or dislocation is evident.  Mild multilevel degenerative changes seen of the cervical spine.   No  prevertebral soft tissue swelling is evident.  There is a port on the  right chest wall with the line tracking down over the mediastinum as  seen on these radiographs.      IMPRESSION:  Mild degenerative change of the cervical spine without osseous injury  Evident    Pain: 5/10; worst pain with LUE at night        Objective     Ambulated with SBA in pool with current for 22 min without need for rest breaks  Ttp L brachioradialis   No inc in symptoms with median nerve ULTT  Dec L shoulder/arm pain with scapular retraction and c-spine retraction     Treatments:  Therapeutic Exercise (97424):   minutes (0 minutes)      Manual Therapy (60896):   minutes      Neuromuscular Re-education (98324):   minutes      Aquatic Therapy (66585):  30 Min 2 units  20 minutes of forward water walking without UE support.   Squats 2x10  Hip ext 2x10 B  Marches 2x10 B  Standing bicycle kick 20 reps  HS Curls: 2 x 10 (B)  HR " 2x10  Hip ABD 2x10  Mini Squats 2x10  Hip FLX: 2x10

## 2025-07-03 ENCOUNTER — APPOINTMENT (OUTPATIENT)
Dept: PHYSICAL THERAPY | Facility: CLINIC | Age: 55
End: 2025-07-03
Payer: MEDICAID

## 2025-07-03 DIAGNOSIS — R26.9 GAIT ABNORMALITY: Primary | ICD-10-CM

## 2025-07-03 DIAGNOSIS — Z91.81 AT RISK FOR INJURY RELATED TO FALL: ICD-10-CM

## 2025-07-03 DIAGNOSIS — R53.1 GENERALIZED WEAKNESS: ICD-10-CM

## 2025-07-03 PROCEDURE — 97113 AQUATIC THERAPY/EXERCISES: CPT | Mod: GP

## 2025-07-03 NOTE — PROGRESS NOTES
Physical Therapy Treatment      Patient Name: Mattie Wolfe  MRN: [id number]  Today's Date: 7/3/2025  Visit #34  Time Calculation  Start Time: 0930  Stop Time: 1002  Time Calculation (min): 32 min    Insurance:  2025 [place] [employer] AUTH REQ AFTER 30 VS SHIRA YR     Assessment:  Continued aquatic therapy focus of improving ambulatory tolerance, strengthening, and balance.  Pt was in water prior to start of PT, water walking for 10 min.  Extra time spent in pool this session.  Seated rest break after therapy session. Pt tolerated all interventions well with good tolerance.  Pt is making progress towards goals and would continue to benefit from skilled PT at this time.     Aquatic PT is required due to, buoyancy of water reduced weight bearing and decreased LE and spinal loading, allowing for more freedom of movement and promotes improved ability to perform functional tasks.  The viscosity of water which is more than 700x that of air, allowed increased reaction time, in turn allowing advanced balance activities to be safely performed and allow patient to be challenged beyond limited of stability without fear of falling; provides the opportunity to work on balance in a safe environment.  The hydrostatic pressure of water facilitates the reduction of edema in the lower extremities, allowing for greater ease of movement.  Aquatics d/t cardio vascular benefits including: improved cardiovascular efficiency including increased stroke volume, decreased HR, and decreased blood pressure with increased cardiac output allowing patient to achieve cardiovascular training effect with less work load.  Aquatics to support upright posture during postural retraining and strengthening.   Aquatics allow patient to work on gait with less assistance or without assistive device improving posture and ease of gait training/conditioning.     Patient's response to session: Decreased pain, Increased ROM/joint mobility, Increase motor control, Improved  "gait, and Increased knowledge and understanding    Plan:  Cont to progress LE strength/endurance for functional mobility as tolerated. Inc SL balance exercises and endurance walking.      Tx Considerations:  - nerve glides for UE nerve related pain   -Ambulation/dynamic gait w/out AD or w/SPC  -Toe taps, step ups etc.    Current Problem:   1. Gait abnormality        2. Generalized weakness        3. At risk for injury related to fall            Subjective   Pt reports, \"       Xray humerus:   FINDINGS:  No fracture or dislocation is evident.  There is partial  visualization of a port line with the tip projecting over the distal  SVC.      IMPRESSION:  No osseous injury is evident.      Xray C-spine:  FINDINGS:  The cervical spine is seen to C7/T1.  Vertebral body height and  alignment are  maintained. No fracture or dislocation is evident.  Mild multilevel degenerative changes seen of the cervical spine.   No  prevertebral soft tissue swelling is evident.  There is a port on the  right chest wall with the line tracking down over the mediastinum as  seen on these radiographs.      IMPRESSION:  Mild degenerative change of the cervical spine without osseous injury  Evident    Pain: 5/10; worst pain with LUE at night        Objective     Ambulated with SBA in pool with current for 22 min without need for rest breaks  Ttp L brachioradialis   No inc in symptoms with median nerve ULTT  Dec L shoulder/arm pain with scapular retraction and c-spine retraction     Treatments:  Therapeutic Exercise (87047):   minutes (0 minutes)      Manual Therapy (68142):   minutes      Neuromuscular Re-education (72036):   minutes      Aquatic Therapy (63177):  30 Min 2 units  22 minutes of forward water walking without UE support.   Squats 2x10  Hip ext 2x10 B  Marches 2x10 B  Standing bicycle kick 20 reps  HS Curls: 2 x 10 (B)  HR 2x10  Hip ABD 2x10  Mini Squats 2x10  Hip FLX: 2x10      "

## 2025-07-08 ENCOUNTER — APPOINTMENT (OUTPATIENT)
Dept: PHYSICAL THERAPY | Facility: CLINIC | Age: 55
End: 2025-07-08
Payer: MEDICAID

## 2025-07-08 DIAGNOSIS — R26.9 GAIT ABNORMALITY: Primary | ICD-10-CM

## 2025-07-08 DIAGNOSIS — Z91.81 AT RISK FOR INJURY RELATED TO FALL: ICD-10-CM

## 2025-07-08 DIAGNOSIS — R53.1 GENERALIZED WEAKNESS: ICD-10-CM

## 2025-07-08 PROCEDURE — 97113 AQUATIC THERAPY/EXERCISES: CPT | Mod: GP

## 2025-07-08 NOTE — PROGRESS NOTES
Physical Therapy Treatment      Patient Name: Mattie Wolfe  MRN: [id number]  Today's Date: 7/8/2025  Visit #35  Time Calculation  Start Time: 0925  Stop Time: 1004  Time Calculation (min): 39 min    Insurance:  2025 [place] [employer] AUTH REQ AFTER 30 VS SHIRA YR AUTH 20 VS 6/19-6/30/2025     Assessment:  Continued aquatic therapy focus of improving ambulatory tolerance, strengthening, and balance.  Pt tolerated entire session without rest breaks and with good rell throughout.  No pain noted this session.  Pt continues to show improved endurance. Pt is making progress towards goals and would continue to benefit from skilled PT at this time.     Aquatic PT is required due to, buoyancy of water reduced weight bearing and decreased LE and spinal loading, allowing for more freedom of movement and promotes improved ability to perform functional tasks.  The viscosity of water which is more than 700x that of air, allowed increased reaction time, in turn allowing advanced balance activities to be safely performed and allow patient to be challenged beyond limited of stability without fear of falling; provides the opportunity to work on balance in a safe environment.  The hydrostatic pressure of water facilitates the reduction of edema in the lower extremities, allowing for greater ease of movement.  Aquatics d/t cardio vascular benefits including: improved cardiovascular efficiency including increased stroke volume, decreased HR, and decreased blood pressure with increased cardiac output allowing patient to achieve cardiovascular training effect with less work load.  Aquatics to support upright posture during postural retraining and strengthening.   Aquatics allow patient to work on gait with less assistance or without assistive device improving posture and ease of gait training/conditioning.     Patient's response to session: Decreased pain, Increased ROM/joint mobility, Increase motor control, Improved gait, and Increased knowledge  "and understanding    Plan:  Cont to progress LE strength/endurance for functional mobility as tolerated. Inc SL balance exercises and endurance walking.      Tx Considerations:  - nerve glides for UE nerve related pain   -Ambulation/dynamic gait w/out AD or w/SPC  -Toe taps, step ups etc.    Current Problem:   1. Gait abnormality        2. Generalized weakness        3. At risk for injury related to fall            Subjective   Pt reports, \"I have been okay nothing, new, but I do have a lot of appointments today\"       Xray humerus:   FINDINGS:  No fracture or dislocation is evident.  There is partial  visualization of a port line with the tip projecting over the distal  SVC.      IMPRESSION:  No osseous injury is evident.      Xray C-spine:  FINDINGS:  The cervical spine is seen to C7/T1.  Vertebral body height and  alignment are  maintained. No fracture or dislocation is evident.  Mild multilevel degenerative changes seen of the cervical spine.   No  prevertebral soft tissue swelling is evident.  There is a port on the  right chest wall with the line tracking down over the mediastinum as  seen on these radiographs.      IMPRESSION:  Mild degenerative change of the cervical spine without osseous injury  Evident    Pain: NA/10;        Objective     Ambulated with SBA in pool with current for 22 min without need for rest breaks  Ttp L brachioradialis   No inc in symptoms with median nerve ULTT  Dec L shoulder/arm pain with scapular retraction and c-spine retraction     Treatments:  Therapeutic Exercise (16743):   minutes (0 minutes)      Manual Therapy (17444):   minutes      Neuromuscular Re-education (84181):   minutes      Aquatic Therapy (08650):  38 Min 3 units  22 minutes of forward water walking without UE support.   Squats 2x10  Hip ext 2x10 B  Marches 2x10 B  Standing bicycle kick 20 reps  HS Curls: 2 x 10 (B)  HR 2x10  Hip ABD 2x10  Mini Squats 2x10  Hip FLX: 2x10      "

## 2025-07-10 ENCOUNTER — APPOINTMENT (OUTPATIENT)
Dept: PHYSICAL THERAPY | Facility: CLINIC | Age: 55
End: 2025-07-10
Payer: MEDICAID

## 2025-07-10 DIAGNOSIS — R26.9 GAIT ABNORMALITY: Primary | ICD-10-CM

## 2025-07-10 DIAGNOSIS — Z91.81 AT RISK FOR INJURY RELATED TO FALL: ICD-10-CM

## 2025-07-10 DIAGNOSIS — R53.1 GENERALIZED WEAKNESS: ICD-10-CM

## 2025-07-10 PROCEDURE — 97113 AQUATIC THERAPY/EXERCISES: CPT | Mod: GP

## 2025-07-10 NOTE — PROGRESS NOTES
Physical Therapy Treatment      Patient Name: Mattie Wolfe  MRN: [id number]  Today's Date: 7/10/2025  Visit #36  Time Calculation  Start Time: 0930  Stop Time: 1008  Time Calculation (min): 38 min    Insurance:  2025 [place] [employer] AUTH REQ AFTER 30 VS SHIRA YR AUTH 20 VS 6/19-6/30/2025     Assessment:  Continued aquatic therapy focus of improving ambulatory tolerance, strengthening, and balance.  Pt tolerated entire session without rest breaks and with good rell throughout.  No pain noted this session.  Pt continues to show improved endurance. Pt is making progress towards goals and would continue to benefit from skilled PT at this time.     Aquatic PT is required due to, buoyancy of water reduced weight bearing and decreased LE and spinal loading, allowing for more freedom of movement and promotes improved ability to perform functional tasks.  The viscosity of water which is more than 700x that of air, allowed increased reaction time, in turn allowing advanced balance activities to be safely performed and allow patient to be challenged beyond limited of stability without fear of falling; provides the opportunity to work on balance in a safe environment.  The hydrostatic pressure of water facilitates the reduction of edema in the lower extremities, allowing for greater ease of movement.  Aquatics d/t cardio vascular benefits including: improved cardiovascular efficiency including increased stroke volume, decreased HR, and decreased blood pressure with increased cardiac output allowing patient to achieve cardiovascular training effect with less work load.  Aquatics to support upright posture during postural retraining and strengthening.   Aquatics allow patient to work on gait with less assistance or without assistive device improving posture and ease of gait training/conditioning.     Patient's response to session: Decreased pain, Increased ROM/joint mobility, Increase motor control, Improved gait, and Increased  "knowledge and understanding    Plan:  Cont to progress LE strength/endurance for functional mobility as tolerated. Inc SL balance exercises and endurance walking.      Tx Considerations:  - nerve glides for UE nerve related pain   -Ambulation/dynamic gait w/out AD or w/SPC  -Toe taps, step ups etc.    Current Problem:   1. Gait abnormality        2. Generalized weakness        3. At risk for injury related to fall            Subjective   Pt reports, \"I have been okay nothing, new, but I do have a lot of appointments today\"       Xray humerus:   FINDINGS:  No fracture or dislocation is evident.  There is partial  visualization of a port line with the tip projecting over the distal  SVC.      IMPRESSION:  No osseous injury is evident.      Xray C-spine:  FINDINGS:  The cervical spine is seen to C7/T1.  Vertebral body height and  alignment are  maintained. No fracture or dislocation is evident.  Mild multilevel degenerative changes seen of the cervical spine.   No  prevertebral soft tissue swelling is evident.  There is a port on the  right chest wall with the line tracking down over the mediastinum as  seen on these radiographs.      IMPRESSION:  Mild degenerative change of the cervical spine without osseous injury  Evident    Pain: NA/10;        Objective     Ambulated with SBA in pool with current for 22 min without need for rest breaks  Ttp L brachioradialis   No inc in symptoms with median nerve ULTT  Dec L shoulder/arm pain with scapular retraction and c-spine retraction     Treatments:  Therapeutic Exercise (11202):   minutes (0 minutes)      Manual Therapy (94747):   minutes      Neuromuscular Re-education (57611):   minutes      Aquatic Therapy (10654):  38 Min 3 units  20 minutes of forward water walking without UE support.   Mini Squats: 30 reps.   Hip ext 2x10 B  Marches 2x10 B  Standing bicycle kick 20 reps  HS Curls: 2 x 10 (B)  HR 2x10  Hip ABD 2x10  Mini Squats 2x10  Hip FLX: 2x10      "

## 2025-07-11 ENCOUNTER — HOSPITAL ENCOUNTER (OUTPATIENT)
Dept: RADIOLOGY | Facility: HOSPITAL | Age: 55
Discharge: HOME | End: 2025-07-11
Payer: MEDICAID

## 2025-07-11 VITALS — WEIGHT: 164 LBS | HEIGHT: 62 IN | BODY MASS INDEX: 30.18 KG/M2

## 2025-07-11 DIAGNOSIS — Z12.31 ENCOUNTER FOR SCREENING MAMMOGRAM FOR BREAST CANCER: ICD-10-CM

## 2025-07-11 PROCEDURE — 77067 SCR MAMMO BI INCL CAD: CPT

## 2025-07-14 ENCOUNTER — INFUSION (OUTPATIENT)
Dept: HEMATOLOGY/ONCOLOGY | Facility: CLINIC | Age: 55
End: 2025-07-14
Payer: MEDICAID

## 2025-07-14 DIAGNOSIS — E55.9 MILD VITAMIN D DEFICIENCY: ICD-10-CM

## 2025-07-14 DIAGNOSIS — Z95.828 PRESENCE OF PERMANENT CENTRAL VENOUS CATHETER: ICD-10-CM

## 2025-07-14 DIAGNOSIS — E10.69 TYPE 1 DIABETES MELLITUS WITH OTHER SPECIFIED COMPLICATION: ICD-10-CM

## 2025-07-14 PROCEDURE — 96523 IRRIG DRUG DELIVERY DEVICE: CPT

## 2025-07-14 PROCEDURE — 2500000004 HC RX 250 GENERAL PHARMACY W/ HCPCS (ALT 636 FOR OP/ED): Mod: SE | Performed by: INTERNAL MEDICINE

## 2025-07-14 RX ORDER — HEPARIN 100 UNIT/ML
500 SYRINGE INTRAVENOUS AS NEEDED
OUTPATIENT
Start: 2025-07-14

## 2025-07-14 RX ORDER — HEPARIN SODIUM,PORCINE/PF 10 UNIT/ML
50 SYRINGE (ML) INTRAVENOUS AS NEEDED
OUTPATIENT
Start: 2025-07-14

## 2025-07-14 RX ORDER — HEPARIN SODIUM 1000 [USP'U]/ML
2000 INJECTION, SOLUTION INTRAVENOUS; SUBCUTANEOUS AS NEEDED
OUTPATIENT
Start: 2025-07-14

## 2025-07-14 RX ORDER — HEPARIN 100 UNIT/ML
500 SYRINGE INTRAVENOUS AS NEEDED
Status: DISCONTINUED | OUTPATIENT
Start: 2025-07-14 | End: 2025-07-14 | Stop reason: HOSPADM

## 2025-07-14 RX ADMIN — HEPARIN 500 UNITS: 100 SYRINGE at 09:33

## 2025-07-15 ENCOUNTER — APPOINTMENT (OUTPATIENT)
Dept: PHYSICAL THERAPY | Facility: CLINIC | Age: 55
End: 2025-07-15
Payer: MEDICAID

## 2025-07-15 DIAGNOSIS — R53.1 GENERALIZED WEAKNESS: ICD-10-CM

## 2025-07-15 DIAGNOSIS — Z91.81 AT RISK FOR INJURY RELATED TO FALL: ICD-10-CM

## 2025-07-15 DIAGNOSIS — R26.9 GAIT ABNORMALITY: Primary | ICD-10-CM

## 2025-07-15 PROCEDURE — 97113 AQUATIC THERAPY/EXERCISES: CPT | Mod: GP

## 2025-07-15 NOTE — PROGRESS NOTES
Physical Therapy Treatment      Patient Name: Mattie Wolfe  MRN: [id number]  Today's Date: 7/15/2025  Visit #36  Time Calculation  Start Time: 0930  Stop Time: 1000  Time Calculation (min): 30 min    Insurance:  2025 [place] [employer] AUTH REQ AFTER 30 VS SHIRA YR AUTH 20 VS 6/19-6/30/2025     Assessment:  Continued aquatic therapy focus of improving ambulatory tolerance, strengthening, and balance.  Pt tolerated entire session without rest breaks and with good rell throughout.  No pain noted this session.  Pt continues to show improved endurance. Planning on trialing aquatic ankle weights.  Pt is making progress towards goals and would continue to benefit from skilled PT at this time.     Aquatic PT is required due to, buoyancy of water reduced weight bearing and decreased LE and spinal loading, allowing for more freedom of movement and promotes improved ability to perform functional tasks.  The viscosity of water which is more than 700x that of air, allowed increased reaction time, in turn allowing advanced balance activities to be safely performed and allow patient to be challenged beyond limited of stability without fear of falling; provides the opportunity to work on balance in a safe environment.  The hydrostatic pressure of water facilitates the reduction of edema in the lower extremities, allowing for greater ease of movement.  Aquatics d/t cardio vascular benefits including: improved cardiovascular efficiency including increased stroke volume, decreased HR, and decreased blood pressure with increased cardiac output allowing patient to achieve cardiovascular training effect with less work load.  Aquatics to support upright posture during postural retraining and strengthening.   Aquatics allow patient to work on gait with less assistance or without assistive device improving posture and ease of gait training/conditioning.     Patient's response to session: Decreased pain, Increased ROM/joint mobility, Increase motor  "control, Improved gait, and Increased knowledge and understanding    Plan:  Cont to progress LE strength/endurance for functional mobility as tolerated. Inc SL balance exercises and endurance walking.     Trial aquatic ankle weights.   Pt would like to swim may trial swimming with kick board when shoulder pain has resolved     Tx Considerations:  - nerve glides for UE nerve related pain   -Ambulation/dynamic gait w/out AD or w/SPC  -Toe taps, step ups etc.    Current Problem:   1. Gait abnormality        2. Generalized weakness        3. At risk for injury related to fall            Subjective   Pt reports, \"I have been okay nothing, new, but I do have a lot of appointments today\"       Xray humerus:   FINDINGS:  No fracture or dislocation is evident.  There is partial  visualization of a port line with the tip projecting over the distal  SVC.      IMPRESSION:  No osseous injury is evident.      Xray C-spine:  FINDINGS:  The cervical spine is seen to C7/T1.  Vertebral body height and  alignment are  maintained. No fracture or dislocation is evident.  Mild multilevel degenerative changes seen of the cervical spine.   No  prevertebral soft tissue swelling is evident.  There is a port on the  right chest wall with the line tracking down over the mediastinum as  seen on these radiographs.  IMPRESSION:  Mild degenerative change of the cervical spine without osseous injury  Evident    Pain: NA/10;        Objective     Ambulated with SBA in pool with current for 22 min without need for rest breaks  Ttp L brachioradialis   No inc in symptoms with median nerve ULTT  Dec L shoulder/arm pain with scapular retraction and c-spine retraction     Treatments:  Therapeutic Exercise (90681):   minutes (0 minutes)      Manual Therapy (72455):   minutes      Neuromuscular Re-education (94827):   minutes      Aquatic Therapy (82290):  30 Min 2 units  20 minutes of forward water walking without UE support.   Mini Squats: 30 reps. "   Marches 2x10 B  Standing bicycle kick 20 reps  HS Curls: 2 x 10 (B)  HR 2x10  Hip ABD 2x10  Mini Squats 2x10  Hip ext 2x10 B  Hip FLX: 2x10  Shoulder rolls (forward/backwards)  Shoulder Shrugs  Scapular retractions and depression.

## 2025-07-17 ENCOUNTER — APPOINTMENT (OUTPATIENT)
Dept: PHYSICAL THERAPY | Facility: CLINIC | Age: 55
End: 2025-07-17
Payer: MEDICAID

## 2025-07-17 DIAGNOSIS — R26.9 GAIT ABNORMALITY: Primary | ICD-10-CM

## 2025-07-17 DIAGNOSIS — R53.1 GENERALIZED WEAKNESS: ICD-10-CM

## 2025-07-17 DIAGNOSIS — Z91.81 AT RISK FOR INJURY RELATED TO FALL: ICD-10-CM

## 2025-07-17 PROCEDURE — 97113 AQUATIC THERAPY/EXERCISES: CPT | Mod: GP

## 2025-07-17 NOTE — PROGRESS NOTES
Physical Therapy Treatment      Patient Name: Mattie Wolfe  MRN: [id number]  Today's Date: 7/17/2025  Visit #36  Time Calculation  Start Time: 0932  Stop Time: 1004  Time Calculation (min): 32 min    Insurance:  2025 [place] [employer] AUTH REQ AFTER 30 VS SHIRA YR AUTH 20 VS 6/19-6/30/2025     Assessment:  Continued aquatic therapy focus of improving ambulatory tolerance, strengthening, and balance.  Added 2# ankle weights this session, with LE exercises, Pt tolerated entire session without rest breaks and with good rell throughout.  No pain noted this session.  Pt continues to show improved endurance. Planning on trialing aquatic ankle weights.  Pt is making progress towards goals and would continue to benefit from skilled PT at this time.     Aquatic PT is required due to, buoyancy of water reduced weight bearing and decreased LE and spinal loading, allowing for more freedom of movement and promotes improved ability to perform functional tasks.  The viscosity of water which is more than 700x that of air, allowed increased reaction time, in turn allowing advanced balance activities to be safely performed and allow patient to be challenged beyond limited of stability without fear of falling; provides the opportunity to work on balance in a safe environment.  The hydrostatic pressure of water facilitates the reduction of edema in the lower extremities, allowing for greater ease of movement.  Aquatics d/t cardio vascular benefits including: improved cardiovascular efficiency including increased stroke volume, decreased HR, and decreased blood pressure with increased cardiac output allowing patient to achieve cardiovascular training effect with less work load.  Aquatics to support upright posture during postural retraining and strengthening.   Aquatics allow patient to work on gait with less assistance or without assistive device improving posture and ease of gait training/conditioning.     Patient's response to session:  "Decreased pain, Increased ROM/joint mobility, Increase motor control, Improved gait, and Increased knowledge and understanding    Plan:  Cont to progress LE strength/endurance for functional mobility as tolerated. Inc SL balance exercises and endurance walking.     Trial aquatic ankle weights.   Pt would like to swim may trial swimming with kick board when shoulder pain has resolved     Tx Considerations:  - nerve glides for UE nerve related pain   -Ambulation/dynamic gait w/out AD or w/SPC  -Toe taps, step ups etc.    Current Problem:   1. Gait abnormality        2. Generalized weakness        3. At risk for injury related to fall            Subjective   Pt reports, \"I have been okay nothing, new, but I do have a lot of appointments today\"       Xray humerus:   FINDINGS:  No fracture or dislocation is evident.  There is partial  visualization of a port line with the tip projecting over the distal  SVC.      IMPRESSION:  No osseous injury is evident.      Xray C-spine:  FINDINGS:  The cervical spine is seen to C7/T1.  Vertebral body height and  alignment are  maintained. No fracture or dislocation is evident.  Mild multilevel degenerative changes seen of the cervical spine.   No  prevertebral soft tissue swelling is evident.  There is a port on the  right chest wall with the line tracking down over the mediastinum as  seen on these radiographs.  IMPRESSION:  Mild degenerative change of the cervical spine without osseous injury  Evident    Pain: NA/10;        Objective     Ambulated with SBA in pool with current for 22 min without need for rest breaks  Ttp L brachioradialis   No inc in symptoms with median nerve ULTT  Dec L shoulder/arm pain with scapular retraction and c-spine retraction     Treatments:  Therapeutic Exercise (29680):   minutes (0 minutes)      Manual Therapy (26179):   minutes      Neuromuscular Re-education (06314):   minutes      Aquatic Therapy (26456):  30 Min 2 units  20 minutes of forward " water walking without UE support.   All standing exercises completed with 2# aquatic ankle weights:   Mini Squats: 30 reps.   Marches 2x10 B  Standing bicycle kick 20 reps  HS Curls: 2 x 10 (B)  HR 2x10  Hip ABD 2x10  Mini Squats 2x10  Hip ext 2x10 B  Hip FLX: 2x10  Shoulder rolls (forward/backwards)  Hip circles  10 x B (CW/CCW).

## 2025-07-21 ENCOUNTER — ANESTHESIA EVENT (OUTPATIENT)
Dept: GASTROENTEROLOGY | Facility: HOSPITAL | Age: 55
End: 2025-07-21
Payer: MEDICAID

## 2025-07-21 ENCOUNTER — ANESTHESIA (OUTPATIENT)
Dept: GASTROENTEROLOGY | Facility: HOSPITAL | Age: 55
End: 2025-07-21
Payer: MEDICAID

## 2025-07-21 ENCOUNTER — HOSPITAL ENCOUNTER (OUTPATIENT)
Dept: GASTROENTEROLOGY | Facility: HOSPITAL | Age: 55
Discharge: HOME | End: 2025-07-21
Payer: MEDICAID

## 2025-07-21 VITALS
WEIGHT: 155 LBS | OXYGEN SATURATION: 100 % | HEIGHT: 62 IN | DIASTOLIC BLOOD PRESSURE: 55 MMHG | HEART RATE: 80 BPM | RESPIRATION RATE: 18 BRPM | SYSTOLIC BLOOD PRESSURE: 128 MMHG | BODY MASS INDEX: 28.52 KG/M2 | TEMPERATURE: 96.8 F

## 2025-07-21 DIAGNOSIS — Z12.11 COLON CANCER SCREENING: ICD-10-CM

## 2025-07-21 DIAGNOSIS — M79.2 NEUROPATHIC PAIN: ICD-10-CM

## 2025-07-21 DIAGNOSIS — M79.603 PAIN OF UPPER EXTREMITY, UNSPECIFIED LATERALITY: ICD-10-CM

## 2025-07-21 PROBLEM — Z93.0 TRACHEOSTOMY STATUS (MULTI): Status: ACTIVE | Noted: 2018-02-13

## 2025-07-21 PROBLEM — I50.22 CHRONIC SYSTOLIC HEART FAILURE: Status: ACTIVE | Noted: 2018-01-17

## 2025-07-21 PROBLEM — D63.8 ANEMIA OF CHRONIC DISEASE: Status: ACTIVE | Noted: 2018-02-07

## 2025-07-21 PROBLEM — Z86.73 HISTORY OF CEREBROVASCULAR ACCIDENT: Status: ACTIVE | Noted: 2025-07-21

## 2025-07-21 PROBLEM — J45.40 MODERATE PERSISTENT ASTHMA WITHOUT COMPLICATION (HHS-HCC): Status: ACTIVE | Noted: 2023-01-24

## 2025-07-21 PROBLEM — G40.909 EPILEPSY: Status: ACTIVE | Noted: 2018-02-05

## 2025-07-21 PROBLEM — I10 ESSENTIAL (PRIMARY) HYPERTENSION: Status: ACTIVE | Noted: 2023-07-04

## 2025-07-21 PROCEDURE — 7100000010 HC PHASE TWO TIME - EACH INCREMENTAL 1 MINUTE

## 2025-07-21 PROCEDURE — 2500000004 HC RX 250 GENERAL PHARMACY W/ HCPCS (ALT 636 FOR OP/ED): Performed by: ANESTHESIOLOGY

## 2025-07-21 PROCEDURE — 2500000004 HC RX 250 GENERAL PHARMACY W/ HCPCS (ALT 636 FOR OP/ED)

## 2025-07-21 PROCEDURE — A45385 PR COLONOSCOPY,REMV LESN,SNARE: Performed by: ANESTHESIOLOGY

## 2025-07-21 PROCEDURE — 7100000009 HC PHASE TWO TIME - INITIAL BASE CHARGE

## 2025-07-21 PROCEDURE — 45385 COLONOSCOPY W/LESION REMOVAL: CPT | Performed by: INTERNAL MEDICINE

## 2025-07-21 PROCEDURE — 3700000002 HC GENERAL ANESTHESIA TIME - EACH INCREMENTAL 1 MINUTE

## 2025-07-21 PROCEDURE — 88305 TISSUE EXAM BY PATHOLOGIST: CPT | Mod: TC,PARLAB | Performed by: INTERNAL MEDICINE

## 2025-07-21 PROCEDURE — 3700000001 HC GENERAL ANESTHESIA TIME - INITIAL BASE CHARGE

## 2025-07-21 PROCEDURE — A45385 PR COLONOSCOPY,REMV LESN,SNARE

## 2025-07-21 RX ORDER — HEPARIN 100 UNIT/ML
5 SYRINGE INTRAVENOUS ONCE
Status: COMPLETED | OUTPATIENT
Start: 2025-07-21 | End: 2025-07-21

## 2025-07-21 RX ORDER — PROPOFOL 10 MG/ML
INJECTION, EMULSION INTRAVENOUS AS NEEDED
Status: DISCONTINUED | OUTPATIENT
Start: 2025-07-21 | End: 2025-07-21

## 2025-07-21 RX ORDER — PHENYLEPHRINE HCL IN 0.9% NACL 1 MG/10 ML
SYRINGE (ML) INTRAVENOUS AS NEEDED
Status: DISCONTINUED | OUTPATIENT
Start: 2025-07-21 | End: 2025-07-21

## 2025-07-21 RX ORDER — SODIUM CHLORIDE, SODIUM LACTATE, POTASSIUM CHLORIDE, CALCIUM CHLORIDE 600; 310; 30; 20 MG/100ML; MG/100ML; MG/100ML; MG/100ML
INJECTION, SOLUTION INTRAVENOUS CONTINUOUS PRN
Status: DISCONTINUED | OUTPATIENT
Start: 2025-07-21 | End: 2025-07-21

## 2025-07-21 RX ORDER — LIDOCAINE HCL/PF 100 MG/5ML
SYRINGE (ML) INTRAVENOUS AS NEEDED
Status: DISCONTINUED | OUTPATIENT
Start: 2025-07-21 | End: 2025-07-21

## 2025-07-21 RX ADMIN — Medication 100 MCG: at 09:00

## 2025-07-21 RX ADMIN — PROPOFOL 140 MCG/KG/MIN: 10 INJECTION, EMULSION INTRAVENOUS at 08:31

## 2025-07-21 RX ADMIN — PROPOFOL 80 MG: 10 INJECTION, EMULSION INTRAVENOUS at 08:30

## 2025-07-21 RX ADMIN — SODIUM CHLORIDE, POTASSIUM CHLORIDE, SODIUM LACTATE AND CALCIUM CHLORIDE: 600; 310; 30; 20 INJECTION, SOLUTION INTRAVENOUS at 09:05

## 2025-07-21 RX ADMIN — HEPARIN 500 UNITS: 100 SYRINGE at 10:04

## 2025-07-21 RX ADMIN — LIDOCAINE HYDROCHLORIDE 40 MG: 20 INJECTION, SOLUTION INTRAVENOUS at 08:30

## 2025-07-21 SDOH — HEALTH STABILITY: MENTAL HEALTH: CURRENT SMOKER: 0

## 2025-07-21 ASSESSMENT — PAIN SCALES - GENERAL
PAINLEVEL_OUTOF10: 0 - NO PAIN
PAIN_LEVEL: 0
PAINLEVEL_OUTOF10: 0 - NO PAIN

## 2025-07-21 ASSESSMENT — PAIN - FUNCTIONAL ASSESSMENT
PAIN_FUNCTIONAL_ASSESSMENT: 0-10

## 2025-07-21 NOTE — ANESTHESIA PREPROCEDURE EVALUATION
Patient: Mattie Wolfe    Procedure Information       Date/Time: 07/21/25 0830    Scheduled providers: Tone Nuno MD    Procedure: COLONOSCOPY    Location: [facility]            Relevant Problems   Anesthesia   (+) Difficult intravenous access      Cardiac   (+) Essential (primary) hypertension   (+) Hyperlipidemia LDL goal <100      Pulmonary   (+) Dyspnea on exertion   (+) Dyspnea on minimal exertion   (+) Moderate persistent asthma without complication (HHS-HCC)      Neuro   (+) Epilepsy   (+) Peripheral neuropathy   (+) Seizure (Multi)   (+) Stroke (Multi)      GI   (+) Pharyngoesophageal dysphagia      Endocrine   (+) Diabetic diarrhea (Multi)   (+) Diabetic neuropathic cachexia   (+) Type 1 diabetes mellitus      Hematology   (+) Anemia of chronic disease      HEENT   (+) Bilateral hearing loss      ID   (+) Candida esophagitis (Multi)       Clinical information reviewed:    Allergies  Meds  Problems              NPO Detail:  NPO/Void Status  Carbohydrate Drink Given Prior to Surgery? : N  Date of Last Liquid: 07/20/25  Time of Last Liquid: 2200  Date of Last Solid: 07/19/25  Time of Last Solid: 1800  Last Intake Type: Clear fluids         Physical Exam    Airway  Mallampati: II  TM distance: >3 FB  Neck ROM: full  Mouth opening: 3 or more finger widths     Cardiovascular - normal exam  Rhythm: regular  Rate: normal     Dental - normal exam    (+) upper dentures, lower dentures     Pulmonary - normal exam   Abdominal            Anesthesia Plan    History of general anesthesia?: yes  History of complications of general anesthesia?: no    ASA 3     MAC     The patient is not a current smoker.    intravenous induction   Anesthetic plan and risks discussed with patient.    Plan discussed with CRNA.

## 2025-07-21 NOTE — H&P
Outpatient Hospital Procedure    Patient Profile-Procedures  Initial Info  Patient Demographics  Name Mattie Wolfe  Date of Birth 1970  MRN [id number]  Address   [address] [facility]   [place] [id number] [facility]   [facility] [address]    Primary Phone Number [phone]  Secondary Phone Number    PCP Kobi Gold    Procedures   Colonoscopy      Indication:  CRC screen     Primary contact name and number   Extended Emergency Contact Information  Primary Emergency Contact: JORDANPADMINI  Address: [address] [place] [address] [place] [address] [facility]  Home Phone: [phone]  Work Phone: [phone]  Mobile Phone: [phone]  Relation: Sibling  Preferred language: English   needed? No    General Health  Weight   Vitals:    07/21/25 0726   Weight: 70.3 kg (155 lb)     BMI Body mass index is 28.35 kg/m².    Allergies  RX Allergies[1]    Past Medical History   Medical History[2]    Provider assessment  Diagnosis  Medication Reviewed - yes  Prior to Admission medications   Medication Sig Start Date End Date Taking? Authorizing Provider   atorvastatin (Lipitor) 40 mg tablet Take 1 tablet (40 mg) by mouth. 5/13/21  Yes Historical Provider, MD   fludrocortisone (Florinef) 0.1 mg tablet Take 1 tablet (0.1 mg) by mouth early in the morning.. 4/17/25  Yes Kobi Gold MD   insulin lispro 100 unit/mL injection INJECT 70 UNITS VIA INSULIN PUMP DAILY 6/10/25  Yes Criss Hampton MD   lacosamide (Vimpat) 150 mg tablet tablet Take 1 tablet (150 mg) by mouth 2 times a day. 4/23/24  Yes Historical Provider, MD   midodrine (Proamatine) 5 mg tablet Take 1 tablet (5 mg) by mouth 3 times a day. 4/8/25  Yes Historical Provider, MD   mometasone-formoterol (Dulera) 200-5 mcg/actuation inhaler Inhale 2 puffs twice a day. 6/23/21  Yes Historical Provider, MD   montelukast (Singulair) 10 mg tablet Take 1 tablet (10 mg) by mouth once daily at bedtime. 4/23/24  Yes  Historical Provider, MD   multivitamin (Daily Multi-Vitamin) tablet Take 1 tablet by mouth once daily.   Yes Historical Provider, MD   omeprazole (PriLOSEC) 40 mg DR capsule TAKE 1 CAPSULE BY MOUTH DAILY 12/20/24  Yes Kobi Gold MD   potassium chloride CR (Klor-Con) 8 mEq ER tablet Take 1 tablet (8 mEq) by mouth once daily. Do not crush, chew, or split. TAKE WHEN TAKING FLUDROCORTISONE 4/17/25  Yes Kobi Gold MD   Ventolin HFA 90 mcg/actuation inhaler  4/23/24  Yes Historical Provider, MD   zonisamide (Zonegran) 100 mg capsule Take by mouth. 4 capules qam and 4 capsules at bedtime 5/25/18  Yes Historical Provider, MD   0.9 % sodium chloride (sodium chloride 0.9%) solution Infuse 50 mL/hr at 50 mL/hr into a venous catheter. 2/7/24   Historical Provider, MD   alcohol swabs Use to check blood glucose up to three times daily as directed. 4/28/25   Kobi Gold MD   blood sugar diagnostic (True Metrix Glucose Test Strip) 1 each 3 times a day. Use to check blood glucose up to three times daily as directed 5/11/25   Criss Hampton MD   blood-glucose meter (OneTouch Ultra2 Meter) misc Use as directed to monitor blood glucose daily. 4/28/25   Kobi Gold MD   calcium carbonate (Tums) 200 mg calcium chewable tablet Chew once daily as needed.    Historical Provider, MD   fluticasone (Flonase) 50 mcg/actuation nasal spray Administer 2 sprays into each nostril once daily. Shake gently. Before first use, prime pump. After use, clean tip and replace cap. 1/16/24   Kobi Gold MD   glucagon (Gvoke HypoPen 2-Pack) 1 mg/0.2 mL auto-injector Inject 0.2 mL under the skin 1 time if needed (hypoglycemia) for up to 1 dose. 5/6/25   Criss Hampton MD   ketoconazole (NIZOral) 2 % cream Apply topically once daily. 3/25/24   Historical Provider, MD   lancets (OneTouch Delica Plus Lancet) 33 gauge misc Use to check blood glucose up to three times daily as directed. 4/28/25   Kobi SOLO  "MD Luis Manuel Goldar U-100 Insulin 100 unit/mL (3 mL) pen Inject 12 Units under the skin once daily at bedtime. For use in case of insulin pump failure. 4/28/25 4/28/26  Kobi Gold MD   lidocaine (Lidoderm) 5 % patch Place 1 patch over 12 hours on the skin once daily. Apply to painful area 12 hours per day, remove for 12 hours.  Patient taking differently: Place 1 patch on the skin once daily as needed. Apply to painful area 12 hours per day, remove for 12 hours. 4/25/25 4/25/26  CARISA Marino   pen needle, diabetic 31 gauge x 3/16\" needle Use once daily as needed for insulin injection 4/28/25   Kobi Gold MD   predniSONE (Deltasone) 20 mg tablet Take 1 tablet (20 mg) by mouth if needed (COPD exacerbation). 6/16/25   Kobi Gold MD   gabapentin (Neurontin) 100 mg capsule Take 1 capsule (100 mg) by mouth 3 times a day.  Patient not taking: Reported on 7/21/2025 5/12/25 7/21/25  Historical Provider, MD   gabapentin (Neurontin) 300 mg capsule Take 1 capsule (300 mg) by mouth once daily at bedtime.  Patient not taking: Reported on 7/21/2025 4/25/25 7/21/25  CARISA Marino       This is my H&P    Physical Exam  Physical Exam  Constitutional:       Appearance: Normal appearance.   HENT:      Head: Normocephalic.      Mouth/Throat:      Mouth: Mucous membranes are moist.     Eyes:      Extraocular Movements: Extraocular movements intact.      Pupils: Pupils are equal, round, and reactive to light.       Cardiovascular:      Rate and Rhythm: Normal rate and regular rhythm.      Pulses: Normal pulses.      Heart sounds: Normal heart sounds.   Pulmonary:      Effort: Pulmonary effort is normal.      Breath sounds: Normal breath sounds.   Abdominal:      Palpations: Abdomen is soft.     Neurological:      General: No focal deficit present.      Mental Status: She is alert.       ASA PS Classification 3  Sedation Plan MAC  Procedure Plan - pre-procedural " (re)assesment completed by physician:  discharge/transfer patient when discharge criteria met    Tone Nuno MD  7/21/2025 8:19 AM           [1]   Allergies  Allergen Reactions    Aspirin Other     GI issues    Azithromycin Hives   [2]   Past Medical History:  Diagnosis Date    Asthma     Cataract     Chronic kidney disease     Diabetes (Multi)     Diabetes mellitus type I (Multi)     Diabetic retinopathy (Multi)     Gastrointestinal disorder     Gastroparesis     Gastroparesis    GERD (gastroesophageal reflux disease)     H/O bladder infections     History of stroke     HL (hearing loss)     Osteoporosis     Other conditions influencing health status     Diabetes type 1, uncontrolled    Personal history of transient ischemic attack (TIA), and cerebral infarction without residual deficits     History of stroke    Stroke (Multi)     Vision loss

## 2025-07-21 NOTE — ANESTHESIA POSTPROCEDURE EVALUATION
Patient: Mattie Wolfe    Procedure Summary       Date: 07/21/25 Room / Location: [place] [facility]    Anesthesia Start: 0826 Anesthesia Stop: 0911    Procedure: COLONOSCOPY Diagnosis: Colon cancer screening    Scheduled Providers: Tone Nuno MD Responsible Provider: Arthur Pride MD    Anesthesia Type: MAC ASA Status: 3            Anesthesia Type: MAC    Vitals Value Taken Time   /53 07/21/25 09:12   Temp 36.0 07/21/25 09:12   Pulse 76 07/21/25 09:11   Resp 16 07/21/25 09:12   SpO2 100 % 07/21/25 09:11   Vitals shown include unfiled device data.    Anesthesia Post Evaluation    Patient location during evaluation: PACU  Patient participation: complete - patient participated  Level of consciousness: awake and alert  Pain score: 0  Pain management: satisfactory to patient  Airway patency: patent  Cardiovascular status: acceptable and hemodynamically stable  Respiratory status: acceptable, nonlabored ventilation, spontaneous ventilation and room air  Hydration status: acceptable  Postoperative Nausea and Vomiting: none    101/53    There were no known notable events for this encounter.

## 2025-07-22 ENCOUNTER — APPOINTMENT (OUTPATIENT)
Dept: NEUROSURGERY | Facility: CLINIC | Age: 55
End: 2025-07-22
Payer: MEDICAID

## 2025-07-22 ENCOUNTER — APPOINTMENT (OUTPATIENT)
Dept: PHYSICAL THERAPY | Facility: CLINIC | Age: 55
End: 2025-07-22
Payer: MEDICAID

## 2025-07-22 VITALS
TEMPERATURE: 98.7 F | HEART RATE: 92 BPM | HEIGHT: 62 IN | WEIGHT: 160 LBS | DIASTOLIC BLOOD PRESSURE: 76 MMHG | BODY MASS INDEX: 29.44 KG/M2 | SYSTOLIC BLOOD PRESSURE: 110 MMHG

## 2025-07-22 DIAGNOSIS — R53.1 GENERALIZED WEAKNESS: ICD-10-CM

## 2025-07-22 DIAGNOSIS — M54.12 CERVICAL RADICULOPATHY: Primary | ICD-10-CM

## 2025-07-22 DIAGNOSIS — Z91.81 AT RISK FOR INJURY RELATED TO FALL: ICD-10-CM

## 2025-07-22 DIAGNOSIS — R26.9 GAIT ABNORMALITY: Primary | ICD-10-CM

## 2025-07-22 PROCEDURE — 99204 OFFICE O/P NEW MOD 45 MIN: CPT | Performed by: STUDENT IN AN ORGANIZED HEALTH CARE EDUCATION/TRAINING PROGRAM

## 2025-07-22 PROCEDURE — 3078F DIAST BP <80 MM HG: CPT | Performed by: STUDENT IN AN ORGANIZED HEALTH CARE EDUCATION/TRAINING PROGRAM

## 2025-07-22 PROCEDURE — 97113 AQUATIC THERAPY/EXERCISES: CPT | Mod: GP

## 2025-07-22 PROCEDURE — 1036F TOBACCO NON-USER: CPT | Performed by: STUDENT IN AN ORGANIZED HEALTH CARE EDUCATION/TRAINING PROGRAM

## 2025-07-22 PROCEDURE — 3044F HG A1C LEVEL LT 7.0%: CPT | Performed by: STUDENT IN AN ORGANIZED HEALTH CARE EDUCATION/TRAINING PROGRAM

## 2025-07-22 PROCEDURE — 3008F BODY MASS INDEX DOCD: CPT | Performed by: STUDENT IN AN ORGANIZED HEALTH CARE EDUCATION/TRAINING PROGRAM

## 2025-07-22 PROCEDURE — 3074F SYST BP LT 130 MM HG: CPT | Performed by: STUDENT IN AN ORGANIZED HEALTH CARE EDUCATION/TRAINING PROGRAM

## 2025-07-22 RX ORDER — TIOTROPIUM BROMIDE INHALATION SPRAY 1.56 UG/1
2 SPRAY, METERED RESPIRATORY (INHALATION) DAILY
COMMUNITY

## 2025-07-22 ASSESSMENT — PATIENT HEALTH QUESTIONNAIRE - PHQ9
1. LITTLE INTEREST OR PLEASURE IN DOING THINGS: NOT AT ALL
SUM OF ALL RESPONSES TO PHQ9 QUESTIONS 1 & 2: 0
2. FEELING DOWN, DEPRESSED OR HOPELESS: NOT AT ALL

## 2025-07-22 NOTE — PROGRESS NOTES
Physical Therapy Treatment      Patient Name: Mattie Wolfe  MRN: [id number]  Today's Date: 7/22/2025  Visit #37  Time Calculation  Start Time: 0930  Stop Time: 0954  Time Calculation (min): 24 min    Insurance:  2025 [place] [employer] AUTH REQ AFTER 30 VS SHIRA YR AUTH     Assessment:  Continued aquatic therapy focus of improving ambulatory tolerance, strengthening, and balance.  Continued with 2# ankle weights this session, with LE exercises, Pt tolerated entire session without rest breaks and with good rell throughout.  No pain noted this session.  Pt continues to show improved endurance. Planning on trialing aquatic ankle weights.  Pt is making progress towards goals and would continue to benefit from skilled PT at this time.     Aquatic PT is required due to, buoyancy of water reduced weight bearing and decreased LE and spinal loading, allowing for more freedom of movement and promotes improved ability to perform functional tasks.  The viscosity of water which is more than 700x that of air, allowed increased reaction time, in turn allowing advanced balance activities to be safely performed and allow patient to be challenged beyond limited of stability without fear of falling; provides the opportunity to work on balance in a safe environment.  The hydrostatic pressure of water facilitates the reduction of edema in the lower extremities, allowing for greater ease of movement.  Aquatics d/t cardio vascular benefits including: improved cardiovascular efficiency including increased stroke volume, decreased HR, and decreased blood pressure with increased cardiac output allowing patient to achieve cardiovascular training effect with less work load.  Aquatics to support upright posture during postural retraining and strengthening.   Aquatics allow patient to work on gait with less assistance or without assistive device improving posture and ease of gait training/conditioning.     Patient's response to session: Decreased pain,  "Increased ROM/joint mobility, Increase motor control, Improved gait, and Increased knowledge and understanding    Plan:  Cont to progress LE strength/endurance for functional mobility as tolerated. Inc SL balance exercises and endurance walking.     Trial aquatic ankle weights.   Pt would like to swim may trial swimming with kick board when shoulder pain has resolved     Tx Considerations:  - nerve glides for UE nerve related pain   -Ambulation/dynamic gait w/out AD or w/SPC  -Toe taps, step ups etc.    Current Problem:   1. Gait abnormality        2. Generalized weakness        3. At risk for injury related to fall            Subjective   Pt reports, \"I have been okay nothing, new, but I do have a lot of appointments today\"       Xray humerus:   FINDINGS:  No fracture or dislocation is evident.  There is partial  visualization of a port line with the tip projecting over the distal  SVC.      IMPRESSION:  No osseous injury is evident.      Xray C-spine:  FINDINGS:  The cervical spine is seen to C7/T1.  Vertebral body height and  alignment are  maintained. No fracture or dislocation is evident.  Mild multilevel degenerative changes seen of the cervical spine.   No  prevertebral soft tissue swelling is evident.  There is a port on the  right chest wall with the line tracking down over the mediastinum as  seen on these radiographs.  IMPRESSION:  Mild degenerative change of the cervical spine without osseous injury  Evident    Pain: NA/10;        Objective     Ambulated with SBA in pool with current for 22 min without need for rest breaks  Ttp L brachioradialis   No inc in symptoms with median nerve ULTT  Dec L shoulder/arm pain with scapular retraction and c-spine retraction     Treatments:  Therapeutic Exercise (51677):   minutes (0 minutes)      Manual Therapy (44227):   minutes      Neuromuscular Re-education (89243):   minutes      Aquatic Therapy (72064):  24 Min 2 units  14 minutes of forward water walking " without UE support.   All standing exercises completed with 2# aquatic ankle weights:   Mini Squats: 30 reps.   Marches 2x10 B  Standing bicycle kick 20 reps  HS Curls: 2 x 10 (B)  HR 2x10  Hip ABD 2x10  Mini Squats 2x10  Hip ext 2x10 B  Hip FLX: 2x10  Shoulder rolls (forward/backwards)  Hip circles  10 x B (CW/CCW).

## 2025-07-22 NOTE — PROGRESS NOTES
[facility]  Department of Neurological Surgery    History Of Present Illness  Mattie Wolfe is a 54 y.o. female presenting with left arm pain. Started in April without inciting event, had new pains going into left shoulder and down left arm. Pain goes across triceps, over elbow and into third and fourth digits (spares fifth digit). Since then, has trialed aquatherapy/other physical therapy for the last few months and has had improvement in pain to the point it is typically not bothersome to patient. Does get intermittent episodes of pain occurring 2-3 times a week.     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 Has not had any recurrent episodes since then per patient. Since this, typically uses a rollator or wheelchair to get around    Neck VAS: 0/10  Arm VAS: 0/10  now, 10/10 when it was severe, episodes are now intermittent and typically occur 2-3 times a week    Patient's imaging was personally reviewed and notable for:  MRI C spine with central stenosis at C4/5, C5/6, and left sided foraminal stenosis on left at C4/5, C5/6, C6/7  EMG with evidence of peripheral neuropathy with superimposed left chronic C7 radiculopathy without signs of active denervation    Past Medical History  She has a past medical history of Asthma, Cataract, Chronic kidney disease, Diabetes (Multi), Diabetes mellitus type I (Multi), Diabetic retinopathy (Multi), Gastrointestinal disorder, Gastroparesis, GERD (gastroesophageal reflux disease), H/O bladder infections, History of stroke, HL (hearing loss), Osteoporosis, Other conditions influencing health status, Personal history of transient ischemic attack (TIA), and cerebral infarction without residual deficits, Stroke (Multi), and Vision loss.    Surgical History  She has a past surgical history that includes Other surgical history  (04/09/2014); Hip surgery (04/09/2014); Appendectomy (04/09/2014); Other surgical history (11/01/2019); Other surgical history (06/04/2020); MR angio head wo IV contrast (02/28/2020); MR angio neck wo IV contrast (02/28/2020); Retinal detachment surgery; Cataract extraction; Cholecystectomy; and Airway surgery.     Social History  She reports that she has never smoked. She has never used smokeless tobacco. She reports that she does not currently use alcohol. She reports that she does not currently use drugs.     Allergies  Aspirin and Azithromycin    Medications  Prescriptions Prior to Admission[1]    14 Point ROS reviewed and negative except as per HPI     Neurological Exam  Mental Status  Alert. Oriented to person, place and time. Speech is normal.    Motor  Normal muscle bulk throughout. Normal muscle tone. No pronator drift.                                             Right                     Left  Deltoid                                   5-                          4+   Biceps                                   5                          4+   Triceps                                  5                          4   Finger flexor                          5                          5   Interossei                              5                          5    Sensory  Light touch is normal in upper and lower extremities.     Reflexes    Right pathological reflexes: John's absent. Ankle clonus absent.  Left pathological reflexes: John's absent. Ankle clonus absent.    Physical Exam  Constitutional:       Appearance: Normal appearance.   HENT:      Head: Atraumatic.      Mouth/Throat:      Pharynx: Oropharynx is clear.     Cardiovascular:      Rate and Rhythm: Normal rate.   Pulmonary:      Effort: Pulmonary effort is normal.   Abdominal:      Palpations: Abdomen is soft.     Musculoskeletal:         General: Normal range of motion.      Cervical back: Normal range of motion.     Neurological:      Mental  "Status: She is alert.     Psychiatric:         Speech: Speech normal.         Last Recorded Vitals  Blood pressure 110/76, pulse 92, temperature 37.1 °C (98.7 °F), temperature source Temporal, height 1.575 m (5' 2\"), weight 72.6 kg (160 lb).       Assessment/Plan     Mattie Wolfe is a 54 y.o. female presenting with new LUE radiculopathy. Appears to be improving with therapy, and currently is tolerable per patient. Discussed weakness, patient not sure if this is worse than previously after encephalitis but does not feel it is significant to her. Currently denies any myelopathic symptoms, though difficult to determine given limited ambulation and baseline weakness. Discussed management options with patient including continued observation and physical therapy, as well as surgical intervention. Given that pain is improved and now at a tolerable level, patient electing to continue observation. Will plan to follow up as an outpatient if symptoms recur or become worse.     I have reviewed all prior documentation and reviewed the electronic medical record since admission. I have personally have reviewed all advanced imaging not just the reports and used my interpretation as documented as the relevant findings. I have reviewed the risks and benefits of all treatment recommendations listed in this note with the patient and family.      The above clinical summary has been dictated with voice recognition software. It has not been proofread for grammatical errors, typographical mistakes, or other semantic inconsistencies.     Thank you for visiting our office today. It was our pleasure to take part in your healthcare.      Do not hesitate to call with any questions regarding your plan of care after leaving at [phone] M-F 8am-4pm.      To clinicians, thank you very much for this kind referral. It is a privilege to partner with you in the care of your patients. My office would be delighted to assist you with any further " consultations or with questions regarding the plan of care outlined. Do not hesitate to call the office or contact me directly.         Sincerely,        Ezequiel Fierro MD, PhD  Attending Neurosurgeon, [facility]   of Neurological Surgery  [facility] [employer] [facility]  Office: [phone]  Fax: [phone]     [facility]  [address]  [address] [place] [address]         [1] (Not in a hospital admission)

## 2025-07-24 ENCOUNTER — APPOINTMENT (OUTPATIENT)
Dept: PHYSICAL THERAPY | Facility: CLINIC | Age: 55
End: 2025-07-24
Payer: MEDICAID

## 2025-07-24 ENCOUNTER — PATIENT OUTREACH (OUTPATIENT)
Dept: PRIMARY CARE | Facility: CLINIC | Age: 55
End: 2025-07-24

## 2025-07-24 DIAGNOSIS — R26.9 GAIT DISTURBANCE, POST-STROKE: ICD-10-CM

## 2025-07-24 DIAGNOSIS — R26.9 GAIT ABNORMALITY: Primary | ICD-10-CM

## 2025-07-24 DIAGNOSIS — J44.9 CHRONIC OBSTRUCTIVE PULMONARY DISEASE, UNSPECIFIED COPD TYPE (MULTI): ICD-10-CM

## 2025-07-24 DIAGNOSIS — R53.1 GENERALIZED WEAKNESS: ICD-10-CM

## 2025-07-24 DIAGNOSIS — Z91.81 AT RISK FOR INJURY RELATED TO FALL: ICD-10-CM

## 2025-07-24 DIAGNOSIS — J39.8 TRACHEAL STENOSIS: ICD-10-CM

## 2025-07-24 DIAGNOSIS — K31.84 GASTROPARESIS: ICD-10-CM

## 2025-07-24 DIAGNOSIS — I69.398 GAIT DISTURBANCE, POST-STROKE: ICD-10-CM

## 2025-07-24 DIAGNOSIS — E10.69 TYPE 1 DIABETES MELLITUS WITH OTHER SPECIFIED COMPLICATION: ICD-10-CM

## 2025-07-24 DIAGNOSIS — M54.12 CERVICAL RADICULOPATHY: ICD-10-CM

## 2025-07-24 DIAGNOSIS — E06.3 HASHIMOTO'S THYROIDITIS: ICD-10-CM

## 2025-07-24 LAB
LABORATORY COMMENT REPORT: NORMAL
PATH REPORT.FINAL DX SPEC: NORMAL
PATH REPORT.GROSS SPEC: NORMAL
PATH REPORT.RELEVANT HX SPEC: NORMAL
PATH REPORT.TOTAL CANCER: NORMAL

## 2025-07-24 PROCEDURE — 97113 AQUATIC THERAPY/EXERCISES: CPT | Mod: GP

## 2025-07-24 RX ORDER — GABAPENTIN 300 MG/1
300 CAPSULE ORAL NIGHTLY PRN
COMMUNITY
Start: 2025-07-24

## 2025-07-24 NOTE — PROGRESS NOTES
"Care Management Monthly Outreach  Chart review completed  Confirmation of at least 2 patient identifiers  Change in insurance? No    Has patient been to ER/Urgent Care since last outreach? No    Last Office Visit with PCP: 5/6/2025   Next Office Visit with PCP: 8/11/2025   APC Collaboration: Pharmacy    Chronic Conditions and Outreach Summary:   Type 1 diabetes mellitus with other specified complication    Gait disturbance, post-stroke    Cervical radiculopathy    Hashimoto's thyroiditis    Tracheal stenosis    Chronic obstructive pulmonary disease, unspecified COPD type (Multi)    Gastroparesis    This RN CM reached out and spoke with Mattie this afternoon. She reports that she is attending therapy and that has helped her arm/neck pain. She attended her appointment with the neurosurgeon and was not completely satisfied. Mattie reports that he asked her \"who told you that you had a stroke?\" Which, Jerilyn told them that she was at [facility] in 2018 when she had her stroke. She reported that it felt that it was like he was accusing her of lying about the stroke per Mattie's report. And then he reportedly told her that she was not a candidate for surgery and since she is not in pain during the visit, they will just monitor. Offered Mattie the option to reach out to patient advocacy to file a compliant, which she declined. Also offered to have her see a different neurosurgeon for a 2nd opinion. Mattie is worried that her therapy sessions are going to be cut off by insurance. Mattie voiced that she is going to be able to enroll at the [employer] for a membership at a discounted price next month. We discussed options if her therapy is cut off. Reviewed her medications. She denies any refill needs. We also scheduled her a visit with Dr. Gold since she has seen the neurosurgeon now. Ensured that Mattie has my availability for any concerns that arise in between our calls.     Medications:   Are there medication changes since " last visit? Yes - Taking Gabapentin 300 only as needed at night for extreme pain  Refills needed? No    Social Drivers of Health: Addressed in the last 6 months  Care Gaps Addressed? Addressed today  Care Plan addressed: Yes    Upcoming Appointments:   Future Appointments       Date / Time Provider Department Dept Phone    7/25/2025 10:15 AM Vielka Garcia, OT [facility] [phone]    7/29/2025 9:30 AM Linden Yanez, PT [facility] [phone]    7/31/2025 9:30 AM Linden Yanez, PT [facility] [phone]    8/5/2025 4:00 PM Vielka Garcia, OT [facility] [phone]    8/11/2025 9:30 AM [facility]  [phone]    8/11/2025 2:40 PM (Arrive by 2:25 PM) Kobi Gold MD [facility] [phone]    8/14/2025 4:00 PM Nuria uGpta, PharmD [facility]  Arrive at: [facility] Home [phone]    8/22/2025 8:00 AM Vielka Garcia, OT [facility] [phone]    8/28/2025 2:30 PM Vielka Garcia, OT [facility] [phone]    9/8/2025 9:30 AM [facility] [address] [facility]  [phone]    11/24/2025 11:30 AM Criss Hampton MD [facility] [phone]    2/23/2026 11:30 AM Criss Hampton MD [facility] [phone]    5/11/2026 11:30 AM Criss Hampton MD [facility] [phone]          Blood Pressures Reviewed  BP Readings from Last 3 Encounters:   07/22/25 110/76   07/21/25 128/55   06/09/25 129/83     Labs Reviewed:  Lab Results   Component Value Date    CREATININE 1.15 (H) 04/19/2025    GLUCOSE 135 (H) 04/19/2025    ALKPHOS 108 04/19/2025    K 4.4 04/19/2025    PROT 6.7 04/19/2025     04/19/2025    CALCIUM 9.0 04/19/2025    AST 27 04/19/2025    ALT 28 04/19/2025    BUN 23 04/19/2025    MG 1.86 04/19/2025    GFRF 57 (A) 08/11/2022     Lab Results   Component Value Date    TRIG 74  04/01/2024    CHOL 146 04/01/2024    LDLCALC 76 04/01/2024    HDL 55.5 04/01/2024     Lab Results   Component Value Date    HGBA1C 6.1 06/09/2025    HGBA1C 6.3 03/10/2025    HGBA1C 6.0 11/12/2024     Lab Results   Component Value Date    WBC 3.9 (L) 04/19/2025    RBC 3.89 (L) 04/19/2025    HGB 12.2 04/19/2025     04/19/2025   No other concerns at this time.  Agreeable to continue monthly outreaches.  Encouraged to call if questions or concerns arise.    Sierra Whitten RN

## 2025-07-24 NOTE — PROGRESS NOTES
Physical Therapy Treatment      Patient Name: Mattie Wolfe  MRN: [id number]  Today's Date: 7/24/2025  Visit #38  Time Calculation  Start Time: 0930  Stop Time: 1009  Time Calculation (min): 39 min    Insurance:  2025 [place] [employer] AUTH REQ AFTER 30 VS SHIRA YR AUTH     Assessment:  Continued aquatic therapy focus of improving ambulatory tolerance, strengthening, and balance.  Continued with 2# ankle weights this session, with LE exercises, Pt tolerated entire session without rest breaks and with good rell throughout.  No pain noted this session.  Pt continues to show improved endurance. Planning on trialing aquatic ankle weights.  Pt is making progress towards goals and would continue to benefit from skilled PT at this time.     Aquatic PT is required due to, buoyancy of water reduced weight bearing and decreased LE and spinal loading, allowing for more freedom of movement and promotes improved ability to perform functional tasks.  The viscosity of water which is more than 700x that of air, allowed increased reaction time, in turn allowing advanced balance activities to be safely performed and allow patient to be challenged beyond limited of stability without fear of falling; provides the opportunity to work on balance in a safe environment.  The hydrostatic pressure of water facilitates the reduction of edema in the lower extremities, allowing for greater ease of movement.  Aquatics d/t cardio vascular benefits including: improved cardiovascular efficiency including increased stroke volume, decreased HR, and decreased blood pressure with increased cardiac output allowing patient to achieve cardiovascular training effect with less work load.  Aquatics to support upright posture during postural retraining and strengthening.   Aquatics allow patient to work on gait with less assistance or without assistive device improving posture and ease of gait training/conditioning.     Patient's response to session: Decreased pain,  "Increased ROM/joint mobility, Increase motor control, Improved gait, and Increased knowledge and understanding    Plan:  Cont to progress LE strength/endurance for functional mobility as tolerated. Inc SL balance exercises and endurance walking.     Trial aquatic ankle weights.   Pt would like to swim may trial swimming with kick board when shoulder pain has resolved     Tx Considerations:  - nerve glides for UE nerve related pain   -Ambulation/dynamic gait w/out AD or w/SPC  -Toe taps, step ups etc.    Current Problem:   1. Gait abnormality        2. Generalized weakness        3. At risk for injury related to fall            Subjective   Pt reports, \"I have been okay nothing, new, but I do have a lot of appointments today\"       Xray humerus:   FINDINGS:  No fracture or dislocation is evident.  There is partial  visualization of a port line with the tip projecting over the distal  SVC.      IMPRESSION:  No osseous injury is evident.      Xray C-spine:  FINDINGS:  The cervical spine is seen to C7/T1.  Vertebral body height and  alignment are  maintained. No fracture or dislocation is evident.  Mild multilevel degenerative changes seen of the cervical spine.   No  prevertebral soft tissue swelling is evident.  There is a port on the  right chest wall with the line tracking down over the mediastinum as  seen on these radiographs.  IMPRESSION:  Mild degenerative change of the cervical spine without osseous injury  Evident    Pain: NA/10;        Objective     Ambulated with SBA in pool with current for 22 min without need for rest breaks  Ttp L brachioradialis   No inc in symptoms with median nerve ULTT  Dec L shoulder/arm pain with scapular retraction and c-spine retraction     Treatments:  Therapeutic Exercise (07051):   minutes (0 minutes)      Manual Therapy (68590):   minutes      Neuromuscular Re-education (10778):   minutes      Aquatic Therapy (51427):  39 Min 3 units  23 minutes of forward water walking " without UE support.   All standing exercises completed with 2# aquatic ankle weights:   Mini Squats: 30 reps.   Marches 2x10 B  Standing bicycle kick 20 reps  HS Curls: 2 x 10 (B)  HR 2x10  Hip ABD 2x10  Mini Squats 2x10  Hip ext 2x10 B  Hip FLX: 2x10  Shoulder rolls (forward/backwards)  Hip circles  10 x B (CW/CCW).

## 2025-07-25 ENCOUNTER — TREATMENT (OUTPATIENT)
Dept: OCCUPATIONAL THERAPY | Facility: CLINIC | Age: 55
End: 2025-07-25
Payer: MEDICAID

## 2025-07-25 DIAGNOSIS — R53.1 GENERALIZED WEAKNESS: Primary | ICD-10-CM

## 2025-07-25 DIAGNOSIS — R06.09 DYSPNEA ON MINIMAL EXERTION: ICD-10-CM

## 2025-07-25 DIAGNOSIS — R41.842 VISUAL-SPATIAL IMPAIRMENT: ICD-10-CM

## 2025-07-25 PROCEDURE — 97530 THERAPEUTIC ACTIVITIES: CPT | Mod: GO

## 2025-07-25 PROCEDURE — 97110 THERAPEUTIC EXERCISES: CPT | Mod: GO

## 2025-07-25 NOTE — PROGRESS NOTES
Occupational Therapy  Occupational Therapy Treatment    Patient Name Mattie Wolfe   MRN: [id number]  : 1970  Today's Date: 25  Time Calculation  Start Time: 1024  Stop Time: 1104  Time Calculation (min): 40 min     Visit #:     Insurance:  MEDICAID  Authorization required: Yes, after 30 visits  # of visits approved: 2025: [place] [employer] AUTH REQ AFTER 30 VS SHIRA YR  Medicaid Certification Dates: 25 - 25    Therapy Diagnoses:   1. Generalized weakness        2. Dyspnea on minimal exertion        3. Visual-spatial impairment          Assessment:  Today's session focused on distal UE strengthening and coordination training.     Pt participated in cognitive assessment this date to establish baseline giving pt's ongoing concerns regarding her memory. Pt scored 25/30 on SLUMS this date, indicating high range of mild neurocognitive disorder. Pt educated on purpose of assessment and purpose of score for informing her cognitive strengths vs areas to target with intervention. Pt will benefit from continued skilled OT for UE strengthening, coordination training, visual-perceptual intervention with adaptation training, and cognitive intervention to optimize ADL/IADL safety and participation.     Plan:      Continue OT 1x/week for 12 weeks, then re-assess.  Interventions: Neuromuscular Reeducation, Self-care/ADL training, Therapeutic Activity, Therapeutic Exercise, Manual Therapy    Goals:  By discharge,      Pt will demo independence with HEP completion.  Pt will demo and verbalize use of energy conservation/joint protection techniques to increase activity tolerance while completing ADL/IADL tasks.  Pt will increase R shoulder strength to 4+/5 to increase activity tolerance for reaching and functional UE use.   Pt will demo ability to independently check orthostatic blood pressures during functional activity and positional changes to prevent falls.   Pt will improve handwriting AEB improved legibility  and speed using compensatory strategies or inc fine motor strength.   Pt will demo improved hilda  strength +5#.   Pt will demo improved hilda FMC -5 sec on 9 Hole Peg Test.     Subjective:   Pt reports her biggest challenge is her brain communicating with her arms properly. Pt saw neurosurgery on 7/22, no follow-up necessary.     Per Neurosurgery, Ezequiel Fierro MD PhD, on 7/22:  Mattie Wolfe is a 54 y.o. female presenting with new LUE radiculopathy. Appears to be improving with therapy, and currently is tolerable per patient. Discussed weakness, patient not sure if this is worse than previously after encephalitis but does not feel it is significant to her. Currently denies any myelopathic symptoms, though difficult to determine given limited ambulation and baseline weakness. Discussed management options with patient including continued observation and physical therapy, as well as surgical intervention. Given that pain is improved and now at a tolerable level, patient electing to continue observation. Will plan to follow up as an outpatient if symptoms recur or become worse.   I have reviewed all prior documentation and reviewed the electronic medical record since admission. I have personally have reviewed all advanced imaging not just the reports and used my interpretation as documented as the relevant findings. I have reviewed the risks and benefits of all treatment recommendations listed in this note with the patient and family.     Significant PMHx and rehab hx: H/o CVA on 1/26/2018 affecting R occipital lobe, and other stroke affecting R optic nerve and causing R eye blindness. Pt reports she was in a coma for over a month after the CVA, on a ventilator, had trouble weaning from ventilator, and ultimately got a trach. Pt reports she spent ~2 years in a SNF post-hospital discharge and re-learned to walk. Pt uses oxygen overnight (3.5 L), completed oxygen therapy in November 2024.   Other hx: H/o L ankle fx  July 2022; L eye cataract with tentative cataract sx scheduled for 3/26/25; hilda hearing aides; Type I Diabetes (dx 2009); Epilepsy (on vimpat and zonegran; last seizure 2020); L wrist fx (2019); orthostatic hypotension; HTN; HLD; gastroparesis; Hashimoto's thyroiditis; Leukopenia; non-ischemic cadiomyopathy; asthma.    Have you fallen since last visit: No (pt reports unsteadiness and tripping incidents but no falls)    Precautions: Moderate fall risk, low vision (R eye blind)    Pain: 0/10  Location/Type of pain: N/A    HEP compliance/understanding: Intermittent compliance     Objective:   Cognition: highly distractible     Positioning/Alignment: mod cues to dec proximal compensatory movement at shoulder/elbow during FMC/strength activity    Hand Function & Coordination: difficulty with bimanual coordination and alternating movements    Treatment:     Therapeutic Exercise: 28 minutes  Pro1 [employer] arm bike warm-up (level 2): 3 min fwd and 3 min bkwd  Quinton [id number] bar (yellow low resistance > green mod resistance) 2x10 supination, pronation, wrist flex/ext    Therapeutic Activity: 12 minutes  Medium-soft theraputty activities:  Putty press-outs BUEs in sitting > standing to flatten putty into pizza  Jar lid tool - spherical : 1xCC and 1xC with RUE. 1xCC and 1xC with LUE.  Bottle cap tool - 3-pt and lateral pinch patterns: 1xCC and 1xC with RUE. 1xCC and 1xC with LUE.     HEP Progression: N/A; continue current program.    Current HEP:  - Rolyan pinch clip 3x10 to improve 2- and 3-pt pinch strength (blue)  - Handwriting practice: with large lined (manuscript) paper 1 sheet per day for 5-10 min at a time. Use red border/tape and brightly colored guide paper; use red foam  to build-up writing utensil  - Short stretch bandage application to LLE to dec swelling  - Shoulder strengthening ex: seated abduction, chest press, scaption with dumbbells 2#-5# (Access code: [email or web address]; 4/7/25).   - Theraputty  (medium-soft) removing beads  - Shoulder rows and shoulder extensions with anchored resistance (Access Code: [email or web address]; 5/9/25)  - Gilles black pinch clip exercises (2-pt, 3-pt, and lateral pinch)  - Red theraball  exercises

## 2025-07-29 ENCOUNTER — TREATMENT (OUTPATIENT)
Dept: PHYSICAL THERAPY | Facility: CLINIC | Age: 55
End: 2025-07-29
Payer: MEDICAID

## 2025-07-29 ENCOUNTER — APPOINTMENT (OUTPATIENT)
Dept: PHARMACY | Facility: HOSPITAL | Age: 55
End: 2025-07-29
Payer: MEDICAID

## 2025-07-29 DIAGNOSIS — Z91.81 AT RISK FOR INJURY RELATED TO FALL: ICD-10-CM

## 2025-07-29 DIAGNOSIS — R26.9 GAIT ABNORMALITY: Primary | ICD-10-CM

## 2025-07-29 DIAGNOSIS — R53.1 GENERALIZED WEAKNESS: ICD-10-CM

## 2025-07-29 PROCEDURE — 97113 AQUATIC THERAPY/EXERCISES: CPT | Mod: GP

## 2025-07-29 NOTE — PROGRESS NOTES
Physical Therapy Treatment      Patient Name: Mattie Wolfe  MRN: [id number]  Today's Date: 7/29/2025  Visit #39  Time Calculation  Start Time: 0930  Stop Time: 1001  Time Calculation (min): 31 min    Insurance:  2025 [place] [employer] AUTH REQ AFTER 30 VS SHIRA YR AUTH     Assessment:  Continued aquatic therapy focus of improving ambulatory tolerance, strengthening, and balance.  Continued with 2# ankle weights this session, with LE exercises, Pt tolerated entire session without rest breaks and with good rell throughout.  No pain noted this session.  Pt continues to show improved endurance. Planning on trialing aquatic ankle weights.  Pt is making progress towards goals and would continue to benefit from skilled PT at this time.     Aquatic PT is required due to, buoyancy of water reduced weight bearing and decreased LE and spinal loading, allowing for more freedom of movement and promotes improved ability to perform functional tasks.  The viscosity of water which is more than 700x that of air, allowed increased reaction time, in turn allowing advanced balance activities to be safely performed and allow patient to be challenged beyond limited of stability without fear of falling; provides the opportunity to work on balance in a safe environment.  The hydrostatic pressure of water facilitates the reduction of edema in the lower extremities, allowing for greater ease of movement.  Aquatics d/t cardio vascular benefits including: improved cardiovascular efficiency including increased stroke volume, decreased HR, and decreased blood pressure with increased cardiac output allowing patient to achieve cardiovascular training effect with less work load.  Aquatics to support upright posture during postural retraining and strengthening.   Aquatics allow patient to work on gait with less assistance or without assistive device improving posture and ease of gait training/conditioning.     Patient's response to session: Decreased pain,  "Increased ROM/joint mobility, Increase motor control, Improved gait, and Increased knowledge and understanding    Plan:  Cont to progress LE strength/endurance for functional mobility as tolerated. Inc SL balance exercises and endurance walking.     Trial aquatic ankle weights.   Pt would like to swim may trial swimming with kick board when shoulder pain has resolved     Tx Considerations:  - nerve glides for UE nerve related pain   -Ambulation/dynamic gait w/out AD or w/SPC  -Toe taps, step ups etc.    Current Problem:   1. Gait abnormality        2. Generalized weakness        3. At risk for injury related to fall            Subjective   Pt reports, \"I have been okay nothing really new, L shoulder is a little more sore\"       Xray humerus:   FINDINGS:  No fracture or dislocation is evident.  There is partial  visualization of a port line with the tip projecting over the distal  SVC.      IMPRESSION:  No osseous injury is evident.      Xray C-spine:  FINDINGS:  The cervical spine is seen to C7/T1.  Vertebral body height and  alignment are  maintained. No fracture or dislocation is evident.  Mild multilevel degenerative changes seen of the cervical spine.   No  prevertebral soft tissue swelling is evident.  There is a port on the  right chest wall with the line tracking down over the mediastinum as  seen on these radiographs.  IMPRESSION:  Mild degenerative change of the cervical spine without osseous injury  Evident    Pain: NA/10;        Objective     Ambulated with SBA in pool with current for 22 min without need for rest breaks  Ttp L brachioradialis   No inc in symptoms with median nerve ULTT  Dec L shoulder/arm pain with scapular retraction and c-spine retraction     Treatments:  Therapeutic Exercise (51821):   minutes (0 minutes)      Manual Therapy (98503):   minutes      Neuromuscular Re-education (02583):   minutes      Aquatic Therapy (46045):  23 Min 2 units  23 minutes of forward water walking without " UE support.   All standing exercises completed with 2# aquatic ankle weights:   Mini Squats: 30 reps.   Marches 2x10 B  Standing bicycle kick 20 reps  HS Curls: 2 x 10 (B)  HR 2x10  Hip ABD 2x10  Mini Squats 2x10  Hip ext 2x10 B  Hip FLX: 2x10  Shoulder rolls (forward/backwards)  Hip circles  10 x B (CW/CCW).

## 2025-07-31 ENCOUNTER — APPOINTMENT (OUTPATIENT)
Dept: PHYSICAL THERAPY | Facility: CLINIC | Age: 55
End: 2025-07-31
Payer: MEDICAID

## 2025-07-31 DIAGNOSIS — R26.9 GAIT ABNORMALITY: Primary | ICD-10-CM

## 2025-07-31 DIAGNOSIS — Z91.81 AT RISK FOR INJURY RELATED TO FALL: ICD-10-CM

## 2025-07-31 DIAGNOSIS — R53.1 GENERALIZED WEAKNESS: ICD-10-CM

## 2025-07-31 PROCEDURE — 97113 AQUATIC THERAPY/EXERCISES: CPT | Mod: GP

## 2025-08-05 ENCOUNTER — APPOINTMENT (OUTPATIENT)
Dept: PHYSICAL THERAPY | Facility: CLINIC | Age: 55
End: 2025-08-05
Payer: MEDICAID

## 2025-08-05 ENCOUNTER — TREATMENT (OUTPATIENT)
Dept: OCCUPATIONAL THERAPY | Facility: CLINIC | Age: 55
End: 2025-08-05
Payer: MEDICAID

## 2025-08-05 DIAGNOSIS — R53.1 GENERALIZED WEAKNESS: Primary | ICD-10-CM

## 2025-08-05 DIAGNOSIS — R41.842 VISUAL-SPATIAL IMPAIRMENT: ICD-10-CM

## 2025-08-05 DIAGNOSIS — R06.09 DYSPNEA ON MINIMAL EXERTION: ICD-10-CM

## 2025-08-05 PROCEDURE — 97112 NEUROMUSCULAR REEDUCATION: CPT | Mod: GO

## 2025-08-05 PROCEDURE — 97110 THERAPEUTIC EXERCISES: CPT | Mod: GO

## 2025-08-07 ENCOUNTER — APPOINTMENT (OUTPATIENT)
Dept: PHYSICAL THERAPY | Facility: CLINIC | Age: 55
End: 2025-08-07
Payer: MEDICAID

## 2025-08-11 ENCOUNTER — APPOINTMENT (OUTPATIENT)
Dept: PRIMARY CARE | Facility: CLINIC | Age: 55
End: 2025-08-11
Payer: MEDICAID

## 2025-08-11 ENCOUNTER — INFUSION (OUTPATIENT)
Dept: HEMATOLOGY/ONCOLOGY | Facility: CLINIC | Age: 55
End: 2025-08-11
Payer: MEDICAID

## 2025-08-11 VITALS
WEIGHT: 165 LBS | HEART RATE: 91 BPM | OXYGEN SATURATION: 99 % | SYSTOLIC BLOOD PRESSURE: 124 MMHG | BODY MASS INDEX: 30.36 KG/M2 | HEIGHT: 62 IN | DIASTOLIC BLOOD PRESSURE: 90 MMHG | RESPIRATION RATE: 16 BRPM

## 2025-08-11 DIAGNOSIS — E10.69 TYPE 1 DIABETES MELLITUS WITH OTHER SPECIFIED COMPLICATION: ICD-10-CM

## 2025-08-11 DIAGNOSIS — Z95.828 PRESENCE OF PERMANENT CENTRAL VENOUS CATHETER: ICD-10-CM

## 2025-08-11 DIAGNOSIS — M62.81 MUSCLE WEAKNESS: Primary | ICD-10-CM

## 2025-08-11 DIAGNOSIS — E55.9 MILD VITAMIN D DEFICIENCY: ICD-10-CM

## 2025-08-11 DIAGNOSIS — M48.02 CERVICAL SPINAL STENOSIS: ICD-10-CM

## 2025-08-11 DIAGNOSIS — Z86.73 HISTORY OF CEREBROVASCULAR ACCIDENT: ICD-10-CM

## 2025-08-11 DIAGNOSIS — I95.1 ORTHOSTATIC HYPOTENSION: ICD-10-CM

## 2025-08-11 PROCEDURE — 2500000004 HC RX 250 GENERAL PHARMACY W/ HCPCS (ALT 636 FOR OP/ED): Mod: SE | Performed by: INTERNAL MEDICINE

## 2025-08-11 PROCEDURE — 3044F HG A1C LEVEL LT 7.0%: CPT | Performed by: INTERNAL MEDICINE

## 2025-08-11 PROCEDURE — 3080F DIAST BP >= 90 MM HG: CPT | Performed by: INTERNAL MEDICINE

## 2025-08-11 PROCEDURE — 99213 OFFICE O/P EST LOW 20 MIN: CPT | Performed by: INTERNAL MEDICINE

## 2025-08-11 PROCEDURE — 96523 IRRIG DRUG DELIVERY DEVICE: CPT

## 2025-08-11 PROCEDURE — 3074F SYST BP LT 130 MM HG: CPT | Performed by: INTERNAL MEDICINE

## 2025-08-11 PROCEDURE — 3008F BODY MASS INDEX DOCD: CPT | Performed by: INTERNAL MEDICINE

## 2025-08-11 RX ORDER — HEPARIN SODIUM,PORCINE/PF 10 UNIT/ML
50 SYRINGE (ML) INTRAVENOUS AS NEEDED
Status: DISCONTINUED | OUTPATIENT
Start: 2025-08-11 | End: 2025-08-11 | Stop reason: HOSPADM

## 2025-08-11 RX ORDER — HEPARIN 100 UNIT/ML
500 SYRINGE INTRAVENOUS AS NEEDED
OUTPATIENT
Start: 2025-08-11

## 2025-08-11 RX ORDER — HEPARIN SODIUM,PORCINE/PF 10 UNIT/ML
50 SYRINGE (ML) INTRAVENOUS AS NEEDED
OUTPATIENT
Start: 2025-08-11

## 2025-08-11 RX ORDER — FLUDROCORTISONE ACETATE 0.1 MG/1
0.1 TABLET ORAL
Qty: 90 TABLET | Refills: 3 | Status: SHIPPED | OUTPATIENT
Start: 2025-08-11

## 2025-08-11 RX ORDER — HEPARIN SODIUM 1000 [USP'U]/ML
2000 INJECTION, SOLUTION INTRAVENOUS; SUBCUTANEOUS AS NEEDED
OUTPATIENT
Start: 2025-08-11

## 2025-08-11 RX ORDER — HEPARIN 100 UNIT/ML
500 SYRINGE INTRAVENOUS AS NEEDED
Status: DISCONTINUED | OUTPATIENT
Start: 2025-08-11 | End: 2025-08-11 | Stop reason: HOSPADM

## 2025-08-11 RX ORDER — HEPARIN SODIUM 1000 [USP'U]/ML
2000 INJECTION, SOLUTION INTRAVENOUS; SUBCUTANEOUS AS NEEDED
Status: DISCONTINUED | OUTPATIENT
Start: 2025-08-11 | End: 2025-08-11 | Stop reason: HOSPADM

## 2025-08-11 RX ADMIN — HEPARIN 500 UNITS: 100 SYRINGE at 09:36

## 2025-08-12 ENCOUNTER — APPOINTMENT (OUTPATIENT)
Dept: PHYSICAL THERAPY | Facility: CLINIC | Age: 55
End: 2025-08-12
Payer: MEDICAID

## 2025-08-12 DIAGNOSIS — Z91.81 AT RISK FOR INJURY RELATED TO FALL: ICD-10-CM

## 2025-08-12 DIAGNOSIS — R53.1 GENERALIZED WEAKNESS: ICD-10-CM

## 2025-08-12 DIAGNOSIS — R26.9 GAIT ABNORMALITY: Primary | ICD-10-CM

## 2025-08-12 PROBLEM — Z93.0 TRACHEOSTOMY STATUS (MULTI): Status: RESOLVED | Noted: 2018-02-13 | Resolved: 2025-08-12

## 2025-08-12 PROBLEM — M48.02 CERVICAL SPINAL STENOSIS: Status: ACTIVE | Noted: 2025-08-12

## 2025-08-12 PROCEDURE — 97113 AQUATIC THERAPY/EXERCISES: CPT | Mod: GP

## 2025-08-12 ASSESSMENT — ENCOUNTER SYMPTOMS
MYALGIAS: 0
FEVER: 0
DIARRHEA: 0
CHILLS: 0
SHORTNESS OF BREATH: 0
STRIDOR: 0
JOINT SWELLING: 0
WHEEZING: 0
CHOKING: 0
NAUSEA: 0
VOMITING: 0
DIAPHORESIS: 0
COUGH: 0
CONSTIPATION: 0

## 2025-08-13 ENCOUNTER — APPOINTMENT (OUTPATIENT)
Dept: PRIMARY CARE | Facility: CLINIC | Age: 55
End: 2025-08-13
Payer: MEDICAID

## 2025-08-14 ENCOUNTER — TREATMENT (OUTPATIENT)
Dept: PHYSICAL THERAPY | Facility: CLINIC | Age: 55
End: 2025-08-14
Payer: MEDICAID

## 2025-08-14 ENCOUNTER — APPOINTMENT (OUTPATIENT)
Dept: PHARMACY | Facility: HOSPITAL | Age: 55
End: 2025-08-14
Payer: MEDICAID

## 2025-08-14 DIAGNOSIS — Z91.81 AT RISK FOR INJURY RELATED TO FALL: ICD-10-CM

## 2025-08-14 DIAGNOSIS — R53.1 GENERALIZED WEAKNESS: ICD-10-CM

## 2025-08-14 DIAGNOSIS — R26.9 GAIT ABNORMALITY: Primary | ICD-10-CM

## 2025-08-14 PROCEDURE — 97113 AQUATIC THERAPY/EXERCISES: CPT | Mod: GP

## 2025-08-18 DIAGNOSIS — I95.1 ORTHOSTATIC HYPOTENSION: ICD-10-CM

## 2025-08-19 ENCOUNTER — APPOINTMENT (OUTPATIENT)
Dept: PHYSICAL THERAPY | Facility: CLINIC | Age: 55
End: 2025-08-19
Payer: MEDICAID

## 2025-08-19 DIAGNOSIS — R53.1 GENERALIZED WEAKNESS: ICD-10-CM

## 2025-08-19 DIAGNOSIS — R26.9 GAIT ABNORMALITY: Primary | ICD-10-CM

## 2025-08-19 DIAGNOSIS — Z91.81 AT RISK FOR INJURY RELATED TO FALL: ICD-10-CM

## 2025-08-19 PROCEDURE — 97113 AQUATIC THERAPY/EXERCISES: CPT | Mod: GP

## 2025-08-19 RX ORDER — POTASSIUM CHLORIDE 600 MG/1
TABLET, EXTENDED RELEASE ORAL
Qty: 90 TABLET | Refills: 3 | Status: SHIPPED | OUTPATIENT
Start: 2025-08-19

## 2025-08-21 ENCOUNTER — APPOINTMENT (OUTPATIENT)
Dept: PHYSICAL THERAPY | Facility: CLINIC | Age: 55
End: 2025-08-21
Payer: MEDICAID

## 2025-08-21 DIAGNOSIS — R26.9 GAIT ABNORMALITY: Primary | ICD-10-CM

## 2025-08-21 DIAGNOSIS — R53.1 GENERALIZED WEAKNESS: ICD-10-CM

## 2025-08-21 DIAGNOSIS — Z91.81 AT RISK FOR INJURY RELATED TO FALL: ICD-10-CM

## 2025-08-21 PROCEDURE — 97113 AQUATIC THERAPY/EXERCISES: CPT | Mod: GP

## 2025-08-26 ENCOUNTER — APPOINTMENT (OUTPATIENT)
Dept: PHYSICAL THERAPY | Facility: CLINIC | Age: 55
End: 2025-08-26
Payer: MEDICAID

## 2025-08-26 DIAGNOSIS — R26.9 GAIT ABNORMALITY: Primary | ICD-10-CM

## 2025-08-26 DIAGNOSIS — R53.1 GENERALIZED WEAKNESS: ICD-10-CM

## 2025-08-26 DIAGNOSIS — Z91.81 AT RISK FOR INJURY RELATED TO FALL: ICD-10-CM

## 2025-08-26 PROCEDURE — 97113 AQUATIC THERAPY/EXERCISES: CPT | Mod: GP

## 2025-08-28 ENCOUNTER — APPOINTMENT (OUTPATIENT)
Dept: PHYSICAL THERAPY | Facility: CLINIC | Age: 55
End: 2025-08-28
Payer: MEDICAID

## 2025-08-28 ENCOUNTER — TREATMENT (OUTPATIENT)
Dept: OCCUPATIONAL THERAPY | Facility: CLINIC | Age: 55
End: 2025-08-28
Payer: MEDICAID

## 2025-08-28 DIAGNOSIS — R26.9 GAIT ABNORMALITY: Primary | ICD-10-CM

## 2025-08-28 DIAGNOSIS — R53.1 GENERALIZED WEAKNESS: ICD-10-CM

## 2025-08-28 DIAGNOSIS — R06.09 DYSPNEA ON MINIMAL EXERTION: ICD-10-CM

## 2025-08-28 DIAGNOSIS — R41.842 VISUAL-SPATIAL IMPAIRMENT: ICD-10-CM

## 2025-08-28 DIAGNOSIS — R53.1 GENERALIZED WEAKNESS: Primary | ICD-10-CM

## 2025-08-28 DIAGNOSIS — Z91.81 AT RISK FOR INJURY RELATED TO FALL: ICD-10-CM

## 2025-08-28 PROCEDURE — 97113 AQUATIC THERAPY/EXERCISES: CPT | Mod: GP

## 2025-08-28 PROCEDURE — 97530 THERAPEUTIC ACTIVITIES: CPT | Mod: GO

## 2025-08-28 PROCEDURE — 97110 THERAPEUTIC EXERCISES: CPT | Mod: GO

## 2025-08-29 ENCOUNTER — PATIENT OUTREACH (OUTPATIENT)
Dept: PRIMARY CARE | Facility: CLINIC | Age: 55
End: 2025-08-29
Payer: MEDICAID

## 2025-08-29 DIAGNOSIS — R21 RASH: Primary | ICD-10-CM

## 2025-08-29 DIAGNOSIS — J39.8 TRACHEAL STENOSIS: ICD-10-CM

## 2025-08-29 DIAGNOSIS — R26.9 GAIT DISTURBANCE, POST-STROKE: ICD-10-CM

## 2025-08-29 DIAGNOSIS — I69.398 GAIT DISTURBANCE, POST-STROKE: ICD-10-CM

## 2025-08-29 DIAGNOSIS — I95.1 ORTHOSTATIC HYPOTENSION: ICD-10-CM

## 2025-08-29 DIAGNOSIS — M48.02 CERVICAL SPINAL STENOSIS: ICD-10-CM

## 2025-08-29 DIAGNOSIS — E10.69 TYPE 1 DIABETES MELLITUS WITH OTHER SPECIFIED COMPLICATION: ICD-10-CM

## 2025-08-29 DIAGNOSIS — M62.81 MUSCLE WEAKNESS: ICD-10-CM

## 2025-08-29 DIAGNOSIS — E06.3 HASHIMOTO'S THYROIDITIS: ICD-10-CM

## 2025-08-29 DIAGNOSIS — Z86.73 HISTORY OF CEREBROVASCULAR ACCIDENT: ICD-10-CM

## 2025-08-29 PROCEDURE — 99490 CHRNC CARE MGMT STAFF 1ST 20: CPT | Performed by: INTERNAL MEDICINE

## 2025-09-02 ENCOUNTER — TREATMENT (OUTPATIENT)
Dept: PHYSICAL THERAPY | Facility: CLINIC | Age: 55
End: 2025-09-02
Payer: MEDICAID

## 2025-09-02 DIAGNOSIS — Z91.81 AT RISK FOR INJURY RELATED TO FALL: ICD-10-CM

## 2025-09-02 DIAGNOSIS — R26.9 GAIT ABNORMALITY: Primary | ICD-10-CM

## 2025-09-02 DIAGNOSIS — R53.1 GENERALIZED WEAKNESS: ICD-10-CM

## 2025-09-02 PROCEDURE — 97113 AQUATIC THERAPY/EXERCISES: CPT | Mod: GP

## 2025-09-04 ENCOUNTER — TREATMENT (OUTPATIENT)
Dept: PHYSICAL THERAPY | Facility: CLINIC | Age: 55
End: 2025-09-04
Payer: MEDICAID

## 2025-09-04 DIAGNOSIS — R53.1 GENERALIZED WEAKNESS: ICD-10-CM

## 2025-09-04 DIAGNOSIS — R26.9 GAIT ABNORMALITY: Primary | ICD-10-CM

## 2025-09-04 DIAGNOSIS — Z91.81 AT RISK FOR INJURY RELATED TO FALL: ICD-10-CM

## 2025-09-04 PROCEDURE — 97113 AQUATIC THERAPY/EXERCISES: CPT | Mod: GP

## 2025-09-09 ENCOUNTER — APPOINTMENT (OUTPATIENT)
Dept: PHYSICAL THERAPY | Facility: CLINIC | Age: 55
End: 2025-09-09
Payer: MEDICAID

## 2025-09-09 DIAGNOSIS — Z91.81 AT RISK FOR INJURY RELATED TO FALL: ICD-10-CM

## 2025-09-09 DIAGNOSIS — R26.9 GAIT ABNORMALITY: Primary | ICD-10-CM

## 2025-09-09 DIAGNOSIS — R53.1 GENERALIZED WEAKNESS: ICD-10-CM

## 2025-09-11 ENCOUNTER — APPOINTMENT (OUTPATIENT)
Dept: PHYSICAL THERAPY | Facility: CLINIC | Age: 55
End: 2025-09-11
Payer: MEDICAID

## 2025-09-11 DIAGNOSIS — R53.1 GENERALIZED WEAKNESS: ICD-10-CM

## 2025-09-11 DIAGNOSIS — R26.9 GAIT ABNORMALITY: Primary | ICD-10-CM

## 2025-09-11 DIAGNOSIS — Z91.81 AT RISK FOR INJURY RELATED TO FALL: ICD-10-CM

## 2025-09-16 ENCOUNTER — APPOINTMENT (OUTPATIENT)
Dept: PHYSICAL THERAPY | Facility: CLINIC | Age: 55
End: 2025-09-16
Payer: MEDICAID

## 2025-09-16 DIAGNOSIS — R53.1 GENERALIZED WEAKNESS: ICD-10-CM

## 2025-09-16 DIAGNOSIS — Z91.81 AT RISK FOR INJURY RELATED TO FALL: ICD-10-CM

## 2025-09-16 DIAGNOSIS — R26.9 GAIT ABNORMALITY: Primary | ICD-10-CM

## 2025-09-18 ENCOUNTER — APPOINTMENT (OUTPATIENT)
Dept: PHYSICAL THERAPY | Facility: CLINIC | Age: 55
End: 2025-09-18
Payer: MEDICAID

## 2025-09-18 DIAGNOSIS — Z91.81 AT RISK FOR INJURY RELATED TO FALL: ICD-10-CM

## 2025-09-18 DIAGNOSIS — R53.1 GENERALIZED WEAKNESS: ICD-10-CM

## 2025-09-18 DIAGNOSIS — R26.9 GAIT ABNORMALITY: Primary | ICD-10-CM

## 2025-09-19 ENCOUNTER — APPOINTMENT (OUTPATIENT)
Dept: OCCUPATIONAL THERAPY | Facility: CLINIC | Age: 55
End: 2025-09-19
Payer: MEDICAID

## 2025-09-19 DIAGNOSIS — R06.09 DYSPNEA ON MINIMAL EXERTION: ICD-10-CM

## 2025-09-19 DIAGNOSIS — R41.842 VISUAL-SPATIAL IMPAIRMENT: ICD-10-CM

## 2025-09-19 DIAGNOSIS — R53.1 GENERALIZED WEAKNESS: Primary | ICD-10-CM

## 2025-09-23 ENCOUNTER — APPOINTMENT (OUTPATIENT)
Dept: PHYSICAL THERAPY | Facility: CLINIC | Age: 55
End: 2025-09-23
Payer: MEDICAID

## 2025-09-23 DIAGNOSIS — Z91.81 AT RISK FOR INJURY RELATED TO FALL: ICD-10-CM

## 2025-09-23 DIAGNOSIS — R26.9 GAIT ABNORMALITY: Primary | ICD-10-CM

## 2025-09-23 DIAGNOSIS — R53.1 GENERALIZED WEAKNESS: ICD-10-CM

## 2025-09-25 ENCOUNTER — APPOINTMENT (OUTPATIENT)
Dept: PHYSICAL THERAPY | Facility: CLINIC | Age: 55
End: 2025-09-25
Payer: MEDICAID

## 2025-09-25 DIAGNOSIS — R53.1 GENERALIZED WEAKNESS: ICD-10-CM

## 2025-09-25 DIAGNOSIS — R26.9 GAIT ABNORMALITY: Primary | ICD-10-CM

## 2025-09-25 DIAGNOSIS — Z91.81 AT RISK FOR INJURY RELATED TO FALL: ICD-10-CM

## 2025-09-26 ENCOUNTER — APPOINTMENT (OUTPATIENT)
Dept: OCCUPATIONAL THERAPY | Facility: CLINIC | Age: 55
End: 2025-09-26
Payer: MEDICAID

## 2025-09-26 DIAGNOSIS — R06.09 DYSPNEA ON MINIMAL EXERTION: ICD-10-CM

## 2025-09-26 DIAGNOSIS — R53.1 GENERALIZED WEAKNESS: Primary | ICD-10-CM

## 2025-09-26 DIAGNOSIS — R41.842 VISUAL-SPATIAL IMPAIRMENT: ICD-10-CM

## 2025-09-30 ENCOUNTER — APPOINTMENT (OUTPATIENT)
Dept: PHYSICAL THERAPY | Facility: CLINIC | Age: 55
End: 2025-09-30
Payer: MEDICAID

## 2025-09-30 DIAGNOSIS — R53.1 GENERALIZED WEAKNESS: ICD-10-CM

## 2025-09-30 DIAGNOSIS — R26.9 GAIT ABNORMALITY: Primary | ICD-10-CM

## 2025-09-30 DIAGNOSIS — Z91.81 AT RISK FOR INJURY RELATED TO FALL: ICD-10-CM

## 2025-10-03 ENCOUNTER — APPOINTMENT (OUTPATIENT)
Dept: OCCUPATIONAL THERAPY | Facility: CLINIC | Age: 55
End: 2025-10-03
Payer: MEDICAID

## 2025-10-03 DIAGNOSIS — R41.842 VISUAL-SPATIAL IMPAIRMENT: ICD-10-CM

## 2025-10-03 DIAGNOSIS — R53.1 GENERALIZED WEAKNESS: Primary | ICD-10-CM

## 2025-10-03 DIAGNOSIS — R06.09 DYSPNEA ON MINIMAL EXERTION: ICD-10-CM

## 2025-10-10 ENCOUNTER — APPOINTMENT (OUTPATIENT)
Dept: OCCUPATIONAL THERAPY | Facility: CLINIC | Age: 55
End: 2025-10-10
Payer: MEDICAID

## 2025-10-10 DIAGNOSIS — R41.842 VISUAL-SPATIAL IMPAIRMENT: ICD-10-CM

## 2025-10-10 DIAGNOSIS — R53.1 GENERALIZED WEAKNESS: Primary | ICD-10-CM

## 2025-10-10 DIAGNOSIS — R06.09 DYSPNEA ON MINIMAL EXERTION: ICD-10-CM

## 2025-11-24 ENCOUNTER — APPOINTMENT (OUTPATIENT)
Dept: ENDOCRINOLOGY | Facility: CLINIC | Age: 55
End: 2025-11-24
Payer: MEDICAID

## 2025-12-29 ENCOUNTER — APPOINTMENT (OUTPATIENT)
Dept: DERMATOLOGY | Facility: CLINIC | Age: 55
End: 2025-12-29
Payer: MEDICAID

## 2026-01-07 ENCOUNTER — APPOINTMENT (OUTPATIENT)
Dept: PRIMARY CARE | Facility: CLINIC | Age: 56
End: 2026-01-07
Payer: MEDICAID

## 2026-02-23 ENCOUNTER — APPOINTMENT (OUTPATIENT)
Dept: ENDOCRINOLOGY | Facility: CLINIC | Age: 56
End: 2026-02-23
Payer: MEDICAID

## 2026-05-11 ENCOUNTER — APPOINTMENT (OUTPATIENT)
Dept: ENDOCRINOLOGY | Facility: CLINIC | Age: 56
End: 2026-05-11
Payer: MEDICAID

## (undated) DEVICE — DRESSING PETRO W3XL8IN OIL EMUL N ADH GZ KNIT IMPREG CELOS

## (undated) DEVICE — SPONGE GZ W4XL4IN COT 12 PLY TYP VII WVN C FLD DSGN

## (undated) DEVICE — SUTURE VCRL SZ 2-0 L27IN ABSRB UD L26MM CT-2 1/2 CIR J269H

## (undated) DEVICE — PADDING UNDERCAST W4INXL12FT RAYON POLY SYN NONADHESIVE

## (undated) DEVICE — SPLINT CAST W4XL15IN GRN STRENGTH PLSTR OF PARIS FAST SET

## (undated) DEVICE — TUBING, SUCTION, 1/4" X 10', STRAIGHT: Brand: MEDLINE

## (undated) DEVICE — GLOVE ORANGE PI 7   MSG9070

## (undated) DEVICE — BANDAGE COMPR W4INXL5YD BGE HI E W/ REM CLP SURE-WRAP

## (undated) DEVICE — PADDING CAST W4INXL4YD HIGHLY ABSRB THAN COT EZ APPL

## (undated) DEVICE — PAD, EYE, OVAL, 1 5/8 X 2 5/8 IN, STERILE

## (undated) DEVICE — DRESSING, TRANSPARENT, TEGADERM, 2-3/8 X 2-3/4 IN

## (undated) DEVICE — GLOVE SURG SZ 75 L12IN FNGR THK13MIL BRN LTX SYN POLYMER W

## (undated) DEVICE — SCREW BNE L24MM DIA2.4MM CORT S STL ST T8 STARDRV RECESS
Type: IMPLANTABLE DEVICE | Site: WRIST | Status: NON-FUNCTIONAL
Removed: 2019-05-08

## (undated) DEVICE — SYRINGE IRRIG 60ML SFT PLIABLE BLB EZ TO GRP 1 HND USE W/

## (undated) DEVICE — GLOVE ORANGE PI 7 1/2   MSG9075

## (undated) DEVICE — ELECTRODE PT RET AD L9FT HI MOIST COND ADH HYDRGEL CORDED

## (undated) DEVICE — SYRINGE, 3 CC, LUER LOCK

## (undated) DEVICE — K WIRE FIX L150MM DIA1.6MM S STL TRCR PNT

## (undated) DEVICE — HAND II: Brand: MEDLINE INDUSTRIES, INC.

## (undated) DEVICE — GOWN,AURORA,NONREINFORCED,LARGE: Brand: MEDLINE

## (undated) DEVICE — SUTURE ETHLN SZ 3-0 L18IN NONABSORBABLE BLK PS-2 L19MM 3/8 1669H

## (undated) DEVICE — BIT DRL L110MM DIA1.8MM QUIK CPL CALIB W/O STP REUSE

## (undated) DEVICE — ELECTROSURGICAL PENCIL BUTTON SWITCH E-Z CLEAN COATED BLADE ELECTRODE 10 FT (3 M) CORD HOLSTER: Brand: MEGADYNE

## (undated) DEVICE — DRAPE, C-ARM, BANDS, 41X120: Brand: MEDLINE

## (undated) DEVICE — 1010 S-DRAPE TOWEL DRAPE 10/BX: Brand: STERI-DRAPE™

## (undated) DEVICE — CHLORAPREP 26ML ORANGE